# Patient Record
Sex: FEMALE | Race: WHITE | NOT HISPANIC OR LATINO | Employment: OTHER | URBAN - METROPOLITAN AREA
[De-identification: names, ages, dates, MRNs, and addresses within clinical notes are randomized per-mention and may not be internally consistent; named-entity substitution may affect disease eponyms.]

---

## 2017-01-05 ENCOUNTER — ALLIED HEALTH/NURSE VISIT (OUTPATIENT)
Dept: FAMILY MEDICINE | Facility: CLINIC | Age: 67
End: 2017-01-05
Payer: COMMERCIAL

## 2017-01-05 DIAGNOSIS — Z23 ENCOUNTER FOR IMMUNIZATION: Primary | ICD-10-CM

## 2017-01-05 PROCEDURE — 99207 ZZC NO CHARGE NURSE ONLY: CPT

## 2017-01-05 PROCEDURE — 90732 PPSV23 VACC 2 YRS+ SUBQ/IM: CPT

## 2017-01-05 PROCEDURE — G0009 ADMIN PNEUMOCOCCAL VACCINE: HCPCS

## 2017-01-05 NOTE — NURSING NOTE
Prior to injection verified patient identity using patient's name and date of birth.  Screening Questionnaire for Adult Immunization    Are you sick today?   No   Do you have allergies to medications, food, a vaccine component or latex?   No   Have you ever had a serious reaction after receiving a vaccination?   No   Do you have a long-term health problem with heart disease, lung disease, asthma, kidney disease, metabolic disease (e.g. diabetes), anemia, or other blood disorder?   No   Do you have cancer, leukemia, HIV/AIDS, or any other immune system problem?   No   In the past 3 months, have you taken medications that affect  your immune system, such as prednisone, other steroids, or anticancer drugs; drugs for the treatment of rheumatoid arthritis, Crohn s disease, or psoriasis; or have you had radiation treatments?   No   Have you had a seizure, or a brain or other nervous system problem?   No   During the past year, have you received a transfusion of blood or blood     products, or been given immune (gamma) globulin or antiviral drug?   No   For women: Are you pregnant or is there a chance you could become        pregnant during the next month?   No   Have you received any vaccinations in the past 4 weeks?   No     Immunization questionnaire answers were all negative.      MNVFC doesn't apply on this patient   Patient instructed to remain in clinic for 20 minutes afterwards, and to report any adverse reaction to me immediately.       Screening performed by Mary Cade on 1/5/2017 at 2:45 PM.

## 2017-01-12 ENCOUNTER — TELEPHONE (OUTPATIENT)
Dept: FAMILY MEDICINE | Facility: CLINIC | Age: 67
End: 2017-01-12

## 2017-01-12 DIAGNOSIS — B00.1 RECURRENT COLD SORES: Primary | ICD-10-CM

## 2017-01-12 RX ORDER — VALACYCLOVIR HYDROCHLORIDE 1 G/1
2000 TABLET, FILM COATED ORAL 2 TIMES DAILY
Qty: 4 TABLET | Refills: 0 | Status: SHIPPED | OUTPATIENT
Start: 2017-01-12 | End: 2017-04-21

## 2017-01-12 NOTE — TELEPHONE ENCOUNTER
Valtrex 1gm       Last Written Prescription Date:  Not on med list   Last Fill Quantity: ,   # refills:   Last Office Visit with [facility] or [facility] prescribing provider: 10/25/16  Future Office visit:       Routing refill request to provider for review/approval because:  Drug not active on patient's medication list    Thank you!  Sultana Burnham   [facility]  P: [phone] F: [phone]

## 2017-01-30 ENCOUNTER — TELEPHONE (OUTPATIENT)
Dept: FAMILY MEDICINE | Facility: CLINIC | Age: 67
End: 2017-01-30

## 2017-02-01 ENCOUNTER — RADIANT APPOINTMENT (OUTPATIENT)
Dept: GENERAL RADIOLOGY | Facility: CLINIC | Age: 67
End: 2017-02-01
Attending: PHYSICIAN ASSISTANT
Payer: COMMERCIAL

## 2017-02-01 ENCOUNTER — OFFICE VISIT (OUTPATIENT)
Dept: FAMILY MEDICINE | Facility: CLINIC | Age: 67
End: 2017-02-01
Payer: COMMERCIAL

## 2017-02-01 VITALS
TEMPERATURE: 97.4 F | HEART RATE: 80 BPM | HEIGHT: 67 IN | BODY MASS INDEX: 24.77 KG/M2 | SYSTOLIC BLOOD PRESSURE: 100 MMHG | WEIGHT: 157.8 LBS | RESPIRATION RATE: 18 BRPM | DIASTOLIC BLOOD PRESSURE: 62 MMHG

## 2017-02-01 DIAGNOSIS — M79.632 PAIN OF LEFT FOREARM: ICD-10-CM

## 2017-02-01 DIAGNOSIS — R11.0 NAUSEA: ICD-10-CM

## 2017-02-01 DIAGNOSIS — I63.50 CEREBRAL ARTERY OCCLUSION WITH CEREBRAL INFARCTION (H): ICD-10-CM

## 2017-02-01 DIAGNOSIS — M79.632 PAIN OF LEFT FOREARM: Primary | ICD-10-CM

## 2017-02-01 DIAGNOSIS — Z12.11 COLON CANCER SCREENING: ICD-10-CM

## 2017-02-01 DIAGNOSIS — J34.89 NASAL DISCHARGE: ICD-10-CM

## 2017-02-01 PROCEDURE — 73090 X-RAY EXAM OF FOREARM: CPT | Mod: LT

## 2017-02-01 PROCEDURE — 99214 OFFICE O/P EST MOD 30 MIN: CPT | Performed by: PHYSICIAN ASSISTANT

## 2017-02-01 ASSESSMENT — PAIN SCALES - GENERAL: PAINLEVEL: NO PAIN (0)

## 2017-02-01 NOTE — PATIENT INSTRUCTIONS
(M79.712) Pain of left forearm  (primary encounter diagnosis)  Comment: Unclear cause, suspect injury that you don't recall.  If xray normal, consider giving this 2 wks or trying splint for 2 wks.  If that doesn't work, then Physical Therapy.    Plan: XR Forearm Left 2 Views    (I63.50) Cerebral artery occlusion with cerebral infarction (H)  Plan: continue pravastatin, discussed krill oil no proven benefit    (R11.0) Nausea  Plan: try omeprazole 20 mg daily x 2 wks, then can try off med but may need for up to 8 weeks.  If never helps at all, need to scope.  Can also be from nose drainage, but not the bad burps.    (J34.89) Nasal discharge  Plan: Unclear sinus infection and already doing nasal spray and rinses, so have ENT take a look.    (Z12.11) Colon cancer screening  Comment: set up colonoscopy!  Plan: GASTROENTEROLOGY ADULT REF PROCEDURE ONLY

## 2017-02-01 NOTE — NURSING NOTE
"Chief Complaint   Patient presents with     Musculoskeletal Problem     Arm tender     /62 mmHg  Pulse 80  Temp(Src) 97.4  F (36.3  C) (Tympanic)  Resp 18  Ht 5' 6.85\" (1.698 m)  Wt 157 lb 12.8 oz (71.578 kg)  BMI 24.83 kg/m2 Estimated body mass index is 24.83 kg/(m^2) as calculated from the following:    Height as of this encounter: 5' 6.85\" (1.698 m).    Weight as of this encounter: 157 lb 12.8 oz (71.578 kg).  bp completed using cuff size: regular      Health Maintenance that is potentially due pending provider review:  NONE    Ayanna Dominguez MA  8:00 AM 2/1/2017      "

## 2017-02-01 NOTE — MR AVS SNAPSHOT
After Visit Summary   2/1/2017    Chyna Moncada    MRN: [id number]           Patient Information     Date Of Birth          1950        Visit Information        Provider Department      2/1/2017 7:40 AM Edilia Davison PA-C [facility]        Today's Diagnoses     Pain of left forearm    -  1     Cerebral artery occlusion with cerebral infarction (H)         Nausea         Nasal discharge         Colon cancer screening           Care Instructions    (M79.632) Pain of left forearm  (primary encounter diagnosis)  Comment: Unclear cause, suspect injury that you don't recall.  If xray normal, consider giving this 2 wks or trying splint for 2 wks.  If that doesn't work, then Physical Therapy.    Plan: XR Forearm Left 2 Views    (I63.50) Cerebral artery occlusion with cerebral infarction (H)  Plan: continue pravastatin, discussed krill oil no proven benefit    (R11.0) Nausea  Plan: try omeprazole 20 mg daily x 2 wks, then can try off med but may need for up to 8 weeks.  If never helps at all, need to scope.  Can also be from nose drainage, but not the bad burps.    (J34.89) Nasal discharge  Plan: Unclear sinus infection and already doing nasal spray and rinses, so have ENT take a look.    (Z12.11) Colon cancer screening  Comment: set up colonoscopy!  Plan: GASTROENTEROLOGY ADULT REF PROCEDURE ONLY            Follow-ups after your visit        Additional Services     GASTROENTEROLOGY ADULT REF PROCEDURE ONLY       Last Lab Result: CREATININE (mg/dL)       Date                     Value                 10/01/2015               0.82             ----------  Body mass index is 24.83 kg/(m^2).      Patient will be contacted to schedule procedure.     Please be aware that coverage of these services is subject to the terms and limitations of your health insurance plan.  Call member services at your health plan with any benefit or coverage questions.  Any procedures must be performed  at a [facility] OR coordinated by your clinic's referral office.    Please bring the following with you to your appointment:    (1) Any X-Rays, CTs or MRIs which have been performed.  Contact the facility where they were done to arrange for  prior to your scheduled appointment.    (2) List of current medications   (3) This referral request   (4) Any documents/labs given to you for this referral                  Your next 10 appointments already scheduled     Feb 02, 2017 10:00 AM   Office Visit with Mando Bradford [facility]    [address] [place] [id number]   [phone]           Bring a current list of meds and any records pertaining to this visit.  For Physicals, please bring immunization records and any forms needing to be filled out.  Please arrive 10 minutes early to complete paperwork.              Future tests that were ordered for you today     Open Future Orders        Priority Expected Expires Ordered    XR Forearm Left 2 Views Routine 2/1/2017 2/1/2018 2/1/2017            Who to contact     If you have questions or need follow up information about today's clinic visit or your schedule please contact [facility] directly at [phone].  Normal or non-critical lab and imaging results will be communicated to you by MyChart, letter or phone within 4 business days after the clinic has received the results. If you do not hear from us within 7 days, please contact the clinic through [employer] or phone. If you have a critical or abnormal lab result, we will notify you by phone as soon as possible.  Submit refill requests through 365[employer] or call your pharmacy and they will forward the refill request to us. Please allow 3 business days for your refill to be completed.          Additional Information About Your Visit        365webcall Information     365webcall lets you send messages to your doctor, view your  "test results, renew your prescriptions, schedule appointments and more. To sign up, go to [email or web address] . Click on \"Log in\" on the left side of the screen, which will take you to the Welcome page. Then click on \"Sign up Now\" on the right side of the page.     You will be asked to enter the access code listed below, as well as some personal information. Please follow the directions to create your username and password.     Your access code is: [email or web address]  Expires: 2017  8:28 AM     Your access code will  in 90 days. If you need help or a new code, please call your [place] clinic or [phone].        Care EveryWhere ID     This is your [facility] EveryWhere ID. This could be used by other organizations to access your [place] medical records  [id number]        Your Vitals Were     Pulse Temperature Respirations Height BMI (Body Mass Index)       80 97.4  F (36.3  C) (Tympanic) 18 5' 6.85\" (1.698 m) 24.83 kg/m2        Blood Pressure from Last 3 Encounters:   17 100/62   10/25/16 132/82   10/13/16 100/74    Weight from Last 3 Encounters:   17 157 lb 12.8 oz (71.578 kg)   10/25/16 154 lb (69.854 kg)   10/13/16 153 lb (69.4 kg)              We Performed the Following     GASTROENTEROLOGY ADULT REF PROCEDURE ONLY          Today's Medication Changes          These changes are accurate as of: 17  8:28 AM.  If you have any questions, ask your nurse or doctor.               Stop taking these medicines if you haven't already. Please contact your care team if you have questions.     vitamin E 1000 UNIT capsule   Commonly known as:  TOCOPHEROL   Stopped by:  Edilia Davison PA-C                    Primary Care Provider Office Phone # Fax #    Edilia Davison PA-C [phone]       [address] [address]        Thank you!     Thank you for choosing [facility]  for your care. Our goal is always to provide you " with excellent care. Hearing back from our patients is one way we can continue to improve our services. Please take a few minutes to complete the written survey that you may receive in the mail after your visit with us. Thank you!             Your Updated Medication List - Protect others around you: Learn how to safely use, store and throw away your medicines at www.disposemymeds.org.          This list is accurate as of: 2/1/17  8:28 AM.  Always use your most recent med list.                   Brand Name Dispense Instructions for use    aspirin EC 81 MG EC tablet      Take 81 mg by mouth       calcium-vitamin D 600-400 MG-UNIT per tablet    CALTRATE     Take 1 tablet by mouth       CINNAMON PO      2 capsules per day       CULTURELLE PO      Take by mouth daily       fluticasone 50 MCG/ACT spray    FLONASE    1 Package    Spray 2 sprays into both nostrils daily       GARLIQUE PO      Take 1 tablet by mouth daily       IRON SUPPLEMENT PO      Take 325 mg by mouth daily       LORazepam 0.5 MG tablet    ATIVAN    10 tablet    Take 1 tablet (0.5 mg) by mouth every 6 hours as needed       magnesium 100 MG Caps          multivitamin Tabs tablet      Take 1 tablet by mouth daily       pravastatin 40 MG tablet    PRAVACHOL    30 tablet    Take 1 tablet (40 mg) by mouth At Bedtime       Propylene Glycol-Glycerin 1-0.3 % Soln      Place 1 drop into both eyes as needed       valACYclovir 1000 mg tablet    VALTREX    4 tablet    Take 2 tablets (2,000 mg) by mouth 2 times daily       VITAMIN D-3 PO      Take 1,000 Units by mouth daily

## 2017-02-01 NOTE — PROGRESS NOTES
"  SUBJECTIVE:                                                    Chyna Moncada is a 66 year old female who presents to clinic today for the following health issues:    1.) Possible Sinus Infection  R side runs all the time - unilateral  Can't blow much out    Not history of freq sinus infections  Has used nasacort, nasal rinses    2.) Nausea x 2 weeks  Variable, can be very mild to very bothersome.    Never like will throw up.  Normal appetite and no change in what she eats.  No NSAIDS.  Stools - more often but not black.  Bad tasting burps    3) discuss cholesterol test  On pravastatin, would prefer to be on just krill   History of stroke.    Tender Arm     Onset: Friday 1/27/17    Description:   Location: Left Arm/Wrist  Character: Dull ache    Intensity: moderate    Progression of Symptoms: same    Accompanying Signs & Symptoms:  Other symptoms: Bruising and very tender   History:   Previous similar pain: no       Precipitating factors:   Trauma or overuse: no     Alleviating factors:  Improved by: nothing       Therapies Tried and outcome: None    No injury  Had bad bruise  Was very tender to just touch the other day   Sometimes awakens her at night.  Aches in day.    Sometimes has to change position of arm.    Due for colonoscopy.    Problem list and histories reviewed & adjusted, as indicated.  Additional history: as documented    Problem list, Medication list, Allergies, and Medical/Social/Surgical histories reviewed in EPIC and updated as appropriate.    ROS:  Constitutional, HEENT, cardiovascular, GI, musculoskeletal, neuro systems are negative, except as otherwise noted.    OBJECTIVE:                                                    /62 mmHg  Pulse 80  Temp(Src) 97.4  F (36.3  C) (Tympanic)  Resp 18  Ht 5' 6.85\" (1.698 m)  Wt 157 lb 12.8 oz (71.578 kg)  BMI 24.83 kg/m2  Body mass index is 24.83 kg/(m^2).  GENERAL: healthy, alert and no distress  HEENT: ear canals and TM's normal, nose and " mouth without ulcers or lesions  MS: 3-4 in fading bruise over ventral distal L forearm, mild discomfort with wrist flexion otherwise normal ROM and no abnormality on palpation    Xray read by Edilia Davison PA-C as normal       ASSESSMENT/PLAN:                                                        ICD-10-CM    1. Pain of left forearm M79.632 XR Forearm Left 2 Views   2. Cerebral artery occlusion with cerebral infarction (H) I63.50    3. Nausea R11.0    4. Nasal discharge J34.89    5. Colon cancer screening Z12.11 GASTROENTEROLOGY ADULT REF PROCEDURE ONLY       Patient Instructions   (M79.632) Pain of left forearm  (primary encounter diagnosis)  Comment: Unclear cause, suspect injury that you don't recall.  If xray normal, consider giving this 2 wks or trying splint for 2 wks.  If that doesn't work, then Physical Therapy.    Plan: XR Forearm Left 2 Views    (I63.50) Cerebral artery occlusion with cerebral infarction (H)  Plan: continue pravastatin, discussed krill oil no proven benefit    (R11.0) Nausea  Plan: try omeprazole 20 mg daily x 2 wks, then can try off med but may need for up to 8 weeks.  If never helps at all, need to scope.  Can also be from nose drainage, but not the bad burps.    (J34.89) Nasal discharge  Plan: Unclear sinus infection and already doing nasal spray and rinses, so have ENT take a look.    (Z12.11) Colon cancer screening  Comment: set up colonoscopy!  Plan: GASTROENTEROLOGY ADULT REF PROCEDURE ONLY            Edilia Davison PA-C  [facility]

## 2017-04-21 ENCOUNTER — OFFICE VISIT (OUTPATIENT)
Dept: FAMILY MEDICINE | Facility: CLINIC | Age: 67
End: 2017-04-21
Payer: COMMERCIAL

## 2017-04-21 VITALS
DIASTOLIC BLOOD PRESSURE: 70 MMHG | BODY MASS INDEX: 25.01 KG/M2 | HEIGHT: 66 IN | SYSTOLIC BLOOD PRESSURE: 102 MMHG | WEIGHT: 155.6 LBS | HEART RATE: 76 BPM

## 2017-04-21 DIAGNOSIS — Z12.11 COLON CANCER SCREENING: ICD-10-CM

## 2017-04-21 DIAGNOSIS — F41.9 ANXIETY: ICD-10-CM

## 2017-04-21 DIAGNOSIS — L82.1 SEBORRHEIC KERATOSES: ICD-10-CM

## 2017-04-21 DIAGNOSIS — I63.50 CEREBRAL ARTERY OCCLUSION WITH CEREBRAL INFARCTION (H): ICD-10-CM

## 2017-04-21 DIAGNOSIS — B00.1 RECURRENT COLD SORES: ICD-10-CM

## 2017-04-21 DIAGNOSIS — G47.00 INSOMNIA, UNSPECIFIED TYPE: Primary | ICD-10-CM

## 2017-04-21 PROCEDURE — 99214 OFFICE O/P EST MOD 30 MIN: CPT | Mod: 25 | Performed by: PHYSICIAN ASSISTANT

## 2017-04-21 PROCEDURE — 17110 DESTRUCTION B9 LES UP TO 14: CPT | Performed by: PHYSICIAN ASSISTANT

## 2017-04-21 RX ORDER — VALACYCLOVIR HYDROCHLORIDE 1 G/1
2000 TABLET, FILM COATED ORAL 2 TIMES DAILY
Qty: 4 TABLET | Refills: 11 | Status: SHIPPED | OUTPATIENT
Start: 2017-04-21 | End: 2018-08-13

## 2017-04-21 RX ORDER — AMITRIPTYLINE HYDROCHLORIDE 10 MG/1
10-20 TABLET ORAL AT BEDTIME
Qty: 60 TABLET | Refills: 11 | Status: SHIPPED | OUTPATIENT
Start: 2017-04-21 | End: 2017-06-21

## 2017-04-21 RX ORDER — LORAZEPAM 0.5 MG/1
0.5 TABLET ORAL EVERY 6 HOURS PRN
Qty: 10 TABLET | Refills: 0 | Status: SHIPPED | OUTPATIENT
Start: 2017-04-21 | End: 2019-06-04

## 2017-04-21 RX ORDER — PRAVASTATIN SODIUM 40 MG
40 TABLET ORAL AT BEDTIME
Qty: 90 TABLET | Refills: 1 | Status: SHIPPED | OUTPATIENT
Start: 2017-04-21 | End: 2017-06-21

## 2017-04-21 ASSESSMENT — ANXIETY QUESTIONNAIRES
6. BECOMING EASILY ANNOYED OR IRRITABLE: SEVERAL DAYS
GAD7 TOTAL SCORE: 1
3. WORRYING TOO MUCH ABOUT DIFFERENT THINGS: NOT AT ALL
2. NOT BEING ABLE TO STOP OR CONTROL WORRYING: NOT AT ALL
7. FEELING AFRAID AS IF SOMETHING AWFUL MIGHT HAPPEN: NOT AT ALL
5. BEING SO RESTLESS THAT IT IS HARD TO SIT STILL: NOT AT ALL
1. FEELING NERVOUS, ANXIOUS, OR ON EDGE: NOT AT ALL

## 2017-04-21 ASSESSMENT — PATIENT HEALTH QUESTIONNAIRE - PHQ9: 5. POOR APPETITE OR OVEREATING: NOT AT ALL

## 2017-04-21 NOTE — NURSING NOTE
"Chief Complaint   Patient presents with     Fatigue     pt. has a hardtime sleeping or stay asleep X 6 months plus. no energy     Derm Problem     check out spots on stomach and one on back      Lipids     Health Maintenance     pt would like talk about fit test instead of colonoscopy       Initial /70 (BP Location: Right arm, Patient Position: Chair, Cuff Size: Adult Regular)  Pulse 76  Ht 5' 6\" (1.676 m)  Wt 155 lb 9.6 oz (70.6 kg)  BMI 25.11 kg/m2 Estimated body mass index is 25.11 kg/(m^2) as calculated from the following:    Height as of this encounter: 5' 6\" (1.676 m).    Weight as of this encounter: 155 lb 9.6 oz (70.6 kg).  Medication Reconciliation: complete     Vanessa Leigh, CMA      "

## 2017-04-21 NOTE — MR AVS SNAPSHOT
After Visit Summary   4/21/2017    Chyna Moncada    MRN: [id number]           Patient Information     Date Of Birth          1950        Visit Information        Provider Department      4/21/2017 1:00 PM Edilia Davison PA-C [facility]        Today's Diagnoses     Insomnia, unspecified type    -  1    Cerebral artery occlusion with cerebral infarction (H)        Anxiety        Recurrent cold sores        Colon cancer screening        Seborrheic keratoses          Care Instructions    Insomnia -  Melatonin, valerian root, chamomile, lavender oil  If none of these work, then try prescription   Many different prescription options  Discussed many different prescription options.  All can potentially make tired in days.  Or risk falls if too groggy.  Chose med to help aches as well.    If fatigue doesn't improve with better sleep, lab work up      Refilled meds    Treated skin spots.  Likely to recur since they keep doing so despite different treatments.            Follow-ups after your visit        Future tests that were ordered for you today     Open Future Orders        Priority Expected Expires Ordered    Fecal colorectal cancer screen (FIT) Routine 5/12/2017 7/14/2017 4/21/2017            Who to contact     If you have questions or need follow up information about today's clinic visit or your schedule please contact [facility] directly at [phone].  Normal or non-critical lab and imaging results will be communicated to you by MyChart, letter or phone within 4 business days after the clinic has received the results. If you do not hear from us within 7 days, please contact the clinic through MyChart or phone. If you have a critical or abnormal lab result, we will notify you by phone as soon as possible.  Submit refill requests through [employer] or call your pharmacy and they will forward the refill request to us. Please allow 3 business days for  "your refill to be completed.          Additional Information About Your Visit        [employer] Information     [employer] lets you send messages to your doctor, view your test results, renew your prescriptions, schedule appointments and more. To sign up, go to [email or web address] . Click on \"Log in\" on the left side of the screen, which will take you to the Welcome page. Then click on \"Sign up Now\" on the right side of the page.     You will be asked to enter the access code listed below, as well as some personal information. Please follow the directions to create your username and password.     Your access code is: [email or web address]  Expires: 2017  9:28 AM     Your access code will  in 90 days. If you need help or a new code, please call your [place] clinic or [phone].        Care EveryWhere ID     This is your Care EveryWhere ID. This could be used by other organizations to access your [place] medical records  [id number]        Your Vitals Were     Pulse Height BMI (Body Mass Index)             76 5' 6\" (1.676 m) 25.11 kg/m2          Blood Pressure from Last 3 Encounters:   17 102/70   17 100/62   10/25/16 132/82    Weight from Last 3 Encounters:   17 155 lb 9.6 oz (70.6 kg)   17 157 lb 12.8 oz (71.6 kg)   10/25/16 154 lb (69.9 kg)              We Performed the Following     C DESTROY < 15 BENIGN SKIN LESIONS          Today's Medication Changes          These changes are accurate as of: 17  1:43 PM.  If you have any questions, ask your nurse or doctor.               Start taking these medicines.        Dose/Directions    amitriptyline 10 MG tablet   Commonly known as:  ELAVIL   Used for:  Insomnia, unspecified type   Started by:  Edilia Davison PA-C        Dose:  10-20 mg   Take 1-2 tablets (10-20 mg) by mouth At Bedtime   Quantity:  60 tablet   Refills:  11            Where to get your medicines      These medications were sent to [facility] - " [facility] [address] [place] [address]  [address] [place] [address]     Phone:  [phone]     amitriptyline 10 MG tablet    pravastatin 40 MG tablet    valACYclovir 1000 mg tablet         Some of these will need a paper prescription and others can be bought over the counter.  Ask your nurse if you have questions.     Bring a paper prescription for each of these medications     LORazepam 0.5 MG tablet                Primary Care Provider Office Phone # Fax #    Edilia Davison PA-C [phone]       [facility] [address] [place] [address]        Thank you!     Thank you for choosing [facility]  for your care. Our goal is always to provide you with excellent care. Hearing back from our patients is one way we can continue to improve our services. Please take a few minutes to complete the written survey that you may receive in the mail after your visit with us. Thank you!             Your Updated Medication List - Protect others around you: Learn how to safely use, store and throw away your medicines at www.disposemymeds.org.          This list is accurate as of: 4/21/17  1:43 PM.  Always use your most recent med list.                   Brand Name Dispense Instructions for use    amitriptyline 10 MG tablet    ELAVIL    60 tablet    Take 1-2 tablets (10-20 mg) by mouth At Bedtime       aspirin EC 81 MG EC tablet      Take 81 mg by mouth       calcium-vitamin D 600-400 MG-UNIT per tablet    CALTRATE     Take 1 tablet by mouth       CINNAMON PO      2 capsules per day       CULTURELLE PO      Take by mouth daily       fluticasone 50 MCG/ACT spray    FLONASE    1 Package    Spray 2 sprays into both nostrils daily       GARLIQUE PO      Take 1 tablet by mouth daily       IRON SUPPLEMENT PO      Take 325 mg by mouth daily       LORazepam 0.5 MG tablet    ATIVAN    10 tablet    Take 1 tablet (0.5 mg) by mouth every 6 hours as needed       magnesium 100 MG  Caps          multivitamin Tabs tablet      Take 1 tablet by mouth daily       order for DME     1 Device    Equipment being ordered: Wrist Brace       pravastatin 40 MG tablet    PRAVACHOL    90 tablet    Take 1 tablet (40 mg) by mouth At Bedtime       Propylene Glycol-Glycerin 1-0.3 % Soln      Place 1 drop into both eyes as needed       valACYclovir 1000 mg tablet    VALTREX    4 tablet    Take 2 tablets (2,000 mg) by mouth 2 times daily       VITAMIN D-3 PO      Take 1,000 Units by mouth daily

## 2017-04-21 NOTE — PATIENT INSTRUCTIONS
Insomnia -  Melatonin, valerian root, chamomile, lavender oil  If none of these work, then try prescription   Many different prescription options  Discussed many different prescription options.  All can potentially make tired in days.  Or risk falls if too groggy.  Chose med to help aches as well.    If fatigue doesn't improve with better sleep, lab work up      Refilled meds    Treated skin spots.  Likely to recur since they keep doing so despite different treatments.

## 2017-04-21 NOTE — PROGRESS NOTES
"Chief Complaint   Patient presents with     Fatigue     pt. has a hardtime sleeping or stay asleep X 6 months plus. no energy     Derm Problem     check out spots on stomach and one on back      Lipids     Health Maintenance     pt would like talk about fit test instead of colonoscopy       SUBJECTIVE:                                                    Chyna Moncada is a 66 year old female who presents to clinic today for the following health issues:      Hyperlipidemia Follow-Up      Rate your low fat/cholesterol diet?: poor    Taking statin?  Yes, no muscle aches from statin    Other lipid medications/supplements?:  none       Amount of exercise or physical activity: 2-3 days/week for an average of greater than 60 minutes    Problems taking medications regularly: No    Medication side effects: none    Diet: regular (no restrictions)    Tired.  Sleep troubles - staying asleep.  \"Brain never stops\", but nothing on mind when she wakes up.  Often getting only 4 hrs.  Sometimes napping.  No history of sleep troubles previously.  Exercise in mornings.  1 glass wine at night - but doesn't seem to sleep differently with or without.  No major stressors.  No new supplements, herbs.      Lipids.  History CVA.  On pravastatin.  Myalgia with lovastatin in past.  Many achy areas of body currently.  Wants to stay on statin, unclear that symptoms are related, and history of stroke.    Cold sores - valtrex works.  Few months or more between symptoms.    Anxiety - not really a problem, still has most of ativan from 2015, but states wants on hand, is \"security blanket\".    Skin lesions- get stuck when showering.    Problem list and histories reviewed & adjusted, as indicated.  Additional history: as documented    BP Readings from Last 3 Encounters:   04/21/17 102/70   02/01/17 100/62   10/25/16 132/82    Wt Readings from Last 3 Encounters:   04/21/17 155 lb 9.6 oz (70.6 kg)   02/01/17 157 lb 12.8 oz (71.6 kg)   10/25/16 154 lb " "(69.9 kg)             Labs reviewed in EPIC    Reviewed and updated as needed this visit by clinical staff  Tobacco  Allergies  Med Hx  Surg Hx  Fam Hx  Soc Hx      Reviewed and updated as needed this visit by Provider         ROS:  Constitutional, HEENT, cardiovascular, GI, musculoskeletal, neuro, skin, and psych systems are negative, except as otherwise noted.    OBJECTIVE:                                                    /70 (BP Location: Right arm, Patient Position: Chair, Cuff Size: Adult Regular)  Pulse 76  Ht 5' 6\" (1.676 m)  Wt 155 lb 9.6 oz (70.6 kg)  BMI 25.11 kg/m2  Body mass index is 25.11 kg/(m^2).  GENERAL: healthy, alert and no distress  SKIN: no suspicious lesions or rashes, numerous SK      Diagnostic Test Results:  none      ASSESSMENT/PLAN:                                                        ICD-10-CM    1. Insomnia, unspecified type G47.00 amitriptyline (ELAVIL) 10 MG tablet   2. Cerebral artery occlusion with cerebral infarction (H) I63.50 pravastatin (PRAVACHOL) 40 MG tablet   3. Anxiety F41.9 LORazepam (ATIVAN) 0.5 MG tablet   4. Recurrent cold sores B00.1 valACYclovir (VALTREX) 1000 mg tablet   5. Colon cancer screening Z12.11 Fecal colorectal cancer screen (FIT)   6. Seborrheic keratoses L82.1 C DESTROY < 15 BENIGN SKIN LESIONS   SK treated with LN    Patient Instructions   Insomnia -  Melatonin, valerian root, chamomile, lavender oil  If none of these work, then try prescription   Many different prescription options  Discussed many different prescription options.  All can potentially make tired in days.  Or risk falls if too groggy.  Chose med to help aches as well.    If fatigue doesn't improve with better sleep, lab work up      Refilled meds    Treated skin spots.  Likely to recur since they keep doing so despite different treatments.          Edilia Davison PA-C  [facility]    "

## 2017-04-22 ASSESSMENT — PATIENT HEALTH QUESTIONNAIRE - PHQ9: SUM OF ALL RESPONSES TO PHQ QUESTIONS 1-9: 11

## 2017-04-22 ASSESSMENT — ANXIETY QUESTIONNAIRES: GAD7 TOTAL SCORE: 1

## 2017-05-08 PROCEDURE — G0328 FECAL BLOOD SCRN IMMUNOASSAY: HCPCS | Performed by: PHYSICIAN ASSISTANT

## 2017-05-09 DIAGNOSIS — Z12.11 COLON CANCER SCREENING: ICD-10-CM

## 2017-05-09 LAB — HEMOCCULT STL QL IA: NEGATIVE

## 2017-05-20 DIAGNOSIS — I63.50 CEREBRAL ARTERY OCCLUSION WITH CEREBRAL INFARCTION (H): ICD-10-CM

## 2017-05-22 ENCOUNTER — TELEPHONE (OUTPATIENT)
Dept: FAMILY MEDICINE | Facility: CLINIC | Age: 67
End: 2017-05-22

## 2017-05-22 DIAGNOSIS — S13.9XXD SPRAIN OF NECK, SUBSEQUENT ENCOUNTER: Primary | ICD-10-CM

## 2017-05-22 RX ORDER — PRAVASTATIN SODIUM 40 MG
TABLET ORAL
Qty: 90 TABLET | Refills: 1 | Status: SHIPPED | OUTPATIENT
Start: 2017-05-22 | End: 2017-10-16

## 2017-05-22 NOTE — TELEPHONE ENCOUNTER
Reason for Call: Request for an order or referral:    Order or referral being requested: Chyna is asking if Edilia would refer her back to Physical Therapy. She is having pain again in her Right shoulder into the neck. She would like to see Deana again.  (Brooklynn Rojas had referred her 10/25/16)    Date needed: as soon as possible    Has the patient been seen by the PCP for this problem? YES    Additional comments: She was last seen in PT in December 2016    Phone number Patient can be reached at:  Home number on file [phone] (home)    Best Time:  anytime    Can we leave a detailed message on this number?  YES    Call taken on 5/22/2017 at 12:05 PM by Cassy Slaughter

## 2017-05-22 NOTE — TELEPHONE ENCOUNTER
myrtle     Last Written Prescription Date: 4/21/17  Last Fill Quantity: 90, # refills: 1  Last Office Visit with G, P or [facility] prescribing provider: 4/21/17       Lab Results   Component Value Date    CHOL 229 10/13/2016     Lab Results   Component Value Date    HDL 81 10/13/2016     Lab Results   Component Value Date     10/13/2016     Lab Results   Component Value Date    TRIG 135 10/13/2016     Lab Results   Component Value Date    CHOLHDLRATIO 2.9 10/06/2015

## 2017-05-30 ENCOUNTER — HOSPITAL ENCOUNTER (EMERGENCY)
Facility: CLINIC | Age: 67
Discharge: HOME | End: 2017-05-30
Attending: PHYSICIAN ASSISTANT | Admitting: PHYSICIAN ASSISTANT
Payer: MEDICARE

## 2017-05-30 ENCOUNTER — APPOINTMENT (OUTPATIENT)
Dept: GENERAL RADIOLOGY | Facility: CLINIC | Age: 67
End: 2017-05-30
Attending: PHYSICIAN ASSISTANT
Payer: MEDICARE

## 2017-05-30 VITALS
SYSTOLIC BLOOD PRESSURE: 138 MMHG | OXYGEN SATURATION: 98 % | BODY MASS INDEX: 25.02 KG/M2 | DIASTOLIC BLOOD PRESSURE: 78 MMHG | TEMPERATURE: 98.7 F | WEIGHT: 155 LBS

## 2017-05-30 DIAGNOSIS — M25.512 ACUTE PAIN OF LEFT SHOULDER: ICD-10-CM

## 2017-05-30 PROCEDURE — 73030 X-RAY EXAM OF SHOULDER: CPT | Mod: LT

## 2017-05-30 PROCEDURE — 71101 X-RAY EXAM UNILAT RIBS/CHEST: CPT | Mod: LT

## 2017-05-30 PROCEDURE — A9270 NON-COVERED ITEM OR SERVICE: HCPCS | Mod: GY | Performed by: PHYSICIAN ASSISTANT

## 2017-05-30 PROCEDURE — 99213 OFFICE O/P EST LOW 20 MIN: CPT | Performed by: PHYSICIAN ASSISTANT

## 2017-05-30 PROCEDURE — 25000132 ZZH RX MED GY IP 250 OP 250 PS 637: Mod: GY | Performed by: PHYSICIAN ASSISTANT

## 2017-05-30 PROCEDURE — 99213 OFFICE O/P EST LOW 20 MIN: CPT

## 2017-05-30 RX ORDER — IBUPROFEN 200 MG
600 TABLET ORAL ONCE
Status: COMPLETED | OUTPATIENT
Start: 2017-05-30 | End: 2017-05-30

## 2017-05-30 RX ADMIN — IBUPROFEN 600 MG: 200 TABLET, FILM COATED ORAL at 20:16

## 2017-05-30 NOTE — ED AVS SNAPSHOT
[facility] Emergency Department    [address] [address]    Phone:  [phone]    Fax:  [phone]                                       Chyna Moncada   MRN: [id number]    Department:  [facility] Emergency Department   Date of Visit:  5/30/2017           Patient Information     Date Of Birth          1950        Your diagnoses for this visit were:     Acute pain of left shoulder        You were seen by Elsi Lynn PA-C.      Follow-up Information     Follow up with Edilia Davison PA-C In 1 week.    Specialty:  Physician Assistant    Why:  if no improvement or sooner if new or worsening symptoms     Contact information:    [facility]  [address] [facility] [address]  [phone]          Follow up with [facility].    Specialty:  Orthopedics and Sports Medicine    Why:  As needed, If symptoms worsen    Contact information:    [address]  [address]  [place] [place] [address] [phone]      Future Appointments        Provider Department Dept Phone Center    5/31/2017  9:00 AM Mary Jane Rocha, PT [facility] Physical Therapy [phone] [facility]    6/12/2017 8:00 AM [facility] MAMMO ROOM 1 [facility] [phone] [facility]      24 Hour Appointment Hotline       To make an appointment at any [facility], call [phone] ([phone]). If you don't have a family doctor or clinic, we will help you find one. [facility] are conveniently located to serve the needs of you and your family.          ED Discharge Orders     LUCINA MARSHALL                    Review of your medicines      Our records show that you are taking the medicines listed below. If these are incorrect, please call your family doctor or clinic.        Dose / Directions Last dose taken    amitriptyline 10 MG tablet   Commonly known as:  ELAVIL   Dose:  10-20 mg   Quantity:  60 tablet         Take 1-2 tablets (10-20 mg) by mouth At Bedtime   Refills:  11        aspirin EC 81 MG EC tablet   Dose:  81 mg        Take 81 mg by mouth   Refills:  0        calcium-vitamin D 600-400 MG-UNIT per tablet   Commonly known as:  CALTRATE   Dose:  1 tablet        Take 1 tablet by mouth   Refills:  0        CINNAMON PO        2 capsules per day   Refills:  0        CULTURELLE PO   Indication:  Pt unsure of dosage        Take by mouth daily   Refills:  0        fluticasone 50 MCG/ACT spray   Commonly known as:  FLONASE   Dose:  2 spray   Quantity:  1 Package        Spray 2 sprays into both nostrils daily   Refills:  11        GARLIQUE PO   Dose:  1 tablet        Take 1 tablet by mouth daily   Refills:  0        IRON SUPPLEMENT PO   Dose:  325 mg        Take 325 mg by mouth daily   Refills:  0        LORazepam 0.5 MG tablet   Commonly known as:  ATIVAN   Dose:  0.5 mg   Quantity:  10 tablet        Take 1 tablet (0.5 mg) by mouth every 6 hours as needed   Refills:  0        magnesium 100 MG Caps        Refills:  0        multivitamin Tabs tablet   Dose:  1 tablet        Take 1 tablet by mouth daily   Refills:  0        order for DME   Quantity:  1 Device        Equipment being ordered: Wrist Brace   Refills:  0        * pravastatin 40 MG tablet   Commonly known as:  PRAVACHOL   Dose:  40 mg   Quantity:  90 tablet        Take 1 tablet (40 mg) by mouth At Bedtime   Refills:  1        * pravastatin 40 MG tablet   Commonly known as:  PRAVACHOL   Quantity:  90 tablet        TAKE ONE TABLET BY MOUTH EVERY NIGHT AT BEDTIME   Refills:  1        Propylene Glycol-Glycerin 1-0.3 % Soln   Dose:  1 drop        Place 1 drop into both eyes as needed   Refills:  0        valACYclovir 1000 mg tablet   Commonly known as:  VALTREX   Dose:  2000 mg   Quantity:  4 tablet        Take 2 tablets (2,000 mg) by mouth 2 times daily   Refills:  11        VITAMIN D-3 PO   Dose:  1000 Units        Take 1,000 Units by mouth daily   Refills:  0        *  Notice:  This list has 2 medication(s) that are the same as other medications prescribed for you. Read the directions carefully, and ask your doctor or other care provider to review them with you.            Procedures and tests performed during your visit     Ribs XR, unilat 3 views + PA chest,  left    Shoulder XR, 2 view left      Orders Needing Specimen Collection     None      Pending Results     Date and Time Order Name Status Description    5/30/2017 2014 Ribs XR, unilat 3 views + PA chest,  left Preliminary     5/30/2017 2014 Shoulder XR, 2 view left Preliminary             Pending Culture Results     No orders found from 5/28/2017 to 5/31/2017.            Pending Results Instructions     If you had any lab results that were not finalized at the time of your Discharge, you can call the ED Lab Result RN at [phone]. You will be contacted by this team for any positive Lab results or changes in treatment. The nurses are available 7 days a week from 10A to 6:30P.  You can leave a message 24 hours per day and they will return your call.        Test Results From Your Hospital Stay        5/30/2017  8:52 PM      Narrative     LEFT SHOULDER THREE VIEWS  5/30/2017 8:39 PM    HISTORY:  Pain after falling.    COMPARISON:  None.    FINDINGS:  No fracture or osseous lesion is seen. The joint spaces are  well preserved. There are two ossifications projected over the medial  aspect of the left humeral neck on the frontal view, the larger  measuring 1.6 cm and the smaller measuring 0.3 cm. These could  represent loose bodies. No other soft tissue pathology is seen.        Impression     IMPRESSION: No definite acute abnormality is seen. There may be loose  bodies adjacent to the medial humeral neck.          5/30/2017  8:54 PM      Narrative     CHEST AND LEFT RIBS THREE VIEWS   5/30/2017 8:39 PM    HISTORY:  Pain after falling.    COMPARISON:  Chest radiographs on 4/25/2016.    FINDINGS:  The heart size is normal. No  "mediastinal pathology is seen.  The lungs are clear. The pulmonary vasculature is normal. No  pneumothorax or pleural effusion is seen. No rib fracture or other  chest wall pathology is seen.        Impression     IMPRESSION:  Unremarkable chest and left ribs.                Thank you for choosing [facility]       Thank you for choosing [facility] for your care. Our goal is always to provide you with excellent care. Hearing back from our patients is one way we can continue to improve our services. Please take a few minutes to complete the written survey that you may receive in the mail after you visit with us. Thank you!        [employer] Information     [employer] lets you send messages to your doctor, view your test results, renew your prescriptions, schedule appointments and more. To sign up, go to [email or web address] . Click on \"Log in\" on the left side of the screen, which will take you to the Welcome page. Then click on \"Sign up Now\" on the right side of the page.     You will be asked to enter the access code listed below, as well as some personal information. Please follow the directions to create your username and password.     Your access code is: [email or web address]  Expires: 2017  9:03 PM     Your access code will  in 90 days. If you need help or a new code, please call your [facility] clinic or [phone].        Care EveryWhere ID     This is your Care EveryWhere ID. This could be used by other organizations to access your [facility] medical records  [id number]        After Visit Summary       This is your record. Keep this with you and show to your community pharmacist(s) and doctor(s) at your next visit.                  "

## 2017-05-30 NOTE — ED AVS SNAPSHOT
[facility] Emergency Department    [address] [address]    Phone:  [phone]    Fax:  [phone]                                       Chyna Moncada   MRN: [id number]    Department:  [facility] Emergency Department   Date of Visit:  5/30/2017           After Visit Summary Signature Page     I have received my discharge instructions, and my questions have been answered. I have discussed any challenges I see with this plan with the nurse or doctor.    ..........................................................................................................................................  Patient/Patient Representative Signature      ..........................................................................................................................................  Patient Representative Print Name and Relationship to Patient    ..................................................               ................................................  Date                                            Time    ..........................................................................................................................................  Reviewed by Signature/Title    ...................................................              ..............................................  Date                                                            Time

## 2017-05-31 NOTE — ED PROVIDER NOTES
History     Chief Complaint   Patient presents with     Shoulder Pain     after fall     HPI  Chyna Moncada is a 66 year old female who presents to the urgent care with concerns over left shoulder/back pain after she sustained a fall on the shower just prior to arrival.  Patient states she slipped on a wet shower floor and fell landing on her left side.  She states she did hit her head during the fall but denies any LOC.  She denies any current headache, dizziness, lightheadedness, distal numbness or paresthesias.  She does complain of left sided upper back/posterior shoulder pain back which is exacerbated by deep breathing.  She has not had any actual shortness of breath.  She has not attempted any OTC treatments.     Past Medical History:   Diagnosis Date     Cerebral embolism with cerebral infarction (H)      Current Outpatient Prescriptions   Medication Sig Dispense Refill     pravastatin (PRAVACHOL) 40 MG tablet TAKE ONE TABLET BY MOUTH EVERY NIGHT AT BEDTIME 90 tablet 1     valACYclovir (VALTREX) 1000 mg tablet Take 2 tablets (2,000 mg) by mouth 2 times daily 4 tablet 11     pravastatin (PRAVACHOL) 40 MG tablet Take 1 tablet (40 mg) by mouth At Bedtime 90 tablet 1     LORazepam (ATIVAN) 0.5 MG tablet Take 1 tablet (0.5 mg) by mouth every 6 hours as needed 10 tablet 0     amitriptyline (ELAVIL) 10 MG tablet Take 1-2 tablets (10-20 mg) by mouth At Bedtime 60 tablet 11     Lactobacillus Rhamnosus, GG, (CULTURELLE PO) Take by mouth daily       Cholecalciferol (VITAMIN D-3 PO) Take 1,000 Units by mouth daily       order for DME Equipment being ordered: Wrist Brace 1 Device 0     aspirin EC 81 MG tablet Take 81 mg by mouth       calcium-vitamin D (CALTRATE) 600-400 MG-UNIT per tablet Take 1 tablet by mouth       CINNAMON PO 2 capsules per day       Garlic (GARLIQUE PO) Take 1 tablet by mouth daily       Ferrous Sulfate (IRON SUPPLEMENT PO) Take 325 mg by mouth daily       magnesium 100 MG CAPS         multivitamin (OCUVITE) TABS Take 1 tablet by mouth daily       fluticasone (FLONASE) 50 MCG/ACT nasal spray Spray 2 sprays into both nostrils daily 1 Package 11     Propylene Glycol-Glycerin 1-0.3 % SOLN Place 1 drop into both eyes as needed       Social History   Substance Use Topics     Smoking status: Former Smoker     Types: Cigarettes     Smokeless tobacco: Never Used     Alcohol use Yes      Comment: Occational     I have reviewed the Medications, Allergies, Past Medical and Surgical History, and Social History in the Epic system.    Review of Systems   CONSTITUTIONAL:NEGATIVE for fever, chills, change in weight  INTEGUMENTARY/SKIN: NEGATIVE for worrisome rashes, moles or lesions  EYES: NEGATIVE for vision changes  RESP:POSITIVE for pain with deep breathing and NEGATIVE for cough, shortness of breath, wheezing   GI: NEGATIVE for nausea, vomiting or abdominal pain   MUSCULOSKELETAL: POSITIVE  for left posterior shoulder/back pain   NEURO: NEGATIVE for headache, dizziness, lightheadedness, or distal numbness or paresthesias   Physical Exam   /78  Temp 98.7  F (37.1  C)  Wt 70.3 kg (155 lb)  SpO2 98%  BMI 25.02 kg/m2  Physical Exam   Constitutional: She is oriented to person, place, and time. She appears well-developed and well-nourished. She appears distressed (mild patient appears in pain).   Cardiovascular: Normal rate and regular rhythm.  Exam reveals no gallop and no friction rub.    No murmur heard.  Pulmonary/Chest: Effort normal and breath sounds normal. No respiratory distress. She has no wheezes. She has no rales.   Musculoskeletal:        Left shoulder: She exhibits decreased range of motion (actively wtih flexion, abduction duet to pain, full passive ROM) and tenderness. She exhibits no swelling, no effusion, no crepitus, no deformity and no laceration.        Cervical back: She exhibits normal range of motion, no tenderness, no bony tenderness, no swelling, no deformity and no laceration.         Thoracic back: She exhibits tenderness.        Back:    Neurological: She is alert and oriented to person, place, and time. No sensory deficit.   Skin: Skin is warm and dry. No abrasion, no ecchymosis, no laceration and no rash noted. No erythema.     ED Course     ED Course     Procedures        Critical Care time:  none            Results for orders placed or performed during the hospital encounter of 05/30/17   Shoulder XR, 2 view left    Narrative    LEFT SHOULDER THREE VIEWS  5/30/2017 8:39 PM    HISTORY:  Pain after falling.    COMPARISON:  None.    FINDINGS:  No fracture or osseous lesion is seen. The joint spaces are  well preserved. There are two ossifications projected over the medial  aspect of the left humeral neck on the frontal view, the larger  measuring 1.6 cm and the smaller measuring 0.3 cm. These could  represent loose bodies. No other soft tissue pathology is seen.      Impression    IMPRESSION: No definite acute abnormality is seen. There may be loose  bodies adjacent to the medial humeral neck.     ELVIS LINK MD   Ribs XR, unilat 3 views + PA chest,  left    Narrative    CHEST AND LEFT RIBS THREE VIEWS   5/30/2017 8:39 PM    HISTORY:  Pain after falling.    COMPARISON:  Chest radiographs on 4/25/2016.    FINDINGS:  The heart size is normal. No mediastinal pathology is seen.  The lungs are clear. The pulmonary vasculature is normal. No  pneumothorax or pleural effusion is seen. No rib fracture or other  chest wall pathology is seen.      Impression    IMPRESSION:  Unremarkable chest and left ribs.    ELVIS LINK MD     Labs Ordered and Resulted from Time of ED Arrival Up to the Time of Departure from the ED - No data to display    Assessments & Plan (with Medical Decision Making)     I have reviewed the nursing notes.    I have reviewed the findings, diagnosis, plan and need for follow up with the patient.  Discharge Medication List as of 5/30/2017  9:03 PM        Final  diagnoses:   Acute pain of left shoulder     66-year-old female presents to the urgent care concerns of her left-sided shoulder/upper back pain after she sustained a fall showers prior to arrival.  She had stable vital signs upon arrival.  Physical exam findings as described above.  Respiratory evaluation she did have x-rays of her left ribs and left shoulder.  Her shoulder x-ray did demonstrate any evidence of acute fracture, dislocation however there was incidental finding of  two ossifications projected over the medial aspect of the left humeral neck on the frontal view, the larger measuring 1.6 cm and the smaller measuring 0.3 cm. These could represent loose bodies per radiology report,  however this does not correlate with patients area of pain.  X-ray of her chest and ribs are negative for acute fracture, pneumothorax, pleural effusion or change cardiopulmonary vasculature.  This was reassured and normal findings.  She was discharged home stable with instructions for symptomatic treatment has been ice, Tylenol/ibuprofen as needed for discomfort.  I did discuss risk/benefits of prescription pain medication however patient is unable to tolerate long history of side effects, intolerances.  She was instructed to follow up with PCP if no improvement within the next 5-7 days.  Worrisome reasons to return to ER/UC sooner discussed.    5/30/2017   [facility] EMERGENCY DEPARTMENT     Elsi Lynn PA-C  06/01/17 1010

## 2017-06-01 ENCOUNTER — RADIANT APPOINTMENT (OUTPATIENT)
Dept: GENERAL RADIOLOGY | Facility: CLINIC | Age: 67
End: 2017-06-01
Attending: NURSE PRACTITIONER
Payer: COMMERCIAL

## 2017-06-01 ENCOUNTER — OFFICE VISIT (OUTPATIENT)
Dept: FAMILY MEDICINE | Facility: CLINIC | Age: 67
End: 2017-06-01
Payer: COMMERCIAL

## 2017-06-01 VITALS
HEART RATE: 69 BPM | BODY MASS INDEX: 25.99 KG/M2 | SYSTOLIC BLOOD PRESSURE: 126 MMHG | TEMPERATURE: 97.3 F | DIASTOLIC BLOOD PRESSURE: 78 MMHG | WEIGHT: 161 LBS

## 2017-06-01 DIAGNOSIS — M79.602 PAIN OF LEFT UPPER EXTREMITY: Primary | ICD-10-CM

## 2017-06-01 DIAGNOSIS — M79.602 PAIN OF LEFT UPPER EXTREMITY: ICD-10-CM

## 2017-06-01 PROCEDURE — 73060 X-RAY EXAM OF HUMERUS: CPT | Mod: LT

## 2017-06-01 PROCEDURE — 99213 OFFICE O/P EST LOW 20 MIN: CPT | Performed by: NURSE PRACTITIONER

## 2017-06-01 NOTE — PATIENT INSTRUCTIONS
Continue using your sling and Ibuprofen and tylenol as directed.    Arnica gel/cream can be applied for comfort as well.    Follow up with your PCP early next week for recheck.    Follow-up with your primary care provider next week and as needed.    Indications for emergent return to emergency department discussed with patient, who verbalized good understanding and agreement.  Patient understands the limitations of today's evaluation.         Upper Extremity Contusion  You have a contusion (bruise) of an upper extremity (arm, wrist, hand, or fingers). Symptoms include pain, swelling, and skin discoloration. No bones are broken. This injury may take from a few days to a few weeks to heal.  During that time, the bruise may change from reddish in color, to purple-blue, to green-yellow, to yellow-brown.  Home care    Unless another medication was prescribed, you can take acetaminophen, ibuprofen, or naproxen to control pain. (If you have chronic liver or kidney disease or ever had a stomach ulcer or GI bleeding, talk with your doctor before using these medicines.)    Elevate the injured area to reduce pain and swelling. As much as possible, sit or lie down with the injured area raised about the level of your heart. This is especially important during the first 48 hours.    Ice the injured area to help reduce pain and swelling. Wrap a cold source (ice pack or ice cubes in a plastic bag) in a thin towel. Apply to the bruised area for 20 minutes every 1 to 2 hours the first day. Continue this 3 to 4 times a day until the pain and swelling goes away.    If a sling was provided, you may remove it to shower or bathe. To prevent joint stiffness, do not wear it for more than one week.  Follow up  Follow up with your health care provider or our staff as advised. Call if you are not improving within the next 1 to 2 weeks.  When to seek medical advice   Call your health care provider right away if any of these occur:    Increased  pain or swelling    Hand or fingers become cold, blue, numb or tingly    Signs of infection: Warmth, drainage, or increased redness or pain around the injury    Inability to move the injured body part     Frequent bruising for unknown reasons    5496-3248 [employer]. [address] [place] [address]. All rights reserved. This information is not intended as a substitute for professional medical care. Always follow your healthcare professional's instructions.

## 2017-06-01 NOTE — NURSING NOTE
"Chief Complaint   Patient presents with     Musculoskeletal Problem     left arm pain       Initial /78 (BP Location: Right arm, Cuff Size: Adult Regular)  Pulse 69  Temp 97.3  F (36.3  C) (Tympanic)  Wt 161 lb (73 kg)  BMI 25.99 kg/m2 Estimated body mass index is 25.99 kg/(m^2) as calculated from the following:    Height as of 4/21/17: 5' 6\" (1.676 m).    Weight as of this encounter: 161 lb (73 kg).  Medication Reconciliation: complete    Health Maintenance that is potentially due pending provider review:  NONE    N/a  Nancy Lara M.A.          "

## 2017-06-01 NOTE — PROGRESS NOTES
SUBJECTIVE:                                                    Chyna Moncada is a 66 year old female who presents to clinic today for the following health issues:    Joint Pain     Onset:Tuesday night     Description:   Location: left top of arm  Character: Sharp and Stabbing    Intensity: moderate, 9/10 when moving arm outwards     Progression of Symptoms: worse    Accompanying Signs & Symptoms:  Other symptoms: swelling of upper arm and in finger and discoloration of fingers slightly.    History:   Previous similar pain: no       Precipitating factors:   Trauma or overuse: YES- fell in shower     Alleviating factors:  Improved by: rest/inactivity and immobilization       Therapies Tried and outcome: sling and Ibuprofen 3 times daily.  Yesterday just sat around, and today is moving a little bit.     She points to mid to upper portion, posterior, of her left humerus.      Problem list and histories reviewed & adjusted, as indicated.  Additional history: as documented    Patient Active Problem List   Diagnosis     Altered mental status     Allergic rhinitis     Anxiety state     Cerebral artery occlusion with cerebral infarction (H)     Degeneration of cervical intervertebral disc     Degeneration of lumbar or lumbosacral intervertebral disc     Insomnia     Postmenopausal atrophic vaginitis     Raynaud's syndrome     Symptomatic menopausal or female climacteric states     Osteopenia     Pulmonary nodules     TGA (transient global amnesia)     Recurrent cold sores     Peripheral vascular disease (H)     Atopic rhinitis     Osteoarthritis of hip     Recurrent herpes labialis     Bilateral sensorineural hearing loss     Tendinitis of shoulder     Past Surgical History:   Procedure Laterality Date     HYSTERECTOMY, PAP NO LONGER INDICATED         Social History   Substance Use Topics     Smoking status: Former Smoker     Types: Cigarettes     Smokeless tobacco: Never Used     Alcohol use Yes      Comment:  Occational     Family History   Problem Relation Age of Onset     Arthritis Mother      CANCER Mother      GYN     HEART DISEASE Father      Alcohol/Drug Maternal Grandfather      HEART DISEASE Paternal Grandfather      Heart attack     DIABETES Paternal Grandfather      CANCER Brother      throat         Current Outpatient Prescriptions   Medication Sig Dispense Refill     pravastatin (PRAVACHOL) 40 MG tablet TAKE ONE TABLET BY MOUTH EVERY NIGHT AT BEDTIME 90 tablet 1     valACYclovir (VALTREX) 1000 mg tablet Take 2 tablets (2,000 mg) by mouth 2 times daily 4 tablet 11     pravastatin (PRAVACHOL) 40 MG tablet Take 1 tablet (40 mg) by mouth At Bedtime 90 tablet 1     LORazepam (ATIVAN) 0.5 MG tablet Take 1 tablet (0.5 mg) by mouth every 6 hours as needed 10 tablet 0     Lactobacillus Rhamnosus, GG, (CULTURELLE PO) Take by mouth daily       Cholecalciferol (VITAMIN D-3 PO) Take 1,000 Units by mouth daily       aspirin EC 81 MG tablet Take 81 mg by mouth       calcium-vitamin D (CALTRATE) 600-400 MG-UNIT per tablet Take 1 tablet by mouth       CINNAMON PO 2 capsules per day       Garlic (GARLIQUE PO) Take 1 tablet by mouth daily       Ferrous Sulfate (IRON SUPPLEMENT PO) Take 325 mg by mouth daily       magnesium 100 MG CAPS        multivitamin (OCUVITE) TABS Take 1 tablet by mouth daily       fluticasone (FLONASE) 50 MCG/ACT nasal spray Spray 2 sprays into both nostrils daily 1 Package 11     Propylene Glycol-Glycerin 1-0.3 % SOLN Place 1 drop into both eyes as needed       amitriptyline (ELAVIL) 10 MG tablet Take 1-2 tablets (10-20 mg) by mouth At Bedtime (Patient not taking: Reported on 6/1/2017) 60 tablet 11     order for DME Equipment being ordered: Wrist Brace (Patient not taking: Reported on 6/1/2017) 1 Device 0     Allergies   Allergen Reactions     Tramadol Anaphylaxis     Benadryl [Altaryl] Other (See Comments)     Made her goofy     Caffeine Other (See Comments)     Jittery       Codeine Nausea and Vomiting      Darvocet [Propoxyphene N-Apap]      Other reaction(s): GI Upset     Diphenhydramine Other (See Comments)     Lovastatin Other (See Comments)     Other reaction(s): Myalgia  pain     Percocet [Oxycodone-Acetaminophen] Other (See Comments)     Other reaction(s): Dizziness  Sick or dizzy?     Vicodin [Hydrocodone-Acetaminophen] Other (See Comments) and Nausea     Sick or dizzy?     Labs reviewed in EPIC    Reviewed and updated as needed this visit by clinical staff  Tobacco  Allergies  Meds  Problems  Med Hx  Surg Hx  Fam Hx  Soc Hx        Reviewed and updated as needed this visit by Provider  Allergies  Meds  Problems         ROS:  Constitutional, HEENT, cardiovascular, pulmonary, GI, , musculoskeletal, neuro, skin, endocrine and psych systems are negative, except as otherwise noted.    OBJECTIVE:                                                    /78 (BP Location: Right arm, Cuff Size: Adult Regular)  Pulse 69  Temp 97.3  F (36.3  C) (Tympanic)  Wt 161 lb (73 kg)  BMI 25.99 kg/m2  Body mass index is 25.99 kg/(m^2).  GENERAL: healthy, alert and in mild distress secondary to pain from fall, left arm in sling  EYES: Eyes grossly normal to inspection,  conjunctivae and sclerae normal  HENT: normocephalic  NECK:supple with full ROM  MS: no gross musculoskeletal defects noted, mild swelling of posterior left humerus.     Diagnostic Test Results: Xray shows no fracture.     XR HUMERUS LT G/E 2 VW 6/1/2017 1:02 PM     COMPARISON: 5/30/2017     HISTORY: Pain         IMPRESSION: Calcific density adjacent to the inferior glenoid,  suggests intra-articular body. No fractures are seen in the left  humerus.     ACE DELEON  No results found for this or any previous visit (from the past 24 hour(s)).     ASSESSMENT/PLAN:                                                      Problem List Items Addressed This Visit     None      Visit Diagnoses     Pain of left upper extremity    -  Primary    Relevant Orders     XR Humerus Left G/E 2 Views (Completed)               Patient Instructions   Continue using your sling and Ibuprofen and tylenol as directed.    Arnica gel/cream can be applied for comfort as well.    Follow up with your PCP early next week for recheck.    Follow-up with your primary care provider next week and as needed.    Indications for emergent return to emergency department discussed with patient, who verbalized good understanding and agreement.  Patient understands the limitations of today's evaluation.         Upper Extremity Contusion  You have a contusion (bruise) of an upper extremity (arm, wrist, hand, or fingers). Symptoms include pain, swelling, and skin discoloration. No bones are broken. This injury may take from a few days to a few weeks to heal.  During that time, the bruise may change from reddish in color, to purple-blue, to green-yellow, to yellow-brown.  Home care    Unless another medication was prescribed, you can take acetaminophen, ibuprofen, or naproxen to control pain. (If you have chronic liver or kidney disease or ever had a stomach ulcer or GI bleeding, talk with your doctor before using these medicines.)    Elevate the injured area to reduce pain and swelling. As much as possible, sit or lie down with the injured area raised about the level of your heart. This is especially important during the first 48 hours.    Ice the injured area to help reduce pain and swelling. Wrap a cold source (ice pack or ice cubes in a plastic bag) in a thin towel. Apply to the bruised area for 20 minutes every 1 to 2 hours the first day. Continue this 3 to 4 times a day until the pain and swelling goes away.    If a sling was provided, you may remove it to shower or bathe. To prevent joint stiffness, do not wear it for more than one week.  Follow up  Follow up with your health care provider or our staff as advised. Call if you are not improving within the next 1 to 2 weeks.  When to seek medical  advice   Call your health care provider right away if any of these occur:    Increased pain or swelling    Hand or fingers become cold, blue, numb or tingly    Signs of infection: Warmth, drainage, or increased redness or pain around the injury    Inability to move the injured body part     Frequent bruising for unknown reasons    8609-9785 [employer]. [address] [place] [address]. All rights reserved. This information is not intended as a substitute for professional medical care. Always follow your healthcare professional's instructions.            GEORGE [facility]

## 2017-06-01 NOTE — MR AVS SNAPSHOT
After Visit Summary   6/1/2017    Chyna Moncada    MRN: [id number]           Patient Information     Date Of Birth          1950        Visit Information        Provider Department      6/1/2017 12:00 PM Sandra Garduno APRN [facility]        Today's Diagnoses     Pain of left upper extremity    -  1      Care Instructions    Continue using your sling and Ibuprofen and tylenol as directed.    Arnica gel/cream can be applied for comfort as well.    Follow up with your PCP early next week for recheck.    Follow-up with your primary care provider next week and as needed.    Indications for emergent return to emergency department discussed with patient, who verbalized good understanding and agreement.  Patient understands the limitations of today's evaluation.         Upper Extremity Contusion  You have a contusion (bruise) of an upper extremity (arm, wrist, hand, or fingers). Symptoms include pain, swelling, and skin discoloration. No bones are broken. This injury may take from a few days to a few weeks to heal.  During that time, the bruise may change from reddish in color, to purple-blue, to green-yellow, to yellow-brown.  Home care    Unless another medication was prescribed, you can take acetaminophen, ibuprofen, or naproxen to control pain. (If you have chronic liver or kidney disease or ever had a stomach ulcer or GI bleeding, talk with your doctor before using these medicines.)    Elevate the injured area to reduce pain and swelling. As much as possible, sit or lie down with the injured area raised about the level of your heart. This is especially important during the first 48 hours.    Ice the injured area to help reduce pain and swelling. Wrap a cold source (ice pack or ice cubes in a plastic bag) in a thin towel. Apply to the bruised area for 20 minutes every 1 to 2 hours the first day. Continue this 3 to 4 times a day until the pain and swelling goes  away.    If a sling was provided, you may remove it to shower or bathe. To prevent joint stiffness, do not wear it for more than one week.  Follow up  Follow up with your health care provider or our staff as advised. Call if you are not improving within the next 1 to 2 weeks.  When to seek medical advice   Call your health care provider right away if any of these occur:    Increased pain or swelling    Hand or fingers become cold, blue, numb or tingly    Signs of infection: Warmth, drainage, or increased redness or pain around the injury    Inability to move the injured body part     Frequent bruising for unknown reasons    7902-3677 [employer]. [address] [place] [address]. All rights reserved. This information is not intended as a substitute for professional medical care. Always follow your healthcare professional's instructions.                Follow-ups after your visit        Follow-up notes from your care team     See patient instructions section of the AVS Return in about 5 days (around 6/6/2017) for Follow up with your primary care provider.      Your next 10 appointments already scheduled     Jun 12, 2017  8:00 AM CDT   MA SCREENING DIGITAL BILATERAL with NBMA1   [facility] ([facility])    [address] [place] [id number]   [phone]           Do not use any powder, lotion or deodorant under your arms or on your breast. If you do, we will ask you to remove it before your exam.  Wear comfortable, two-piece clothing.  If you have any allergies, tell your care team.  Bring any previous mammograms from other facilities or have them mailed to the breast center.              Who to contact     If you have questions or need follow up information about today's clinic visit or your schedule please contact [facility] directly at [phone].  Normal or non-critical lab and imaging results will be communicated to you  "by Tap[employer]art, letter or phone within 4 business days after the clinic has received the results. If you do not hear from us within 7 days, please contact the clinic through [employer] or phone. If you have a critical or abnormal lab result, we will notify you by phone as soon as possible.  Submit refill requests through [employer] or call your pharmacy and they will forward the refill request to us. Please allow 3 business days for your refill to be completed.          Additional Information About Your Visit        [facility]"[employer] Information     [employer] lets you send messages to your doctor, view your test results, renew your prescriptions, schedule appointments and more. To sign up, go to [email or web address] . Click on \"Log in\" on the left side of the screen, which will take you to the Welcome page. Then click on \"Sign up Now\" on the right side of the page.     You will be asked to enter the access code listed below, as well as some personal information. Please follow the directions to create your username and password.     Your access code is: [email or web address]  Expires: 2017  9:03 PM     Your access code will  in 90 days. If you need help or a new code, please call your [place] clinic or [phone].        Care EveryWhere ID     This is your Care EveryWhere ID. This could be used by other organizations to access your [place] medical records  [id number]        Your Vitals Were     Pulse Temperature BMI (Body Mass Index)             69 97.3  F (36.3  C) (Tympanic) 25.99 kg/m2          Blood Pressure from Last 3 Encounters:   17 126/78   17 138/78   17 102/70    Weight from Last 3 Encounters:   17 161 lb (73 kg)   17 155 lb (70.3 kg)   17 155 lb 9.6 oz (70.6 kg)               Primary Care Provider Office Phone # Fax #    Edilia Davison PA-C [phone]       [place] [id number] [place] [address]        Thank you!     Thank you for " choosing [facility]  for your care. Our goal is always to provide you with excellent care. Hearing back from our patients is one way we can continue to improve our services. Please take a few minutes to complete the written survey that you may receive in the mail after your visit with us. Thank you!             Your Updated Medication List - Protect others around you: Learn how to safely use, store and throw away your medicines at www.disposemymeds.org.          This list is accurate as of: 6/1/17  1:24 PM.  Always use your most recent med list.                   Brand Name Dispense Instructions for use    amitriptyline 10 MG tablet    ELAVIL    60 tablet    Take 1-2 tablets (10-20 mg) by mouth At Bedtime       aspirin EC 81 MG EC tablet      Take 81 mg by mouth       calcium-vitamin D 600-400 MG-UNIT per tablet    CALTRATE     Take 1 tablet by mouth       CINNAMON PO      2 capsules per day       CULTURELLE PO      Take by mouth daily       fluticasone 50 MCG/ACT spray    FLONASE    1 Package    Spray 2 sprays into both nostrils daily       GARLIQUE PO      Take 1 tablet by mouth daily       IRON SUPPLEMENT PO      Take 325 mg by mouth daily       LORazepam 0.5 MG tablet    ATIVAN    10 tablet    Take 1 tablet (0.5 mg) by mouth every 6 hours as needed       magnesium 100 MG Caps          multivitamin Tabs tablet      Take 1 tablet by mouth daily       order for DME     1 Device    Equipment being ordered: Wrist Brace       * pravastatin 40 MG tablet    PRAVACHOL    90 tablet    Take 1 tablet (40 mg) by mouth At Bedtime       * pravastatin 40 MG tablet    PRAVACHOL    90 tablet    TAKE ONE TABLET BY MOUTH EVERY NIGHT AT BEDTIME       Propylene Glycol-Glycerin 1-0.3 % Soln      Place 1 drop into both eyes as needed       valACYclovir 1000 mg tablet    VALTREX    4 tablet    Take 2 tablets (2,000 mg) by mouth 2 times daily       VITAMIN D-3 PO      Take 1,000 Units by mouth daily       * Notice:   This list has 2 medication(s) that are the same as other medications prescribed for you. Read the directions carefully, and ask your doctor or other care provider to review them with you.

## 2017-06-05 ENCOUNTER — TELEPHONE (OUTPATIENT)
Dept: FAMILY MEDICINE | Facility: CLINIC | Age: 67
End: 2017-06-05

## 2017-06-05 NOTE — TELEPHONE ENCOUNTER
"S-(situation): fell a week ago    B-(background): was seen in the ED and in urgent care on 6-1-17.  Pain is worse today.  States \"taking ibuprofen like candy\".  Woke up with pain this AM.  States can' use the arm.      A-(assessment): left arm pain    R-(recommendations): advised to return to clinic to be re evaluated.     "

## 2017-06-05 NOTE — TELEPHONE ENCOUNTER
Reason for Call:  Other      Detailed comments: Chyna says she fell in her bathtub a week ago. She was seen in ER and they xrayed her ribs and shoulder. She says nothing was broken. She was seen in follow up by Sandra Garduno on Thursday, June 1st. Chyna says it is so painful and she is not able to use the upper part of her arm or move it away from her body. She would like to get order for MRI ASAP. She says she doesn't want to come in to be seen again.  Her brother is visiting from out of state so hopes this can be done ASAP so she can make plans to see him.     Phone Number Patient can be reached at: Cell number on file:    Telephone Information:   Mobile [phone]       Best Time: anytime but would like to hear from us ASAP this morning    Can we leave a detailed message on this number? YES    Call taken on 6/5/2017 at 8:04 AM by Cassy Slaughter

## 2017-06-07 ENCOUNTER — HOSPITAL ENCOUNTER (EMERGENCY)
Facility: CLINIC | Age: 67
Discharge: HOME | End: 2017-06-07
Attending: EMERGENCY MEDICINE | Admitting: EMERGENCY MEDICINE
Payer: MEDICARE

## 2017-06-07 ENCOUNTER — NURSE TRIAGE (OUTPATIENT)
Dept: NURSING | Facility: CLINIC | Age: 67
End: 2017-06-07

## 2017-06-07 VITALS
DIASTOLIC BLOOD PRESSURE: 81 MMHG | RESPIRATION RATE: 16 BRPM | HEART RATE: 78 BPM | BODY MASS INDEX: 25.07 KG/M2 | OXYGEN SATURATION: 100 % | TEMPERATURE: 97.8 F | HEIGHT: 66 IN | WEIGHT: 156 LBS | SYSTOLIC BLOOD PRESSURE: 151 MMHG

## 2017-06-07 DIAGNOSIS — M79.602 PAIN OF LEFT UPPER EXTREMITY: ICD-10-CM

## 2017-06-07 PROCEDURE — 99282 EMERGENCY DEPT VISIT SF MDM: CPT

## 2017-06-07 PROCEDURE — 99283 EMERGENCY DEPT VISIT LOW MDM: CPT | Performed by: EMERGENCY MEDICINE

## 2017-06-07 ASSESSMENT — ENCOUNTER SYMPTOMS
JOINT SWELLING: 0
NECK PAIN: 1
NECK STIFFNESS: 1
WOUND: 0

## 2017-06-07 NOTE — ED NOTES
"Pt fell in shower 1 week ago and then had pain and discomfort. Was seen after thge event and then was re-seen the next Wednesday. Pt has been told that there is no \"broken bones\" but reports that she can not use her arm and has severe pain. Has brrn on ibuprofen 600 QID and is not having her pain controlled. Pt comes in with arm in sling  "

## 2017-06-07 NOTE — ED NOTES
Patient left ED room and walked out of ED before receiving pt's physician discharge and follow up instructions.

## 2017-06-07 NOTE — DISCHARGE INSTRUCTIONS
Muscle Strain in the Extremities  A muscle strain is a stretching and tearing of muscle fibers. This causes pain, especially when you move that muscle. There may also be some swelling and bruising.  Home care    Keep the hurt area raised to reduce pain and swelling. This is especially important during the first 48 hours.    Apply an ice pack over the injured area for 15 to 20 minutes every 3 to 6 hours. You should do this for the first 24 to 48 hours. You can make an ice pack by filling a plastic bag that seals at the top with ice cubes and then wrapping it with a thin towel. Be careful not to injure your skin with the ice treatments. Ice should never be applied directly to skin. Continue the use of ice packs for relief of pain and swelling as needed. After 48 hours, apply heat (warm shower or warm bath) for 15 to 20 minutes several times a day, or alternate ice and heat.    You may use over-the-counter pain medicine to control pain, unless another medicine was prescribed. If you have chronic liver or kidney disease or ever had a stomach ulcer or GI bleeding, talk with your healthcare provider before using these medicines.    For leg strains: If crutches have been recommended, don t put full weight on the hurt leg until you can do so without pain. You can return to sports when you are able to hop and run on the injured leg without pain.  Follow-up care  Follow up with your healthcare provider, or as advised.  When to seek medical advice  Call your healthcare provider right away if any of these occur:    The toes of the injured leg become swollen, cold, blue, numb, or tingly    Pain or swelling increases    6763-2416 [employer]. [address] [place] [address], [place] [address]. All rights reserved. This information is not intended as a substitute for professional medical care. Always follow your healthcare professional's instructions.          Understanding the Pain Response  Your pain is important. It can  slow healing and keep you from being active. You may have acute or chronic pain. Both types of pain respond to treatment. Work with your health care professional. Together you can find relief.  Types of pain  Acute pain is caused by a health problem or injury. The pain usually goes away when its cause is treated. You may have pain:    From an illness or injury that needs emergency care.    After an operation, such as heart surgery.    During and after the birth of your baby.  Chronic pain lasts 3 to 6 months or more. It can be caused by a health problem or injury, like arthritis or a shoulder strain. Chronic pain can also exist without a clear cause.  Your perception of pain  Pain is a complex phenomenon that involves many of the chemicals found naturally in the spinal cord and brain. All pain signals travel to the brain. The brain sends back signals to protect the body. The brain also makes its own painkillers (endorphins). These can help reduce the pain.    1. Pain starts in 1 or more parts of the body. In some cases, the site of the pain is far from its source.  2. Pain signals move through nerves and up the spinal cord.  3. The brain reads the signals as pain. Natural painkillers are released.  4. The feeling of pain can be reduced in this way.    8498-6004 [employer]. [address] [place], [place] [address]. All rights reserved. This information is not intended as a substitute for professional medical care. Always follow your healthcare professional's instructions.          Myalgias  Myalgias are another word for muscle aches and soreness. This is a symptom, not a disease. Myalgias can have many causes. A cold, the flu, or an acute infection can cause them. So can any illness with a high fever. They may happen after exertion (such as heavy exercise) or trauma (such as an accident or fall). Some medicines (such as statins and certain antidepressants) can cause myalgias. They can also be a  symptom of chronic or ongoing medical problems (such as lupus, chronic fatigue, or hypothyroidism). With these illnesses, other serious symptoms often occur in addition to muscle pain and soreness.    Myalgias most often go away on their own. If they don't go away, come back, or are severe, testing may be needed to help find the cause.  Home care    Rest until you feel better.    Follow instructions that you were given for how to care for yourself. This may depend on the cause of your myalgias.     If myalgia is thought to be due to a medicine, be sure to talk to the doctor that prescribed the medicine about the best course of action.    To control pain, take prescription or over-the-counter medicines as directed. Unless told not to, you can try acetaminophen or ibuprophen.  Follow-up care  Follow up with your healthcare provider or as advised by our staff. If your symptoms do not go away in a few days or if they come back, follow up with your healthcare provider for an exam and testing.  When to see medical advice  Call your healthcare provider for any of the following:    Fever of 10.4 F (38 C) or higher, or as directed by your healthcare provider    Pain that gets worse and not better, or that goes away and comes back    New joint pains    New rash    Severe headache, neck pain, drowsiness, or confusion    1116-6727 [employer]. [address] [place] [address], [place] [address]. All rights reserved. This information is not intended as a substitute for professional medical care. Always follow your healthcare professional's instructions.

## 2017-06-07 NOTE — ED AVS SNAPSHOT
[facility] Emergency Department    [address] [address]    Phone:  [phone]    Fax:  [phone]                                       Chyna Moncada   MRN: [id number]    Department:  [facility] Emergency Department   Date of Visit:  6/7/2017           After Visit Summary Signature Page     I have received my discharge instructions, and my questions have been answered. I have discussed any challenges I see with this plan with the nurse or doctor.    ..........................................................................................................................................  Patient/Patient Representative Signature      ..........................................................................................................................................  Patient Representative Print Name and Relationship to Patient    ..................................................               ................................................  Date                                            Time    ..........................................................................................................................................  Reviewed by Signature/Title    ...................................................              ..............................................  Date                                                            Time

## 2017-06-07 NOTE — ED AVS SNAPSHOT
[facility] Emergency Department    [address] [facility] [address]    Phone:  [phone]    Fax:  [phone]                                       Chyna Moncada   MRN: [id number]    Department:  [facility] Emergency Department   Date of Visit:  6/7/2017           Patient Information     Date Of Birth          1950        Your diagnoses for this visit were:     Pain of left upper extremity        You were seen by Kasi Cole MD.        Discharge Instructions         Muscle Strain in the Extremities  A muscle strain is a stretching and tearing of muscle fibers. This causes pain, especially when you move that muscle. There may also be some swelling and bruising.  Home care    Keep the hurt area raised to reduce pain and swelling. This is especially important during the first 48 hours.    Apply an ice pack over the injured area for 15 to 20 minutes every 3 to 6 hours. You should do this for the first 24 to 48 hours. You can make an ice pack by filling a plastic bag that seals at the top with ice cubes and then wrapping it with a thin towel. Be careful not to injure your skin with the ice treatments. Ice should never be applied directly to skin. Continue the use of ice packs for relief of pain and swelling as needed. After 48 hours, apply heat (warm shower or warm bath) for 15 to 20 minutes several times a day, or alternate ice and heat.    You may use over-the-counter pain medicine to control pain, unless another medicine was prescribed. If you have chronic liver or kidney disease or ever had a stomach ulcer or GI bleeding, talk with your healthcare provider before using these medicines.    For leg strains: If crutches have been recommended, don t put full weight on the hurt leg until you can do so without pain. You can return to sports when you are able to hop and run on the injured leg without pain.  Follow-up care  Follow up with your healthcare provider, or as advised.  When  to seek medical advice  Call your healthcare provider right away if any of these occur:    The toes of the injured leg become swollen, cold, blue, numb, or tingly    Pain or swelling increases    0533-7013 [employer]. [address] [place] [address]. All rights reserved. This information is not intended as a substitute for professional medical care. Always follow your healthcare professional's instructions.          Understanding the Pain Response  Your pain is important. It can slow healing and keep you from being active. You may have acute or chronic pain. Both types of pain respond to treatment. Work with your health care professional. Together you can find relief.  Types of pain  Acute pain is caused by a health problem or injury. The pain usually goes away when its cause is treated. You may have pain:    From an illness or injury that needs emergency care.    After an operation, such as heart surgery.    During and after the birth of your baby.  Chronic pain lasts 3 to 6 months or more. It can be caused by a health problem or injury, like arthritis or a shoulder strain. Chronic pain can also exist without a clear cause.  Your perception of pain  Pain is a complex phenomenon that involves many of the chemicals found naturally in the spinal cord and brain. All pain signals travel to the brain. The brain sends back signals to protect the body. The brain also makes its own painkillers (endorphins). These can help reduce the pain.    1. Pain starts in 1 or more parts of the body. In some cases, the site of the pain is far from its source.  2. Pain signals move through nerves and up the spinal cord.  3. The brain reads the signals as pain. Natural painkillers are released.  4. The feeling of pain can be reduced in this way.    7438-3222 [employer]. [address] [place] [address]. All rights reserved. This information is not intended as a substitute for professional  medical care. Always follow your healthcare professional's instructions.          Myalgias  Myalgias are another word for muscle aches and soreness. This is a symptom, not a disease. Myalgias can have many causes. A cold, the flu, or an acute infection can cause them. So can any illness with a high fever. They may happen after exertion (such as heavy exercise) or trauma (such as an accident or fall). Some medicines (such as statins and certain antidepressants) can cause myalgias. They can also be a symptom of chronic or ongoing medical problems (such as lupus, chronic fatigue, or hypothyroidism). With these illnesses, other serious symptoms often occur in addition to muscle pain and soreness.    Myalgias most often go away on their own. If they don't go away, come back, or are severe, testing may be needed to help find the cause.  Home care    Rest until you feel better.    Follow instructions that you were given for how to care for yourself. This may depend on the cause of your myalgias.     If myalgia is thought to be due to a medicine, be sure to talk to the doctor that prescribed the medicine about the best course of action.    To control pain, take prescription or over-the-counter medicines as directed. Unless told not to, you can try acetaminophen or ibuprophen.  Follow-up care  Follow up with your healthcare provider or as advised by our staff. If your symptoms do not go away in a few days or if they come back, follow up with your healthcare provider for an exam and testing.  When to see medical advice  Call your healthcare provider for any of the following:    Fever of 10.4 F (38 C) or higher, or as directed by your healthcare provider    Pain that gets worse and not better, or that goes away and comes back    New joint pains    New rash    Severe headache, neck pain, drowsiness, or confusion    0443-2718 [employer]. [address] [place] [address], [place] [address]. All rights reserved. This  information is not intended as a substitute for professional medical care. Always follow your healthcare professional's instructions.          Future Appointments        Provider Department Dept Phone Center    6/12/2017 8:00 AM [facility] MAMMO ROOM 1 [facility] [phone] [facility]      24 Hour Appointment Hotline       To make an appointment at any [facility], call [phone] ([phone]). If you don't have a family doctor or clinic, we will help you find one. [facility] are conveniently located to serve the needs of you and your family.          ED Discharge Orders     ORTHO  REFERRAL       [facility] is referring you to the Orthopedic  Services at [facility].       The  Representative will assist you in the coordination of your Orthopedic and Musculoskeletal Care as prescribed by your physician.    The  Representative will call you within 1 business day to help schedule your appointment, or you may contact the  Representative at:    All areas ~ [phone]     Type of Referral : Non Surgical       Timeframe requested: 1 - 2 days    Coverage of these services is subject to the terms and limitations of your health insurance plan.  Please call member services at your health plan with any benefit or coverage questions.      If X-rays, CT or MRI's have been performed, please contact the facility where they were done to arrange for , prior to your scheduled appointment.  Please bring this referral request to your appointment and present it to your specialist.                     Review of your medicines      Our records show that you are taking the medicines listed below. If these are incorrect, please call your family doctor or clinic.        Dose / Directions Last dose taken    amitriptyline 10 MG tablet   Commonly known as:  ELAVIL   Dose:  10-20 mg   Quantity:  60 tablet         Take 1-2 tablets (10-20 mg) by mouth At Bedtime   Refills:  11        aspirin EC 81 MG EC tablet   Dose:  81 mg        Take 81 mg by mouth   Refills:  0        calcium-vitamin D 600-400 MG-UNIT per tablet   Commonly known as:  CALTRATE   Dose:  1 tablet        Take 1 tablet by mouth   Refills:  0        CINNAMON PO        2 capsules per day   Refills:  0        CULTURELLE PO   Indication:  Pt unsure of dosage        Take by mouth daily   Refills:  0        fluticasone 50 MCG/ACT spray   Commonly known as:  FLONASE   Dose:  2 spray   Quantity:  1 Package        Spray 2 sprays into both nostrils daily   Refills:  11        GARLIQUE PO   Dose:  1 tablet        Take 1 tablet by mouth daily   Refills:  0        IRON SUPPLEMENT PO   Dose:  325 mg        Take 325 mg by mouth daily   Refills:  0        LORazepam 0.5 MG tablet   Commonly known as:  ATIVAN   Dose:  0.5 mg   Quantity:  10 tablet        Take 1 tablet (0.5 mg) by mouth every 6 hours as needed   Refills:  0        magnesium 100 MG Caps        Refills:  0        multivitamin Tabs tablet   Dose:  1 tablet        Take 1 tablet by mouth daily   Refills:  0        order for DME   Quantity:  1 Device        Equipment being ordered: Wrist Brace   Refills:  0        * pravastatin 40 MG tablet   Commonly known as:  PRAVACHOL   Dose:  40 mg   Quantity:  90 tablet        Take 1 tablet (40 mg) by mouth At Bedtime   Refills:  1        * pravastatin 40 MG tablet   Commonly known as:  PRAVACHOL   Quantity:  90 tablet        TAKE ONE TABLET BY MOUTH EVERY NIGHT AT BEDTIME   Refills:  1        Propylene Glycol-Glycerin 1-0.3 % Soln   Dose:  1 drop        Place 1 drop into both eyes as needed   Refills:  0        valACYclovir 1000 mg tablet   Commonly known as:  VALTREX   Dose:  2000 mg   Quantity:  4 tablet        Take 2 tablets (2,000 mg) by mouth 2 times daily   Refills:  11        VITAMIN D-3 PO   Dose:  1000 Units        Take 1,000 Units by mouth daily   Refills:  0        *  "Notice:  This list has 2 medication(s) that are the same as other medications prescribed for you. Read the directions carefully, and ask your doctor or other care provider to review them with you.            Orders Needing Specimen Collection     None      Pending Results     No orders found from 2017 to 2017.            Pending Culture Results     No orders found from 2017 to 2017.            Pending Results Instructions     If you had any lab results that were not finalized at the time of your Discharge, you can call the ED Lab Result RN at [phone]. You will be contacted by this team for any positive Lab results or changes in treatment. The nurses are available 7 days a week from 10A to 6:30P.  You can leave a message 24 hours per day and they will return your call.        Test Results From Your Hospital Stay               Thank you for choosing [facility]       Thank you for choosing [facility] for your care. Our goal is always to provide you with excellent care. Hearing back from our patients is one way we can continue to improve our services. Please take a few minutes to complete the written survey that you may receive in the mail after you visit with us. Thank you!        [facility] Information     [employer] lets you send messages to your doctor, view your test results, renew your prescriptions, schedule appointments and more. To sign up, go to [email or web address] . Click on \"Log in\" on the left side of the screen, which will take you to the Welcome page. Then click on \"Sign up Now\" on the right side of the page.     You will be asked to enter the access code listed below, as well as some personal information. Please follow the directions to create your username and password.     Your access code is: [email or web address]  Expires: 2017  9:03 PM     Your access code will  in 90 days. If you need help or a new code, please call your [facility]3] or [phone].        Care EveryWhere ID  "    This is your Care EveryWhere ID. This could be used by other organizations to access your [facility] medical records  [id number]        After Visit Summary       This is your record. Keep this with you and show to your community pharmacist(s) and doctor(s) at your next visit.

## 2017-06-07 NOTE — ED PROVIDER NOTES
"  History     Chief Complaint   Patient presents with     Arm Pain     injured 1 week agoo and has been seen X2 poor control of pain      HPI  Chyna Moncada is a 66 year old female who states she fell one week ago and injured her left arm and left shoulder and has had persistent, severe pain, now radiating to the right posterior shoulder and right neck to the occiput area.  She has a sharp, constant, severe pain which is refractory to ibuprofen.  She is using a sling and states she can't move her left arm.  She cannot localize the pain and states that the entire left upper arm hurts.  She was initially seen in the ED 8 days ago, 5/30/17, after her fall and head x-rays of the left shoulder, ribs and chest which were unremarkable.  She was then seen again 2 days later and had left humerus x-rays which were only remarkable for a calcific density adjacent to the inferior glenoid suggesting an intra-articular body, but no fractures.  She presents to the emergency department today requesting an MRI \"to see if I tore something in there\".  No acute injury or trauma.  No CMS abnormality in the distal left upper extremity.      I have reviewed the Medications, Allergies, Past Medical and Surgical History, and Social History in the Epic system.  Patient Active Problem List   Diagnosis     Altered mental status     Allergic rhinitis     Anxiety state     Cerebral artery occlusion with cerebral infarction (H)     Degeneration of cervical intervertebral disc     Degeneration of lumbar or lumbosacral intervertebral disc     Insomnia     Postmenopausal atrophic vaginitis     Raynaud's syndrome     Symptomatic menopausal or female climacteric states     Osteopenia     Pulmonary nodules     TGA (transient global amnesia)     Recurrent cold sores     Peripheral vascular disease (H)     Atopic rhinitis     Osteoarthritis of hip     Recurrent herpes labialis     Bilateral sensorineural hearing loss     Tendinitis of shoulder     Past " Medical History:   Diagnosis Date     Cerebral embolism with cerebral infarction (H)      Past Surgical History:   Procedure Laterality Date     HYSTERECTOMY, PAP NO LONGER INDICATED       No current facility-administered medications for this encounter.      Current Outpatient Prescriptions   Medication     pravastatin (PRAVACHOL) 40 MG tablet     valACYclovir (VALTREX) 1000 mg tablet     pravastatin (PRAVACHOL) 40 MG tablet     LORazepam (ATIVAN) 0.5 MG tablet     amitriptyline (ELAVIL) 10 MG tablet     Lactobacillus Rhamnosus, GG, (CULTURELLE PO)     Cholecalciferol (VITAMIN D-3 PO)     order for DME     aspirin EC 81 MG tablet     calcium-vitamin D (CALTRATE) 600-400 MG-UNIT per tablet     CINNAMON PO     Garlic (GARLIQUE PO)     Ferrous Sulfate (IRON SUPPLEMENT PO)     magnesium 100 MG CAPS     multivitamin (OCUVITE) TABS     fluticasone (FLONASE) 50 MCG/ACT nasal spray     Propylene Glycol-Glycerin 1-0.3 % SOLN     Allergies   Allergen Reactions     Tramadol Anaphylaxis     Benadryl [Altaryl] Other (See Comments)     Made her goofy     Caffeine Other (See Comments)     Jittery       Codeine Nausea and Vomiting     Darvocet [Propoxyphene N-Apap]      Other reaction(s): GI Upset     Diphenhydramine Other (See Comments)     Lovastatin Other (See Comments)     Other reaction(s): Myalgia  pain     Percocet [Oxycodone-Acetaminophen] Other (See Comments)     Other reaction(s): Dizziness  Sick or dizzy?     Vicodin [Hydrocodone-Acetaminophen] Other (See Comments) and Nausea     Sick or dizzy?     Social History   Substance Use Topics     Smoking status: Former Smoker     Types: Cigarettes     Smokeless tobacco: Never Used     Alcohol use Yes      Comment: Occational     Family History   Problem Relation Age of Onset     Arthritis Mother      CANCER Mother      GYN     HEART DISEASE Father      Alcohol/Drug Maternal Grandfather      HEART DISEASE Paternal Grandfather      Heart attack     DIABETES Paternal Grandfather   "    CANCER Brother      throat     Review of Systems   Musculoskeletal: Positive for neck pain and neck stiffness. Negative for joint swelling.   Skin: Negative.  Negative for pallor and wound.       Physical Exam   BP: 151/81  Pulse: 78  Temp: 97.8  F (36.6  C)  Resp: 16  Height: 167.6 cm (5' 6\")  Weight: 70.8 kg (156 lb)  SpO2: 100 %    Physical Exam   Constitutional: She appears well-developed and well-nourished.   HENT:   Head: Normocephalic and atraumatic.   Eyes: Conjunctivae and EOM are normal. No scleral icterus.   Neck: Normal range of motion. Neck supple.   Cardiovascular: Normal rate, regular rhythm and intact distal pulses.    Musculoskeletal: She exhibits tenderness (diffuse left upper arm soft tissue tenderness, left shoulder tenderness, left trapezius and left paraspinous muscular tenderness; no contusion, abrasion, ecchymosis or swelling). She exhibits no edema or deformity.   Neurological: She is alert.   Skin: Skin is warm and dry. No rash noted. No erythema. No pallor.   Psychiatric:   Flat affect.  Depressed mood.   Nursing note and vitals reviewed.      ED Course     ED Course     Procedures             Labs Ordered and Resulted from Time of ED Arrival Up to the Time of Departure from the ED - No data to display    8:58 AM - Left without signing out after discussion of orthopedic  referral and orthopedic/sports medicine clinic follow-up later this week.  She declined Toradol IM or Ibuprofen po in the ED.  She declined an Rx for a muscle relaxant states that she has those at home.    Assessments & Plan (with Medical Decision Making)   66-year-old female with fall 8 days ago here for her 3rd evaluation of nonlocalized left upper arm pain.  She presents today wanting an MRI evaluation.  She has thus far had x-rays of the left shoulder, left ribs and chest, and left humerus which were only remarkable for a calcific density inferior to the glenoid suggesting an intra-articular foreign body.  " No fractures, dislocation or other acute abnormalities were seen on x-rays.  No acute injury or trauma and she appears neurovascularly intact.  Do not feel that repeat plain films are indicated and emergent MRI evaluation is not currently indicated.  I made an orthopedic  referral for her.  She declined Toradol IM or Ibuprofen po in the ED, and declined a prescription for a muscle relaxant.     I have reviewed the nursing notes.    I have reviewed the findings, diagnosis, plan and need for follow up with the patient.    Discharge Medication List as of 6/7/2017  9:08 AM          Final diagnoses:   Pain of left upper extremity       6/7/2017   [facility] EMERGENCY DEPARTMENT     Kasi Cole MD  06/07/17 0913

## 2017-06-07 NOTE — TELEPHONE ENCOUNTER
"Patient calling reporting she was seen in ED on 5/30/17 following a fall with left shoulder pain.  Reporting x-ray results were negative. Patient reporting severe pain \"above a 10\" this morning. 3 Ibuprofen taken this morning with no improvement.   Patient prefers to go into ED now.     Reason for Disposition    [1] SEVERE pain AND [2] not improved 2 hours after pain medicine/ice packs    Additional Information    Negative: Passed out (i.e., lost consciousness, collapsed and was not responding)    Negative: Serious injury with multiple fractures    Negative: [1] Major bleeding (e.g., actively dripping or spurting) AND [2] can't be stopped    Negative: Bullet wound, stabbed by knife, or other serious penetrating wound    Negative: Sounds like a life-threatening emergency to the triager    Negative: Wound looks infected    Negative: Shoulder pain from overuse (work, exercise, gardening) OR from self-induced lifting injury    Negative: Shoulder pain not from an injury    Negative: Looks like a broken bone (crooked or deformed)    Negative: Looks like a dislocated joint    Negative: Can't move injured shoulder at all    Negative: Skin is split open or gaping  (or length > 1/2 inch or 12 mm)    Negative: [1] Bleeding AND [2] won't stop after 10 minutes of direct pressure (using correct technique)    Negative: [1] Dirt in the wound AND [2] not removed with 15 minutes of scrubbing    Negative: Sounds like a serious injury to the triager    Protocols used: SHOULDER INJURY-ADULT-, SHOULDER PAIN-ADULT-    "

## 2017-06-14 ENCOUNTER — OFFICE VISIT (OUTPATIENT)
Dept: ORTHOPEDICS | Facility: CLINIC | Age: 67
End: 2017-06-14
Payer: COMMERCIAL

## 2017-06-14 VITALS — HEIGHT: 66 IN | BODY MASS INDEX: 25.07 KG/M2 | RESPIRATION RATE: 16 BRPM | HEART RATE: 88 BPM | WEIGHT: 156 LBS

## 2017-06-14 DIAGNOSIS — S49.92XA INJURY OF LEFT SHOULDER, INITIAL ENCOUNTER: Primary | ICD-10-CM

## 2017-06-14 PROCEDURE — 99214 OFFICE O/P EST MOD 30 MIN: CPT | Performed by: PEDIATRICS

## 2017-06-14 NOTE — PATIENT INSTRUCTIONS
We discussed these other possible diagnosis: left shoulder injury    Plan:  - Today's Plan of Care:  MRI of the left shoulder     -We also discussed other future treatment options:  Referral to orthopedic surgeon  or Rehab: Physical Therapy    Follow Up: after MRI to review results       Advanced imaging is done by appointment. Some insurance require a prior authorization to be completed which may delay the time until you are able to schedule your appointment.  Please call [place] [place] [facility] [place]: [phone] to schedule your MRI.  Depending on your availability you can usually schedule within the next 1-2 days.    Please make a follow up appointment in the clinic at least 2 days following your MRI by calling [phone].

## 2017-06-14 NOTE — PROGRESS NOTES
Sports Medicine Clinic Visit    PCP: Edilia Davison Antwan is a 66 year old female who is seen as an ER referral presenting with left upper arm injury.  Patient fell in the shower 2 weeks ago.  She is not exactly sure how she landed, but feels she may have landed over the latera aspect of her left shoulder.  Has had x rays of shoulder and humerus.  Continues with use of sling.  States she cannot pinpoint where it is most bothersome since she will not allow herself to move enough to find out where it is painful.  Might be more painful anterior.  Pain in posterior shoulder as well.  Denies any numbness or tingling.  Denies neck pain or radiating pain down her arm.  - Patient is right hand dominant.     Injury: fall on left shoulder    Location of Pain: left shoulder diffuse   Duration of Pain: 2 week(s)  Rating of Pain at worst: off the charts  Rating of Pain Currently: cannot valorie, too painful to answer  Symptoms are better with: nothing  Symptoms are worse with: any movement of her arm   Additional Features:   Positive: weakness   Negative: swelling, bruising, popping, grinding, catching, locking, instability, paresthesias, numbness, pain in other joints and systemic symptoms  Other evaluation and/or treatments so far consists of: Ibuprofen, Other medications: muscle relaxant  and sling   Prior History of related problems: HX right arm shoulder pain in the past.      Social History: retired.     Review of Systems  Skin: no bruising, no swelling  Musculoskeletal: as above  Neurologic: no numbness, paresthesias  Remainder of review of systems is negative including constitutional, CV, pulmonary, GI, except as noted in HPI or medical history.    Patient's current problem list, past medical and surgical history, and family history were reviewed.    Patient Active Problem List   Diagnosis     Altered mental status     Allergic rhinitis     Anxiety state     Cerebral artery occlusion with cerebral  "infarction (H)     Degeneration of cervical intervertebral disc     Degeneration of lumbar or lumbosacral intervertebral disc     Insomnia     Postmenopausal atrophic vaginitis     Raynaud's syndrome     Symptomatic menopausal or female climacteric states     Osteopenia     Pulmonary nodules     TGA (transient global amnesia)     Recurrent cold sores     Peripheral vascular disease (H)     Atopic rhinitis     Osteoarthritis of hip     Recurrent herpes labialis     Bilateral sensorineural hearing loss     Tendinitis of shoulder     Past Medical History:   Diagnosis Date     Cerebral embolism with cerebral infarction (H)      Past Surgical History:   Procedure Laterality Date     HYSTERECTOMY, PAP NO LONGER INDICATED       Family History   Problem Relation Age of Onset     Arthritis Mother      CANCER Mother      GYN     HEART DISEASE Father      Alcohol/Drug Maternal Grandfather      HEART DISEASE Paternal Grandfather      Heart attack     DIABETES Paternal Grandfather      CANCER Brother      throat         Objective  Pulse 88  Resp 16  Ht 5' 6\" (1.676 m)  Wt 156 lb (70.8 kg)  BMI 25.18 kg/m2      GENERAL APPEARANCE: healthy, alert and no distress   GAIT: NORMAL  SKIN: no suspicious lesions or rashes  HEENT: Sclera clear, anicteric  CV: good peripheral pulses  RESP: Breathing not labored  NEURO: Normal strength and tone, mentation intact and speech normal  PSYCH:  mentation appears normal and affect normal/bright    Bilateral Shoulder exam    Inspection and Posture:       rounded shoulders and upper back    Skin:        no visible deformities    Tender:        None currently    Non Tender:       remainder of shoulder bilateral    ROM:        forward flexion 110  left       abduction 20 left       internal rotation minimal left       external rotation 30 left       asymmetric scapular motion    Painful motions:       flexion left       elevation left       internal rotation left    Strength:        abduction 3/5 " left       flexion 3/5 left       internal rotation 3/5 left       external rotation 3/5 left    Sensation:        normal sensation over shoulder and upper extremity       Radiology  I visualized and reviewed these images with the patient  XR HUMERUS LT G/E 2 VW 6/1/2017 1:02 PM  COMPARISON: 5/30/2017  HISTORY: Pain  IMPRESSION: Calcific density adjacent to the inferior glenoid,  suggests intra-articular body. No fractures are seen in the left  humerus.    LEFT SHOULDER THREE VIEWS  5/30/2017 8:39 PM  HISTORY:  Pain after falling.  COMPARISON:  None.  FINDINGS:  No fracture or osseous lesion is seen. The joint spaces are  well preserved. There are two ossifications projected over the medial  aspect of the left humeral neck on the frontal view, the larger  measuring 1.6 cm and the smaller measuring 0.3 cm. These could  represent loose bodies. No other soft tissue pathology is seen.  IMPRESSION: No definite acute abnormality is seen. There may be loose  bodies adjacent to the medial humeral neck.     Assessment:  1. Injury of left shoulder, initial encounter      Concern for rotator cuff tear given his history and exam. I recommend MRI to evaluate. Would consider referral or therapy pending images.    Plan:  - Today's Plan of Care:  MRI of the left shoulder     -We also discussed other future treatment options:  Referral to orthopedic surgeon  or Rehab: Physical Therapy    Follow Up: after MRI to review results    Concerning signs and symptoms were reviewed.  The patient expressed understanding of this management plan and all questions were answered at this time.    Tiffany Ernandez MD CAQ  Primary Care Sports Medicine  [facility]

## 2017-06-14 NOTE — MR AVS SNAPSHOT
After Visit Summary   6/14/2017    Chyna Moncada    MRN: [id number]           Patient Information     Date Of Birth          1950        Visit Information        Provider Department      6/14/2017 1:20 PM Tiffany Ernandez MD [facility]        Today's Diagnoses     Injury of left shoulder, initial encounter    -  1      Care Instructions    We discussed these other possible diagnosis: left shoulder injury    Plan:  - Today's Plan of Care:  MRI of the left shoulder     -We also discussed other future treatment options:  Referral to orthopedic surgeon  or Rehab: Physical Therapy    Follow Up: after MRI to review results       Advanced imaging is done by appointment. Some insurance require a prior authorization to be completed which may delay the time until you are able to schedule your appointment.  Please call [facility]: [phone] to schedule your MRI.  Depending on your availability you can usually schedule within the next 1-2 days.    Please make a follow up appointment in the clinic at least 2 days following your MRI by calling [phone].                    Follow-ups after your visit        Future tests that were ordered for you today     Open Future Orders        Priority Expected Expires Ordered    MR Shoulder Left w/o Contrast Routine  6/14/2018 6/14/2017            Who to contact     If you have questions or need follow up information about today's clinic visit or your schedule please contact [facility] directly at [phone].  Normal or non-critical lab and imaging results will be communicated to you by MyChart, letter or phone within 4 business days after the clinic has received the results. If you do not hear from us within 7 days, please contact the clinic through MyChart or phone. If you have a critical or abnormal lab result, we will notify you by phone as soon as possible.  Submit  "refill requests through zeeWAVES or call your pharmacy and they will forward the refill request to us. Please allow 3 business days for your refill to be completed.          Additional Information About Your Visit        VOYAAhart Information     zeeWAVES lets you send messages to your doctor, view your test results, renew your prescriptions, schedule appointments and more. To sign up, go to [email or web address] . Click on \"Log in\" on the left side of the screen, which will take you to the Welcome page. Then click on \"Sign up Now\" on the right side of the page.     You will be asked to enter the access code listed below, as well as some personal information. Please follow the directions to create your username and password.     Your access code is: [email or web address]  Expires: 2017  9:03 PM     Your access code will  in 90 days. If you need help or a new code, please call your [place] clinic or [phone].        Care EveryWhere ID     This is your Care EveryWhere ID. This could be used by other organizations to access your [place] medical records  [id number]        Your Vitals Were     Pulse Respirations Height BMI (Body Mass Index)          88 16 5' 6\" (1.676 m) 25.18 kg/m2         Blood Pressure from Last 3 Encounters:   17 151/81   17 126/78   17 138/78    Weight from Last 3 Encounters:   17 156 lb (70.8 kg)   17 156 lb (70.8 kg)   17 161 lb (73 kg)               Primary Care Provider Office Phone # Fax #    Edilia Davison PA-C [phone]       [place] [id number] [place] [address]        Thank you!     Thank you for choosing [facility]  for your care. Our goal is always to provide you with excellent care. Hearing back from our patients is one way we can continue to improve our services. Please take a few minutes to complete the written survey that you may receive in the mail after your " visit with us. Thank you!             Your Updated Medication List - Protect others around you: Learn how to safely use, store and throw away your medicines at www.disposemymeds.org.          This list is accurate as of: 6/14/17  1:42 PM.  Always use your most recent med list.                   Brand Name Dispense Instructions for use    amitriptyline 10 MG tablet    ELAVIL    60 tablet    Take 1-2 tablets (10-20 mg) by mouth At Bedtime       aspirin EC 81 MG EC tablet      Take 81 mg by mouth       calcium-vitamin D 600-400 MG-UNIT per tablet    CALTRATE     Take 1 tablet by mouth       CINNAMON PO      2 capsules per day       CULTURELLE PO      Take by mouth daily       fluticasone 50 MCG/ACT spray    FLONASE    1 Package    Spray 2 sprays into both nostrils daily       GARLIQUE PO      Take 1 tablet by mouth daily       IRON SUPPLEMENT PO      Take 325 mg by mouth daily       LORazepam 0.5 MG tablet    ATIVAN    10 tablet    Take 1 tablet (0.5 mg) by mouth every 6 hours as needed       magnesium 100 MG Caps          multivitamin Tabs tablet      Take 1 tablet by mouth daily       order for DME     1 Device    Equipment being ordered: Wrist Brace       * pravastatin 40 MG tablet    PRAVACHOL    90 tablet    Take 1 tablet (40 mg) by mouth At Bedtime       * pravastatin 40 MG tablet    PRAVACHOL    90 tablet    TAKE ONE TABLET BY MOUTH EVERY NIGHT AT BEDTIME       Propylene Glycol-Glycerin 1-0.3 % Soln      Place 1 drop into both eyes as needed       valACYclovir 1000 mg tablet    VALTREX    4 tablet    Take 2 tablets (2,000 mg) by mouth 2 times daily       VITAMIN D-3 PO      Take 1,000 Units by mouth daily       * Notice:  This list has 2 medication(s) that are the same as other medications prescribed for you. Read the directions carefully, and ask your doctor or other care provider to review them with you.

## 2017-06-15 ENCOUNTER — HOSPITAL ENCOUNTER (OUTPATIENT)
Dept: MRI IMAGING | Facility: CLINIC | Age: 67
Discharge: HOME | End: 2017-06-15
Attending: PEDIATRICS | Admitting: PEDIATRICS
Payer: MEDICARE

## 2017-06-15 DIAGNOSIS — S49.92XA INJURY OF LEFT SHOULDER, INITIAL ENCOUNTER: ICD-10-CM

## 2017-06-15 PROCEDURE — 73221 MRI JOINT UPR EXTREM W/O DYE: CPT | Mod: LT

## 2017-06-16 ENCOUNTER — TELEPHONE (OUTPATIENT)
Dept: ORTHOPEDICS | Facility: CLINIC | Age: 67
End: 2017-06-16

## 2017-06-16 NOTE — TELEPHONE ENCOUNTER
Reason for Call:  Request for results:    Name of test or procedure: MRI    Date of test of procedure: 6/15    Location of the test or procedure: [place]    OK to leave the result message on voice mail or with a family member? YES    Phone number Patient can be reached at:  Home number on file [phone] (home)    Additional comments: pt calling would like results from MRI asap    Call taken on 6/16/2017 at 12:26 PM by Danuta Glover

## 2017-06-16 NOTE — TELEPHONE ENCOUNTER
Results for orders placed or performed during the hospital encounter of 06/15/17   MR Shoulder Left w/o Contrast    Narrative    MR LEFT SHOULDER  6/15/2017 9:40 AM    HISTORY: Left-sided shoulder pain with decreased range of motion after  a fall 2 weeks ago.    TECHNIQUE: Oblique coronal T1, T2 and T2 fat suppressed images,  oblique sagittal T2 and axial proton density and T2 weighted images  were obtained.     COMPARISON: Plain film dated 5/30/2017.    FINDINGS:  Osseous acromial outlet: The acromioclavicular joint is unremarkable.  The distal acromion is a type I morphology with slight negative slope  on oblique sagittal images and shows moderate to marked lateral  downsloping on oblique coronal images. No abnormal fluid in the  subacromial/subdeltoid bursa.    Rotator cuff: The supraspinatus, infraspinatus, teres minor, and  subscapularis muscles and tendons are normal.    Labrum: The glenoid labrum is normal as visualized.    Biceps tendon: The biceps tendon is normal proximally at the biceps  anchor and distally in the bicipital groove.      Osseous structures and cartilaginous surfaces: No acute-appearing bony  abnormalities. There is a 1.4 x 0.9 x 0.7 cm focus of very low-signal  intensity projecting adjacent to the posteroinferior glenoid rim. This  corresponds to the dense calcification seen on the recent plain film.  This is not intra-articular and is closely related to the glenoid rim.  This may be the residual of an old posteroinferior glenoid rim  ununited fracture, although the glenoid rim does not appear  significantly deformed. This could also represent a focal ossification  within the joint capsule of uncertain etiology and significance. It  could be related to an old glenoid avulsion of the posterior band of  the inferior glenohumeral ligament which has healed. Glenohumeral  articular cartilages appear normal.    Additional findings: None.      Impression    IMPRESSION:   1. 1.4 cm calcific  density seen on the plain film is closely related  to the posteroinferior glenoid rim and may be the result of an old  bony and/or ligamentous injury as described above. This is not an  intra-articular loose body.  2. Lateral downsloping of distal acromion.    KRISH JENKINS MD

## 2017-06-19 NOTE — TELEPHONE ENCOUNTER
Please call patient with MRI results as noted below.    IMPRESSION:   1. 1.4 cm calcific density seen on the plain film is closely related  to the posteroinferior glenoid rim and may be the result of an old  bony and/or ligamentous injury as described above. This is not an  intra-articular loose body.  2. Lateral downsloping of distal acromion.    In Summary:  - Calcification near inferior glenoid rim, possibly related to old bony or ligamentous injury, not intra-articular  - No rotator cuff tear    I Recommend:  - Follow up in clinic to discuss results.  Likely next step in treatment is physical therapy, possible injection, pending repeat exam and our discussion.    Tiffany Ernandez MD

## 2017-06-20 NOTE — TELEPHONE ENCOUNTER
Spoke with patient regarding their MRI results and plan. She is scheduled for Friday. She is also interested in talking about a CSI in the right shoulder.     It appears that you saw her 5/18 and 8/12 of 2016 and referred her to [facility]. She had a CSI with [facility] on 12/6/2016 with Dr. Mccullough.    Shelbie Sanchez M.Ed., ATR, ATC

## 2017-06-21 ENCOUNTER — OFFICE VISIT (OUTPATIENT)
Dept: FAMILY MEDICINE | Facility: CLINIC | Age: 67
End: 2017-06-21
Payer: COMMERCIAL

## 2017-06-21 VITALS
OXYGEN SATURATION: 98 % | WEIGHT: 157 LBS | TEMPERATURE: 97.1 F | RESPIRATION RATE: 18 BRPM | BODY MASS INDEX: 25.34 KG/M2 | SYSTOLIC BLOOD PRESSURE: 118 MMHG | HEART RATE: 70 BPM | DIASTOLIC BLOOD PRESSURE: 68 MMHG

## 2017-06-21 DIAGNOSIS — M79.18 ACUTE BUTTOCK PAIN: Primary | ICD-10-CM

## 2017-06-21 DIAGNOSIS — G89.29 CHRONIC RIGHT SHOULDER PAIN: ICD-10-CM

## 2017-06-21 DIAGNOSIS — M54.2 CERVICALGIA: ICD-10-CM

## 2017-06-21 DIAGNOSIS — M25.512 ACUTE PAIN OF LEFT SHOULDER: ICD-10-CM

## 2017-06-21 DIAGNOSIS — M25.511 CHRONIC RIGHT SHOULDER PAIN: ICD-10-CM
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
## 2017-06-21 PROCEDURE — 99214 OFFICE O/P EST MOD 30 MIN: CPT | Performed by: PHYSICIAN ASSISTANT

## 2017-06-21 NOTE — NURSING NOTE
"Chief Complaint   Patient presents with     Musculoskeletal Problem     fell in bath tub     Back Pain       Initial /68  Pulse 70  Temp 97.1  F (36.2  C) (Tympanic)  Resp 18  Wt 157 lb (71.2 kg)  SpO2 98%  BMI 25.34 kg/m2 Estimated body mass index is 25.34 kg/(m^2) as calculated from the following:    Height as of 6/14/17: 5' 6\" (1.676 m).    Weight as of this encounter: 157 lb (71.2 kg).  Medication Reconciliation: complete     Encouraged Advanced directive planning  "

## 2017-06-21 NOTE — MR AVS SNAPSHOT
After Visit Summary   6/21/2017    Chyna Moncada    MRN: [id number]           Patient Information     Date Of Birth          1950        Visit Information        Provider Department      6/21/2017 8:00 AM Edilia Davison PA-C [facility]        Today's Diagnoses     Acute buttock pain    -  1    Acute pain of left shoulder        Chronic right shoulder pain        Fall, subsequent encounter          Care Instructions    Try Physical Therapy   Consider massage  Discussed options for pain - can't do tramadol, other stronger pain meds aren't good options for this type of pain and you've been intolerant of them in past, lidocaine OTC patches haven't helped, tylenol hasn't helped, and muscle relaxers risk falls and don't seem to help much.  Ibuprofen has helped a bit, so would continue that as needed - take with food and stay hydrated.    Recheck if not improving          Follow-ups after your visit        Your next 10 appointments already scheduled     Jun 23, 2017  8:20 AM CDT   Return Visit with Tiffany Ernandez MD   [facility] ([facility])    [address] [address] [phone]              Who to contact     If you have questions or need follow up information about today's clinic visit or your schedule please contact [facility] directly at [phone].  Normal or non-critical lab and imaging results will be communicated to you by MyChart, letter or phone within 4 business days after the clinic has received the results. If you do not hear from us within 7 days, please contact the clinic through MyChart or phone. If you have a critical or abnormal lab result, we will notify you by phone as soon as possible.  Submit refill requests through [employer] or call your pharmacy and they will forward the refill request to us. Please allow 3 business days for your refill to be  "completed.          Additional Information About Your Visit        [employer] Information     [employer] lets you send messages to your doctor, view your test results, renew your prescriptions, schedule appointments and more. To sign up, go to [email or web address] . Click on \"Log in\" on the left side of the screen, which will take you to the Welcome page. Then click on \"Sign up Now\" on the right side of the page.     You will be asked to enter the access code listed below, as well as some personal information. Please follow the directions to create your username and password.     Your access code is: [email or web address]  Expires: 2017  9:03 PM     Your access code will  in 90 days. If you need help or a new code, please call your [facility] clinic or [phone].        Care EveryWhere ID     This is your Care EveryWhere ID. This could be used by other organizations to access your [facility] medical records  [id number]        Your Vitals Were     Pulse Temperature Respirations Pulse Oximetry BMI (Body Mass Index)       70 97.1  F (36.2  C) (Tympanic) 18 98% 25.34 kg/m2        Blood Pressure from Last 3 Encounters:   17 118/68   17 151/81   17 126/78    Weight from Last 3 Encounters:   17 157 lb (71.2 kg)   17 156 lb (70.8 kg)   17 156 lb (70.8 kg)              Today, you had the following     No orders found for display       Primary Care Provider Office Phone # Fax #    Edilia Davison PA-C [phone]       [facility] [address] [place] [id number]        Equal Access to Services     PATTI GARCIA : Celestina Agee, homar hernandez, camilla barclay, nigel landry. So [facility] [phone].    ATENCIÓN: Si habla español, tiene a ferraro disposición servicios gratuitos de asistencia lingüística. Llame al [phone].    We comply with applicable federal civil rights laws and [place] laws. We do not " discriminate on the basis of race, color, national origin, age, disability sex, sexual orientation or gender identity.            Thank you!     Thank you for choosing [facility]  for your care. Our goal is always to provide you with excellent care. Hearing back from our patients is one way we can continue to improve our services. Please take a few minutes to complete the written survey that you may receive in the mail after your visit with us. Thank you!             Your Updated Medication List - Protect others around you: Learn how to safely use, store and throw away your medicines at www.disposemymeds.org.          This list is accurate as of: 6/21/17  8:38 AM.  Always use your most recent med list.                   Brand Name Dispense Instructions for use Diagnosis    aspirin EC 81 MG EC tablet      Take 81 mg by mouth        calcium-vitamin D 600-400 MG-UNIT per tablet    CALTRATE     Take 1 tablet by mouth        CINNAMON PO      2 capsules per day        fluticasone 50 MCG/ACT spray    FLONASE    1 Package    Spray 2 sprays into both nostrils daily    Chronic rhinitis       GARLIQUE PO      Take 1 tablet by mouth daily        IRON SUPPLEMENT PO      Take 325 mg by mouth daily        LORazepam 0.5 MG tablet    ATIVAN    10 tablet    Take 1 tablet (0.5 mg) by mouth every 6 hours as needed    Anxiety       magnesium 100 MG Caps           multivitamin Tabs tablet      Take 1 tablet by mouth daily        pravastatin 40 MG tablet    PRAVACHOL    90 tablet    TAKE ONE TABLET BY MOUTH EVERY NIGHT AT BEDTIME    Cerebral artery occlusion with cerebral infarction (H)       Propylene Glycol-Glycerin 1-0.3 % Soln      Place 1 drop into both eyes as needed        valACYclovir 1000 mg tablet    VALTREX    4 tablet    Take 2 tablets (2,000 mg) by mouth 2 times daily    Recurrent cold sores       VITAMIN D-3 PO      Take 1,000 Units by mouth daily

## 2017-06-21 NOTE — PATIENT INSTRUCTIONS
Try Physical Therapy   Consider massage  Discussed options for pain - can't do tramadol, other stronger pain meds aren't good options for this type of pain and you've been intolerant of them in past, lidocaine OTC patches haven't helped, tylenol hasn't helped, and muscle relaxers risk falls and don't seem to help much.  Ibuprofen has helped a bit, so would continue that as needed - take with food and stay hydrated.    Recheck if not improving

## 2017-06-21 NOTE — PROGRESS NOTES
SUBJECTIVE:                                                    Chyna Moncada is a 66 year old female who presents to clinic today for the following health issues:      Musculoskeletal problem/pain      Duration: three weeks ago- would like PT referral    Description  Location:L upper back, also lower left and Left leg pain- radiates down leg- tingling    Intensity:  mild, severe    Accompanying signs and symptoms: none    History  Previous similar problem: no   Previous evaluation:  x-ray, MRI and orthopedic evaluation- has another appt with ortho Friday- expecting injection and PT    Precipitating or alleviating factors:  Trauma or overuse: YES- fall three weeks ago- was seen in ER and ortho  Aggravating factors include: laying down, housework (dishes)    Therapies tried and outcome: ibuprofen   Arm is getting better, can move it some now whereas she couldn't move it at all.      Back pain - in past couple weeks, didn't notice immediately .  Really bad when she lays on her back. Pain is always there, but worse in early mornings.  Can feel it some with breathing.  Is a pain that is above/on scapula goes thru her chest.    Had dramatic bruise on butt after fall.  Now when sits, can't sit for so long, and sometimes pain goes to posterior thigh.      Also had a fall a year ago, hurt R shoulder, which never fully healed, had done Physical Therapy for awhile, then went to ortho for injection, and is worse right now with compensating for L arm.  Also neck is sore, was sore before she fell.      No falls except the 2 in the past 1 year as above.    Problem list and histories reviewed & adjusted, as indicated.  Additional history: as documented    BP Readings from Last 3 Encounters:   06/21/17 118/68   06/07/17 151/81   06/01/17 126/78    Wt Readings from Last 3 Encounters:   06/21/17 157 lb (71.2 kg)   06/14/17 156 lb (70.8 kg)   06/07/17 156 lb (70.8 kg)         Labs reviewed in EPIC    Reviewed and updated as needed  this visit by clinical staff  Tobacco  Allergies  Med Hx  Surg Hx  Fam Hx  Soc Hx      Reviewed and updated as needed this visit by Provider         ROS:  Constitutional, musculoskeletal, neuro, psych systems are negative, except as otherwise noted.    OBJECTIVE:                                                    /68  Pulse 70  Temp 97.1  F (36.2  C) (Tympanic)  Resp 18  Wt 157 lb (71.2 kg)  SpO2 98%  BMI 25.34 kg/m2  Body mass index is 25.34 kg/(m^2).  GENERAL: healthy, alert and no distress  MS: area of pain is just medial of L scapula, diffuse tightness in neck, paraspinal cervical thoracic lumbar, and tender over inferior to lateral L buttock, but appropriate back ROM, deferring exam of shoulders today        ASSESSMENT/PLAN:                                                        ICD-10-CM    1. Acute buttock pain M79.1 PHYSICAL THERAPY REFERRAL   2. Acute pain of left shoulder M25.512 PHYSICAL THERAPY REFERRAL   3. Chronic right shoulder pain M25.511 PHYSICAL THERAPY REFERRAL    G89.29    4. Fall, subsequent encounter W19.XXXD PHYSICAL THERAPY REFERRAL   5. Cervicalgia M54.2 PHYSICAL THERAPY REFERRAL       Patient Instructions   Try Physical Therapy   Consider massage  Discussed options for pain - can't do tramadol, other stronger pain meds aren't good options for this type of pain and you've been intolerant of them in past, lidocaine OTC patches haven't helped, tylenol hasn't helped, and muscle relaxers risk falls and don't seem to help much.  Ibuprofen has helped a bit, so would continue that as needed - take with food and stay hydrated.    Recheck if not improving      Edilia Davison PA-C  [facility]

## 2017-06-23 ENCOUNTER — OFFICE VISIT (OUTPATIENT)
Dept: ORTHOPEDICS | Facility: CLINIC | Age: 67
End: 2017-06-23
Payer: COMMERCIAL

## 2017-06-23 VITALS
DIASTOLIC BLOOD PRESSURE: 84 MMHG | SYSTOLIC BLOOD PRESSURE: 132 MMHG | BODY MASS INDEX: 25.23 KG/M2 | WEIGHT: 157 LBS | HEIGHT: 66 IN

## 2017-06-23 DIAGNOSIS — M25.512 ACUTE PAIN OF LEFT SHOULDER: Primary | ICD-10-CM

## 2017-06-23 PROCEDURE — 99213 OFFICE O/P EST LOW 20 MIN: CPT | Mod: 25 | Performed by: PEDIATRICS

## 2017-06-23 PROCEDURE — 20610 DRAIN/INJ JOINT/BURSA W/O US: CPT | Mod: LT | Performed by: PEDIATRICS

## 2017-06-23 RX ORDER — TRIAMCINOLONE ACETONIDE 40 MG/ML
40 INJECTION, SUSPENSION INTRA-ARTICULAR; INTRAMUSCULAR ONCE
Qty: 1 ML | Refills: 0 | OUTPATIENT
Start: 2017-06-23 | End: 2017-06-23

## 2017-06-23 RX ORDER — LIDOCAINE HYDROCHLORIDE 10 MG/ML
2 INJECTION, SOLUTION INFILTRATION; PERINEURAL ONCE
Qty: 2 ML | Refills: 0 | OUTPATIENT
Start: 2017-06-23 | End: 2017-06-23

## 2017-06-23 NOTE — NURSING NOTE
"Chief Complaint   Patient presents with     Shoulder left       Initial /84  Ht 5' 6\" (1.676 m)  Wt 157 lb (71.2 kg)  BMI 25.34 kg/m2 Estimated body mass index is 25.34 kg/(m^2) as calculated from the following:    Height as of this encounter: 5' 6\" (1.676 m).    Weight as of this encounter: 157 lb (71.2 kg).  Medication Reconciliation: complete     Sissy Calhoun, ATC    "

## 2017-06-23 NOTE — PATIENT INSTRUCTIONS
We discussed diagnosis: Left Shoulder Impingement    Plan:  - Today's Plan of Care:  Continue with Physical Therapy  Steroid injection of the left shoulder: subacromial space was performed today in clinic  Icing for the next 1-2 days may be helpful for pain. Injection may take 10-14 days to see the full effect.    -We also discussed other future treatment options:  Referral to orthopedic surgery    Follow Up: as needed    After the Injection     After the injection, strenuous and repetitive activity should be minimized for approximately 48 hours.   Ice should be applied to the injected area at least for the next 48 hours.   Apply ice to the injected area at least 3 - 4 times a day for 20 minutes each time for the next 48 hours. This can reduce the painful  flare  reaction that can follow an injection the next day. This reaction can cause the area that was injected to hurt more the next day just from the injection. This will resolve within a day if it does occur.     Use over-the-counter pain medications such as Tylenol to help with the pain if necessary.     After 48 hours, icing the area may be continued if you find it beneficial.     The lidocaine or marcaine (commonly called Novocain) is an anesthetic agent that is injected with the steroid will typically relieve your pain for a few hours following the injection. If the  Novocain  and steroid are injected near a nerve, you may experience local numbness or weakness from the nerve block until it wears off. After this wears off your pain may return until the steroid takes effect.   The steroid may be effective immediately after the injection. Do not be concerned if the injection is not effective in relieving your symptoms immediately. In some cases, it may take up to two weeks for the steroid to work.   If you are diabetic, the corticosteroid may cause your blood sugar to become elevated for several days following the injection. This response usually lasts about 2-4  days before it returns to your normal level.   You should report any adverse reaction to you doctor. Call if there are any questions.

## 2017-06-23 NOTE — PROGRESS NOTES
Sports Medicine Clinic Visit - Interim History June 23, 2017    Initial Visit Date 6/16/2017    PCP: Edilia Davison Antwan is a 66 year old female who is seen in f/u up for Acute pain of left shoulder. Since last visit on 6/16/2017 patient has slight improvement, better ROM since the last visit. She is here to review the results of her recent MRI left shoulder.  - Pain mostly lateral shoulder.  Denies radiating pain into arm, numbness or tingling.    Initial History 6/14/2017:  Patient fell in the shower 2 weeks ago.  She is not exactly sure how she landed, but feels she may have landed over the latera aspect of her left shoulder.  Has had x rays of shoulder and humerus.  Continues with use of sling.  States she cannot pinpoint where it is most bothersome since she will not allow herself to move enough to find out where it is painful.  Might be more painful anterior.  Pain in posterior shoulder as well.  Denies any numbness or tingling.  Denies neck pain or radiating pain down her arm.  - Patient is right hand dominant.     Symptoms are better with: increased use of the left arm  Symptoms are worse with: trying to weight bear with arms, or push self up with arms  Additional Features:   Positive: pain   Negative: swelling, bruising, popping, grinding, catching, locking, instability and paresthesias    Social History: retired    Review of Systems  Skin: no bruising, no swelling  Musculoskeletal: as above  Neurologic: no numbness, paresthesias  Remainder of review of systems is negative including constitutional, CV, pulmonary, GI, except as noted in HPI or medical history.    Patient's current problem list, past medical and surgical history, and family history were reviewed.    Patient Active Problem List   Diagnosis     Altered mental status     Allergic rhinitis     Anxiety state     Cerebral artery occlusion with cerebral infarction (H)     Degeneration of cervical intervertebral disc      "Degeneration of lumbar or lumbosacral intervertebral disc     Insomnia     Postmenopausal atrophic vaginitis     Raynaud's syndrome     Symptomatic menopausal or female climacteric states     Osteopenia     Pulmonary nodules     TGA (transient global amnesia)     Recurrent cold sores     Peripheral vascular disease (H)     Atopic rhinitis     Osteoarthritis of hip     Recurrent herpes labialis     Bilateral sensorineural hearing loss     Tendinitis of shoulder     Past Medical History:   Diagnosis Date     Cerebral embolism with cerebral infarction (H)      Past Surgical History:   Procedure Laterality Date     HYSTERECTOMY, PAP NO LONGER INDICATED       Family History   Problem Relation Age of Onset     Arthritis Mother      CANCER Mother      GYN     HEART DISEASE Father      Alcohol/Drug Maternal Grandfather      HEART DISEASE Paternal Grandfather      Heart attack     DIABETES Paternal Grandfather      CANCER Brother      throat       Objective  /84  Ht 5' 6\" (1.676 m)  Wt 157 lb (71.2 kg)  BMI 25.34 kg/m2    GENERAL APPEARANCE: healthy, alert and no distress   GAIT: NORMAL  SKIN: no suspicious lesions or rashes  HEENT: Sclera clear, anicteric  CV: good peripheral pulses  RESP: Breathing not labored  NEURO: Normal strength and tone, mentation intact and speech normal  PSYCH:  mentation appears normal and affect normal/bright    Bilateral Shoulder exam  Inspection and Posture:       rounded shoulders and upper back     Skin:        no visible deformities     Tender:        None currently     Non Tender:       remainder of shoulder bilateral     ROM:        forward flexion 130  left       abduction 130 left       internal rotation minimal left       external rotation 45 left       asymmetric scapular motion     Painful motions:       flexion left       elevation left       internal rotation left     Strength:        abduction 4/5 left       flexion 4/5 left       internal rotation 4/5 left       external " rotation 4/5 left     Sensation:        normal sensation over shoulder and upper extremity     Radiology  I ordered, visualized and reviewed these images with the patient  R Shoulder Left w/o Contrast    Narrative    MR LEFT SHOULDER  6/15/2017 9:40 AM    HISTORY: Left-sided shoulder pain with decreased range of motion after  a fall 2 weeks ago.    TECHNIQUE: Oblique coronal T1, T2 and T2 fat suppressed images,  oblique sagittal T2 and axial proton density and T2 weighted images  were obtained.     COMPARISON: Plain film dated 5/30/2017.    FINDINGS:  Osseous acromial outlet: The acromioclavicular joint is unremarkable.  The distal acromion is a type I morphology with slight negative slope  on oblique sagittal images and shows moderate to marked lateral  downsloping on oblique coronal images. No abnormal fluid in the  subacromial/subdeltoid bursa.    Rotator cuff: The supraspinatus, infraspinatus, teres minor, and  subscapularis muscles and tendons are normal.    Labrum: The glenoid labrum is normal as visualized.    Biceps tendon: The biceps tendon is normal proximally at the biceps  anchor and distally in the bicipital groove.      Osseous structures and cartilaginous surfaces: No acute-appearing bony  abnormalities. There is a 1.4 x 0.9 x 0.7 cm focus of very low-signal  intensity projecting adjacent to the posteroinferior glenoid rim. This  corresponds to the dense calcification seen on the recent plain film.  This is not intra-articular and is closely related to the glenoid rim.  This may be the residual of an old posteroinferior glenoid rim  ununited fracture, although the glenoid rim does not appear  significantly deformed. This could also represent a focal ossification  within the joint capsule of uncertain etiology and significance. It  could be related to an old glenoid avulsion of the posterior band of  the inferior glenohumeral ligament which has healed. Glenohumeral  articular cartilages appear  normal.    Additional findings: None.      Impression    IMPRESSION:   1. 1.4 cm calcific density seen on the plain film is closely related  to the posteroinferior glenoid rim and may be the result of an old  bony and/or ligamentous injury as described above. This is not an  intra-articular loose body.  2. Lateral downsloping of distal acromion.    KRISH JENKINS MD       Assessment:  1. Acute pain of left shoulder      Left shoulder pain from an injury without clear injury on MRI.  Calcification likely old.  Exam with shoulder dysfunction and impingement.  Less likely cervical source of pain.  We discussed next steps in treatment including physical therapy and trial of injection which may calm down some initial pain - patient elects to proceed.    Plan:  - Today's Plan of Care:  Continue with Physical Therapy  Steroid injection of the left shoulder: subacromial space was performed today in clinic  Icing for the next 1-2 days may be helpful for pain. Injection may take 10-14 days to see the full effect.    -We also discussed other future treatment options:  Referral to orthopedic surgery    Follow Up: as needed    Procedure Note:  The risks, benefits and complications of steroid injection were discussed with the patient (including but not limited to: bleeding, infection, pain, scar, damage to adjacent structures, atrophy or necrosis of soft tissue, skin blanching, failure to relieve symptoms, worsening of symptoms, allergic reaction).  After this discussion all questions were addressed and answered and the patient elected to proceed.  Written informed consent was obtained.  The correct procedural site was identified and confirmed.  Using sterile technique, the area was first prepped with betadyne and an alcohol swab.  A 25 gauge  needle was used to inject 40 mg Kenalog and 2 cc of 1% lidocaine into subacromial space using a posterior approach on the left.  Sterile band aid applied.  Chyna  tolerated the procedure  well without complications.  Also discussed that if diabetic, recommend close monitoring of blood sugars over the next week as cortisone injections can temporarily elevate blood sugars.    Concerning signs and symptoms were reviewed.  The patient expressed understanding of this management plan and all questions were answered at this time.    Tiffany Ernandez MD CAQ  Primary Care Sports Medicine  [facility]

## 2017-06-23 NOTE — MR AVS SNAPSHOT
After Visit Summary   6/23/2017    Chyna Moncada    MRN: [id number]           Patient Information     Date Of Birth          1950        Visit Information        Provider Department      6/23/2017 8:20 AM Tiffany Ernandez MD [facility]        Care Instructions    We discussed diagnosis: Left Shoulder Impingement    Plan:  - Today's Plan of Care:  Continue with Physical Therapy  Steroid injection of the left shoulder: subacromial space was performed today in clinic  Icing for the next 1-2 days may be helpful for pain. Injection may take 10-14 days to see the full effect.    -We also discussed other future treatment options:  Referral to orthopedic surgery    Follow Up: as needed    After the Injection     After the injection, strenuous and repetitive activity should be minimized for approximately 48 hours.   Ice should be applied to the injected area at least for the next 48 hours.   Apply ice to the injected area at least 3 - 4 times a day for 20 minutes each time for the next 48 hours. This can reduce the painful  flare  reaction that can follow an injection the next day. This reaction can cause the area that was injected to hurt more the next day just from the injection. This will resolve within a day if it does occur.     Use over-the-counter pain medications such as Tylenol to help with the pain if necessary.     After 48 hours, icing the area may be continued if you find it beneficial.     The lidocaine or marcaine (commonly called Novocain) is an anesthetic agent that is injected with the steroid will typically relieve your pain for a few hours following the injection. If the  Novocain  and steroid are injected near a nerve, you may experience local numbness or weakness from the nerve block until it wears off. After this wears off your pain may return until the steroid takes effect.   The steroid may be effective immediately after the injection. Do not be  "concerned if the injection is not effective in relieving your symptoms immediately. In some cases, it may take up to two weeks for the steroid to work.   If you are diabetic, the corticosteroid may cause your blood sugar to become elevated for several days following the injection. This response usually lasts about 2-4 days before it returns to your normal level.   You should report any adverse reaction to you doctor. Call if there are any questions.               Follow-ups after your visit        Your next 10 appointments already scheduled     Jun 26, 2017  7:00 AM CDT   Ortho Eval with Mary Jane Rocha, PT   [facility] ([facility])    [address] [place] [address] [phone]              Who to contact     If you have questions or need follow up information about today's clinic visit or your schedule please contact [facility] directly at [phone].  Normal or non-critical lab and imaging results will be communicated to you by MyChart, letter or phone within 4 business days after the clinic has received the results. If you do not hear from us within 7 days, please contact the clinic through MyChart or phone. If you have a critical or abnormal lab result, we will notify you by phone as soon as possible.  Submit refill requests through [employer] or call your pharmacy and they will forward the refill request to us. Please allow 3 business days for your refill to be completed.          Additional Information About Your Visit        [email or web address] Information     [employer] lets you send messages to your doctor, view your test results, renew your prescriptions, schedule appointments and more. To sign up, go to [email or web address] . Click on \"Log in\" on the left side of the screen, which will take you to the Welcome page. Then click on \"Sign up Now\" on the right side of the page.     You will be asked to enter the access code listed " "below, as well as some personal information. Please follow the directions to create your username and password.     Your access code is: [email or web address]  Expires: 2017  9:03 PM     Your access code will  in 90 days. If you need help or a new code, please call your [facility] clinic or [phone].        Care EveryWhere ID     This is your Care EveryWhere ID. This could be used by other organizations to access your [facility] medical records  [id number]        Your Vitals Were     Height BMI (Body Mass Index)                5' 6\" (1.676 m) 25.34 kg/m2           Blood Pressure from Last 3 Encounters:   17 132/84   17 118/68   17 151/81    Weight from Last 3 Encounters:   17 157 lb (71.2 kg)   17 157 lb (71.2 kg)   17 156 lb (70.8 kg)              Today, you had the following     No orders found for display       Primary Care Provider Office Phone # Fax #    Edilia Davison PA-C [phone]       [facility] [address] [address]        Equal Access to Services     PATTI GARCIA : Hadii polly moyer hadasho Somaryali, waaxda luqadaha, qaybta kaalmada adeegyada, nigel landry. So [facility] [phone].    ATENCIÓN: Si habla español, tiene a ferraro disposición servicios gratuitos de asistencia lingüística. Llame al [phone].    We comply with applicable federal civil rights laws and [place] laws. We do not discriminate on the basis of race, color, national origin, age, disability sex, sexual orientation or gender identity.            Thank you!     Thank you for choosing [facility]  for your care. Our goal is always to provide you with excellent care. Hearing back from our patients is one way we can continue to improve our services. Please take a few minutes to complete the written survey that you may receive in the mail after your visit with us. Thank you!             Your Updated " Medication List - Protect others around you: Learn how to safely use, store and throw away your medicines at www.disposemymeds.org.          This list is accurate as of: 6/23/17  8:57 AM.  Always use your most recent med list.                   Brand Name Dispense Instructions for use Diagnosis    aspirin EC 81 MG EC tablet      Take 81 mg by mouth        calcium-vitamin D 600-400 MG-UNIT per tablet    CALTRATE     Take 1 tablet by mouth        CINNAMON PO      2 capsules per day        fluticasone 50 MCG/ACT spray    FLONASE    1 Package    Spray 2 sprays into both nostrils daily    Chronic rhinitis       GARLIQUE PO      Take 1 tablet by mouth daily        IRON SUPPLEMENT PO      Take 325 mg by mouth daily        LORazepam 0.5 MG tablet    ATIVAN    10 tablet    Take 1 tablet (0.5 mg) by mouth every 6 hours as needed    Anxiety       magnesium 100 MG Caps           multivitamin Tabs tablet      Take 1 tablet by mouth daily        pravastatin 40 MG tablet    PRAVACHOL    90 tablet    TAKE ONE TABLET BY MOUTH EVERY NIGHT AT BEDTIME    Cerebral artery occlusion with cerebral infarction (H)       Propylene Glycol-Glycerin 1-0.3 % Soln      Place 1 drop into both eyes as needed        valACYclovir 1000 mg tablet    VALTREX    4 tablet    Take 2 tablets (2,000 mg) by mouth 2 times daily    Recurrent cold sores       VITAMIN D-3 PO      Take 1,000 Units by mouth daily

## 2017-06-26 ENCOUNTER — HOSPITAL ENCOUNTER (OUTPATIENT)
Dept: PHYSICAL THERAPY | Facility: CLINIC | Age: 67
Setting detail: THERAPIES SERIES
End: 2017-06-26
Attending: PHYSICIAN ASSISTANT
Payer: MEDICARE

## 2017-06-26 PROCEDURE — 97110 THERAPEUTIC EXERCISES: CPT | Mod: GP | Performed by: PHYSICAL THERAPIST

## 2017-06-26 PROCEDURE — 97140 MANUAL THERAPY 1/> REGIONS: CPT | Mod: GP | Performed by: PHYSICAL THERAPIST

## 2017-06-26 PROCEDURE — 40000718 ZZHC STATISTIC PT DEPARTMENT ORTHO VISIT: Performed by: PHYSICAL THERAPIST

## 2017-06-26 PROCEDURE — G8978 MOBILITY CURRENT STATUS: HCPCS | Mod: GP,CL | Performed by: PHYSICAL THERAPIST

## 2017-06-26 PROCEDURE — 97162 PT EVAL MOD COMPLEX 30 MIN: CPT | Mod: GP | Performed by: PHYSICAL THERAPIST

## 2017-06-26 PROCEDURE — 97116 GAIT TRAINING THERAPY: CPT | Mod: GP | Performed by: PHYSICAL THERAPIST

## 2017-06-26 PROCEDURE — G8979 MOBILITY GOAL STATUS: HCPCS | Mod: GP,CI | Performed by: PHYSICAL THERAPIST

## 2017-06-26 NOTE — PROGRESS NOTES
[facility]          OUTPATIENT PHYSICAL THERAPY ORTHOPEDIC EVALUATION  PLAN OF TREATMENT FOR OUTPATIENT REHABILITATION  (COMPLETE FOR INITIAL CLAIMS ONLY)  Patient's Last Name, First Name, M.I.  YOB: 1950  NatanaelskyChyna burnham  LISA    Provider s Name:  [facility]   Medical Record No.  [id number]   Start of Care Date:  06/26/17   Onset Date:  05/30/17   Type:     _X__PT   ___OT   ___SLP Medical Diagnosis:  Acute buttock pain; acute pain of left shoulder; chronic right shoulder pain; fall, subsequent encounter, cervicalgia     PT Diagnosis:  Left GH primary impingement; Left sciatic N and superficial peroneal N tension; limited hip and knee strength; impaired gait and proprioception; high falls risk   Visits from SOC:  1      _________________________________________________________________________________  Plan of Treatment/Functional Goals:  ADL retraining, joint mobilization, manual therapy, motor coordination training, neuromuscular re-education, ROM, strengthening, stretching, balance training, gait training     Cryotherapy     Goals  Goal Identifier: GH ER Strength  Goal Description: Following PT interventions, patient will have >=4+/5 lisa GH ER strength to reduce impingement with overhead ADL's.  Target Date: 08/21/17    Goal Identifier: GH AROM  Goal Description: Following PT interventions, patient will have >=150 degrees of bilateral GH flexion AROM to allow for normalized ADL's.  Target Date: 08/07/17    Goal Identifier: Stairs  Goal Description: Following PT interventions, patient will be able to climb >=10 stairs without difficulty to allow for normalized community mobility.  Target Date: 08/21/17    Goal Identifier: Weight lifting  Goal Description: Following PT interventions, patient will be able to perform her desired weightlifting routine without difficulty.   Target Date: 09/18/17      Therapy Frequency:  2 times/Week (2x/week for 5 weeks; 1x/week  for 7 weeks)  Predicted Duration of Therapy Intervention:  12 weeks    Mary Jane Rocha, PT                 I CERTIFY THE NEED FOR THESE SERVICES FURNISHED UNDER        THIS PLAN OF TREATMENT AND WHILE UNDER MY CARE     (Physician co-signature of this document indicates review and certification of the therapy plan).                       Certification Date From:  06/26/17   Certification Date To:  09/18/17    Referring Provider:  Edilia Davison PA-C    Initial Assessment        See Epic Evaluation Start of Care Date: 06/26/17                                                                            I CERTIFY THE NEED FOR THESE SERVICES FURNISHED UNDER        THIS PLAN OF TREATMENT AND WHILE UNDER MY CARE       Edilia Davison  6/26/2017

## 2017-06-26 NOTE — PROGRESS NOTES
06/26/17 0600   General Information   Type of Visit Initial OP Ortho PT Evaluation   Start of Care Date 06/26/17   Referring Physician Edilia Davison PA-C   Orders Evaluate and Treat   Date of Order 06/21/17   Insurance Type Medicare   Insurance Comments/Visits Authorized Medicare; [employer] Pl[employer] ($1980 remaining in PT/SLP cap; no KX modifier needed; no iontophoresis)   Medical Diagnosis Acute buttock pain; acute pain of left shoulder; chronic right shoulder pain; fall, subsequent encounter, cervicalgia   Surgical/Medical history reviewed Yes   Precautions/Limitations no known precautions/limitations   General Information Comments PMHX/Co-morbidities: anxiety state; insomnia; raynaud's syndrome; osteopenia; OA of hip; bilateral sensorineural hearing loss       Present No   Body Part(s)   Body Part(s) Shoulder;Hip   Presentation and Etiology   Pertinent history of current problem (include personal factors and/or comorbidities that impact the POC) Per chart review: patient injured her left shoulder after a fall on 5/30/17.    Patient reports: her low back has really tightened up.  Feeling left LE sciatic N pain running down to her toes.  Left shoulder has also flared up after a fall.  Left shoulder has been improving more than the sciatic N pain.  Waking up 5-8x/night due to pain.    Impairments A. Pain;D. Decreased ROM;E. Decreased flexibility;F. Decreased strength and endurance   Functional Limitations perform activities of daily living;perform desired leisure / sports activities   Symptom Location Right lateral shoulder; lisa SI, Left LE posterior thigh to calf, to distal toes   How/Where did it occur With a fall   Onset date of current episode/exacerbation 05/30/17   Chronicity New   Pain rating (0-10 point scale) Best (/10);Worst (/10)   Best (/10) 0   Worst (/10) 10   Pain quality A. Sharp;C. Aching;F. Stabbing   Frequency of pain/symptoms A. Constant   Pain/symptoms are: The same  all the time   Pain/symptoms exacerbated by E. Rest;G. Certain positions;H. Overhead reach;J. ADL;K. Home tasks   Pain/symptoms eased by D. Nothing   Progression of symptoms since onset: Worsened   Current / Previous Interventions   Diagnostic Tests: MRI;X-ray   X-ray Results Results   X-ray results 5/30/17 XR left shoulder: FINDINGS:  No fracture or osseous lesion is seen. The joint spaces are well preserved. There are two ossifications projected over the medial aspect of the left humeral neck on the frontal view, the larger measuring 1.6 cm and the smaller measuring 0.3 cm. These could represent loose bodies. No other soft tissue pathology is seen. 6/1/17 XR left humerus: IMPRESSION: Calcific density adjacent to the inferior glenoid, suggests intra-articular body. No fractures are seen in the left humerus.   MRI Results Results   MRI results 5/13/16 MR right shoulder: IMPRESSION: 1. Mild supraspinatus tendinosis. Some findings which can be seen in association with impingement syndrome, in the appropriate clinical setting.  2. Moderate subscapularis tendinosis. 6/15/17 MR right shoulder IMPRESSION:  1. 1.4 cm calcific density seen on the plain film is closely related to the posteroinferior glenoid rim and may be the result of an old bony and/or ligamentous injury as described above. This is not an intra-articular loose body. 2. Lateral downsloping of distal acromion.   Prior Level of Function   Prior Level of Function-Mobility Independent   Prior Level of Function-ADLs Independent   Current Level of Function   Current Community Support Family/friend caregiver   Patient role/employment history F. Retired   Living environment [facility]   Fall Risk Screen   Fall screen completed by PT   Per patient - Fall 2 or more times in past year? Yes   Per patient - Fall with injury in past year? Yes   Timed Up and Go score (seconds) NT- deferred due to antalgic gait (>15 sec)   Is patient a fall risk? Yes;Department fall  "risk interventions implemented   System Outcome Measures   Outcome Measures Low Back Pain (see [place] and Mary)   Functional Scales   Functional Scales Other   Other Scales  SPADI   Hip Objective Findings   Side (if bilateral, select both right and left) Left   Gait/Locomotion With single axillary crutch right UE - reduced stance time left, antalgic, lacking heel-toe (left)   Balance/Proprioception (Single Leg Stance) Left=0 sec   Straight Leg Raise Test Right=40 deg; Left=30 deg   Palpation Tender to palpation along superficial peroneal nerve \"that's my pain, it shoots up to my low back\"   Left Hip Flexion PROM Roh=359 deg   Left Hip ER PROM Alberto=30 deg   Left Hip IR PROM Alberto=50 deg   Left Hip Ext PROM Alberto=10 deg   Left Hip Flexion Strength Alberto=5/5   Left Hip Abduction Strength Alberto=3+/5   Left Hip Extension Strength Alberto=4/5   Left Hip IR Strength Alberto=4/5   Left Hip ER Strength Alberto=5/5   Left Knee Flexion Strength Alberto=4+/5   Left Knee Extension Strength Alberto=5/5   Shoulder Objective Findings   Side (if bilateral, select both right and left) Left   Posture Rounded shoulders; forward head; increased thoracic kyphosis   Right Shoulder Flexion AROM Right=165 deg; Left=140 deg   Right Shoulder Abduction AROM Tosvc=078 deg; Left=100 deg   Right Shoulder ER AROM Alberto=50 deg   Right Shoulder IR AROM Right=PSIS; Left=lateral hip   Right Shoulder Flexion Strength right=4+/5; Left=3+/5   Right Shoulder Abduction Strength Alberto=4/5   Right Shoulder ER Strength Right=4/5; Left=2+/5 with pain   Right Shoulder IR Strength alberto=4/5 pain free   Right Lower Trapezius Strength Alberto=3+/5   Palpation Tender to palpation of the biceps   Accessory Motion/Joint Mobility Limited caudal and posterior GH glide   Planned Therapy Interventions   Planned Therapy Interventions ADL retraining;joint mobilization;manual therapy;motor coordination training;neuromuscular re-education;ROM;strengthening;stretching;balance training;gait training   Planned " Modality Interventions   Planned Modality Interventions Cryotherapy   Clinical Impression   Criteria for Skilled Therapeutic Interventions Met yes, treatment indicated   PT Diagnosis Left GH primary impingement; Left sciatic N and superficial peroneal N tension; limited hip and knee strength; impaired gait and proprioception; high falls risk   Influenced by the following impairments Pain; weaknesss; impaired ROM; impaired proprioception   Functional limitations due to impairments Unable to perform desired weightlifting routine; pain and difficulty with ADL's   Clinical Presentation Evolving/Changing   Clinical Presentation Rationale (+) motivation; previous success with PT  (-) time since injury; recurring falls   Clinical Decision Making (Complexity) Moderate complexity   Therapy Frequency 2 times/Week  (2x/week for 5 weeks; 1x/week for 7 weeks)   Predicted Duration of Therapy Intervention (days/wks) 12 weeks   Risk & Benefits of therapy have been explained Yes   Patient, Family & other staff in agreement with plan of care Yes   Clinical Impression Comments Chyna is a pleasant 67 yo female who presents today with two separate issues: left GH primary impingement and    Education Assessment   Preferred Learning Style Listening;Demonstration;Pictures/video   Barriers to Learning No barriers   ORTHO GOALS   PT Ortho Eval Goals 1;2;3;4;5   Ortho Goal 1   Goal Identifier GH ER Strength   Goal Description Following PT interventions, patient will have >=4+/5 lisa GH ER strength to reduce impingement with overhead ADL's.   Target Date 08/21/17   Ortho Goal 2   Goal Identifier GH AROM   Goal Description Following PT interventions, patient will have >=150 degrees of bilateral GH flexion AROM to allow for normalized ADL's.   Target Date 08/07/17   Ortho Goal 3   Goal Identifier Stairs   Goal Description Following PT interventions, patient will be able to climb >=10 stairs without difficulty to allow for normalized community  mobility.   Target Date 08/21/17   Ortho Goal 4   Goal Identifier Weight lifting   Goal Description Following PT interventions, patient will be able to perform her desired weightlifting routine without difficulty.    Target Date 09/18/17   Total Evaluation Time   Total Evaluation Time 25 min   Therapy Certification   Certification date from 06/26/17   Certification date to 09/18/17   Medical Diagnosis Acute buttock pain; acute pain of left shoulder; chronic right shoulder pain; fall, subsequent encounter, cervicalgia         Mary Jane Rocha, PT, DPT  Doctor of Physical Therapy #[id number]  [facility]  [phone]  [email or web address]

## 2017-06-29 ENCOUNTER — TELEPHONE (OUTPATIENT)
Dept: FAMILY MEDICINE | Facility: CLINIC | Age: 67
End: 2017-06-29

## 2017-06-29 NOTE — TELEPHONE ENCOUNTER
Reason for call:  Symptom   Symptom or request: still has pain from fall    Duration (how long have symptoms been present): 05/30/17  Have you been treated for this before? Yes    Additional comments: was seen in ed and has mri and other tests/ hard to take a deep breath and worse when exhaling    Phone number to reach patient:  Home number on file [phone] (home)    Best Time:  Any     Can we leave a detailed message on this number?  YES

## 2017-06-29 NOTE — TELEPHONE ENCOUNTER
S-(situation): patient C/O pain     B-(background): pain in chest straight across the breast bone.  Hurts to inhale and exhale.  Pain is worse.  No change in sleep habits. Has done PT.     A-(assessment): breast bone pain    R-(recommendations): advised to consider massage, ibuprofen.  If worst of worst pain go to the ER.  Iesha Henriquez RN

## 2017-07-03 ENCOUNTER — HOSPITAL ENCOUNTER (OUTPATIENT)
Dept: PHYSICAL THERAPY | Facility: CLINIC | Age: 67
Setting detail: THERAPIES SERIES
End: 2017-07-03
Attending: PHYSICIAN ASSISTANT
Payer: MEDICARE

## 2017-07-03 PROCEDURE — 40000718 ZZHC STATISTIC PT DEPARTMENT ORTHO VISIT: Performed by: PHYSICAL THERAPIST

## 2017-07-03 PROCEDURE — 97110 THERAPEUTIC EXERCISES: CPT | Mod: GP | Performed by: PHYSICAL THERAPIST

## 2017-07-03 PROCEDURE — 97140 MANUAL THERAPY 1/> REGIONS: CPT | Mod: GP | Performed by: PHYSICAL THERAPIST

## 2017-07-06 ENCOUNTER — OFFICE VISIT (OUTPATIENT)
Dept: FAMILY MEDICINE | Facility: CLINIC | Age: 67
End: 2017-07-06
Payer: COMMERCIAL

## 2017-07-06 ENCOUNTER — RADIANT APPOINTMENT (OUTPATIENT)
Dept: GENERAL RADIOLOGY | Facility: CLINIC | Age: 67
End: 2017-07-06
Attending: PHYSICIAN ASSISTANT
Payer: COMMERCIAL

## 2017-07-06 VITALS
WEIGHT: 158 LBS | BODY MASS INDEX: 25.39 KG/M2 | SYSTOLIC BLOOD PRESSURE: 118 MMHG | TEMPERATURE: 97.7 F | DIASTOLIC BLOOD PRESSURE: 76 MMHG | HEART RATE: 71 BPM | HEIGHT: 66 IN

## 2017-07-06 DIAGNOSIS — M54.6 PAIN OF THORACIC FACET JOINT: ICD-10-CM
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
## 2017-07-06 DIAGNOSIS — M54.6 PAIN OF THORACIC FACET JOINT: Primary | ICD-10-CM

## 2017-07-06 PROCEDURE — 99213 OFFICE O/P EST LOW 20 MIN: CPT | Performed by: PHYSICIAN ASSISTANT

## 2017-07-06 PROCEDURE — 72070 X-RAY EXAM THORAC SPINE 2VWS: CPT

## 2017-07-06 NOTE — MR AVS SNAPSHOT
"              After Visit Summary   7/6/2017    Chyna Moncada    MRN: [id number]           Patient Information     Date Of Birth          1950        Visit Information        Provider Department      7/6/2017 8:00 AM Edilia Davison PA-C [facility]        Today's Diagnoses     Pain of thoracic facet joint    -  1    Fall, subsequent encounter          Care Instructions    Xray   Physical Therapy for this, or can try chiropractic  Update me if not resolving          Follow-ups after your visit        Additional Services     PHYSICAL THERAPY REFERRAL       *This therapy referral will be filtered to a centralized scheduling office at [facility] and the patient will receive a call to schedule an appointment at a [facility] location most convenient for them. *     [facility] provides Physical Therapy evaluation and treatment and many specialty services across the [facility] system.  If requesting a specialty program, please choose from the list below.    If you have not heard from the scheduling office within 2 business days, please call [phone] for all locations, with the exception of [place], please call [phone].  Treatment: Evaluation & Treatment  Special Instructions/Modalities: eval and treat  Special Programs: None    Please be aware that coverage of these services is subject to the terms and limitations of your health insurance plan.  Call member services at your health plan with any benefit or coverage questions.      **Note to Provider:  If you are referring outside of [facility] for the therapy appointment, please list the name of the location in the \"special instructions\" above, print the referral and give to the patient to schedule the appointment.                  Your next 10 appointments already scheduled     Jul 07, 2017  8:30 AM CDT   Ortho Treatment with Mary Jane Rocha PT   [facility] ([facility] " "[facility])    [address] [place] [address]   [phone]            Jul 10, 2017  7:00 AM CDT   Ortho Treatment with Mary Jane L Hron, PT   [facility] ([facility])    [address] [place] [address]   [phone]            Jul 14, 2017  7:00 AM CDT   Ortho Treatment with Mary Jane L Hron, PT   [facility] ([facility])    [address] [place] [address]   [phone]            Jul 17, 2017 10:00 AM CDT   Ortho Treatment with Mary Jane L Hron, PT   [facility] ([facility])    [address] [place] [address]   [phone]            Jul 21, 2017  8:00 AM CDT   Ortho Treatment with Mary Jane L Hron, PT   [facility] ([facility])    [address] [place] [address]   [phone]              Who to contact     If you have questions or need follow up information about today's clinic visit or your schedule please contact [facility] directly at [phone].  Normal or non-critical lab and imaging results will be communicated to you by [employer], letter or phone within 4 business days after the clinic has received the results. If you do not hear from us within 7 days, please contact the clinic through [employer] or phone. If you have a critical or abnormal lab result, we will notify you by phone as soon as possible.  Submit refill requests through [employer] or call your pharmacy and they will forward the refill request to us. Please allow 3 business days for your refill to be completed.          Additional Information About Your Visit        [employer] Information     [employer] lets you send messages to your doctor, view your test results, renew your prescriptions, schedule appointments and more. To sign up, go to [email or web address] . Click on \"Log in\" on the left side of the screen, " "which will take you to the Welcome page. Then click on \"Sign up Now\" on the right side of the page.     You will be asked to enter the access code listed below, as well as some personal information. Please follow the directions to create your username and password.     Your access code is: [email or web address]  Expires: 2017  9:03 PM     Your access code will  in 90 days. If you need help or a new code, please call your [facility] or [phone].        Care EveryWhere ID     This is your Care EveryWhere ID. This could be used by other organizations to access your [place] medical records  [id number]        Your Vitals Were     Pulse Temperature Height BMI (Body Mass Index)          71 97.7  F (36.5  C) (Tympanic) 5' 6\" (1.676 m) 25.5 kg/m2         Blood Pressure from Last 3 Encounters:   17 118/76   17 132/84   17 118/68    Weight from Last 3 Encounters:   17 158 lb (71.7 kg)   17 157 lb (71.2 kg)   17 157 lb (71.2 kg)              We Performed the Following     PHYSICAL THERAPY REFERRAL        Primary Care Provider Office Phone # Fax #    Edilia Davison PA-C [phone]       [facility] [address] [place] [id number]        Equal Access to Services     PATTI GARCIA AH: Hadii polly duckwortho Cyndie, waaxda luqadaha, qaybta kaalmada ning, nigel landry. So [facility] [phone].    ATENCIÓN: Si habla carloz, tiene a ferraro disposición servicios gratuitos de asistencia lingüística. Llame al [phone].    We comply with applicable federal civil rights laws and [place] laws. We do not discriminate on the basis of race, color, national origin, age, disability sex, sexual orientation or gender identity.            Thank you!     Thank you for choosing [facility]  for your care. Our goal is always to provide you with excellent care. Hearing back from our patients is one way we can continue " to improve our services. Please take a few minutes to complete the written survey that you may receive in the mail after your visit with us. Thank you!             Your Updated Medication List - Protect others around you: Learn how to safely use, store and throw away your medicines at www.disposemymeds.org.          This list is accurate as of: 7/6/17  2:05 PM.  Always use your most recent med list.                   Brand Name Dispense Instructions for use Diagnosis    aspirin EC 81 MG EC tablet      Take 81 mg by mouth        calcium-vitamin D 600-400 MG-UNIT per tablet    CALTRATE     Take 1 tablet by mouth        CINNAMON PO      2 capsules per day        fluticasone 50 MCG/ACT spray    FLONASE    1 Package    Spray 2 sprays into both nostrils daily    Chronic rhinitis       GARLIQUE PO      Take 1 tablet by mouth daily        IRON SUPPLEMENT PO      Take 325 mg by mouth daily        LORazepam 0.5 MG tablet    ATIVAN    10 tablet    Take 1 tablet (0.5 mg) by mouth every 6 hours as needed    Anxiety       magnesium 100 MG Caps           multivitamin Tabs tablet      Take 1 tablet by mouth daily        pravastatin 40 MG tablet    PRAVACHOL    90 tablet    TAKE ONE TABLET BY MOUTH EVERY NIGHT AT BEDTIME    Cerebral artery occlusion with cerebral infarction (H)       Propylene Glycol-Glycerin 1-0.3 % Soln      Place 1 drop into both eyes as needed        valACYclovir 1000 mg tablet    VALTREX    4 tablet    Take 2 tablets (2,000 mg) by mouth 2 times daily    Recurrent cold sores       VITAMIN D-3 PO      Take 1,000 Units by mouth daily

## 2017-07-06 NOTE — PROGRESS NOTES
"  SUBJECTIVE:                                                    Chyna Moncada is a 66 year old female who presents to clinic today for the following health issues:    Chest Pain      Onset: 5/30/2017 since her fall    Description (location/character/radiation/duration): Chest, straight across the breast bone    Intensity:  moderate    Accompanying signs and symptoms:        Shortness of breath: YES- hurts to inhale and exhale       Sweating: no        Nausea/vomitting: no        Palpitations: no        Other (fevers/chills/cough/heartburn/lightheadedness): no     History (similar episodes/previous evaluation): None    Precipitating or alleviating factors:       Worse with exertion: YES, pain is always there       Worse with breathing: YES       Related to eating: no        Better with burping: no     Therapies tried and outcome: None    Feels pain is actually in her back but then radiates thru to mid chest.  Is sharp.  Hurts with breathing, somewhat with twisting.  Not improving.  Physical Therapy has mainly been working on her R shoulder.  Shoulder, neck, buttock all still hurt.  Has seen sports med for shoulder, had injection.  No past imaging of back.    Problem list and histories reviewed & adjusted, as indicated.  Additional history: as documented      Reviewed and updated as needed this visit by clinical staff  Tobacco  Allergies  Med Hx  Surg Hx  Fam Hx  Soc Hx      Reviewed and updated as needed this visit by Provider         ROS:  Constitutional, HEENT, cardiovascular, pulmonary, GI, , musculoskeletal, neuro, skin, endocrine and psych systems are negative, except as otherwise noted.    OBJECTIVE:     /76 (BP Location: Right arm, Patient Position: Chair, Cuff Size: Adult Regular)  Pulse 71  Temp 97.7  F (36.5  C) (Tympanic)  Ht 5' 6\" (1.676 m)  Wt 158 lb (71.7 kg)  BMI 25.5 kg/m2  Body mass index is 25.5 kg/(m^2).  GENERAL: healthy, alert and no distress  MS: no tenderness over spine, " tenderness is just left of spine around T8, and without symptoms on passive scapular extension    Xray neg    ASSESSMENT/PLAN:       ICD-10-CM    1. Pain of thoracic facet joint M54.6 XR Thoracic Spine 2 Views     PHYSICAL THERAPY REFERRAL   2. Fall, subsequent encounter W19.XXXD      Patient Instructions   Xray   Physical Therapy for this, or can try chiropractic  Update me if not resolving        Edilia Davison PA-C  [facility]

## 2017-07-06 NOTE — NURSING NOTE
"Chief Complaint   Patient presents with     Musculoskeletal Problem       Initial /76 (BP Location: Right arm, Patient Position: Chair, Cuff Size: Adult Regular)  Pulse 71  Temp 97.7  F (36.5  C) (Tympanic)  Ht 5' 6\" (1.676 m)  Wt 158 lb (71.7 kg)  BMI 25.5 kg/m2 Estimated body mass index is 25.5 kg/(m^2) as calculated from the following:    Height as of this encounter: 5' 6\" (1.676 m).    Weight as of this encounter: 158 lb (71.7 kg).  Medication Reconciliation: complete    Health Maintenance that is potentially due pending provider review:  NONE    Felisha WOODRUFF MA        "

## 2017-07-07 ENCOUNTER — HOSPITAL ENCOUNTER (OUTPATIENT)
Dept: PHYSICAL THERAPY | Facility: CLINIC | Age: 67
Setting detail: THERAPIES SERIES
End: 2017-07-07
Attending: PHYSICIAN ASSISTANT
Payer: MEDICARE

## 2017-07-07 PROCEDURE — 40000718 ZZHC STATISTIC PT DEPARTMENT ORTHO VISIT: Performed by: PHYSICAL THERAPIST

## 2017-07-07 PROCEDURE — 97110 THERAPEUTIC EXERCISES: CPT | Mod: GP | Performed by: PHYSICAL THERAPIST

## 2017-07-07 PROCEDURE — 97112 NEUROMUSCULAR REEDUCATION: CPT | Mod: GP | Performed by: PHYSICAL THERAPIST

## 2017-07-07 PROCEDURE — 97140 MANUAL THERAPY 1/> REGIONS: CPT | Mod: GP | Performed by: PHYSICAL THERAPIST

## 2017-07-10 ENCOUNTER — HOSPITAL ENCOUNTER (OUTPATIENT)
Dept: PHYSICAL THERAPY | Facility: CLINIC | Age: 67
Setting detail: THERAPIES SERIES
End: 2017-07-10
Attending: PHYSICIAN ASSISTANT
Payer: MEDICARE

## 2017-07-10 PROCEDURE — 40000718 ZZHC STATISTIC PT DEPARTMENT ORTHO VISIT: Performed by: PHYSICAL THERAPIST

## 2017-07-10 PROCEDURE — 97140 MANUAL THERAPY 1/> REGIONS: CPT | Mod: GP | Performed by: PHYSICAL THERAPIST

## 2017-07-10 PROCEDURE — 97110 THERAPEUTIC EXERCISES: CPT | Mod: GP | Performed by: PHYSICAL THERAPIST

## 2017-07-10 PROCEDURE — 97112 NEUROMUSCULAR REEDUCATION: CPT | Mod: GP | Performed by: PHYSICAL THERAPIST

## 2017-07-14 ENCOUNTER — HOSPITAL ENCOUNTER (OUTPATIENT)
Dept: PHYSICAL THERAPY | Facility: CLINIC | Age: 67
Setting detail: THERAPIES SERIES
End: 2017-07-14
Attending: PHYSICIAN ASSISTANT
Payer: MEDICARE

## 2017-07-14 PROCEDURE — 40000718 ZZHC STATISTIC PT DEPARTMENT ORTHO VISIT: Performed by: PHYSICAL THERAPIST

## 2017-07-14 PROCEDURE — 97140 MANUAL THERAPY 1/> REGIONS: CPT | Mod: GP | Performed by: PHYSICAL THERAPIST

## 2017-07-14 PROCEDURE — 97110 THERAPEUTIC EXERCISES: CPT | Mod: GP | Performed by: PHYSICAL THERAPIST

## 2017-07-14 PROCEDURE — 97112 NEUROMUSCULAR REEDUCATION: CPT | Mod: GP | Performed by: PHYSICAL THERAPIST

## 2017-07-19 ENCOUNTER — HOSPITAL ENCOUNTER (OUTPATIENT)
Dept: PHYSICAL THERAPY | Facility: CLINIC | Age: 67
Setting detail: THERAPIES SERIES
End: 2017-07-19
Attending: PHYSICIAN ASSISTANT
Payer: MEDICARE

## 2017-07-19 PROCEDURE — 40000718 ZZHC STATISTIC PT DEPARTMENT ORTHO VISIT: Performed by: PHYSICAL THERAPIST

## 2017-07-19 PROCEDURE — 97140 MANUAL THERAPY 1/> REGIONS: CPT | Mod: GP | Performed by: PHYSICAL THERAPIST

## 2017-07-26 ENCOUNTER — HOSPITAL ENCOUNTER (OUTPATIENT)
Dept: PHYSICAL THERAPY | Facility: CLINIC | Age: 67
Setting detail: THERAPIES SERIES
End: 2017-07-26
Attending: PHYSICIAN ASSISTANT
Payer: MEDICARE

## 2017-07-26 PROCEDURE — G8978 MOBILITY CURRENT STATUS: HCPCS | Mod: GP,CI | Performed by: PHYSICAL THERAPIST

## 2017-07-26 PROCEDURE — 97110 THERAPEUTIC EXERCISES: CPT | Mod: GP | Performed by: PHYSICAL THERAPIST

## 2017-07-26 PROCEDURE — G8979 MOBILITY GOAL STATUS: HCPCS | Mod: GP,CI | Performed by: PHYSICAL THERAPIST

## 2017-07-26 PROCEDURE — 40000718 ZZHC STATISTIC PT DEPARTMENT ORTHO VISIT: Performed by: PHYSICAL THERAPIST

## 2017-07-26 PROCEDURE — 97140 MANUAL THERAPY 1/> REGIONS: CPT | Mod: GP | Performed by: PHYSICAL THERAPIST

## 2017-07-26 PROCEDURE — G8980 MOBILITY D/C STATUS: HCPCS | Mod: GP,CI | Performed by: PHYSICAL THERAPIST

## 2017-07-28 NOTE — PROGRESS NOTES
Outpatient Physical Therapy Discharge Note     Patient: Chyna Moncada  : 1950    Beginning/End Dates of Reporting Period:  17 to 2017    Referring Provider: Edilia Davison PA-C    Therapy Diagnosis: Left GH primary impingement; Left sciatic N and superficial peroneal N tension; limited hip and knee strength; impaired gait and proprioception; high falls risk     Client Self Report: The shoulder is really feeling great.  Mild right upper trap tightness remaining, managing this well with the HEP.  Feels ready to be done with PT and continue HEP independently.  Brought in TENS unit today - wondering how to purchase longer lead wires via the internet.    Objective Measurements:  Objective Measure: GH MMT  Details: ER=4+/5 lisa; IR=5/5 lisa  Objective Measure: Gait  Details: No gross deviations  Objective Measure: GH AROM   Details: Tkrcqkz=292 deg lisa; ER=65 deg lisa  Objective Measure: SPADI  Details: 7.5% disability (92.41% improvement from initial evaluation)  Objective Measure: Cervical rotation AROM  Details: Bilateral=70 deg         Goals:  Goal Identifier GH ER Strength   Goal Description Following PT interventions, patient will have >=4+/5 lisa GH ER strength to reduce impingement with overhead ADL's.   Target Date 17   Date Met  17   Progress:     Goal Identifier GH AROM   Goal Description Following PT interventions, patient will have >=150 degrees of bilateral GH flexion AROM to allow for normalized ADL's.   Target Date 17   Date Met  17   Progress:     Goal Identifier Stairs   Goal Description Following PT interventions, patient will be able to climb >=10 stairs without difficulty to allow for normalized community mobility.   Target Date 17   Date Met  07/10/17   Progress:     Goal Identifier Weight lifting   Goal Description Following PT interventions, patient will be able to perform her desired weightlifting routine without difficulty.    Target Date  09/18/17   Date Met  07/26/17   Progress:     Progress Toward Goals:   Progress this reporting period: Chyna attended 7 physical therapy sessions to address her left LE, left shoulder, and neck pain.  She made excellent progress with pain reduction, rotator cuff and glute strength, and range of motion.   Patient is independent with her long term HEP and all therapy goals have been met.        Plan:  Changes to therapy plan of care: No formal visits at this time; chart to remain open 6 weeks in case of exacerbation.    Discharge:  Pt has not returned in 6 weeks., discharge with long term HEP.    Mary Jane Rocha, PT, DPT  Doctor of Physical Therapy #[id number]  [facility]  [phone]  [email or web address]

## 2017-08-07 ENCOUNTER — RADIANT APPOINTMENT (OUTPATIENT)
Dept: MAMMOGRAPHY | Facility: CLINIC | Age: 67
End: 2017-08-07
Attending: PHYSICIAN ASSISTANT
Payer: COMMERCIAL

## 2017-08-07 DIAGNOSIS — Z12.31 VISIT FOR SCREENING MAMMOGRAM: ICD-10-CM

## 2017-08-07 PROCEDURE — G0202 SCR MAMMO BI INCL CAD: HCPCS | Mod: TC

## 2017-10-16 ENCOUNTER — OFFICE VISIT (OUTPATIENT)
Dept: FAMILY MEDICINE | Facility: CLINIC | Age: 67
End: 2017-10-16
Payer: COMMERCIAL

## 2017-10-16 VITALS
TEMPERATURE: 98.3 F | HEART RATE: 82 BPM | HEIGHT: 66 IN | WEIGHT: 165 LBS | SYSTOLIC BLOOD PRESSURE: 138 MMHG | DIASTOLIC BLOOD PRESSURE: 87 MMHG | BODY MASS INDEX: 26.52 KG/M2 | RESPIRATION RATE: 16 BRPM

## 2017-10-16 DIAGNOSIS — M25.50 POLYARTHRALGIA: ICD-10-CM

## 2017-10-16 DIAGNOSIS — Z23 NEED FOR PROPHYLACTIC VACCINATION AND INOCULATION AGAINST INFLUENZA: ICD-10-CM

## 2017-10-16 DIAGNOSIS — Z00.00 MEDICARE ANNUAL WELLNESS VISIT, SUBSEQUENT: Primary | ICD-10-CM

## 2017-10-16 DIAGNOSIS — M85.80 OSTEOPENIA, UNSPECIFIED LOCATION: ICD-10-CM

## 2017-10-16 DIAGNOSIS — R63.5 WEIGHT GAIN: ICD-10-CM

## 2017-10-16 DIAGNOSIS — I73.9 PERIPHERAL VASCULAR DISEASE (H): ICD-10-CM

## 2017-10-16 DIAGNOSIS — R26.89 BALANCE PROBLEM: ICD-10-CM

## 2017-10-16 DIAGNOSIS — I63.50 CEREBRAL ARTERY OCCLUSION WITH CEREBRAL INFARCTION (H): ICD-10-CM

## 2017-10-16 LAB
ALBUMIN SERPL-MCNC: 3.7 G/DL (ref 3.4–5)
ALP SERPL-CCNC: 107 U/L (ref 40–150)
ALT SERPL W P-5'-P-CCNC: 32 U/L (ref 0–50)
ANION GAP SERPL CALCULATED.3IONS-SCNC: 4 MMOL/L (ref 3–14)
AST SERPL W P-5'-P-CCNC: 22 U/L (ref 0–45)
BASOPHILS # BLD AUTO: 0 10E9/L (ref 0–0.2)
BASOPHILS NFR BLD AUTO: 0.7 %
BILIRUB SERPL-MCNC: 0.5 MG/DL (ref 0.2–1.3)
BUN SERPL-MCNC: 15 MG/DL (ref 7–30)
CALCIUM SERPL-MCNC: 8.7 MG/DL (ref 8.5–10.1)
CHLORIDE SERPL-SCNC: 106 MMOL/L (ref 94–109)
CHOLEST SERPL-MCNC: 205 MG/DL
CK SERPL-CCNC: 76 U/L (ref 30–225)
CO2 SERPL-SCNC: 28 MMOL/L (ref 20–32)
CREAT SERPL-MCNC: 0.76 MG/DL (ref 0.52–1.04)
CRP SERPL-MCNC: <2.9 MG/L (ref 0–8)
DIFFERENTIAL METHOD BLD: NORMAL
EOSINOPHIL # BLD AUTO: 0.2 10E9/L (ref 0–0.7)
EOSINOPHIL NFR BLD AUTO: 3.3 %
ERYTHROCYTE [DISTWIDTH] IN BLOOD BY AUTOMATED COUNT: 13.2 % (ref 10–15)
ERYTHROCYTE [SEDIMENTATION RATE] IN BLOOD BY WESTERGREN METHOD: 6 MM/H (ref 0–30)
GFR SERPL CREATININE-BSD FRML MDRD: 76 ML/MIN/1.7M2
GLUCOSE SERPL-MCNC: 96 MG/DL (ref 70–99)
HCT VFR BLD AUTO: 44.6 % (ref 35–47)
HDLC SERPL-MCNC: 91 MG/DL
HGB BLD-MCNC: 14.6 G/DL (ref 11.7–15.7)
LDLC SERPL CALC-MCNC: 80 MG/DL
LYMPHOCYTES # BLD AUTO: 1.3 10E9/L (ref 0.8–5.3)
LYMPHOCYTES NFR BLD AUTO: 23.3 %
MCH RBC QN AUTO: 29.1 PG (ref 26.5–33)
MCHC RBC AUTO-ENTMCNC: 32.7 G/DL (ref 31.5–36.5)
MCV RBC AUTO: 89 FL (ref 78–100)
MONOCYTES # BLD AUTO: 0.6 10E9/L (ref 0–1.3)
MONOCYTES NFR BLD AUTO: 10.9 %
NEUTROPHILS # BLD AUTO: 3.3 10E9/L (ref 1.6–8.3)
NEUTROPHILS NFR BLD AUTO: 61.8 %
NONHDLC SERPL-MCNC: 114 MG/DL
PLATELET # BLD AUTO: 199 10E9/L (ref 150–450)
POTASSIUM SERPL-SCNC: 4.3 MMOL/L (ref 3.4–5.3)
PROT SERPL-MCNC: 7.3 G/DL (ref 6.8–8.8)
PTH-INTACT SERPL-MCNC: 67 PG/ML (ref 12–72)
RBC # BLD AUTO: 5.01 10E12/L (ref 3.8–5.2)
SODIUM SERPL-SCNC: 138 MMOL/L (ref 133–144)
TRIGL SERPL-MCNC: 169 MG/DL
TSH SERPL DL<=0.005 MIU/L-ACNC: 1.77 MU/L (ref 0.4–4)
URATE SERPL-MCNC: 3.7 MG/DL (ref 2.6–6)
VIT B12 SERPL-MCNC: 465 PG/ML (ref 193–986)
WBC # BLD AUTO: 5.4 10E9/L (ref 4–11)

## 2017-10-16 PROCEDURE — 36415 COLL VENOUS BLD VENIPUNCTURE: CPT | Performed by: PHYSICIAN ASSISTANT

## 2017-10-16 PROCEDURE — 83970 ASSAY OF PARATHORMONE: CPT | Performed by: PHYSICIAN ASSISTANT

## 2017-10-16 PROCEDURE — 82607 VITAMIN B-12: CPT | Performed by: PHYSICIAN ASSISTANT

## 2017-10-16 PROCEDURE — 86431 RHEUMATOID FACTOR QUANT: CPT | Performed by: PHYSICIAN ASSISTANT

## 2017-10-16 PROCEDURE — 84550 ASSAY OF BLOOD/URIC ACID: CPT | Performed by: PHYSICIAN ASSISTANT

## 2017-10-16 PROCEDURE — 85652 RBC SED RATE AUTOMATED: CPT | Performed by: PHYSICIAN ASSISTANT

## 2017-10-16 PROCEDURE — 86140 C-REACTIVE PROTEIN: CPT | Performed by: PHYSICIAN ASSISTANT

## 2017-10-16 PROCEDURE — 90662 IIV NO PRSV INCREASED AG IM: CPT | Performed by: PHYSICIAN ASSISTANT

## 2017-10-16 PROCEDURE — 82306 VITAMIN D 25 HYDROXY: CPT | Performed by: PHYSICIAN ASSISTANT

## 2017-10-16 PROCEDURE — 99397 PER PM REEVAL EST PAT 65+ YR: CPT | Mod: 25 | Performed by: PHYSICIAN ASSISTANT

## 2017-10-16 PROCEDURE — G0008 ADMIN INFLUENZA VIRUS VAC: HCPCS | Performed by: PHYSICIAN ASSISTANT

## 2017-10-16 PROCEDURE — 86038 ANTINUCLEAR ANTIBODIES: CPT | Performed by: PHYSICIAN ASSISTANT

## 2017-10-16 PROCEDURE — 86618 LYME DISEASE ANTIBODY: CPT | Performed by: PHYSICIAN ASSISTANT

## 2017-10-16 PROCEDURE — 80061 LIPID PANEL: CPT | Performed by: PHYSICIAN ASSISTANT

## 2017-10-16 PROCEDURE — 99213 OFFICE O/P EST LOW 20 MIN: CPT | Mod: 25 | Performed by: PHYSICIAN ASSISTANT

## 2017-10-16 PROCEDURE — 86780 TREPONEMA PALLIDUM: CPT | Performed by: PHYSICIAN ASSISTANT

## 2017-10-16 PROCEDURE — 82550 ASSAY OF CK (CPK): CPT | Performed by: PHYSICIAN ASSISTANT

## 2017-10-16 PROCEDURE — 80050 GENERAL HEALTH PANEL: CPT | Performed by: PHYSICIAN ASSISTANT

## 2017-10-16 RX ORDER — PRAVASTATIN SODIUM 40 MG
TABLET ORAL
Qty: 90 TABLET | Refills: 3 | Status: SHIPPED | OUTPATIENT
Start: 2017-10-16 | End: 2018-10-25

## 2017-10-16 NOTE — MR AVS SNAPSHOT
After Visit Summary   10/16/2017    Chyna Moncada    MRN: [id number]           Patient Information     Date Of Birth          1950        Visit Information        Provider Department      10/16/2017 8:00 AM Edilia Davison PA-C [facility]        Today's Diagnoses     Medicare annual wellness visit, subsequent    -  1    Cerebral artery occlusion with cerebral infarction (H)        Peripheral vascular disease (H)        Osteopenia, unspecified location        Polyarthralgia        Balance problem        Weight gain          Care Instructions    Joint aches and pain in neck/shoulder -  Many labs today   See if going off pravastatin helps.  If doesn't help, resume.  If does help to go off, retry going back on to make sure isn't a fluke.      See me when have decided if statin is causing any symptoms - we'll work further on symptoms or changing meds  Will talk more about fatigue at that recheck    Quit fungal topical - usually not effective  Can try Vicks daily x 6 mo  If starts to bother more, will do nail culture.      Referral to neurology for balance and history of stroke.  We can discuss further next visit as well.    Bone density check again next Dec or so      Preventive Health Recommendations  Female Ages 65 +    Yearly exam:     See your health care provider every year in order to  o Review health changes.   o Discuss preventive care.    o Review your medicines if your doctor has prescribed any.      You no longer need a yearly Pap test unless you've had an abnormal Pap test in the past 10 years. If you have vaginal symptoms, such as bleeding or discharge, be sure to talk with your provider about a Pap test.      Every 1 to 2 years, have a mammogram.  If you are over 69, talk with your health care provider about whether or not you want to continue having screening mammograms.      Every 10 years, have a colonoscopy. Or, have a yearly FIT test (stool test). These  exams will check for colon cancer.       Have a cholesterol test every 5 years, or more often if your doctor advises it.       Have a diabetes test (fasting glucose) every three years. If you are at risk for diabetes, you should have this test more often.       At age 65, have a bone density scan (DEXA) to check for osteoporosis (brittle bone disease).    Shots:    Get a flu shot each year.    Get a tetanus shot every 10 years.    Talk to your doctor about your pneumonia vaccines. There are now two you should receive - Pneumovax (PPSV 23) and Prevnar (PCV 13).    Talk to your doctor about the shingles vaccine.    Talk to your doctor about the hepatitis B vaccine.    Nutrition:     Eat at least 5 servings of fruits and vegetables each day.      Eat whole-grain bread, whole-wheat pasta and brown rice instead of white grains and rice.      Talk to your provider about Calcium and Vitamin D.     Lifestyle    Exercise at least 150 minutes a week (30 minutes a day, 5 days a week). This will help you control your weight and prevent disease.      Limit alcohol to one drink per day.      No smoking.       Wear sunscreen to prevent skin cancer.       See your dentist twice a year for an exam and cleaning.      See your eye doctor every 1 to 2 years to screen for conditions such as glaucoma, macular degeneration, cataracts, etc           Follow-ups after your visit        Additional Services     NEUROLOGY ADULT REFERRAL       Your provider has referred you for the following:   Consult at [facility] [place] [place]: [facility] [phone]   [email or web address]  [facility] [place] [place]: [place] [facility] [phone]   [email or web address]  [facility] [place] [place]: [facility] [phone]   [email or web address]    Please be aware that coverage of these services is subject to the terms and limitations of your health insurance plan.  Call member services at your [facility]  "plan with any benefit or coverage questions.      Please bring the following with you to your appointment:    (1) Any X-Rays, CTs or MRIs which have been performed.  Contact the facility where they were done to arrange for  prior to your scheduled appointment.    (2) List of current medications  (3) This referral request   (4) Any documents/labs given to you for this referral                  Who to contact     If you have questions or need follow up information about today's clinic visit or your schedule please contact [facility] directly at [phone].  Normal or non-critical lab and imaging results will be communicated to you by "[employer]hart, letter or phone within 4 business days after the clinic has received the results. If you do not hear from us within 7 days, please contact the clinic through "[employer]"hart or phone. If you have a critical or abnormal lab result, we will notify you by phone as soon as possible.  Submit refill requests through [employer] or call your pharmacy and they will forward the refill request to us. Please allow 3 business days for your refill to be completed.          Additional Information About Your Visit        "[employer] Information     [employer] lets you send messages to your doctor, view your test results, renew your prescriptions, schedule appointments and more. To sign up, go to [email or web address] . Click on \"Log in\" on the left side of the screen, which will take you to the Welcome page. Then click on \"Sign up Now\" on the right side of the page.     You will be asked to enter the access code listed below, as well as some personal information. Please follow the directions to create your username and password.     Your access code is: [email or web address]  Expires: 2018  8:54 AM     Your access code will  in 90 days. If you need help or a new code, please call your [facility] or [phone].        Care EveryWhere ID     This is your Care EveryWhere ID. " "This could be used by other organizations to access your [facility] medical records  [id number]        Your Vitals Were     Pulse Temperature Respirations Height BMI (Body Mass Index)       82 98.3  F (36.8  C) (Tympanic) 16 5' 6\" (1.676 m) 26.63 kg/m2        Blood Pressure from Last 3 Encounters:   10/16/17 138/87   07/06/17 118/76   06/23/17 132/84    Weight from Last 3 Encounters:   10/16/17 165 lb (74.8 kg)   07/06/17 158 lb (71.7 kg)   06/23/17 157 lb (71.2 kg)              We Performed the Following     Anti Nuclear Regina IgG by IFA with Reflex     Anti Treponema     CBC with platelets and differential     CK total     Comprehensive metabolic panel     CRP inflammation     Erythrocyte sedimentation rate auto     Lipid panel reflex to direct LDL     Lyme Disease Regina with reflex to WB Serum     NEUROLOGY ADULT REFERRAL     Parathyroid Hormone Intact     Rheumatoid factor     TSH with free T4 reflex     Uric acid     Vitamin B12     Vitamin D Deficiency          Today's Medication Changes          These changes are accurate as of: 10/16/17  8:54 AM.  If you have any questions, ask your nurse or doctor.               These medicines have changed or have updated prescriptions.        Dose/Directions    pravastatin 40 MG tablet   Commonly known as:  PRAVACHOL   This may have changed:  See the new instructions.   Used for:  Cerebral artery occlusion with cerebral infarction (H)   Changed by:  Edilia Davison PA-C        TAKE ONE TABLET BY MOUTH EVERY NIGHT AT BEDTIME   Quantity:  90 tablet   Refills:  3         Stop taking these medicines if you haven't already. Please contact your care team if you have questions.     JEANNETTE HIRAM   Stopped by:  Edilia Davison PA-C                Where to get your medicines      These medications were sent to [facility] [place] [id number] [place] [address]     Phone:  [phone]     pravastatin 40 MG tablet "                Primary Care Provider Office Phone # Fax #    Edilia Davison PA-C [phone] [address] [place] [address]        Equal Access to Services     PATTI GARCIA : Celestina moyer guccio Somaryali, wakiada luqadaha, qaybta kaalmada ning, nigel mayes laForrestnathanael landry. So [facility] [phone].    ATENCIÓN: Si habla español, tiene a ferraro disposición servicios gratuitos de asistencia lingüística. Llame al [phone].    We comply with applicable federal civil rights laws and [place] laws. We do not discriminate on the basis of race, color, national origin, age, disability, sex, sexual orientation, or gender identity.            Thank you!     Thank you for choosing [facility]  for your care. Our goal is always to provide you with excellent care. Hearing back from our patients is one way we can continue to improve our services. Please take a few minutes to complete the written survey that you may receive in the mail after your visit with us. Thank you!             Your Updated Medication List - Protect others around you: Learn how to safely use, store and throw away your medicines at www.disposemymeds.org.          This list is accurate as of: 10/16/17  8:54 AM.  Always use your most recent med list.                   Brand Name Dispense Instructions for use Diagnosis    aspirin EC 81 MG EC tablet      Take 81 mg by mouth        calcium-vitamin D 600-400 MG-UNIT per tablet    CALTRATE     Take 1 tablet by mouth        CINNAMON PO      2 capsules per day        [facility] PO           fluticasone 50 MCG/ACT spray    FLONASE    1 Package    Spray 2 sprays into both nostrils daily    Chronic rhinitis       IRON SUPPLEMENT PO      Take 325 mg by mouth daily        LORazepam 0.5 MG tablet    ATIVAN    10 tablet    Take 1 tablet (0.5 mg) by mouth every 6 hours as needed    Anxiety       magnesium 100 MG Caps           multivitamin Tabs  tablet      Take 1 tablet by mouth daily        pravastatin 40 MG tablet    PRAVACHOL    90 tablet    TAKE ONE TABLET BY MOUTH EVERY NIGHT AT BEDTIME    Cerebral artery occlusion with cerebral infarction (H)       Propylene Glycol-Glycerin 1-0.3 % Soln      Place 1 drop into both eyes as needed        valACYclovir 1000 mg tablet    VALTREX    4 tablet    Take 2 tablets (2,000 mg) by mouth 2 times daily    Recurrent cold sores       VITAMIN D-3 PO      Take 1,000 Units by mouth daily

## 2017-10-16 NOTE — PATIENT INSTRUCTIONS
Joint aches and pain in neck/shoulder -  Many labs today   See if going off pravastatin helps.  If doesn't help, resume.  If does help to go off, retry going back on to make sure isn't a fluke.      See me when have decided if statin is causing any symptoms - we'll work further on symptoms or changing meds  Will talk more about fatigue at that recheck    Quit fungal topical - usually not effective  Can try Vicks daily x 6 mo  If starts to bother more, will do nail culture.      Referral to neurology for balance and history of stroke.  We can discuss further next visit as well.    Bone density check again next Dec or so      Preventive Health Recommendations  Female Ages 65 +    Yearly exam:     See your health care provider every year in order to  o Review health changes.   o Discuss preventive care.    o Review your medicines if your doctor has prescribed any.      You no longer need a yearly Pap test unless you've had an abnormal Pap test in the past 10 years. If you have vaginal symptoms, such as bleeding or discharge, be sure to talk with your provider about a Pap test.      Every 1 to 2 years, have a mammogram.  If you are over 69, talk with your health care provider about whether or not you want to continue having screening mammograms.      Every 10 years, have a colonoscopy. Or, have a yearly FIT test (stool test). These exams will check for colon cancer.       Have a cholesterol test every 5 years, or more often if your doctor advises it.       Have a diabetes test (fasting glucose) every three years. If you are at risk for diabetes, you should have this test more often.       At age 65, have a bone density scan (DEXA) to check for osteoporosis (brittle bone disease).    Shots:    Get a flu shot each year.    Get a tetanus shot every 10 years.    Talk to your doctor about your pneumonia vaccines. There are now two you should receive - Pneumovax (PPSV 23) and Prevnar (PCV 13).    Talk to your doctor about the  shingles vaccine.    Talk to your doctor about the hepatitis B vaccine.    Nutrition:     Eat at least 5 servings of fruits and vegetables each day.      Eat whole-grain bread, whole-wheat pasta and brown rice instead of white grains and rice.      Talk to your provider about Calcium and Vitamin D.     Lifestyle    Exercise at least 150 minutes a week (30 minutes a day, 5 days a week). This will help you control your weight and prevent disease.      Limit alcohol to one drink per day.      No smoking.       Wear sunscreen to prevent skin cancer.       See your dentist twice a year for an exam and cleaning.      See your eye doctor every 1 to 2 years to screen for conditions such as glaucoma, macular degeneration, cataracts, etc

## 2017-10-16 NOTE — NURSING NOTE
"Chief Complaint   Patient presents with     Physical       Initial /87 (BP Location: Right arm, Patient Position: Chair, Cuff Size: Adult Regular)  Pulse 82  Temp 98.3  F (36.8  C) (Tympanic)  Resp 16  Ht 5' 6\" (1.676 m)  Wt 165 lb (74.8 kg)  BMI 26.63 kg/m2 Estimated body mass index is 26.63 kg/(m^2) as calculated from the following:    Height as of this encounter: 5' 6\" (1.676 m).    Weight as of this encounter: 165 lb (74.8 kg).  Medication Reconciliation: complete    Health Maintenance that is potentially due pending provider review:  NONE        Is there anyone who you would like to be able to receive your results? No  If yes have patient fill out KRISTY      "

## 2017-10-16 NOTE — LETTER
[facility]  [address] [place] [id number]  Phone: [phone]  Fax: [phone]    October 18, 2017    Chynaantionette Moncada  [address] [place] [id number]        Dear Chyna,    I am writing to inform you of the results of the laboratory tests you had done recently.  Lab Results   Component Value Date    CHOL 205 10/16/2017          Lab Results   Component Value Date    HDL 91 10/16/2017            Lab Results   Component Value Date    LDL 80 10/16/2017        Lab Results   Component Value Date    TRIG 169 10/16/2017          Results for orders placed or performed in visit on 10/16/17   Lipid panel reflex to direct LDL   Result Value Ref Range    Cholesterol 205 (H) <200 mg/dL    Triglycerides 169 (H) <150 mg/dL    HDL Cholesterol 91 >49 mg/dL    LDL Cholesterol Calculated 80 <100 mg/dL    Non HDL Cholesterol 114 <130 mg/dL   Uric acid   Result Value Ref Range    Uric Acid 3.7 2.6 - 6.0 mg/dL   Vitamin D Deficiency   Result Value Ref Range    Vitamin D Deficiency screening 69 20 - 75 ug/L   Comprehensive metabolic panel   Result Value Ref Range    Sodium 138 133 - 144 mmol/L    Potassium 4.3 3.4 - 5.3 mmol/L    Chloride 106 94 - 109 mmol/L    Carbon Dioxide 28 20 - 32 mmol/L    Anion Gap 4 3 - 14 mmol/L    Glucose 96 70 - 99 mg/dL    Urea Nitrogen 15 7 - 30 mg/dL    Creatinine 0.76 0.52 - 1.04 mg/dL    GFR Estimate 76 >60 mL/min/1.7m2    GFR Estimate If Black >90 >60 mL/min/1.7m2    Calcium 8.7 8.5 - 10.1 mg/dL    Bilirubin Total 0.5 0.2 - 1.3 mg/dL    Albumin 3.7 3.4 - 5.0 g/dL    Protein Total 7.3 6.8 - 8.8 g/dL    Alkaline Phosphatase 107 40 - 150 U/L    ALT 32 0 - 50 U/L    AST 22 0 - 45 U/L   CK total   Result Value Ref Range    CK Total 76 30 - 225 U/L   TSH with free T4 reflex   Result Value Ref Range    TSH 1.77 0.40 - 4.00 mU/L   CRP inflammation   Result Value Ref Range    CRP Inflammation <2.9 0.0 - 8.0 mg/L   Erythrocyte sedimentation rate auto   Result  Value Ref Range    Sed Rate 6 0 - 30 mm/h   Rheumatoid factor   Result Value Ref Range    Rheumatoid Factor <20 <20 IU/mL   Anti Nuclear Regina IgG by IFA with Reflex   Result Value Ref Range    DIANNE interpretation Negative NEG^Negative   Lyme Disease Regina with reflex to WB Serum   Result Value Ref Range    Lyme Disease Antibodies Serum 0.03 0.00 - 0.89   Vitamin B12   Result Value Ref Range    Vitamin B12 465 193 - 986 pg/mL   CBC with platelets and differential   Result Value Ref Range    WBC 5.4 4.0 - 11.0 10e9/L    RBC Count 5.01 3.8 - 5.2 10e12/L    Hemoglobin 14.6 11.7 - 15.7 g/dL    Hematocrit 44.6 35.0 - 47.0 %    MCV 89 78 - 100 fl    MCH 29.1 26.5 - 33.0 pg    MCHC 32.7 31.5 - 36.5 g/dL    RDW 13.2 10.0 - 15.0 %    Platelet Count 199 150 - 450 10e9/L    Diff Method Automated Method     % Neutrophils 61.8 %    % Lymphocytes 23.3 %    % Monocytes 10.9 %    % Eosinophils 3.3 %    % Basophils 0.7 %    Absolute Neutrophil 3.3 1.6 - 8.3 10e9/L    Absolute Lymphocytes 1.3 0.8 - 5.3 10e9/L    Absolute Monocytes 0.6 0.0 - 1.3 10e9/L    Absolute Eosinophils 0.2 0.0 - 0.7 10e9/L    Absolute Basophils 0.0 0.0 - 0.2 10e9/L   Anti Treponema   Result Value Ref Range    Treponema pallidum Antibody Negative NEG^Negative   Parathyroid Hormone Intact   Result Value Ref Range    Parathyroid Hormone Intact 67 12 - 72 pg/mL

## 2017-10-16 NOTE — PROGRESS NOTES
"  SUBJECTIVE:   Chyna Moncada is a 66 year old female who presents for Preventive Visit    Are you in the first 12 months of your Medicare Part B coverage?  No    Healthy Habits:    Do you get at least three servings of calcium containing foods daily (dairy, green leafy vegetables, etc.)? no, taking calcium and/or vitamin D supplement: yes - calcium-vitamin D    Amount of exercise or daily activities, outside of work: 0 day(s) per week, since falling in the tub, her whole body aches.  Tries to do 5 days a week    Problems taking medications regularly No    Medication side effects: No    Have you had an eye exam in the past two years? yes    Do you see a dentist twice per year? yes    Do you have sleep apnea, excessive snoring or daytime drowsiness?yes-daytime drowsiness, is always tired    COGNITIVE SCREEN  1) Repeat 3 items (Banana, Sunrise, Chair)    2) Clock draw: NORMAL  3) 3 item recall: Recalls 3 objects  Results: 3 items recalled: COGNITIVE IMPAIRMENT LESS LIKELY    Mini-CogTM Copyright S Liliana. Licensed by the author for use in [employer]; reprinted with permission ([email or web address]@.[email or web address]). All rights reserved.      * Something wrong with her 2nd toenail.  Has been using fungal shield for over the past year and nothing has changed.    * Would like her cholesterol checked      * Pain in right shoulder into neck, hasn't gone away     From my 6/21 note, \"   Also had a fall a year ago, hurt R shoulder, which never fully healed, had done Physical Therapy for awhile, then went to ortho for injection, and is worse right now with compensating for L arm.  Also neck is sore, was sore before she fell.\"  Had been seeing sports med, had shoulder MRI without obvious injury on MRI and AC injection.  Had Physical Therapy.  Had back and buttock pain as well, thoracic xray  Neck/shoulder pain - tried Physical Therapy, chiro, TENS, bengay, acupuncture.   Neck pain had been back before injury but had gone away for " "awhile.  Physical Therapy strongly feels this is muscle issue.  Physical Therapy helped for awhile.  No pain down arms.    No weakness.      Joint stiffness. \"I just feel like I've been hit by a mac truck.\"  Sore joints, not stiffness.  Not myalgia.  No joint swelling.  Pretty much all joints - knees, hips, shoulders, wrists.      Falls twice in last 2 yrs.  Tripped a couple times.  A few times has felt like she is standing still and just starts to tip over.  Started a few exercises from Physical Therapy for this, but didn't continue then.  Very tired, no ambition to do the exercises.  Feels tired is not new.  \"Its almost since my fall, I can't motivate myself to do stuff.\"  Memory - occasionally can't think of word, sometimes can't recall what she did.  No safety issues.  HA - since neck pain getting some again, used to get more in past.  No change in personality.  Feels may have a bit of depression, \"disgusted\" with situations at home.      Known history osteoporosis, treated with actonel.  Off med sicne Dexa Dec 2015, plan to repeat next yr.    History CVA and PAD.  Taking asa and pravastatin.  Intolerant of other statins.  Forgot to trial off statin.      Reviewed and updated as needed this visit by clinical staff  Tobacco  Allergies  Meds  Problems  Med Hx  Surg Hx  Fam Hx  Soc Hx          Reviewed and updated as needed this visit by Provider  Tobacco  Allergies  Meds  Problems  Med Hx  Surg Hx  Fam Hx  Soc Hx         Social History   Substance Use Topics     Smoking status: Former Smoker     Types: Cigarettes     Smokeless tobacco: Never Used     Alcohol use Yes      Comment: Occational       The patient does not drink >3 drinks per day nor >7 drinks per week.    Today's PHQ-2 Score:   PHQ-2 ( 1999 Pfizer) 10/16/2017 4/21/2017   Q1: Little interest or pleasure in doing things 0 0   Q2: Feeling down, depressed or hopeless 0 0   PHQ-2 Score 0 0       Do you feel safe in your environment - Yes    Do " "you have a Health Care Directive?: Yes: Patient states has Advance Directive and will bring in a copy to clinic.    Current providers sharing in care for this patient include:   Patient Care Team:  Edilia Davison PA-C as PCP - General (Physician Assistant)      Hearing impairment: Yes, Has hearing aids    Ability to successfully perform activities of daily living: Yes, no assistance needed     Fall risk:         Home safety:  none identified      The following health maintenance items are reviewed in Epic and correct as of today:  Health Maintenance   Topic Date Due     ADVANCE DIRECTIVE PLANNING Q5 YRS  12/19/2005     INFLUENZA VACCINE (SYSTEM ASSIGNED)  09/01/2017     FALL RISK ASSESSMENT  10/13/2017     FIT Q1 YR  05/08/2018     MAMMO SCREEN Q2 YR (SYSTEM ASSIGNED)  08/07/2019     LIPID SCREEN Q5 YR FEMALE (SYSTEM ASSIGNED)  10/13/2021     TETANUS IMMUNIZATION (SYSTEM ASSIGNED)  10/22/2023     DEXA SCAN SCREENING (SYSTEM ASSIGNED)  Completed     PNEUMOCOCCAL  Completed     HEPATITIS C SCREENING  Completed     Labs reviewed in EPIC  BP Readings from Last 3 Encounters:   10/16/17 138/87   07/06/17 118/76   06/23/17 132/84    Wt Readings from Last 3 Encounters:   10/16/17 165 lb (74.8 kg)   07/06/17 158 lb (71.7 kg)   06/23/17 157 lb (71.2 kg)         ROS:  Constitutional, HEENT, cardiovascular, pulmonary, GI, , musculoskeletal, neuro, skin, endocrine and psych systems are negative, except as otherwise noted.    OBJECTIVE:   /87 (BP Location: Right arm, Patient Position: Chair, Cuff Size: Adult Regular)  Pulse 82  Temp 98.3  F (36.8  C) (Tympanic)  Resp 16  Ht 5' 6\" (1.676 m)  Wt 165 lb (74.8 kg)  BMI 26.63 kg/m2 Estimated body mass index is 26.63 kg/(m^2) as calculated from the following:    Height as of this encounter: 5' 6\" (1.676 m).    Weight as of this encounter: 165 lb (74.8 kg).  EXAM:   GENERAL APPEARANCE: healthy, alert and no distress  EYES: Eyes grossly normal to inspection, PERRL " and conjunctivae and sclerae normal  HENT: ear canals and TM's normal, nose and mouth without ulcers or lesions, oropharynx clear and oral mucous membranes moist  NECK: no adenopathy, no asymmetry, masses, or scars and thyroid normal to palpation  RESP: lungs clear to auscultation - no rales, rhonchi or wheezes  BREAST: normal without masses, tenderness or nipple discharge and no palpable axillary masses or adenopathy  CV: regular rate and rhythm, normal S1 S2, no S3 or S4, no murmur, click or rub, no peripheral edema and peripheral pulses strong  ABDOMEN: soft, nontender, no hepatosplenomegaly, no masses and bowel sounds normal  MS: no musculoskeletal defects are noted and gait is age appropriate without ataxia  SKIN: no suspicious lesions or rashes.  Thickened 4th toenail.  NEURO: Normal strength and tone, sensory exam grossly normal, mentation intact and speech normal  PSYCH: mentation appears normal and affect normal/bright    ASSESSMENT / PLAN:       ICD-10-CM    1. Medicare annual wellness visit, subsequent Z00.00    2. Cerebral artery occlusion with cerebral infarction (H) I63.50 pravastatin (PRAVACHOL) 40 MG tablet     Lipid panel reflex to direct LDL     NEUROLOGY ADULT REFERRAL   3. Peripheral vascular disease (H) I73.9 Lipid panel reflex to direct LDL     NEUROLOGY ADULT REFERRAL   4. Osteopenia, unspecified location M85.80 Vitamin D Deficiency     TSH with free T4 reflex   5. Polyarthralgia M25.50 Uric acid     Comprehensive metabolic panel     CK total     TSH with free T4 reflex     CRP inflammation     Erythrocyte sedimentation rate auto     Rheumatoid factor     Anti Nuclear Regina IgG by IFA with Reflex     Lyme Disease Regina with reflex to WB Serum     CBC with platelets and differential     Parathyroid Hormone Intact   6. Balance problem R26.89 TSH with free T4 reflex     Lyme Disease Regina with reflex to WB Serum     NEUROLOGY ADULT REFERRAL     Vitamin B12     CBC with platelets and differential      "Anti Treponema   7. Weight gain R63.5 TSH with free T4 reflex   8. Need for prophylactic vaccination and inoculation against influenza Z23 FLU VACCINE, INCREASED ANTIGEN, PRESV FREE, AGE 65+ [18442]     Vaccine Administration, Initial [60628]       End of Life Planning:  Patient currently has an advanced directive: No.  I have verified the patient's ablity to prepare an advanced directive/make health care decisions.  Literature was provided to assist patient in preparing an advanced directive.    COUNSELING:  Reviewed preventive health counseling, as reflected in patient instructions       Regular exercise       Healthy diet/nutrition        Estimated body mass index is 26.63 kg/(m^2) as calculated from the following:    Height as of this encounter: 5' 6\" (1.676 m).    Weight as of this encounter: 165 lb (74.8 kg).     reports that she has quit smoking. Her smoking use included Cigarettes. She has never used smokeless tobacco.        Appropriate preventive services were discussed with this patient, including applicable screening as appropriate for cardiovascular disease, diabetes, osteopenia/osteoporosis, and glaucoma.  As appropriate for age/gender, discussed screening for colorectal cancer, prostate cancer, breast cancer, and cervical cancer. Checklist reviewing preventive services available has been given to the patient.    Reviewed patients plan of care and provided an AVS. The Basic Care Plan (routine screening as documented in Health Maintenance) for Chyna meets the Care Plan requirement. This Care Plan has been established and reviewed with the Patient.    Counseling Resources:  ATP IV Guidelines  Pooled Cohorts Equation Calculator  Breast Cancer Risk Calculator  FRAX Risk Assessment  ICSI Preventive Guidelines  Dietary Guidelines for Americans, 2010  USDA's MyPlate  ASA Prophylaxis  Lung CA Screening    Edilia Davison PA-C  [facility]  Patient Instructions   Joint aches and pain in " neck/shoulder -  Many labs today   See if going off pravastatin helps.  If doesn't help, resume.  If does help to go off, retry going back on to make sure isn't a fluke.      See me when have decided if statin is causing any symptoms - we'll work further on symptoms or changing meds  Will talk more about fatigue at that recheck    Quit fungal topical - usually not effective  Can try Vicks daily x 6 mo  If starts to bother more, will do nail culture.      Referral to neurology for balance and history of stroke.  We can discuss further next visit as well.    Bone density check again next Dec or so      Preventive Health Recommendations  Female Ages 65 +    Yearly exam:     See your health care provider every year in order to  o Review health changes.   o Discuss preventive care.    o Review your medicines if your doctor has prescribed any.      You no longer need a yearly Pap test unless you've had an abnormal Pap test in the past 10 years. If you have vaginal symptoms, such as bleeding or discharge, be sure to talk with your provider about a Pap test.      Every 1 to 2 years, have a mammogram.  If you are over 69, talk with your health care provider about whether or not you want to continue having screening mammograms.      Every 10 years, have a colonoscopy. Or, have a yearly FIT test (stool test). These exams will check for colon cancer.       Have a cholesterol test every 5 years, or more often if your doctor advises it.       Have a diabetes test (fasting glucose) every three years. If you are at risk for diabetes, you should have this test more often.       At age 65, have a bone density scan (DEXA) to check for osteoporosis (brittle bone disease).    Shots:    Get a flu shot each year.    Get a tetanus shot every 10 years.    Talk to your doctor about your pneumonia vaccines. There are now two you should receive - Pneumovax (PPSV 23) and Prevnar (PCV 13).    Talk to your doctor about the shingles  vaccine.    Talk to your doctor about the hepatitis B vaccine.    Nutrition:     Eat at least 5 servings of fruits and vegetables each day.      Eat whole-grain bread, whole-wheat pasta and brown rice instead of white grains and rice.      Talk to your provider about Calcium and Vitamin D.     Lifestyle    Exercise at least 150 minutes a week (30 minutes a day, 5 days a week). This will help you control your weight and prevent disease.      Limit alcohol to one drink per day.      No smoking.       Wear sunscreen to prevent skin cancer.       See your dentist twice a year for an exam and cleaning.      See your eye doctor every 1 to 2 years to screen for conditions such as glaucoma, macular degeneration, cataracts, etc

## 2017-10-17 LAB
ANA SER QL IF: NEGATIVE
B BURGDOR IGG+IGM SER QL: 0.03 (ref 0–0.89)
DEPRECATED CALCIDIOL+CALCIFEROL SERPL-MC: 69 UG/L (ref 20–75)
RHEUMATOID FACT SER NEPH-ACNC: <20 IU/ML (ref 0–20)
T PALLIDUM IGG+IGM SER QL: NEGATIVE

## 2017-12-01 ENCOUNTER — OFFICE VISIT (OUTPATIENT)
Dept: FAMILY MEDICINE | Facility: CLINIC | Age: 67
End: 2017-12-01
Payer: COMMERCIAL

## 2017-12-01 ENCOUNTER — RADIANT APPOINTMENT (OUTPATIENT)
Dept: GENERAL RADIOLOGY | Facility: CLINIC | Age: 67
End: 2017-12-01
Attending: PHYSICIAN ASSISTANT
Payer: COMMERCIAL

## 2017-12-01 VITALS
BODY MASS INDEX: 26.2 KG/M2 | TEMPERATURE: 98.1 F | HEART RATE: 82 BPM | DIASTOLIC BLOOD PRESSURE: 67 MMHG | WEIGHT: 163 LBS | SYSTOLIC BLOOD PRESSURE: 115 MMHG | RESPIRATION RATE: 12 BRPM | HEIGHT: 66 IN

## 2017-12-01 DIAGNOSIS — Z91.81 PERSONAL HISTORY OF FALL: ICD-10-CM

## 2017-12-01 DIAGNOSIS — M79.18 MYOFASCIAL PAIN: Primary | ICD-10-CM

## 2017-12-01 DIAGNOSIS — M54.2 CERVICALGIA: ICD-10-CM

## 2017-12-01 DIAGNOSIS — M79.672 LEFT FOOT PAIN: ICD-10-CM

## 2017-12-01 DIAGNOSIS — M62.838 MUSCLE SPASM: ICD-10-CM

## 2017-12-01 DIAGNOSIS — M77.42 METATARSALGIA OF LEFT FOOT: ICD-10-CM

## 2017-12-01 PROCEDURE — 99214 OFFICE O/P EST MOD 30 MIN: CPT | Performed by: PHYSICIAN ASSISTANT

## 2017-12-01 PROCEDURE — 73630 X-RAY EXAM OF FOOT: CPT | Mod: LT

## 2017-12-01 NOTE — PATIENT INSTRUCTIONS
Neck/shoulder pain -  Seems a lot of spasm  Discussed can try meds like cymbalta but doesn't address muscle spasms as likely cause   Other more muscle relaxant type meds risk more falls - not what we want   Therefore suggest seeing specialist at the .  Possible consideration for botox.    Foot -  Bone abnormality, unsure if cause of symptoms   Do not think callous was cause - too small when I trimmed it  Can try metatarsal pads and see foot doctor if not improving

## 2017-12-01 NOTE — NURSING NOTE
"Chief Complaint   Patient presents with     Musculoskeletal Problem       Initial /67 (BP Location: Right arm, Patient Position: Chair, Cuff Size: Adult Regular)  Pulse 82  Temp 98.1  F (36.7  C) (Tympanic)  Resp 12  Ht 5' 6\" (1.676 m)  Wt 163 lb (73.9 kg)  BMI 26.31 kg/m2 Estimated body mass index is 26.31 kg/(m^2) as calculated from the following:    Height as of this encounter: 5' 6\" (1.676 m).    Weight as of this encounter: 163 lb (73.9 kg).  Medication Reconciliation: complete    Health Maintenance that is potentially due pending provider review:  NONE        Is there anyone who you would like to be able to receive your results? No  If yes have patient fill out KRISTY      "

## 2017-12-01 NOTE — MR AVS SNAPSHOT
After Visit Summary   12/1/2017    Chyna Moncada    MRN: [id number]           Patient Information     Date Of Birth          1950        Visit Information        Provider Department      12/1/2017 10:00 AM Edilia Davison PA-C [facility]        Today's Diagnoses     Myofascial pain    -  1    Muscle spasm        Cervicalgia        Left foot pain          Care Instructions    Neck/shoulder pain -  Seems a lot of spasm  Discussed can try meds like cymbalta but doesn't address muscle spasms as likely cause   Other more muscle relaxant type meds risk more falls - not what we want   Therefore suggest seeing specialist at the .  Possible consideration for botox.    Foot -  Bone abnormality, unsure if cause of symptoms   Do not think callous was cause - too small when I trimmed it  Can try metatarsal pads and see foot doctor if not improving          Follow-ups after your visit        Additional Services     PHYSIATRY REFERRAL       Your provider has referred you to: [facility] [phone]   [email or web address]     Please be aware that coverage of these services is subject to the terms and limitations of your health insurance plan.  Call member services at your health plan with any benefit or coverage questions.      Please bring the following to your appointment:  >>   Any x-rays, CTs or MRIs which have been performed.  Contact the facility where they were done to arrange for  prior to your scheduled appointment.    >>   List of current medications   >>   This referral request   >>   Any documents/labs given to you for this referral            PODIATRY/FOOT & ANKLE SURGERY REFERRAL       Your provider has referred you to: [facility]: [facility] [phone]   [email or web address]  Or any other.    Please be aware that coverage of these services is subject to  "the terms and limitations of your health insurance plan.  Call member services at your health plan with any benefit or coverage questions.      Please bring the following to your appointment:  >>   Any x-rays, CTs or MRIs which have been performed.  Contact the facility where they were done to arrange for  prior to your scheduled appointment.    >>   List of current medications   >>   This referral request   >>   Any documents/labs given to you for this referral                  Who to contact     If you have questions or need follow up information about today's clinic visit or your schedule please contact [facility] directly at [phone].  Normal or non-critical lab and imaging results will be communicated to you by yubackhart, letter or phone within 4 business days after the clinic has received the results. If you do not hear from us within 7 days, please contact the clinic through yubackhart or phone. If you have a critical or abnormal lab result, we will notify you by phone as soon as possible.  Submit refill requests through [employer] or call your pharmacy and they will forward the refill request to us. Please allow 3 business days for your refill to be completed.          Additional Information About Your Visit        yubackharSpeakPhone Information     [employer] lets you send messages to your doctor, view your test results, renew your prescriptions, schedule appointments and more. To sign up, go to [email or web address] . Click on \"Log in\" on the left side of the screen, which will take you to the Welcome page. Then click on \"Sign up Now\" on the right side of the page.     You will be asked to enter the access code listed below, as well as some personal information. Please follow the directions to create your username and password.     Your access code is: [email or web address]  Expires: 2018  7:54 AM     Your access code will  in 90 days. If you need help or a new code, please call your [place] " "clinic or [phone].        Care EveryWhere ID     This is your Care EveryWhere ID. This could be used by other organizations to access your [place] medical records  [id number]        Your Vitals Were     Pulse Temperature Respirations Height BMI (Body Mass Index)       82 98.1  F (36.7  C) (Tympanic) 12 5' 6\" (1.676 m) 26.31 kg/m2        Blood Pressure from Last 3 Encounters:   12/01/17 115/67   10/16/17 138/87   07/06/17 118/76    Weight from Last 3 Encounters:   12/01/17 163 lb (73.9 kg)   10/16/17 165 lb (74.8 kg)   07/06/17 158 lb (71.7 kg)              We Performed the Following     PHYSIATRY REFERRAL     PODIATRY/FOOT & ANKLE SURGERY REFERRAL          Today's Medication Changes          These changes are accurate as of: 12/1/17 11:14 AM.  If you have any questions, ask your nurse or doctor.               Stop taking these medicines if you haven't already. Please contact your care team if you have questions.     CINNAMON PO   Stopped by:  Edilia Davison PA-C                    Primary Care Provider Office Phone # Fax #    Edilia Davison PA-C [phone] [address] [place] [id number]        Equal Access to Services     PATTI GARCIA AH: Hadii polly moyer hadasho Somaryali, waaxda luqadaha, qaybta kaalmada adeegyada, nigel zhu . So [facility] [phone].    ATENCIÓN: Si habla español, tiene a ferraro disposición servicios gratuitos de asistencia lingüística. Llame al [phone].    We comply with applicable federal civil rights laws and [place] laws. We do not discriminate on the basis of race, color, national origin, age, disability, sex, sexual orientation, or gender identity.            Thank you!     Thank you for choosing [facility]  for your care. Our goal is always to provide you with excellent care. Hearing back from our patients is one way we can continue to improve our services. Please take a few minutes to complete the " written survey that you may receive in the mail after your visit with us. Thank you!             Your Updated Medication List - Protect others around you: Learn how to safely use, store and throw away your medicines at www.disposemymeds.org.          This list is accurate as of: 12/1/17 11:14 AM.  Always use your most recent med list.                   Brand Name Dispense Instructions for use Diagnosis    aspirin EC 81 MG EC tablet      Take 81 mg by mouth        calcium-vitamin D 600-400 MG-UNIT per tablet    CALTRATE     Take 1 tablet by mouth        CULTUREE DIGESTIVE HEALTH PO           fluticasone 50 MCG/ACT spray    FLONASE    1 Package    Spray 2 sprays into both nostrils daily    Chronic rhinitis       IRON SUPPLEMENT PO      Take 325 mg by mouth daily        LORazepam 0.5 MG tablet    ATIVAN    10 tablet    Take 1 tablet (0.5 mg) by mouth every 6 hours as needed    Anxiety       magnesium 100 MG Caps           * multivitamin Tabs tablet      Take 1 tablet by mouth daily        * HAIR SKIN NAILS PO           pravastatin 40 MG tablet    PRAVACHOL    90 tablet    TAKE ONE TABLET BY MOUTH EVERY NIGHT AT BEDTIME    Cerebral artery occlusion with cerebral infarction (H)       Propylene Glycol-Glycerin 1-0.3 % Soln      Place 1 drop into both eyes as needed        valACYclovir 1000 mg tablet    VALTREX    4 tablet    Take 2 tablets (2,000 mg) by mouth 2 times daily    Recurrent cold sores       VITAMIN D-3 PO      Take 1,000 Units by mouth daily        * Notice:  This list has 2 medication(s) that are the same as other medications prescribed for you. Read the directions carefully, and ask your doctor or other care provider to review them with you.

## 2017-12-13 ENCOUNTER — OFFICE VISIT (OUTPATIENT)
Dept: PODIATRY | Facility: CLINIC | Age: 67
End: 2017-12-13
Payer: COMMERCIAL

## 2017-12-13 VITALS — HEIGHT: 66 IN | HEART RATE: 80 BPM | WEIGHT: 163 LBS | BODY MASS INDEX: 26.2 KG/M2

## 2017-12-13 DIAGNOSIS — M77.52 BURSITIS OF LEFT FOOT: ICD-10-CM

## 2017-12-13 DIAGNOSIS — M77.42 METATARSALGIA, LEFT FOOT: Primary | ICD-10-CM

## 2017-12-13 PROCEDURE — 99203 OFFICE O/P NEW LOW 30 MIN: CPT | Performed by: PODIATRIST

## 2017-12-13 NOTE — PATIENT INSTRUCTIONS
Initial musculoskeletal treatment recommendation:    1.  Stretch the calf muscles as instructed once an hour.  2.  Ice the injured area in the evening; 20 min on/off.  3.  Take antiinflammatory medication as directed.  4.  Massage the soft tissues around the injured area in the morning to loosen the tissue  5.  Wear supportive foot wear and/or arch supports (rigid not cushion).      If no improvement in symptoms within four to six weeks, return to clinic for reevaluation.    Memory foam insert  Dr. Valdez's Gel toe cap size mini

## 2017-12-13 NOTE — NURSING NOTE
"Chief Complaint   Patient presents with     Consult     Left foot pain, xrays done 12/1/2017       Initial Pulse 80  Ht 5' 6\" (1.676 m)  Wt 163 lb (73.9 kg)  BMI 26.31 kg/m2 Estimated body mass index is 26.31 kg/(m^2) as calculated from the following:    Height as of this encounter: 5' 6\" (1.676 m).    Weight as of this encounter: 163 lb (73.9 kg).  Medication Reconciliation: complete     Hoa James MA        "

## 2017-12-13 NOTE — PROGRESS NOTES
PATIENT HISTORY:  Chyna Moncada is a 66 year old female who presents to clinic for a painful left foot .  The patient describes the pain as sharp stabbing.  The patient relates the pain level is moderate.  The patient relates pain is located under the ball of the left foot.  The patient relates the pain has been present for the past several weeks.  The patient relates pain with ambulation.  The patient has tried different shoes with little relief.       REVIEW OF SYSTEMS:  Constitutional, HEENT, cardiovascular, pulmonary, GI, , musculoskeletal, neuro, skin, endocrine and psych systems are negative, except as otherwise noted.     PAST MEDICAL HISTORY:   Past Medical History:   Diagnosis Date     Cerebral embolism with cerebral infarction (H)         PAST SURGICAL HISTORY:   Past Surgical History:   Procedure Laterality Date     HYSTERECTOMY, PAP NO LONGER INDICATED          MEDICATIONS:   Current Outpatient Prescriptions:      Multiple Vitamins-Minerals (HAIR SKIN NAILS PO), , Disp: , Rfl:      pravastatin (PRAVACHOL) 40 MG tablet, TAKE ONE TABLET BY MOUTH EVERY NIGHT AT BEDTIME, Disp: 90 tablet, Rfl: 3     Lactobacillus-Inulin (CULTUREE DIGESTIVE HEALTH PO), , Disp: , Rfl:      valACYclovir (VALTREX) 1000 mg tablet, Take 2 tablets (2,000 mg) by mouth 2 times daily, Disp: 4 tablet, Rfl: 11     LORazepam (ATIVAN) 0.5 MG tablet, Take 1 tablet (0.5 mg) by mouth every 6 hours as needed, Disp: 10 tablet, Rfl: 0     Cholecalciferol (VITAMIN D-3 PO), Take 1,000 Units by mouth daily, Disp: , Rfl:      aspirin EC 81 MG tablet, Take 81 mg by mouth, Disp: , Rfl:      calcium-vitamin D (CALTRATE) 600-400 MG-UNIT per tablet, Take 1 tablet by mouth, Disp: , Rfl:      Ferrous Sulfate (IRON SUPPLEMENT PO), Take 325 mg by mouth daily, Disp: , Rfl:      magnesium 100 MG CAPS, , Disp: , Rfl:      multivitamin (OCUVITE) TABS, Take 1 tablet by mouth daily, Disp: , Rfl:      fluticasone (FLONASE) 50 MCG/ACT nasal spray, Spray 2  "sprays into both nostrils daily, Disp: 1 Package, Rfl: 11     Propylene Glycol-Glycerin 1-0.3 % SOLN, Place 1 drop into both eyes as needed, Disp: , Rfl:      ALLERGIES:    Allergies   Allergen Reactions     Tramadol Anaphylaxis     Benadryl [Altaryl] Other (See Comments)     Made her goofy     Caffeine Other (See Comments)     Jittery       Codeine Nausea and Vomiting     Darvocet [Propoxyphene N-Apap]      Other reaction(s): GI Upset     Diphenhydramine Other (See Comments)     Lovastatin Other (See Comments)     Other reaction(s): Myalgia  pain     Percocet [Oxycodone-Acetaminophen] Other (See Comments)     Other reaction(s): Dizziness  Sick or dizzy?     Vicodin [Hydrocodone-Acetaminophen] Other (See Comments) and Nausea     Sick or dizzy?        SOCIAL HISTORY:   Social History     Social History     Marital status:      Spouse name: N/A     Number of children: N/A     Years of education: N/A     Occupational History     Not on file.     Social History Main Topics     Smoking status: Former Smoker     Types: Cigarettes     Smokeless tobacco: Never Used     Alcohol use Yes      Comment: Occational     Drug use: No     Sexual activity: No     Other Topics Concern     Parent/Sibling W/ Cabg, Mi Or Angioplasty Before 65f 55m? No     Social History Narrative        FAMILY HISTORY:   Family History   Problem Relation Age of Onset     Arthritis Mother      CANCER Mother      GYN     HEART DISEASE Father      Alcohol/Drug Maternal Grandfather      HEART DISEASE Paternal Grandfather      Heart attack     DIABETES Paternal Grandfather      CANCER Brother      throat        EXAM:Vitals: Pulse 80  Ht 5' 6\" (1.676 m)  Wt 163 lb (73.9 kg)  BMI 26.31 kg/m2  BMI= Body mass index is 26.31 kg/(m^2).          General appearance: Patient is alert and fully cooperative with history & exam.  No sign of distress is noted during the visit.     Psychiatric: Affect is pleasant & appropriate.  Patient appears motivated to " improve health.     Respiratory: Breathing is regular & unlabored while sitting.     HEENT: Hearing is intact to spoken word.  Speech is clear.  No gross evidence of visual impairment that would impact ambulation.     Dermatologic: Skin is intact to both lower extremities without significant lesions, rash or abrasion.  No paronychia or evidence of soft tissue infection is noted.     Vascular: DP & PT pulses are intact & regular bilaterally.  No significant edema or varicosities noted.  CFT and skin temperature is normal to both lower extremities.     Neurologic: Lower extremity sensation is intact to light touch.  No evidence of weakness or contracture in the lower extremities.  No evidence of neuropathy.     Musculoskeletal: Patient is ambulatory without assistive device or brace.  No gross ankle deformity noted.  No foot or ankle joint effusion is noted.  Noted pain on palpation under the fifth metatarsophalangeal joint of the left foot. No surrounding erythema or edema noted. One notes a firm palpable soft tissue mass on the plantar aspect of the fifth metatarsophalangeal joint.         ASSESSMENT / PLAN:     ICD-10-CM    1. Metatarsalgia, left foot M77.42    2. Bursitis of left foot M71.572        I have explained to Chyna  about the conditions.  We discussed the nature of the condition as well as the treatment plan and expected length of recovery.  At this time, the patient will try offloading the left foot with a cushioned insole. The patient was instructed return to the office if problems persist.      Disclaimer: This note consists of symbols derived from keyboarding, dictation and/or voice recognition software. As a result, there may be errors in the script that have gone undetected. Please consider this when interpreting information found in this chart.       GANGA Pace D.P.M., JACQUELYN.F.A.S.

## 2017-12-13 NOTE — LETTER
12/13/2017         RE: Chyna Moncada  [address] [facility] [address]        Dear Colleague,    Thank you for referring your patient, Chyna Moncada, to the [facility]. Please see a copy of my visit note below.    PATIENT HISTORY:  Chyna Moncada is a 66 year old female who presents to clinic for a painful left foot .  The patient describes the pain as sharp stabbing.  The patient relates the pain level is moderate.  The patient relates pain is located under the ball of the left foot.  The patient relates the pain has been present for the past several weeks.  The patient relates pain with ambulation.  The patient has tried different shoes with little relief.       REVIEW OF SYSTEMS:  Constitutional, HEENT, cardiovascular, pulmonary, GI, , musculoskeletal, neuro, skin, endocrine and psych systems are negative, except as otherwise noted.     PAST MEDICAL HISTORY:   Past Medical History:   Diagnosis Date     Cerebral embolism with cerebral infarction (H)         PAST SURGICAL HISTORY:   Past Surgical History:   Procedure Laterality Date     HYSTERECTOMY, PAP NO LONGER INDICATED          MEDICATIONS:   Current Outpatient Prescriptions:      Multiple Vitamins-Minerals (HAIR SKIN NAILS PO), , Disp: , Rfl:      pravastatin (PRAVACHOL) 40 MG tablet, TAKE ONE TABLET BY MOUTH EVERY NIGHT AT BEDTIME, Disp: 90 tablet, Rfl: 3     Lactobacillus-Inulin (CULTURELLE DIGESTIVE HEALTH PO), , Disp: , Rfl:      valACYclovir (VALTREX) 1000 mg tablet, Take 2 tablets (2,000 mg) by mouth 2 times daily, Disp: 4 tablet, Rfl: 11     LORazepam (ATIVAN) 0.5 MG tablet, Take 1 tablet (0.5 mg) by mouth every 6 hours as needed, Disp: 10 tablet, Rfl: 0     Cholecalciferol (VITAMIN D-3 PO), Take 1,000 Units by mouth daily, Disp: , Rfl:      aspirin EC 81 MG tablet, Take 81 mg by mouth, Disp: , Rfl:      calcium-vitamin D (CALTRATE) 600-400 MG-UNIT per tablet, Take 1 tablet by mouth, Disp: , Rfl:       "Ferrous Sulfate (IRON SUPPLEMENT PO), Take 325 mg by mouth daily, Disp: , Rfl:      magnesium 100 MG CAPS, , Disp: , Rfl:      multivitamin (OCUVITE) TABS, Take 1 tablet by mouth daily, Disp: , Rfl:      fluticasone (FLONASE) 50 MCG/ACT nasal spray, Spray 2 sprays into both nostrils daily, Disp: 1 Package, Rfl: 11     Propylene Glycol-Glycerin 1-0.3 % SOLN, Place 1 drop into both eyes as needed, Disp: , Rfl:      ALLERGIES:    Allergies   Allergen Reactions     Tramadol Anaphylaxis     Benadryl [Altaryl] Other (See Comments)     Made her goofy     Caffeine Other (See Comments)     Jittery       Codeine Nausea and Vomiting     Darvocet [Propoxyphene N-Apap]      Other reaction(s): GI Upset     Diphenhydramine Other (See Comments)     Lovastatin Other (See Comments)     Other reaction(s): Myalgia  pain     Percocet [Oxycodone-Acetaminophen] Other (See Comments)     Other reaction(s): Dizziness  Sick or dizzy?     Vicodin [Hydrocodone-Acetaminophen] Other (See Comments) and Nausea     Sick or dizzy?        SOCIAL HISTORY:   Social History     Social History     Marital status:      Spouse name: N/A     Number of children: N/A     Years of education: N/A     Occupational History     Not on file.     Social History Main Topics     Smoking status: Former Smoker     Types: Cigarettes     Smokeless tobacco: Never Used     Alcohol use Yes      Comment: Occational     Drug use: No     Sexual activity: No     Other Topics Concern     Parent/Sibling W/ Cabg, Mi Or Angioplasty Before 65f 55m? No     Social History Narrative        FAMILY HISTORY:   Family History   Problem Relation Age of Onset     Arthritis Mother      CANCER Mother      GYN     HEART DISEASE Father      Alcohol/Drug Maternal Grandfather      HEART DISEASE Paternal Grandfather      Heart attack     DIABETES Paternal Grandfather      CANCER Brother      throat        EXAM:Vitals: Pulse 80  Ht 5' 6\" (1.676 m)  Wt 163 lb (73.9 kg)  BMI 26.31 kg/m2  BMI= " Body mass index is 26.31 kg/(m^2).          General appearance: Patient is alert and fully cooperative with history & exam.  No sign of distress is noted during the visit.     Psychiatric: Affect is pleasant & appropriate.  Patient appears motivated to improve health.     Respiratory: Breathing is regular & unlabored while sitting.     HEENT: Hearing is intact to spoken word.  Speech is clear.  No gross evidence of visual impairment that would impact ambulation.     Dermatologic: Skin is intact to both lower extremities without significant lesions, rash or abrasion.  No paronychia or evidence of soft tissue infection is noted.     Vascular: DP & PT pulses are intact & regular bilaterally.  No significant edema or varicosities noted.  CFT and skin temperature is normal to both lower extremities.     Neurologic: Lower extremity sensation is intact to light touch.  No evidence of weakness or contracture in the lower extremities.  No evidence of neuropathy.     Musculoskeletal: Patient is ambulatory without assistive device or brace.  No gross ankle deformity noted.  No foot or ankle joint effusion is noted.  Noted pain on palpation under the fifth metatarsophalangeal joint of the left foot. No surrounding erythema or edema noted. One notes a firm palpable soft tissue mass on the plantar aspect of the fifth metatarsophalangeal joint.         ASSESSMENT / PLAN:     ICD-10-CM    1. Metatarsalgia, left foot M77.42    2. Bursitis of left foot M71.572        I have explained to Chyna  about the conditions.  We discussed the nature of the condition as well as the treatment plan and expected length of recovery.  At this time, the patient will try offloading the left foot with a cushioned insole. The patient was instructed return to the office if problems persist.      Disclaimer: This note consists of symbols derived from keyboarding, dictation and/or voice recognition software. As a result, there may be errors in the script  that have gone undetected. Please consider this when interpreting information found in this chart.       GANGA Pace D.P.M., F.COLE.C.F.A.S.        Again, thank you for allowing me to participate in the care of your patient.        Sincerely,        Nato Pace DPM

## 2017-12-13 NOTE — MR AVS SNAPSHOT
After Visit Summary   12/13/2017    Chyna Moncada    MRN: [id number]           Patient Information     Date Of Birth          1950        Visit Information        Provider Department      12/13/2017 10:00 AM Nato Pace DPM [facility]        Care Instructions    Initial musculoskeletal treatment recommendation:    1.  Stretch the calf muscles as instructed once an hour.  2.  Ice the injured area in the evening; 20 min on/off.  3.  Take antiinflammatory medication as directed.  4.  Massage the soft tissues around the injured area in the morning to loosen the tissue  5.  Wear supportive foot wear and/or arch supports (rigid not cushion).      If no improvement in symptoms within four to six weeks, return to clinic for reevaluation.    Memory foam insert  Dr. Valdez's Gel toe cap size mini          Follow-ups after your visit        Your next 10 appointments already scheduled     Mar 30, 2018 10:00 AM CDT   (Arrive by 9:45 AM)   New Patient Visit with Sandra Casarez MD   [facility] Physical Medicine and Rehabilitation ([facility])    [address] [place] [id number] [phone]              Who to contact     If you have questions or need follow up information about today's clinic visit or your schedule please contact [facility] directly at [phone].  Normal or non-critical lab and imaging results will be communicated to you by MyChart, letter or phone within 4 business days after the clinic has received the results. If you do not hear from us within 7 days, please contact the clinic through MyChart or phone. If you have a critical or abnormal lab result, we will notify you by phone as soon as possible.  Submit refill requests through [employer] or call your pharmacy and they will forward the refill request to us. Please allow 3 business days for your refill to be completed.          Additional  "Information About Your Visit        MyChart Information     [employer] lets you send messages to your doctor, view your test results, renew your prescriptions, schedule appointments and more. To sign up, go to [email or web address] . Click on \"Log in\" on the left side of the screen, which will take you to the Welcome page. Then click on \"Sign up Now\" on the right side of the page.     You will be asked to enter the access code listed below, as well as some personal information. Please follow the directions to create your username and password.     Your access code is: [email or web address]  Expires: 2018  7:54 AM     Your access code will  in 90 days. If you need help or a new code, please call your [place] clinic or [phone].        Care EveryWhere ID     This is your Care EveryWhere ID. This could be used by other organizations to access your [place] medical records  [id number]        Your Vitals Were     Pulse Height BMI (Body Mass Index)             80 5' 6\" (1.676 m) 26.31 kg/m2          Blood Pressure from Last 3 Encounters:   17 115/67   10/16/17 138/87   17 118/76    Weight from Last 3 Encounters:   17 163 lb (73.9 kg)   17 163 lb (73.9 kg)   10/16/17 165 lb (74.8 kg)              Today, you had the following     No orders found for display       Primary Care Provider Office Phone # Fax #    Edilia Davison PA-C [phone] [address] [place] [address]        Equal Access to Services     [facility] JOSE : Hadangel Agee, homar hernandez, nigel brooks. So [facility] [phone].    ATENCIÓN: Si habla español, tiene a ferraro disposición servicios gratuitos de asistencia lingüística. Shannan al [phone].    We comply with applicable federal civil rights laws and [place] laws. We do not discriminate on the basis of race, color, national origin, age, disability, sex, sexual " orientation, or gender identity.            Thank you!     Thank you for choosing [facility]  for your care. Our goal is always to provide you with excellent care. Hearing back from our patients is one way we can continue to improve our services. Please take a few minutes to complete the written survey that you may receive in the mail after your visit with us. Thank you!             Your Updated Medication List - Protect others around you: Learn how to safely use, store and throw away your medicines at www.disposemymeds.org.          This list is accurate as of: 12/13/17 10:02 AM.  Always use your most recent med list.                   Brand Name Dispense Instructions for use Diagnosis    aspirin EC 81 MG EC tablet      Take 81 mg by mouth        calcium-vitamin D 600-400 MG-UNIT per tablet    CALTRATE     Take 1 tablet by mouth        CULTUREE DIGESTIVE HEALTH PO           fluticasone 50 MCG/ACT spray    FLONASE    1 Package    Spray 2 sprays into both nostrils daily    Chronic rhinitis       IRON SUPPLEMENT PO      Take 325 mg by mouth daily        LORazepam 0.5 MG tablet    ATIVAN    10 tablet    Take 1 tablet (0.5 mg) by mouth every 6 hours as needed    Anxiety       magnesium 100 MG Caps           * multivitamin Tabs tablet      Take 1 tablet by mouth daily        * HAIR SKIN NAILS PO           pravastatin 40 MG tablet    PRAVACHOL    90 tablet    TAKE ONE TABLET BY MOUTH EVERY NIGHT AT BEDTIME    Cerebral artery occlusion with cerebral infarction (H)       Propylene Glycol-Glycerin 1-0.3 % Soln      Place 1 drop into both eyes as needed        valACYclovir 1000 mg tablet    VALTREX    4 tablet    Take 2 tablets (2,000 mg) by mouth 2 times daily    Recurrent cold sores       VITAMIN D-3 PO      Take 1,000 Units by mouth daily        * Notice:  This list has 2 medication(s) that are the same as other medications prescribed for you. Read the directions carefully, and ask your doctor or other  care provider to review them with you.

## 2017-12-26 ENCOUNTER — TELEPHONE (OUTPATIENT)
Dept: FAMILY MEDICINE | Facility: CLINIC | Age: 67
End: 2017-12-26

## 2017-12-26 DIAGNOSIS — M54.2 CERVICALGIA: ICD-10-CM

## 2017-12-26 DIAGNOSIS — M79.18 MYOFASCIAL PAIN: Primary | ICD-10-CM

## 2017-12-26 DIAGNOSIS — M62.838 MUSCLE SPASM: ICD-10-CM

## 2017-12-26 NOTE — TELEPHONE ENCOUNTER
Patient is calling stating Edilia gave her a referral for physical medicine and rehab at the [facility]. She does not want to go down to the [facility]. She found another place called [facility] in [place]. It is in her insurance network. The phone number there is [phone]. She would like a referral sent there please.    Kimmy [facility] [place]

## 2018-01-03 ENCOUNTER — OFFICE VISIT (OUTPATIENT)
Dept: FAMILY MEDICINE | Facility: CLINIC | Age: 68
End: 2018-01-03
Payer: COMMERCIAL

## 2018-01-03 VITALS
SYSTOLIC BLOOD PRESSURE: 118 MMHG | OXYGEN SATURATION: 98 % | DIASTOLIC BLOOD PRESSURE: 64 MMHG | WEIGHT: 164 LBS | HEART RATE: 103 BPM | HEIGHT: 66 IN | TEMPERATURE: 101.3 F | BODY MASS INDEX: 26.36 KG/M2

## 2018-01-03 DIAGNOSIS — J11.1 INFLUENZA-LIKE ILLNESS: Primary | ICD-10-CM

## 2018-01-03 LAB
FLUAV+FLUBV AG SPEC QL: NEGATIVE
FLUAV+FLUBV AG SPEC QL: NEGATIVE
SPECIMEN SOURCE: NORMAL

## 2018-01-03 PROCEDURE — 99213 OFFICE O/P EST LOW 20 MIN: CPT | Performed by: PHYSICIAN ASSISTANT

## 2018-01-03 PROCEDURE — 87804 INFLUENZA ASSAY W/OPTIC: CPT | Performed by: PHYSICIAN ASSISTANT

## 2018-01-03 NOTE — NURSING NOTE
"Chief Complaint   Patient presents with     URI     x 5 days       Initial /64 (BP Location: Right arm)  Pulse 103  Ht 5' 6\" (1.676 m)  Wt 164 lb (74.4 kg)  SpO2 98%  BMI 26.47 kg/m2 Estimated body mass index is 26.47 kg/(m^2) as calculated from the following:    Height as of this encounter: 5' 6\" (1.676 m).    Weight as of this encounter: 164 lb (74.4 kg).  Medication Reconciliation: complete    Health Maintenance that is potentially due pending provider review:  NONE    n/a    Is there anyone who you would like to be able to receive your results? No  If yes have patient fill out KRISTY    "

## 2018-01-03 NOTE — PATIENT INSTRUCTIONS
Viral   Antibiotics will not help at this time  Influenza test negative, but still could have it  Offered tamiflu, but discussed usually most helpful if started in first 2 days.  Decided against it.  Continue fluids, nasal rinses ok, throat gargles.  Ibuprofen and/or acetaminophen (tylenol), can overlap.  For your sinus symptoms -    Afrin (oxymetazoline, generic fine) is medicine that reduces how much mucus your nose makes.  Afrin can be used according to package directions, for up to 3 days.  Longer use will cause worsening symptoms.  Ok to continue nasacort.    Call if questions    Be seen if much worse or if fever lasting more than 1 week

## 2018-01-03 NOTE — MR AVS SNAPSHOT
After Visit Summary   1/3/2018    Chyna Moncada    MRN: [id number]           Patient Information     Date Of Birth          1950        Visit Information        Provider Department      1/3/2018 11:40 AM Edilia Davison PA-C [facility]        Today's Diagnoses     URI (upper respiratory infection)    -  1      Care Instructions    Viral   Antibiotics will not help at this time  Influenza test negative, but still could have it  Offered tamiflu, but discussed usually most helpful if started in first 2 days.  Decided against it.  Continue fluids, nasal rinses ok, throat gargles.  Ibuprofen and/or acetaminophen (tylenol), can overlap.  For your sinus symptoms -    Afrin (oxymetazoline, generic fine) is medicine that reduces how much mucus your nose makes.  Afrin can be used according to package directions, for up to 3 days.  Longer use will cause worsening symptoms.  Ok to continue nasacort.    Call if questions    Be seen if much worse or if fever lasting more than 1 week                Follow-ups after your visit        Your next 10 appointments already scheduled     Pavel 10, 2018 12:00 PM CST   (Arrive by 11:45 AM)   New Patient Visit with Josiah Babin [facility] Physical Medicine and Rehabilitation ([facility])    [address] [place] [address] [phone]              Who to contact     If you have questions or need follow up information about today's clinic visit or your schedule please contact [facility] directly at [phone].  Normal or non-critical lab and imaging results will be communicated to you by MyChart, letter or phone within 4 business days after the clinic has received the results. If you do not hear from us within 7 days, please contact the clinic through MyChart or phone. If you have a critical or abnormal lab result, we will notify you by phone as soon as  "possible.  Submit refill requests through [employer] or call your pharmacy and they will forward the refill request to us. Please allow 3 business days for your refill to be completed.          Additional Information About Your Visit        [employer] Information     [employer] lets you send messages to your doctor, view your test results, renew your prescriptions, schedule appointments and more. To sign up, go to [email or web address] . Click on \"Log in\" on the left side of the screen, which will take you to the Welcome page. Then click on \"Sign up Now\" on the right side of the page.     You will be asked to enter the access code listed below, as well as some personal information. Please follow the directions to create your username and password.     Your access code is: [email or web address]  Expires: 2018  7:54 AM     Your access code will  in 90 days. If you need help or a new code, please call your [place] clinic or [phone].        Care EveryWhere ID     This is your Care EveryWhere ID. This could be used by other organizations to access your [place] medical records  [id number]        Your Vitals Were     Pulse Temperature Height Pulse Oximetry BMI (Body Mass Index)       103 101.3  F (38.5  C) (Tympanic) 5' 6\" (1.676 m) 98% 26.47 kg/m2        Blood Pressure from Last 3 Encounters:   18 118/64   17 115/67   10/16/17 138/87    Weight from Last 3 Encounters:   18 164 lb (74.4 kg)   17 163 lb (73.9 kg)   17 163 lb (73.9 kg)              We Performed the Following     Influenza A/B antigen        Primary Care Provider Office Phone # Fax #    Edilia Davison PA-C [phone] [address] [place] [address]        Equal Access to Services     PATTI GARCIA : Celestina Agee, waiqra hernandez, qaybnigel iniguez . [facility] [phone].    ATENCIÓN: Si torito urbina a ferraro disposición " servicios gratuitos de asistencia lingüística. Shannan tello [phone].    We comply with applicable federal civil rights laws and [place] laws. We do not discriminate on the basis of race, color, national origin, age, disability, sex, sexual orientation, or gender identity.            Thank you!     Thank you for choosing [facility]  for your care. Our goal is always to provide you with excellent care. Hearing back from our patients is one way we can continue to improve our services. Please take a few minutes to complete the written survey that you may receive in the mail after your visit with us. Thank you!             Your Updated Medication List - Protect others around you: Learn how to safely use, store and throw away your medicines at www.disposemymeds.org.          This list is accurate as of: 1/3/18 12:48 PM.  Always use your most recent med list.                   Brand Name Dispense Instructions for use Diagnosis    aspirin EC 81 MG EC tablet      Take 81 mg by mouth        calcium-vitamin D 600-400 MG-UNIT per tablet    CALTRATE     Take 1 tablet by mouth        [facility] DIGESTIVE HEALTH PO           fluticasone 50 MCG/ACT spray    FLONASE    1 Package    Spray 2 sprays into both nostrils daily    Chronic rhinitis       IRON SUPPLEMENT PO      Take 325 mg by mouth daily        LORazepam 0.5 MG tablet    ATIVAN    10 tablet    Take 1 tablet (0.5 mg) by mouth every 6 hours as needed    Anxiety       magnesium 100 MG Caps           * multivitamin Tabs tablet      Take 1 tablet by mouth daily        * HAIR SKIN NAILS PO           pravastatin 40 MG tablet    PRAVACHOL    90 tablet    TAKE ONE TABLET BY MOUTH EVERY NIGHT AT BEDTIME    Cerebral artery occlusion with cerebral infarction (H)       Propylene Glycol-Glycerin 1-0.3 % Soln      Place 1 drop into both eyes as needed        valACYclovir 1000 mg tablet    VALTREX    4 tablet    Take 2 tablets (2,000 mg) by mouth 2 times daily     Recurrent cold sores       VITAMIN D-3 PO      Take 1,000 Units by mouth daily        * Notice:  This list has 2 medication(s) that are the same as other medications prescribed for you. Read the directions carefully, and ask your doctor or other care provider to review them with you.

## 2018-01-03 NOTE — PROGRESS NOTES
"  SUBJECTIVE:   Chyna Moncada is a 67 year old female who presents to clinic today for the following health issues:      RESPIRATORY SYMPTOMS      Duration: 5 days    Description  nasal congestion, rhinorrhea, sore throat, facial pain/pressure, cough, fever, chills, headache, fatigue/malaise, hoarse voice and conjunctival irritation    Severity: severe    Accompanying signs and symptoms: body aches    History (predisposing factors):  none    Precipitating or alleviating factors: None    Therapies tried and outcome:  rest and fluids, nasal rinses and Ibuprofen    Got much worse on Sat - miserable and all above symptoms.  Burning nose and throat a few days previously.  Temp maybe noted Mon night?  Symptoms a bit variable yesterday.   A little cough, more dry now.  Cough has improved.  Not SOB.  Swallowing hurts.  No history freq or severe sinus infections.    Problem list and histories reviewed & adjusted, as indicated.  Additional history: as documented    Labs reviewed in EPIC    Reviewed and updated as needed this visit by clinical staffTobacco  Allergies  Meds  Problems  Med Hx  Surg Hx  Fam Hx  Soc Hx        Reviewed and updated as needed this visit by Provider  Tobacco  Allergies  Meds  Problems  Med Hx  Surg Hx  Fam Hx  Soc Hx          ROS:  Constitutional, HEENT, cardiovascular, pulmonary, GI, , musculoskeletal, neuro, skin, systems are negative, except as otherwise noted.    OBJECTIVE:   /64 (BP Location: Right arm)  Pulse 103  Temp 101.3  F (38.5  C) (Tympanic)  Ht 5' 6\" (1.676 m)  Wt 164 lb (74.4 kg)  SpO2 98%  BMI 26.47 kg/m2  Body mass index is 26.47 kg/(m^2).  GENERAL: tired, mildly ill but non toxic, alert and no distress  EYES: Eyes grossly normal to inspection, conjunctivae and sclerae normal  HENT: ear canals and TM's normal, nose and mouth without ulcers or lesions  NECK: no adenopathy, no asymmetry, masses, or scars   RESP: lungs clear to auscultation - no rales, " rhonchi or wheezes  CV: regular rate and rhythm, normal S1 S2, no S3 or S4, no murmur, click or rub    ASSESSMENT/PLAN:       ICD-10-CM    1. Influenza-like illness R69 Influenza A/B antigen       Patient Instructions   Viral   Antibiotics will not help at this time  Influenza test negative, but still could have it  Offered tamiflu, but discussed usually most helpful if started in first 2 days.  Decided against it.  Continue fluids, nasal rinses ok, throat gargles.  Ibuprofen and/or acetaminophen (tylenol), can overlap.  For your sinus symptoms -    Afrin (oxymetazoline, generic fine) is medicine that reduces how much mucus your nose makes.  Afrin can be used according to package directions, for up to 3 days.  Longer use will cause worsening symptoms.  Ok to continue nasacort.    Call if questions    Be seen if much worse or if fever lasting more than 1 week            Edilia Davison PA-C  [facility]

## 2018-01-05 ENCOUNTER — TELEPHONE (OUTPATIENT)
Dept: FAMILY MEDICINE | Facility: CLINIC | Age: 68
End: 2018-01-05

## 2018-01-05 DIAGNOSIS — J01.90 ACUTE SINUSITIS WITH SYMPTOMS GREATER THAN 10 DAYS: Primary | ICD-10-CM

## 2018-01-05 NOTE — TELEPHONE ENCOUNTER
Reason for Call:  Other call back    Detailed comments: possible sinus infection states blowing out thick green mucus    Phone Number Patient can be reached at: Cell number on file:    Telephone Information:   Mobile [phone]       Best Time: anytime    Can we leave a detailed message on this number? Not Applicable    Call taken on 1/5/2018 at 1:39 PM by Nayely Wagner

## 2018-01-05 NOTE — TELEPHONE ENCOUNTER
S-(situation): asking for antibiotic for what she is a sinus infection    B-(background): not sure if fever.  States feels nose is packed.  Nasal discharge is greenish.  No other symptoms.  Nose stuffy for 7 days    A-(assessment): nasal congestion    R-(recommendations): I told her I would ask Edilia Davison PA-C , but still not so sure an antibiotic would be prescribed.  She is using Neti Pot.

## 2018-01-08 NOTE — TELEPHONE ENCOUNTER
I was off Friday afternoon and thus only now seeing her message.    It looks like it has now been 10 days of symptoms.  At this point is it reasonable to try antibiotic, but I am still not convinced it is truly bacterial sinus infection.  She could give this another few days.  Antibiotics sent.

## 2018-01-10 ENCOUNTER — RADIANT APPOINTMENT (OUTPATIENT)
Dept: GENERAL RADIOLOGY | Facility: CLINIC | Age: 68
End: 2018-01-10
Attending: PHYSICAL MEDICINE & REHABILITATION

## 2018-01-10 ENCOUNTER — OFFICE VISIT (OUTPATIENT)
Dept: PHYSICAL MEDICINE AND REHAB | Facility: CLINIC | Age: 68
End: 2018-01-10

## 2018-01-10 VITALS
WEIGHT: 165.5 LBS | DIASTOLIC BLOOD PRESSURE: 78 MMHG | SYSTOLIC BLOOD PRESSURE: 119 MMHG | TEMPERATURE: 97.8 F | OXYGEN SATURATION: 100 % | RESPIRATION RATE: 20 BRPM | BODY MASS INDEX: 26.6 KG/M2 | HEIGHT: 66 IN | HEART RATE: 94 BPM

## 2018-01-10 DIAGNOSIS — M67.911 TENDINOPATHY OF RIGHT ROTATOR CUFF: Primary | ICD-10-CM

## 2018-01-10 DIAGNOSIS — M25.512 CHRONIC PAIN OF BOTH SHOULDERS: ICD-10-CM

## 2018-01-10 DIAGNOSIS — M54.2 NECK PAIN: ICD-10-CM

## 2018-01-10 DIAGNOSIS — G89.29 CHRONIC PAIN OF BOTH SHOULDERS: ICD-10-CM

## 2018-01-10 DIAGNOSIS — M25.511 CHRONIC PAIN OF BOTH SHOULDERS: ICD-10-CM

## 2018-01-10 DIAGNOSIS — M79.18 MYOFASCIAL PAIN SYNDROME: ICD-10-CM

## 2018-01-10 RX ORDER — DIPHENOXYLATE HYDROCHLORIDE AND ATROPINE SULFATE 2.5; .025 MG/1; MG/1
1 TABLET ORAL DAILY
COMMUNITY
Start: 2007-04-12

## 2018-01-10 RX ORDER — MOMETASONE FUROATE MONOHYDRATE 50 UG/1
2 SPRAY, METERED NASAL DAILY
COMMUNITY
Start: 2013-02-06

## 2018-01-10 ASSESSMENT — PAIN SCALES - GENERAL: PAINLEVEL: MODERATE PAIN (4)

## 2018-01-10 NOTE — LETTER
1/10/2018       RE: Chyna Moncada  [address] [facility] [address]     Dear Colleague,    Thank you for referring your patient, Chyna Moncada, to the [facility] PHYSICAL MEDICINE AND REHABILITATION at [facility]. Please see a copy of my visit note below.    [facility] Physical Medicine & Rehabilitation Clinic Note  Clinic Visit s Pavel 10, 2018    Subjective:  Chyna Moncada is a 67 year old female who is seen in consultation at the request of Edilia Davison PA-C for evaluation of neck/bilateral shoulder pain.    Had a fall in 2015 onto the right shoulder.  Neck pain started in 2016 without acute precipitating event.  Left shoulder pain started in April 2017 with a fall at that time.  Reports right-sided neck and bilateral posterior/lateral shoulder pain without radiation.  Pain is 4/10 currently.  Symptoms are generally worse with rotation activities and with shrugging her shoulders.  Treatment has consisted of TENS unit, heating pad, PT (MARGARITO - without completion of home exercise program currently), chiropractic care, heat, Ibuprofen, corticosteroid injections (bilateral subacromial bursa - [facility]), Salonpas, James Hathaway, and massage therapy (Best relief with use of TENS unit).  Denies any numbness/tingling of the upper extremities.  Notes weakness of the upper extremities because of pain.  Denies any previous shoulder/neck surgeries.    Patient's past medical, surgical, social, and family histories are reviewed today.  Significant medical history as noted above  Past Medical History:   Diagnosis Date     Cerebral embolism with cerebral infarction (H)        Review of Systems:  Constitutional: POSITIVE for fevers and chills (101 degrees Fahrenheit)  Skin: NEGATIVE for worrisome rashes, moles, or lesions  Neuro: POSITIVE for weakness of the upper extremities due to pain  MSK: see  "HPI    Objective:  /78  Pulse 94  Temp 97.8  F (36.6  C) (Oral)  Resp 20  Ht 1.664 m (5' 5.5\")  Wt 75.1 kg (165 lb 8 oz)  SpO2 100%  Breastfeeding? No  BMI 27.12 kg/m2  General: healthy, alert, and in no distress  Skin: no suspicious lesions or rashes  Psych: mentation appears normal and affect normal/bright  HEENT: no scleral icterus  CV: no pedal edema  Resp: normal respiratory effort without conversational dyspnea   Neuro: sensory exam is within normal limits.  Motor strength as noted below  Lymph: no palpable lymphadenopathy    MSK:    CERVICAL SPINE  Inspection:    No redness, swelling, ecchymosis, overlying skin change, or gross deformity/asymmetry with normal cervical lordosis present  Palpation:    Tender about the upper trapezius (bilateral)    No tenderness over the occiput, cervical spinous processes, or rhomboids (bilateral)  Range of Motion:     Flexion within normal limits    Extension limited by pain    Right side bend within normal limits    Left side bend limited by tightness    Right rotation limited by pain    Left rotation within normal limits  Strength:    Deltoid (C5) 5/5, biceps (C6) 5/5, wrist extension (C6) 5/5, triceps (C7) 5/5, and wrist flexion (C7) 5/5  Special Tests:    Positive: None    Negative: Spurling's (bilateral)    BILATERAL SHOULDERS  Inspection:    No swelling, bruising, discoloration, or obvious deformity or asymmetry  Palpation:    Tender about the acromions    No tenderness over the AC joints, anterior capsules, or greater tuberosities  Special Tests:    Positive: Supraspinatus (empty can) - right    Negative: Neer's, Chadwick', crossed arm adduction, Speed's, Yergason's, and belly testing    Diagnostic Testing/Imaging:  No x-rays indicated during today's visit  Previous films were reviewed today, independent visualization of images was performed, and results were discussed with the patient  ESR, CRP, RF, DIANNE, and Lymes Disease - 10/2017 - negative  Thoracic " spine x-rays - 7/16/2017  Impression:  1. Moderate multilevel loss of disc space with anterior marginal osteophyte formation in the midthoracic spine consistent with degenerative change.   2. Devoscoliosis noted of the upper/mid thoracic spine region.    MR of the Left Shoulder - 6/15/2017  IMPRESSION:   1. 1.4 cm calcific density seen on the plain film is closely related to the posteroinferior glenoid rim and may be the result of an old bony and/or ligamentous injury as described above. This is not an intra-articular loose body.  2. Lateral downsloping of distal acromion.    MR of the Right Shoulder - 5/13/2016  IMPRESSION:  1. Mild supraspinatus tendinosis. Some findings which can be seen in association with impingement syndrome, in the appropriate clinical setting.   2. Moderate subscapularis tendinosis.    ASSESSMENT:  1.  Right rotator cuff tendinopathy   2.  Neck pain  3.  Myofascial pain syndrome    PLAN:  1.  Return to formal physical therapy - exercises to include pain-free range of motion exercises along with strengthening/stretching of the rotator cuff musculature with progression to activity specific exercises with home exercise prescription.  2.  Acetaminophen/ice/heat as needed for improved pain control.  3.  Plain radiographs of cervical spine ordered for further evaluation of current medical state.  4.  Call if interested in a right shoulder platelet rich plasma injection with ultrasound guidance for further treatment purposes as discussed.  5.  Call 1-2 business days after completion of cervical spine x-ray to discuss results/further medical care.  Consider MRI of the cervical spine without contrast, initiation of Botulinum toxin injections, cervical medial branch blocks with progression to cervical radiofrequency ablation, referral back to/second surgical opinion for consideration of surgical intervention for the right shoulder, etc. as deemed appropriate moving forward.    Josiah Babin DO,  CHLOE    Department of Rehabilitation Medicine    I spent a total of 30 minutes face-to-face with the patient during today's office visit.  Over 50% of the time was spent counseling the patient and/or coordinating care regarding further formal physical therapy, oral pain medication management, additional diagnostic imaging, injection therapy, surgical intervention, etc.  See office note for details.    Disclaimer: This note consists of symbols derived from keyboarding, dictation and/or voice recognition software. As a result, there may be errors in the script that have gone undetected. Please consider this when interpreting information found in this chart.          Again, thank you for allowing me to participate in the care of your patient.      Sincerely,    Josiah Babin, DO

## 2018-01-10 NOTE — MR AVS SNAPSHOT
After Visit Summary   1/10/2018    Chyna Moncada    MRN: [id number]           Patient Information     Date Of Birth          1950        Visit Information        Provider Department      1/10/2018 12:00 PM Josiah Babin DO [facility] Physical Medicine and Rehabilitation        Today's Diagnoses     Tendinopathy of right rotator cuff    -  1    Neck pain        Chronic pain of both shoulders        Myofascial pain syndrome          Care Instructions    We addressed the following today:    1.  Right rotator cuff tendinitis  2.  Neck pain  3.  Myofascial pain syndrome    Activity modification as discussed  Physical therapy: [facility] - [phone]  Topical Treatments: Ice or heat  Over the counter medication: Acetaminophen (Tylenol) 1000 mg every 6 hours with food (Maximum of 3000 mg/day)  Plain radiographs of the cervical spine to be completed on the first floor in Radiology   Call if interested in a right shoulder platelet rich plasma injection with ultrasound guidance for further treatment purposes as discussed  Call 1-2 business days after completion of cervical spine x-ray to discuss results/further medical care (sooner if needed; call direct clinic number [[phone]] at any time with questions or concerns)              Follow-ups after your visit        Additional Services     [facility] PT, HAND, AND CHIROPRACTIC REFERRAL       **This order will print in the [facility] Scheduling Office**    Physical Therapy, Hand Therapy and Chiropractic Care are available through:    *[facility]  *[facility]  *[facility]    Call one number to schedule at any of the above locations: [phone].    Your provider has referred you to: Physical Therapy at [facility] or [facility]    Indication/Reason for Referral: Shoulder Pain  Therapy Orders: Evaluate and Treat  Special Programs: None  Special Request: None    Mary Hernandez      Additional  Comments for the Therapist or Chiropractor: Formal physical therapy - exercises to include pain-free range of motion exercises along with strengthening/stretching of the rotator cuff musculature with progression to activity specific exercises with home exercise prescription.    Please be aware that coverage of these services is subject to the terms and limitations of your health insurance plan.  Call member services at your health plan with any benefit or coverage questions.      Please bring the following to your appointment:    *Your personal calendar for scheduling future appointments  *Comfortable clothing                  Your next 10 appointments already scheduled     Pavel 10, 2018  1:40 PM CST   (Arrive by 1:25 PM)   XR CERVICAL SPINE 2/3 VIEWS with UCXR1   [facility] Xray ([facility])    [address] [address] [phone]           Please bring a list of your current medicines to your exam. (Include vitamins, minerals and over-thecounter medicines.) Leave your valuables at home.  Tell your doctor if there is a chance you may be pregnant.  You do not need to do anything special for this exam.              Future tests that were ordered for you today     Open Future Orders        Priority Expected Expires Ordered    XR Cervical Spine 2/3 Views Routine 1/10/2018 1/10/2019 1/10/2018            Who to contact     Please call your clinic at [phone] to:    Ask questions about your health    Make or cancel appointments    Discuss your medicines    Learn about your test results    Speak to your doctor   If you have compliments or concerns about an experience at your clinic, or if you wish to file a complaint, please contact [facility] Patient Relations at [phone] or email us at [email or web address]         Additional Information About Your Visit        MyChart Information     [employer] is an  "electronic gateway that provides easy, online access to your medical records. With [employer], you can request a clinic appointment, read your test results, renew a prescription or communicate with your care team.     To sign up for [employer] visit the website at [email or web address]   You will be asked to enter the access code listed below, as well as some personal information. Please follow the directions to create your username and password.     Your access code is: [email or web address]  Expires: 2018  7:54 AM     Your access code will  in 90 days. If you need help or a new code, please contact your [facility] or call [phone] for assistance.        Care EveryWhere ID     This is your Care EveryWhere ID. This could be used by other organizations to access your [facility] medical records  [id number]        Your Vitals Were     Pulse Temperature Respirations Height Pulse Oximetry Breastfeeding?    94 97.8  F (36.6  C) (Oral) 20 1.664 m (5' 5.5\") 100% No    BMI (Body Mass Index)                   27.12 kg/m2            Blood Pressure from Last 3 Encounters:   01/10/18 119/78   18 118/64   17 115/67    Weight from Last 3 Encounters:   01/10/18 75.1 kg (165 lb 8 oz)   18 74.4 kg (164 lb)   17 73.9 kg (163 lb)              We Performed the Following     [facility] PT, HAND, AND CHIROPRACTIC REFERRAL          Today's Medication Changes          These changes are accurate as of: 1/10/18  1:33 PM.  If you have any questions, ask your nurse or doctor.               Stop taking these medicines if you haven't already. Please contact your care team if you have questions.     fluticasone 50 MCG/ACT spray   Commonly known as:  FLONASE   Stopped by:  Josiah Babin,                     Primary Care Provider Office Phone # Fax #    Edilia Davison PA-C [phone] [address] [place] [address]        Equal Access to Services     FIIRO " GAAR : Hadii aad ku jacy Agee, waaxda luqadaha, qaybta kaalmada adesilvia, nigel sebastian ashleytrey escobedojayjessy zhu . So [facility] [phone].    ATENCIÓN: Si habla carloz, tiene a ferraro disposición servicios gratuitos de asistencia lingüística. Llame al [phone].    We comply with applicable federal civil rights laws and [place] laws. We do not discriminate on the basis of race, color, national origin, age, disability, sex, sexual orientation, or gender identity.            Thank you!     Thank you for choosing [facility] PHYSICAL MEDICINE AND REHABILITATION  for your care. Our goal is always to provide you with excellent care. Hearing back from our patients is one way we can continue to improve our services. Please take a few minutes to complete the written survey that you may receive in the mail after your visit with us. Thank you!             Your Updated Medication List - Protect others around you: Learn how to safely use, store and throw away your medicines at www.disposemymeds.org.          This list is accurate as of: 1/10/18  1:33 PM.  Always use your most recent med list.                   Brand Name Dispense Instructions for use Diagnosis    amoxicillin-clavulanate 875-125 MG per tablet    AUGMENTIN    20 tablet    Take 1 tablet by mouth 2 times daily    Acute sinusitis with symptoms greater than 10 days       aspirin EC 81 MG EC tablet      Take 81 mg by mouth daily        calcium-vitamin D 600-400 MG-UNIT per tablet    CALTRATE     Take 1 tablet by mouth daily        [facility] DIGESTIVE HEALTH PO      Take 1 capsule by mouth daily        IRON SUPPLEMENT PO      Take 325 mg by mouth daily        LORazepam 0.5 MG tablet    ATIVAN    10 tablet    Take 1 tablet (0.5 mg) by mouth every 6 hours as needed    Anxiety       magnesium 100 MG Caps      Take 1 tablet by mouth daily        mometasone 50 MCG/ACT spray    NASONEX     Spray 2 sprays in nostril daily        MULTI-VITAMINS Tabs      Take 1  tablet by mouth daily        * multivitamin Tabs tablet      Take 1 tablet by mouth daily        * HAIR SKIN NAILS PO      Take 3 tablets by mouth daily        pravastatin 40 MG tablet    PRAVACHOL    90 tablet    TAKE ONE TABLET BY MOUTH EVERY NIGHT AT BEDTIME    Cerebral artery occlusion with cerebral infarction (H)       Propylene Glycol-Glycerin 1-0.3 % Soln      Place 1 drop into both eyes as needed        valACYclovir 1000 mg tablet    VALTREX    4 tablet    Take 2 tablets (2,000 mg) by mouth 2 times daily    Recurrent cold sores       VITAMIN D-3 PO      Take 1,000 Units by mouth daily        * Notice:  This list has 2 medication(s) that are the same as other medications prescribed for you. Read the directions carefully, and ask your doctor or other care provider to review them with you.

## 2018-01-10 NOTE — Clinical Note
Edilia,  Thank you for the referral.  I wanted to introduce myself as I am a new Sports Medicine/PM&R physician that started with the Department of Rehabilitation Medicine in December 2017.  I wanted to let you know that I am happy to see patients of yours for nonsurgical orthopedic/sports medicine/musculoskeletal medicine conditions including peripheral joint abnormalities/disorders, nonoperative spine care, etc.  I also wanted to let you know that I am able to do lumbar interventional procedures, ultrasound guided corticosteroid/PRP/viscosupplementation injections, etc, thus if you have any patients in need of the care above in the future, please feel free to refer them my direction as I would be happy to help in the care of any of your patients.  Thanks and have a great day!  Sincerely,  Josiah Babin

## 2018-01-10 NOTE — PATIENT INSTRUCTIONS
We addressed the following today:    1.  Right rotator cuff tendinitis  2.  Neck pain  3.  Myofascial pain syndrome    Activity modification as discussed  Physical therapy: [facility] - [phone]  Topical Treatments: Ice or heat  Over the counter medication: Acetaminophen (Tylenol) 1000 mg every 6 hours with food (Maximum of 3000 mg/day)  Plain radiographs of the cervical spine to be completed on the first floor in Radiology   Call if interested in a right shoulder platelet rich plasma injection with ultrasound guidance for further treatment purposes as discussed  Call 1-2 business days after completion of cervical spine x-ray to discuss results/further medical care (sooner if needed; call direct clinic number [[phone]] at any time with questions or concerns)

## 2018-01-10 NOTE — NURSING NOTE
Chief Complaint   Patient presents with     Consult     [facility]- PAIN IN NECK AND SHOULDERS     Ra Christensen, CMA

## 2018-01-10 NOTE — PROGRESS NOTES
"[facility] Physical Medicine & Rehabilitation Clinic Note  Clinic Visit s Pavel 10, 2018    Subjective:  Chyna Moncada is a 67 year old female who is seen in consultation at the request of Edilia Davison PA-C for evaluation of neck/bilateral shoulder pain.    Had a fall in 2015 onto the right shoulder.  Neck pain started in 2016 without acute precipitating event.  Left shoulder pain started in April 2017 with a fall at that time.  Reports right-sided neck and bilateral posterior/lateral shoulder pain without radiation.  Pain is 4/10 currently.  Symptoms are generally worse with rotation activities and with shrugging her shoulders.  Treatment has consisted of TENS unit, heating pad, PT (MARGARITO - without completion of home exercise program currently), chiropractic care, heat, Ibuprofen, corticosteroid injections (bilateral subacromial bursa - [facility]), Salonpas, James Hathaway, and massage therapy (Best relief with use of TENS unit).  Denies any numbness/tingling of the upper extremities.  Notes weakness of the upper extremities because of pain.  Denies any previous shoulder/neck surgeries.    Patient's past medical, surgical, social, and family histories are reviewed today.  Significant medical history as noted above  Past Medical History:   Diagnosis Date     Cerebral embolism with cerebral infarction (H)        Review of Systems:  Constitutional: POSITIVE for fevers and chills (101 degrees Fahrenheit)  Skin: NEGATIVE for worrisome rashes, moles, or lesions  Neuro: POSITIVE for weakness of the upper extremities due to pain  MSK: see HPI    Objective:  /78  Pulse 94  Temp 97.8  F (36.6  C) (Oral)  Resp 20  Ht 1.664 m (5' 5.5\")  Wt 75.1 kg (165 lb 8 oz)  SpO2 100%  Breastfeeding? No  BMI 27.12 kg/m2  General: healthy, alert, and in no distress  Skin: no suspicious lesions or rashes  Psych: mentation appears normal and affect " normal/bright  HEENT: no scleral icterus  CV: no pedal edema  Resp: normal respiratory effort without conversational dyspnea   Neuro: sensory exam is within normal limits.  Motor strength as noted below  Lymph: no palpable lymphadenopathy    MSK:    CERVICAL SPINE  Inspection:    No redness, swelling, ecchymosis, overlying skin change, or gross deformity/asymmetry with normal cervical lordosis present  Palpation:    Tender about the upper trapezius (bilateral)    No tenderness over the occiput, cervical spinous processes, or rhomboids (bilateral)  Range of Motion:     Flexion within normal limits    Extension limited by pain    Right side bend within normal limits    Left side bend limited by tightness    Right rotation limited by pain    Left rotation within normal limits  Strength:    Deltoid (C5) 5/5, biceps (C6) 5/5, wrist extension (C6) 5/5, triceps (C7) 5/5, and wrist flexion (C7) 5/5  Special Tests:    Positive: None    Negative: Spurling's (bilateral)    BILATERAL SHOULDERS  Inspection:    No swelling, bruising, discoloration, or obvious deformity or asymmetry  Palpation:    Tender about the acromions    No tenderness over the AC joints, anterior capsules, or greater tuberosities  Special Tests:    Positive: Supraspinatus (empty can) - right    Negative: Neer's, Chadwick', crossed arm adduction, Speed's, Yergason's, and belly testing    Diagnostic Testing/Imaging:  No x-rays indicated during today's visit  Previous films were reviewed today, independent visualization of images was performed, and results were discussed with the patient  ESR, CRP, RF, DIANNE, and Lymes Disease - 10/2017 - negative  Thoracic spine x-rays - 7/16/2017  Impression:  1. Moderate multilevel loss of disc space with anterior marginal osteophyte formation in the midthoracic spine consistent with degenerative change.   2. Devoscoliosis noted of the upper/mid thoracic spine region.    MR of the Left Shoulder - 6/15/2017  IMPRESSION:   1. 1.4  cm calcific density seen on the plain film is closely related to the posteroinferior glenoid rim and may be the result of an old bony and/or ligamentous injury as described above. This is not an intra-articular loose body.  2. Lateral downsloping of distal acromion.    MR of the Right Shoulder - 5/13/2016  IMPRESSION:  1. Mild supraspinatus tendinosis. Some findings which can be seen in association with impingement syndrome, in the appropriate clinical setting.   2. Moderate subscapularis tendinosis.    ASSESSMENT:  1.  Right rotator cuff tendinopathy   2.  Neck pain  3.  Myofascial pain syndrome    PLAN:  1.  Return to formal physical therapy - exercises to include pain-free range of motion exercises along with strengthening/stretching of the rotator cuff musculature with progression to activity specific exercises with home exercise prescription.  2.  Acetaminophen/ice/heat as needed for improved pain control.  3.  Plain radiographs of cervical spine ordered for further evaluation of current medical state.  4.  Call if interested in a right shoulder platelet rich plasma injection with ultrasound guidance for further treatment purposes as discussed.  5.  Call 1-2 business days after completion of cervical spine x-ray to discuss results/further medical care.  Consider MRI of the cervical spine without contrast, initiation of Botulinum toxin injections, cervical medial branch blocks with progression to cervical radiofrequency ablation, referral back to/second surgical opinion for consideration of surgical intervention for the right shoulder, etc. as deemed appropriate moving forward.    Josiah Babin DO, CANDACEM    Department of Rehabilitation Medicine    I spent a total of 30 minutes face-to-face with the patient during today's office visit.  Over 50% of the time was spent counseling the patient and/or coordinating care regarding further formal physical therapy, oral pain medication management,  additional diagnostic imaging, injection therapy, surgical intervention, etc.  See office note for details.    Disclaimer: This note consists of symbols derived from keyboarding, dictation and/or voice recognition software. As a result, there may be errors in the script that have gone undetected. Please consider this when interpreting information found in this chart.

## 2018-01-12 ENCOUNTER — HOSPITAL ENCOUNTER (OUTPATIENT)
Dept: PHYSICAL THERAPY | Facility: CLINIC | Age: 68
Setting detail: THERAPIES SERIES
End: 2018-01-12
Attending: PHYSICAL MEDICINE & REHABILITATION
Payer: MEDICARE

## 2018-01-12 PROCEDURE — G8984 CARRY CURRENT STATUS: HCPCS | Mod: GP,CK | Performed by: PHYSICAL THERAPIST

## 2018-01-12 PROCEDURE — 97140 MANUAL THERAPY 1/> REGIONS: CPT | Mod: GP | Performed by: PHYSICAL THERAPIST

## 2018-01-12 PROCEDURE — 40000718 ZZHC STATISTIC PT DEPARTMENT ORTHO VISIT: Performed by: PHYSICAL THERAPIST

## 2018-01-12 PROCEDURE — 97110 THERAPEUTIC EXERCISES: CPT | Mod: GP | Performed by: PHYSICAL THERAPIST

## 2018-01-12 PROCEDURE — 97161 PT EVAL LOW COMPLEX 20 MIN: CPT | Mod: GP | Performed by: PHYSICAL THERAPIST

## 2018-01-12 PROCEDURE — G8985 CARRY GOAL STATUS: HCPCS | Mod: GP,CI | Performed by: PHYSICAL THERAPIST

## 2018-01-15 ENCOUNTER — TELEPHONE (OUTPATIENT)
Dept: PHYSICAL MEDICINE AND REHAB | Facility: CLINIC | Age: 68
End: 2018-01-15

## 2018-01-15 NOTE — PROGRESS NOTES
" 01/12/18 1200   General Information   Type of Visit Initial OP Ortho PT Evaluation   Start of Care Date 01/12/18   Referring Physician Josiah Babin, DO    Patient/Family Goals Statement reduce pain   Orders Evaluate and Treat   Orders Comment Additional Comments for the Therapist or Chiropractor: Formal physical therapy - exercises to include pain-free range of motion exercises along with strengthening/stretching of the rotator cuff musculature with progression to activity specific exercises with home exercise prescription.   Date of Order 01/10/18   Insurance Type Medicare   Medical Diagnosis Tendinopathy of right rotator cuff M67.911  - Primary   Surgical/Medical history reviewed Yes   Precautions/Limitations no known precautions/limitations   Body Part(s)   Body Part(s) Shoulder   Presentation and Etiology   Pertinent history of current problem (include personal factors and/or comorbidities that impact the POC) Per MD note, \"Had a fall in 2015 onto the right shoulder.  Neck pain started in 2016 without acute precipitating event.  Left shoulder pain started in April 2017 with a fall at that time.  Reports right-sided neck and bilateral posterior/lateral shoulder pain without radiation.  Pain is 4/10 currently.  Symptoms are generally worse with rotation activities and with shrugging her shoulders.  Treatment has consisted of TENS unit, heating pad, PT (MARGARITO - without completion of home exercise program currently), chiropractic care, heat, Ibuprofen, corticosteroid injections (bilateral subacromial bursa - [facility] and [facility]), Salonpas, James Hathawya, and massage therapy (Best relief with use of TENS unit).  Denies any numbness/tingling of the upper extremities.  Notes weakness of the upper extremities because of pain.  Denies any previous shoulder/neck surgeries.\" Pt now c/o of general stiffness and pain in front of her shoulder radiating up to her neck. Pt denies HA's. " Pt also had a recent injection w/o relief. PMH: broken bones, menopuase, OA, stroke(appeared on MRI - no residual symptoms   Impairments A. Pain;D. Decreased ROM;F. Decreased strength and endurance   Functional Limitations perform activities of daily living;perform required work activities;perform desired leisure / sports activities   Symptom Location R shoulder   How/Where did it occur At home   Onset date of current episode/exacerbation 02/01/17   Chronicity Chronic   Pain rating (0-10 point scale) Best (/10);Worst (/10)   Best (/10) 4   Worst (/10) 10   Pain quality A. Sharp;C. Aching   Frequency of pain/symptoms A. Constant   Pain/symptoms exacerbated by C. Lifting;D. Carrying;E. Rest;G. Certain positions;F. Nothing;H. Overhead reach;J. ADL;K. Home tasks   Pain/symptoms eased by D. Nothing   Progression of symptoms since onset: Worsened   Current Level of Function   Current Community Support Family/friend caregiver   Patient role/employment history F. Retired   Living environment [place]   Fall Risk Screen   Fall screen completed by PT   Have you fallen 2 or more times in the past year? No   Have you fallen and had an injury in the past year? Yes   Is patient a fall risk? No   Functional Scales   Other Scales  SPADI: 83%    Cervical Spine   Cervical Flexion ROM chin to chest,   Cervical Extension %   Cervical Right Side Bending ROM 20   Cervical Left Side Bending ROM 20   Cervical Right Rotation ROM 55 w pain   Cervical Left Rotation ROM 55   Shoulder Objective Findings   Side (if bilateral, select both right and left) Right   Posture fwd head min rounded shoulders    Right Shoulder Flexion AROM 140   Right Shoulder Abduction AROM 160   Right Shoulder ER AROM C7   Right Shoulder IR AROM R hip   Right Shoulder Flexion Strength 4/5   Right Shoulder Abduction Strength 3/5   Right Shoulder ER Strength 3/5   Right Shoulder IR Strength 4/5   Palpation TTP RTC insertion   Chadwick-Suman Test negative    Coracoid Test negative   Crossover Test negative   Planned Therapy Interventions   Planned Therapy Interventions joint mobilization;manual therapy;neuromuscular re-education;ROM;strengthening;stretching   Planned Modality Interventions   Planned Modality Interventions Cryotherapy;Electrical stimulation;Hot packs;TENS;Ultrasound   Clinical Impression   Criteria for Skilled Therapeutic Interventions Met yes, treatment indicated   PT Diagnosis R shoulder pain   Influenced by the following impairments Pain; weaknesss; impaired ROM; impaired proprioception   Functional limitations due to impairments pain with ADL's, IADL's   Clinical Presentation Stable/Uncomplicated   Clinical Presentation Rationale Pt is pleasent 67 yr odl female who presents with R shoulder pain. Pain interferrese with daily ADLS's and is motivated to get better however pt has chornic symptoms and no relief with many other tx   Clinical Decision Making (Complexity) Low complexity   Therapy Frequency 2 times/Week   Predicted Duration of Therapy Intervention (days/wks) 6 weeks   Risk & Benefits of therapy have been explained Yes   Patient, Family & other staff in agreement with plan of care Yes   Education Assessment   Preferred Learning Style Listening;Reading;Demonstration;Pictures/video   Barriers to Learning No barriers   ORTHO GOALS   PT Ortho Eval Goals 1;2;4;3   Ortho Goal 1   Goal Identifier reading    Goal Description Pt will demontrate full cerval ROm w/o pain to be able to read w/o pain   Target Date 02/05/18   Ortho Goal 2   Goal Identifier AROM   Goal Description Pt will demonstrate full  GH AROM with less than 1/10 pain in order to be able to don/doff jacket/shirt to return to PLOF w/o  pain.   Target Date 02/05/18   Ortho Goal 3   Goal Identifier lifting   Goal Description Pt will be able to lift 30 lbs w/o pain in R shoulder to be able to compelte tasks around house   Target Date 02/26/18   Ortho Goal 4   Goal Identifier SPADI   Goal  Description Pt will report <10% disability on SPADI to demonstrate improved ability to complete daily activities and demonstrate significant clinical improvement   Target Date 02/26/18   Total Evaluation Time   Total Evaluation Time 50 (20 eval, 1 TE, 1 MT    Therapy Certification   Certification date from 01/12/18   Certification date to 02/26/18   Medical Diagnosis Tendinopathy of right rotator cuff M67.911  - Primary     Azul Ortega  Physical Therapist  [address] [address]  [email or web address]   [email or web address]   Office: [phone] Fax: [phone]

## 2018-01-15 NOTE — PROGRESS NOTES
[facility]          OUTPATIENT PHYSICAL THERAPY ORTHOPEDIC EVALUATION  PLAN OF TREATMENT FOR OUTPATIENT REHABILITATION  (COMPLETE FOR INITIAL CLAIMS ONLY)  Patient's Last Name, First Name, M.I.  YOB: 1950  AntwanChyna  C    Provider s Name:  [facility]   Medical Record No.  [id number]   Start of Care Date:  01/12/18   Onset Date:  02/01/17   Type:     _X__PT   ___OT   ___SLP Medical Diagnosis:  Tendinopathy of right rotator cuff M67.911  - Primary     PT Diagnosis:  R shoulder pain   Visits from SOC:  1      _________________________________________________________________________________  Plan of Treatment/Functional Goals:  joint mobilization, manual therapy, neuromuscular re-education, ROM, strengthening, stretching     Cryotherapy, Electrical stimulation, Hot packs, TENS, Ultrasound     Goals  Goal Identifier: reading   Goal Description: Pt will demontrate full cerval ROm w/o pain to be able to read w/o pain  Target Date: 02/05/18    Goal Identifier: AROM  Goal Description: Pt will demonstrate full  GH AROM with less than 1/10 pain in order to be able to don/doff jacket/shirt to return to PLOF w/o  pain.  Target Date: 02/05/18    Goal Identifier: lifting  Goal Description: Pt will be able to lift 30 lbs w/o pain in R shoulder to be able to compelte tasks around house  Target Date: 02/26/18    Goal Identifier: SPADI  Goal Description: Pt will report <10% disability on SPADI to demonstrate improved ability to complete daily activities and demonstrate significant clinical improvement  Target Date: 02/26/18                        Therapy Frequency:  2 times/Week  Predicted Duration of Therapy Intervention:  6 weeks    Azul Ortega, PT                 I CERTIFY THE NEED FOR THESE SERVICES FURNISHED UNDER        THIS PLAN OF TREATMENT AND WHILE UNDER MY CARE     (Physician co-signature of this document indicates review and certification of the therapy  plan).                         Certification Date From:  01/12/18   Certification Date To:  02/26/18    Referring Provider:  Josiah Babin, DO     Initial Assessment        See Epic Evaluation Start of Care Date: 01/12/18

## 2018-01-15 NOTE — TELEPHONE ENCOUNTER
Patient was called with results of cervical spine xrays which show degenerative disc disease and facet arthropathy.  Patient stated that she started physical therapy in [place] last week. She will give PMR clinic a call with update on her progress. If no improvement, Dr Babin may order a cervical MRI and or injection therapy. Patient given PMR phone number to call.

## 2018-01-17 ENCOUNTER — HOSPITAL ENCOUNTER (OUTPATIENT)
Dept: PHYSICAL THERAPY | Facility: CLINIC | Age: 68
Setting detail: THERAPIES SERIES
End: 2018-01-17
Attending: PHYSICAL MEDICINE & REHABILITATION
Payer: MEDICARE

## 2018-01-17 PROCEDURE — 97140 MANUAL THERAPY 1/> REGIONS: CPT | Mod: GP | Performed by: PHYSICAL THERAPIST

## 2018-01-17 PROCEDURE — 40000718 ZZHC STATISTIC PT DEPARTMENT ORTHO VISIT: Performed by: PHYSICAL THERAPIST

## 2018-01-17 PROCEDURE — 97110 THERAPEUTIC EXERCISES: CPT | Mod: GP | Performed by: PHYSICAL THERAPIST

## 2018-01-19 ENCOUNTER — HOSPITAL ENCOUNTER (OUTPATIENT)
Dept: PHYSICAL THERAPY | Facility: CLINIC | Age: 68
Setting detail: THERAPIES SERIES
End: 2018-01-19
Attending: PHYSICAL MEDICINE & REHABILITATION
Payer: MEDICARE

## 2018-01-19 PROCEDURE — 40000718 ZZHC STATISTIC PT DEPARTMENT ORTHO VISIT: Performed by: PHYSICAL THERAPIST

## 2018-01-19 PROCEDURE — 97110 THERAPEUTIC EXERCISES: CPT | Mod: GP | Performed by: PHYSICAL THERAPIST

## 2018-01-24 ENCOUNTER — HOSPITAL ENCOUNTER (OUTPATIENT)
Dept: PHYSICAL THERAPY | Facility: CLINIC | Age: 68
Setting detail: THERAPIES SERIES
End: 2018-01-24
Attending: PHYSICAL MEDICINE & REHABILITATION
Payer: MEDICARE

## 2018-01-24 PROCEDURE — 40000718 ZZHC STATISTIC PT DEPARTMENT ORTHO VISIT: Performed by: PHYSICAL THERAPIST

## 2018-01-24 PROCEDURE — 97140 MANUAL THERAPY 1/> REGIONS: CPT | Mod: GP | Performed by: PHYSICAL THERAPIST

## 2018-01-26 ENCOUNTER — HOSPITAL ENCOUNTER (OUTPATIENT)
Dept: PHYSICAL THERAPY | Facility: CLINIC | Age: 68
Setting detail: THERAPIES SERIES
End: 2018-01-26
Attending: PHYSICAL MEDICINE & REHABILITATION
Payer: MEDICARE

## 2018-01-26 PROCEDURE — 97140 MANUAL THERAPY 1/> REGIONS: CPT | Mod: GP | Performed by: PHYSICAL THERAPIST

## 2018-01-26 PROCEDURE — 40000718 ZZHC STATISTIC PT DEPARTMENT ORTHO VISIT: Performed by: PHYSICAL THERAPIST

## 2018-01-31 ENCOUNTER — HOSPITAL ENCOUNTER (OUTPATIENT)
Dept: PHYSICAL THERAPY | Facility: CLINIC | Age: 68
Setting detail: THERAPIES SERIES
End: 2018-01-31
Attending: PHYSICAL MEDICINE & REHABILITATION
Payer: MEDICARE

## 2018-01-31 PROCEDURE — 97110 THERAPEUTIC EXERCISES: CPT | Mod: GP | Performed by: PHYSICAL THERAPIST

## 2018-01-31 PROCEDURE — 40000718 ZZHC STATISTIC PT DEPARTMENT ORTHO VISIT: Performed by: PHYSICAL THERAPIST

## 2018-01-31 PROCEDURE — 97140 MANUAL THERAPY 1/> REGIONS: CPT | Mod: GP | Performed by: PHYSICAL THERAPIST

## 2018-01-31 PROCEDURE — G8983 BODY POS D/C STATUS: HCPCS | Mod: GP,CK | Performed by: PHYSICAL THERAPIST

## 2018-01-31 PROCEDURE — G8982 BODY POS GOAL STATUS: HCPCS | Mod: GP,CI | Performed by: PHYSICAL THERAPIST

## 2018-02-01 NOTE — PROGRESS NOTES
Outpatient Physical Therapy Progress/Discharge Note     Patient: Chyna Moncada  : 1950    Beginning/End Dates of Reporting Period:  18 to 2018    Referring Provider: Josiah Babin DO     Therapy Diagnosis: R shoulder pain     Client Self Report: R shoulder is the same. She is not sure if the HEP is irritating her or what.     Objective Measurements:  Objective Measure: R shoulder AROM   Details: flex:160  abd: 90 ER: T2 IR: R hip    Objective Measure: R MMT   Details: flex: 3/5 w pain abd 2/5 - w pain IR: 3/5 ER: 3/5     Objective Measure: SPADI   Details: 74%        Goals:  Goal Identifier reading    Goal Description Pt will demontrate full cerval ROM w/o pain to be able to read w/o pain    Target Date 18   Date Met      Progress:not met, neck continues to be sore     Goal Identifier AROM   Goal Description Pt will demonstrate full GH AROM with less than 1/10 pain in order to be able to don/doff jacket/shirt to return to PLOF w/o  pain.   Target Date 18   Date Met      Progress:not met, see above     Goal Identifier lifting   Goal Description Pt will be able to lift 30 lbs w/o pain in R shoulder to be able to compelte tasks around house    Target Date 18   Date Met      Progress:pt relates unable to lift over 10 lbs      Goal Identifier SPADI   Goal Description Pt will report <10% disability on SPADI to demonstrate improved ability to complete daily activities and demonstrate significant clinical improvement   Target Date 18   Date Met      Progress:min improved        Progress Toward Goals:   Progress this reporting period: Pt has been seen for 6 visits over this POC. Pt has not seen any change with ROM or strength since starting therapy and continues to be in pain thus pt is being referred back to MD at this time. Plan to hold chart open for 4 weeks.  Pt relates she may try plasma injection.     Update 3/15/18 :Pt has failed to schedule f/u visits within 30 days  from last visit thus is being d/c from therapy at this time. Objective measures are all taken from last visit. Current status is unknown at this time. No additional information found in chart at this time      Plan:  Changes to therapy plan of care: plan for pt to return to MD due to no improvement, will hold chart open for 4 weeks    Discharge:  Yes, d/c to HEP as pt failed to return to therapy     Azul Ortega  Physical Therapist  [place] [id number]  [email or web address]   [email or web address]   Office: [phone] Fax: [phone]

## 2018-02-05 NOTE — PROGRESS NOTES
Outpatient Physical Therapy Discharge Note     Patient: Chyna Moncada  : 1950    Beginning/End Dates of Reporting Period:  17 to 2018    Referring Provider: Edilia Davison PA-C    Therapy Diagnosis:    Left GH primary impingement; Left sciatic N and superficial peroneal N tension; limited hip and knee strength; impaired gait and proprioception; high falls risk          Client Self Report: The shoulder is really feeling great.  Mild right upper trap tightness remaining, managing this well with the HEP.  Feels ready to be done with PT and continue HEP independently.  Brought in TENS unit today - wondering how to purchase longer lead wires via the internet.    Objective Measurements:  Objective Measure: GH MMT  Details: ER=4+/5 lisa; IR=5/5 lisa  Objective Measure: Gait  Details: No gross deviations  Objective Measure: GH AROM   Details: Qftiimx=036 deg lisa; ER=65 deg lisa  Objective Measure: SPADI  Details: 7.5% disability (92.41% improvement from initial evaluation)  Objective Measure: Cervical rotation AROM  Details: Bilateral=70 deg              Goals:  Goal Identifier GH ER Strength   Goal Description Following PT interventions, patient will have >=4+/5 lisa GH ER strength to reduce impingement with overhead ADL's.   Target Date 17   Date Met  17   Progress:     Goal Identifier GH AROM   Goal Description Following PT interventions, patient will have >=150 degrees of bilateral GH flexion AROM to allow for normalized ADL's.   Target Date 17   Date Met  17   Progress:     Goal Identifier Stairs   Goal Description Following PT interventions, patient will be able to climb >=10 stairs without difficulty to allow for normalized community mobility.   Target Date 17   Date Met  07/10/17   Progress:     Goal Identifier Weight lifting   Goal Description Following PT interventions, patient will be able to perform her desired weightlifting routine without difficulty.     Target Date 09/18/17   Date Met  07/26/17   Progress:     Progress Toward Goals:   Progress this reporting period: Chyna attended 7 PT sessions to address her left shoulder and left leg pain.  She made good progress with pain reduction, return to normalized workout routine, and independence with her HEP.      Plan:  Discharge from therapy.    Discharge:    Reason for Discharge: No further expectation of progress.    Equipment Issued: Theraband    Discharge Plan: Patient to continue home program.      Mary Jane Bustamante, PT, DPT  Doctor of Physical Therapy #[id number]  [facility]  [phone]  [email or web address]

## 2018-03-21 ENCOUNTER — OFFICE VISIT (OUTPATIENT)
Dept: FAMILY MEDICINE | Facility: CLINIC | Age: 68
End: 2018-03-21
Payer: COMMERCIAL

## 2018-03-21 VITALS
WEIGHT: 160 LBS | DIASTOLIC BLOOD PRESSURE: 62 MMHG | BODY MASS INDEX: 26.22 KG/M2 | TEMPERATURE: 97.6 F | HEART RATE: 76 BPM | RESPIRATION RATE: 16 BRPM | SYSTOLIC BLOOD PRESSURE: 102 MMHG

## 2018-03-21 DIAGNOSIS — I95.1 ORTHOSTATIC HYPOTENSION: ICD-10-CM

## 2018-03-21 DIAGNOSIS — L98.9 BENIGN SKIN GROWTH: Primary | ICD-10-CM

## 2018-03-21 DIAGNOSIS — E66.3 OVERWEIGHT: ICD-10-CM

## 2018-03-21 LAB
ANION GAP SERPL CALCULATED.3IONS-SCNC: 6 MMOL/L (ref 3–14)
BUN SERPL-MCNC: 26 MG/DL (ref 7–30)
CALCIUM SERPL-MCNC: 9.1 MG/DL (ref 8.5–10.1)
CHLORIDE SERPL-SCNC: 102 MMOL/L (ref 94–109)
CO2 SERPL-SCNC: 29 MMOL/L (ref 20–32)
CREAT SERPL-MCNC: 0.75 MG/DL (ref 0.52–1.04)
ERYTHROCYTE [DISTWIDTH] IN BLOOD BY AUTOMATED COUNT: 13.1 % (ref 10–15)
GFR SERPL CREATININE-BSD FRML MDRD: 77 ML/MIN/1.7M2
GLUCOSE SERPL-MCNC: 83 MG/DL (ref 70–99)
HCT VFR BLD AUTO: 40.2 % (ref 35–47)
HGB BLD-MCNC: 13.5 G/DL (ref 11.7–15.7)
MCH RBC QN AUTO: 29 PG (ref 26.5–33)
MCHC RBC AUTO-ENTMCNC: 33.6 G/DL (ref 31.5–36.5)
MCV RBC AUTO: 87 FL (ref 78–100)
PLATELET # BLD AUTO: 196 10E9/L (ref 150–450)
POTASSIUM SERPL-SCNC: 5.3 MMOL/L (ref 3.4–5.3)
RBC # BLD AUTO: 4.65 10E12/L (ref 3.8–5.2)
SODIUM SERPL-SCNC: 137 MMOL/L (ref 133–144)
WBC # BLD AUTO: 5.7 10E9/L (ref 4–11)

## 2018-03-21 PROCEDURE — 85027 COMPLETE CBC AUTOMATED: CPT | Performed by: PHYSICIAN ASSISTANT

## 2018-03-21 PROCEDURE — 99213 OFFICE O/P EST LOW 20 MIN: CPT | Mod: 25 | Performed by: PHYSICIAN ASSISTANT

## 2018-03-21 PROCEDURE — 80048 BASIC METABOLIC PNL TOTAL CA: CPT | Performed by: PHYSICIAN ASSISTANT

## 2018-03-21 PROCEDURE — 11200 RMVL SKIN TAGS UP TO&INC 15: CPT | Performed by: PHYSICIAN ASSISTANT

## 2018-03-21 PROCEDURE — 36415 COLL VENOUS BLD VENIPUNCTURE: CPT | Performed by: PHYSICIAN ASSISTANT

## 2018-03-21 ASSESSMENT — PAIN SCALES - GENERAL: PAINLEVEL: NO PAIN (0)

## 2018-03-21 NOTE — NURSING NOTE
"Chief Complaint   Patient presents with     Weight Loss     [facility]     Derm Problem     skin growths on chest and head       Initial /62 (BP Location: Right arm)  Pulse 76  Temp 97.6  F (36.4  C) (Tympanic)  Resp 16  Wt 160 lb (72.6 kg)  BMI 26.22 kg/m2 Estimated body mass index is 26.22 kg/(m^2) as calculated from the following:    Height as of 1/10/18: 5' 5.5\" (1.664 m).    Weight as of this encounter: 160 lb (72.6 kg).      Health Maintenance that is potentially due pending provider review:  NONE    n/a    Is there anyone who you would like to be able to receive your results? No  If yes have patient fill out KRISTY    "

## 2018-03-21 NOTE — PROGRESS NOTES
SUBJECTIVE:   Chyna Moncada is a 67 year old female who presents to clinic today for the following health issues:      Derm        Growths on chest and head - wants taken care of   Bother her when getting caught on clothes or with combing hair    On Medifast - wants to talk about this    Lightheaded only when first standing in mornings, not other times.  Trying to hydrate.  Dislikes caffeine.  No new palpitations.    Problem list and histories reviewed & adjusted, as indicated.  Additional history: as documented    BP Readings from Last 3 Encounters:   03/21/18 102/62   01/10/18 119/78   01/03/18 118/64    Wt Readings from Last 3 Encounters:   03/21/18 160 lb (72.6 kg)   01/10/18 165 lb 8 oz (75.1 kg)   01/03/18 164 lb (74.4 kg)        Labs reviewed in EPIC    Reviewed and updated as needed this visit by clinical staff  Tobacco  Allergies  Meds  Problems  Med Hx  Surg Hx  Fam Hx  Soc Hx        Reviewed and updated as needed this visit by Provider  Tobacco  Allergies  Meds  Problems  Med Hx  Surg Hx  Fam Hx  Soc Hx          ROS:  Constitutional, cardiovascular, pulmonary, skin, endocrine systems are negative, except as otherwise noted.    OBJECTIVE:     /62 (BP Location: Right arm)  Pulse 76  Temp 97.6  F (36.4  C) (Tympanic)  Resp 16  Wt 160 lb (72.6 kg)  BMI 26.22 kg/m2  Body mass index is 26.22 kg/(m^2).  GENERAL: healthy, alert and no distress  RESP: lungs clear to auscultation - no rales, rhonchi or wheezes  CV: regular rate and rhythm, normal S1 S2, no S3 or S4, no murmur, click or rub  SKIN: no suspicious lesions or rashes, small benign growth on chest and scalp    ASSESSMENT/PLAN:       ICD-10-CM    1. Benign skin growth L98.9 REMOVAL OF SKIN TAGS, FIRST 15   2. Orthostatic hypotension I95.1 CBC with platelets     Basic metabolic panel  (Ca, Cl, CO2, Creat, Gluc, K, Na, BUN)   3. Overweight E66.3      Lesions cleansed with alcohol, infiltrated with 0.3 cc lidocaine with epi, and  removed by shave technique.  EBL < 1 cc.   Patient Instructions   Labs today  Staying hydrated  Slowly getting up in mornings  Discussed caffeine   Let me know if getting very bad or throughout the day          Edilia Davison PA-C  [facility]

## 2018-03-21 NOTE — PATIENT INSTRUCTIONS
Labs today  Staying hydrated  Slowly getting up in mornings  Discussed caffeine   Let me know if getting very bad or throughout the day

## 2018-03-21 NOTE — MR AVS SNAPSHOT
After Visit Summary   3/21/2018    Chyna Moncada    MRN: [id number]           Patient Information     Date Of Birth          1950        Visit Information        Provider Department      3/21/2018 11:00 AM Edilia Davison PA-C [facility]        Today's Diagnoses     Benign skin growth    -  1    Orthostatic hypotension          Care Instructions    Labs today  Staying hydrated  Slowly getting up in mornings  Discussed caffeine   Let me know if getting very bad or throughout the day              Follow-ups after your visit        Who to contact     If you have questions or need follow up information about today's clinic visit or your schedule please contact [facility] directly at [phone].  Normal or non-critical lab and imaging results will be communicated to you by MyChart, letter or phone within 4 business days after the clinic has received the results. If you do not hear from us within 7 days, please contact the clinic through MyChart or phone. If you have a critical or abnormal lab result, we will notify you by phone as soon as possible.  Submit refill requests through [email or web address] or call your pharmacy and they will forward the refill request to us. Please allow 3 business days for your refill to be completed.          Additional Information About Your Visit        Care EveryWhere ID     This is your Care EveryWhere ID. This could be used by other organizations to access your [facility] medical records  [id number]        Your Vitals Were     Pulse Temperature Respirations BMI (Body Mass Index)          76 97.6  F (36.4  C) (Tympanic) 16 26.22 kg/m2         Blood Pressure from Last 3 Encounters:   03/21/18 102/62   01/10/18 119/78   01/03/18 118/64    Weight from Last 3 Encounters:   03/21/18 160 lb (72.6 kg)   01/10/18 165 lb 8 oz (75.1 kg)   01/03/18 164 lb (74.4 kg)              We Performed the Following     Basic metabolic panel  (Ca, Cl,  CO2, Creat, Gluc, K, Na, BUN)     CBC with platelets          Today's Medication Changes          These changes are accurate as of 3/21/18 11:29 AM.  If you have any questions, ask your nurse or doctor.               Stop taking these medicines if you haven't already. Please contact your care team if you have questions.     amoxicillin-clavulanate 875-125 MG per tablet   Commonly known as:  AUGMENTIN   Stopped by:  Edilia Davison PA-C                    Primary Care Provider Office Phone # Fax #    Edilia Davison PA-C [phone] [address] [facility] [address]        Equal Access to Services     [facility] : Hadii polly moyer hadashisiah Sojackie, waaxda luqadaha, qaybta kaalmada adekyrayada, nigel zhu . So [facility] [phone].    ATENCIÓN: Si habla español, tiene a ferraro disposición servicios gratuitos de asistencia lingüística. Llame al [phone].    We comply with applicable federal civil rights laws and [place] laws. We do not discriminate on the basis of race, color, national origin, age, disability, sex, sexual orientation, or gender identity.            Thank you!     Thank you for choosing [facility]  for your care. Our goal is always to provide you with excellent care. Hearing back from our patients is one way we can continue to improve our services. Please take a few minutes to complete the written survey that you may receive in the mail after your visit with us. Thank you!             Your Updated Medication List - Protect others around you: Learn how to safely use, store and throw away your medicines at www.disposemymeds.org.          This list is accurate as of 3/21/18 11:29 AM.  Always use your most recent med list.                   Brand Name Dispense Instructions for use Diagnosis    aspirin EC 81 MG EC tablet      Take 81 mg by mouth daily        calcium-vitamin D 600-400 MG-UNIT per tablet    CALTRATE     Take 1  tablet by mouth daily        [facility] DIGESTIVE HEALTH PO      Take 1 capsule by mouth daily        IRON SUPPLEMENT PO      Take 325 mg by mouth daily        LORazepam 0.5 MG tablet    ATIVAN    10 tablet    Take 1 tablet (0.5 mg) by mouth every 6 hours as needed    Anxiety       magnesium 100 MG Caps      Take 1 tablet by mouth daily        mometasone 50 MCG/ACT spray    NASONEX     Spray 2 sprays in nostril daily        MULTI-VITAMINS Tabs      Take 1 tablet by mouth daily        * multivitamin Tabs tablet      Take 1 tablet by mouth daily        * HAIR SKIN NAILS PO      Take 3 tablets by mouth daily        pravastatin 40 MG tablet    PRAVACHOL    90 tablet    TAKE ONE TABLET BY MOUTH EVERY NIGHT AT BEDTIME    Cerebral artery occlusion with cerebral infarction (H)       Propylene Glycol-Glycerin 1-0.3 % Soln      Place 1 drop into both eyes as needed        valACYclovir 1000 mg tablet    VALTREX    4 tablet    Take 2 tablets (2,000 mg) by mouth 2 times daily    Recurrent cold sores       VITAMIN D-3 PO      Take 1,000 Units by mouth daily        * Notice:  This list has 2 medication(s) that are the same as other medications prescribed for you. Read the directions carefully, and ask your doctor or other care provider to review them with you.

## 2018-05-11 ENCOUNTER — TELEPHONE (OUTPATIENT)
Dept: FAMILY MEDICINE | Facility: CLINIC | Age: 68
End: 2018-05-11

## 2018-05-11 ENCOUNTER — OFFICE VISIT (OUTPATIENT)
Dept: URGENT CARE | Facility: URGENT CARE | Age: 68
End: 2018-05-11
Payer: COMMERCIAL

## 2018-05-11 VITALS
WEIGHT: 146 LBS | OXYGEN SATURATION: 99 % | BODY MASS INDEX: 23.93 KG/M2 | HEART RATE: 73 BPM | SYSTOLIC BLOOD PRESSURE: 124 MMHG | DIASTOLIC BLOOD PRESSURE: 76 MMHG | TEMPERATURE: 97.8 F

## 2018-05-11 DIAGNOSIS — R30.0 DYSURIA: Primary | ICD-10-CM

## 2018-05-11 LAB
ALBUMIN UR-MCNC: NEGATIVE MG/DL
APPEARANCE UR: CLEAR
BILIRUB UR QL STRIP: NEGATIVE
COLOR UR AUTO: YELLOW
GLUCOSE UR STRIP-MCNC: NEGATIVE MG/DL
HGB UR QL STRIP: NEGATIVE
KETONES UR STRIP-MCNC: NEGATIVE MG/DL
LEUKOCYTE ESTERASE UR QL STRIP: NEGATIVE
NITRATE UR QL: NEGATIVE
PH UR STRIP: 7 PH (ref 5–7)
SOURCE: NORMAL
SP GR UR STRIP: 1.01 (ref 1–1.03)
UROBILINOGEN UR STRIP-ACNC: 0.2 EU/DL (ref 0.2–1)

## 2018-05-11 PROCEDURE — 99213 OFFICE O/P EST LOW 20 MIN: CPT | Performed by: NURSE PRACTITIONER

## 2018-05-11 PROCEDURE — 81003 URINALYSIS AUTO W/O SCOPE: CPT | Performed by: NURSE PRACTITIONER

## 2018-05-11 NOTE — MR AVS SNAPSHOT
After Visit Summary   5/11/2018    Chyna Moncada    MRN: [id number]           Patient Information     Date Of Birth          1950        Visit Information        Provider Department      5/11/2018 5:30 PM Nereida Jacobs APRN CNP [facility]        Today's Diagnoses     Dysuria    -  1      Care Instructions      Dysuria with Uncertain Cause (Adult)    The urethra is the tube that allows urine to pass out of the body. In a woman, the urethra is the opening above the vagina. In men, the urethra is the opening on the tip of the penis. Dysuria is the feeling of pain or burning in the urethra when passing urine.  Dysuria can be caused by anything that irritates or inflames the urethra. An infection or chemical irritation can cause this reaction. A bladder infection is the most common cause of dysuria in adults. A urine test can diagnose this. A bladder infection needs antibiotic treatment.  Soaps, lotions, colognes and feminine hygiene products can cause dysuria. So can birth control jellies, creams, and foams. It will go away 1 to 3 days after using these irritants.  Sexually transmitted diseases (STDs) such as chlamydia or gonorrhea can cause dysuria. Your healthcare provider may take a culture sample. Your provider may start you on antibiotic medicine before the culture test returns.  In women who have gone through menopause, dysuria can be from dryness in the lining of the urethra. This can be treated with hormones. Dysuria becomes long-term (chronic) when it lasts for weeks or months. You may need to see a specialist (urologist) to diagnose and treat chronic dysuria.  Home care  These home care tips may help:    Don't use any chemicals or products that you think may be causing your symptoms.    If you were given a prescription medicine, take as directed. Be sure to take it until it is all used up.    If a culture was taken, don't have sex until you have been  told that it is negative. This means you don't have an infection. Then follow your healthcare provider's advice to treat your condition.  If a culture was done and it is positive:    Both you and your sexual partner may need to be treated. This is true even if your partner has no symptoms.    Contact your healthcare provider or go to an urgent care clinic or the public health department to be looked at and treated.    Don't have sex until both you and your partner(s) have finished all antibiotics and your healthcare provider says you are no longer contagious.    Learn about and use safe sex practices. The safest sex is with a partner who has tested negative and only has sex with you. Condoms can prevent STDs from spreading, but they aren't a guarantee.  Follow-up care  Follow up with your healthcare provider, or as advised. If a culture was taken, you may call as directed for the results. If you have an STD, follow up with your provider or the public health department for a complete STD screening, including HIV testing. For more information, contact CDC-INFO at 369-172-8878.  When to seek medical advice  Call your healthcare provider right away if any of these occur:    You aren't better after 3 days of treatment    Fever of 100.4 F (38 C) or higher, or as directed by your healthcare provider    Back or belly pain that gets worse    You can't urinate because of pain    New discharge from the urethra, vagina, or penis    Painful sores on the penis    Rash or joint pain    Painful lumps (lymph nodes) in the groin    Testicle pain or swelling of the scrotum  Date Last Reviewed: 11/1/2016 2000-2017 [employer]. [address] [place] [address], [place] [address]. All rights reserved. This information is not intended as a substitute for professional medical care. Always follow your healthcare professional's instructions.                Follow-ups after your visit        Who to contact     If you have questions or  need follow up information about today's clinic visit or your schedule please contact [facility] directly at [phone].  Normal or non-critical lab and imaging results will be communicated to you by MyChart, letter or phone within 4 business days after the clinic has received the results. If you do not hear from us within 7 days, please contact the clinic through MyChart or phone. If you have a critical or abnormal lab result, we will notify you by phone as soon as possible.  Submit refill requests through [employer] or call your pharmacy and they will forward the refill request to us. Please allow 3 business days for your refill to be completed.          Additional Information About Your Visit        Care EveryWhere ID     This is your Care EveryWhere ID. This could be used by other organizations to access your [place] medical records  [id number]        Your Vitals Were     Pulse Temperature Pulse Oximetry BMI (Body Mass Index)          73 97.8  F (36.6  C) (Tympanic) 99% 23.93 kg/m2         Blood Pressure from Last 3 Encounters:   05/11/18 124/76   03/21/18 102/62   01/10/18 119/78    Weight from Last 3 Encounters:   05/11/18 146 lb (66.2 kg)   03/21/18 160 lb (72.6 kg)   01/10/18 165 lb 8 oz (75.1 kg)              We Performed the Following     *UA reflex to Microscopic and Culture ([place] [facility] (except [place] and [place])        Primary Care Provider Office Phone # Fax #    Edilia Davison PA-C [phone] [address] [place] [id number]        Equal Access to Services     [facility] JOSE : Hadii aad ku hadasho Soomaali, waaxda luqadaha, qaybta kaalmada adekyrayaquintin, nigel landry. So [facility] [phone].    ATENCIÓN: Si habla español, tiene a ferraro disposición servicios gratuitos de asistencia lingüística. Llame al [phone].    We comply with applicable federal civil rights laws and [place] laws. We do  not discriminate on the basis of race, color, national origin, age, disability, sex, sexual orientation, or gender identity.            Thank you!     Thank you for choosing [facility]  for your care. Our goal is always to provide you with excellent care. Hearing back from our patients is one way we can continue to improve our services. Please take a few minutes to complete the written survey that you may receive in the mail after your visit with us. Thank you!             Your Updated Medication List - Protect others around you: Learn how to safely use, store and throw away your medicines at www.disposemymeds.org.          This list is accurate as of 5/11/18  6:03 PM.  Always use your most recent med list.                   Brand Name Dispense Instructions for use Diagnosis    aspirin 81 MG EC tablet      Take 81 mg by mouth daily        calcium-vitamin D 600-400 MG-UNIT per tablet    CALTRATE     Take 1 tablet by mouth daily        [facility] DIGESTIVE HEALTH PO      Take 1 capsule by mouth daily        IRON SUPPLEMENT PO      Take 325 mg by mouth daily        LORazepam 0.5 MG tablet    ATIVAN    10 tablet    Take 1 tablet (0.5 mg) by mouth every 6 hours as needed    Anxiety       magnesium 100 MG Caps      Take 1 tablet by mouth daily        mometasone 50 MCG/ACT spray    NASONEX     Spray 2 sprays in nostril daily        MULTI-VITAMINS Tabs      Take 1 tablet by mouth daily        * multivitamin Tabs tablet      Take 1 tablet by mouth daily        * HAIR SKIN NAILS PO      Take 3 tablets by mouth daily        pravastatin 40 MG tablet    PRAVACHOL    90 tablet    TAKE ONE TABLET BY MOUTH EVERY NIGHT AT BEDTIME    Cerebral artery occlusion with cerebral infarction (H)       Propylene Glycol-Glycerin 1-0.3 % Soln      Place 1 drop into both eyes as needed        valACYclovir 1000 mg tablet    VALTREX    4 tablet    Take 2 tablets (2,000 mg) by mouth 2 times daily    Recurrent cold  sores       VITAMIN D-3 PO      Take 1,000 Units by mouth daily        * Notice:  This list has 2 medication(s) that are the same as other medications prescribed for you. Read the directions carefully, and ask your doctor or other care provider to review them with you.

## 2018-05-11 NOTE — PATIENT INSTRUCTIONS
Dysuria with Uncertain Cause (Adult)    The urethra is the tube that allows urine to pass out of the body. In a woman, the urethra is the opening above the vagina. In men, the urethra is the opening on the tip of the penis. Dysuria is the feeling of pain or burning in the urethra when passing urine.  Dysuria can be caused by anything that irritates or inflames the urethra. An infection or chemical irritation can cause this reaction. A bladder infection is the most common cause of dysuria in adults. A urine test can diagnose this. A bladder infection needs antibiotic treatment.  Soaps, lotions, colognes and feminine hygiene products can cause dysuria. So can birth control jellies, creams, and foams. It will go away 1 to 3 days after using these irritants.  Sexually transmitted diseases (STDs) such as chlamydia or gonorrhea can cause dysuria. Your healthcare provider may take a culture sample. Your provider may start you on antibiotic medicine before the culture test returns.  In women who have gone through menopause, dysuria can be from dryness in the lining of the urethra. This can be treated with hormones. Dysuria becomes long-term (chronic) when it lasts for weeks or months. You may need to see a specialist (urologist) to diagnose and treat chronic dysuria.  Home care  These home care tips may help:    Don't use any chemicals or products that you think may be causing your symptoms.    If you were given a prescription medicine, take as directed. Be sure to take it until it is all used up.    If a culture was taken, don't have sex until you have been told that it is negative. This means you don't have an infection. Then follow your healthcare provider's advice to treat your condition.  If a culture was done and it is positive:    Both you and your sexual partner may need to be treated. This is true even if your partner has no symptoms.    Contact your healthcare provider or go to an urgent care clinic or the  [facility] health department to be looked at and treated.    Don't have sex until both you and your partner(s) have finished all antibiotics and your healthcare provider says you are no longer contagious.    Learn about and use safe sex practices. The safest sex is with a partner who has tested negative and only has sex with you. Condoms can prevent STDs from spreading, but they aren't a guarantee.  Follow-up care  Follow up with your healthcare provider, or as advised. If a culture was taken, you may call as directed for the results. If you have an STD, follow up with your provider or the public health department for a complete STD screening, including HIV testing. For more information, contact CDC-INFO at 247-303-3479.  When to seek medical advice  Call your healthcare provider right away if any of these occur:    You aren't better after 3 days of treatment    Fever of 100.4 F (38 C) or higher, or as directed by your healthcare provider    Back or belly pain that gets worse    You can't urinate because of pain    New discharge from the urethra, vagina, or penis    Painful sores on the penis    Rash or joint pain    Painful lumps (lymph nodes) in the groin    Testicle pain or swelling of the scrotum  Date Last Reviewed: 11/1/2016 2000-2017 [employer]. [address] [place] [address], [place] [address]. All rights reserved. This information is not intended as a substitute for professional medical care. Always follow your healthcare professional's instructions.

## 2018-05-11 NOTE — TELEPHONE ENCOUNTER
Reason for Call:  Other call back    Detailed comments: low back pain and low urine output in AM no pain with urination.  states she is on the Medi-Fast diet    Phone Number Patient can be reached at: Cell number on file:    Telephone Information:   Mobile [phone]       Best Time: anytime    Can we leave a detailed message on this number? Not Applicable    Call taken on 5/11/2018 at 3:00 PM by Nayely Wagner

## 2018-05-11 NOTE — TELEPHONE ENCOUNTER
S-(situation): low back pain, frequency, foul smelling urine    B-(background): started a week ago    A-(assessment):   Says she has developed lower back pain in the morning mostly and is fine during the day, frequency (during day and up 2-3 times per night), and foul smelling urine over the past week, denies blood, no fever, no burning, no pain, and no sediment. Says she is on a medi-fast diet, and feels fatiqued. Is drinking a lot of water.      R-(recommendations): Advised to be seen, no appointments here today or in [place], so advised urgent care at 5, patient agrees and will be here at 5 to have symptoms reviewed.    SUSSY Tellez

## 2018-05-11 NOTE — PROGRESS NOTES
SUBJECTIVE:   Chyna Moncada is a 67 year old female who  presents today for a possible UTI. Symptoms of dysuria and frequency have been going on for 1week(s).  Hematuria no.  gradual onsetand moderate.  There is no history of fever, chills, nausea or vomiting.  This patient does have a history of urinary tract infections. Patient denies vaginal symptoms, pregnancy or STD concerns.    Past Medical History:   Diagnosis Date     Cerebral embolism with cerebral infarction (H)      Current Outpatient Prescriptions   Medication Sig Dispense Refill     aspirin EC 81 MG tablet Take 81 mg by mouth daily        Lactobacillus-Inulin (CULTURELLE DIGESTIVE HEALTH PO) Take 1 capsule by mouth daily        mometasone (NASONEX) 50 MCG/ACT spray Spray 2 sprays in nostril daily        Multiple Vitamin (MULTI-VITAMINS) TABS Take 1 tablet by mouth daily       Multiple Vitamins-Minerals (HAIR SKIN NAILS PO) Take 3 tablets by mouth daily        multivitamin (OCUVITE) TABS Take 1 tablet by mouth daily       pravastatin (PRAVACHOL) 40 MG tablet TAKE ONE TABLET BY MOUTH EVERY NIGHT AT BEDTIME 90 tablet 3     Propylene Glycol-Glycerin 1-0.3 % SOLN Place 1 drop into both eyes as needed       calcium-vitamin D (CALTRATE) 600-400 MG-UNIT per tablet Take 1 tablet by mouth daily        Cholecalciferol (VITAMIN D-3 PO) Take 1,000 Units by mouth daily       Ferrous Sulfate (IRON SUPPLEMENT PO) Take 325 mg by mouth daily       LORazepam (ATIVAN) 0.5 MG tablet Take 1 tablet (0.5 mg) by mouth every 6 hours as needed (Patient not taking: Reported on 5/11/2018) 10 tablet 0     magnesium 100 MG CAPS Take 1 tablet by mouth daily        valACYclovir (VALTREX) 1000 mg tablet Take 2 tablets (2,000 mg) by mouth 2 times daily (Patient not taking: Reported on 5/11/2018) 4 tablet 11         ROS:   CONSTITUTIONAL:NEGATIVE for fever, chills, change in weight  INTEGUMENTARY/SKIN: NEGATIVE for worrisome rashes, moles or lesions  EYES: NEGATIVE for vision changes  or irritation  ENT/MOUTH: NEGATIVE for ear, mouth and throat problems  RESP:NEGATIVE for significant cough or SOB  CV: NEGATIVE for chest pain, palpitations or peripheral edema  GI: See above   NEURO: NEGATIVE for weakness, dizziness or paresthesias  ENDOCRINE: NEGATIVE for temperature intolerance, skin/hair changes    OBJECTIVE:  /76 (BP Location: Right arm, Cuff Size: Adult Small)  Pulse 73  Temp 97.8  F (36.6  C) (Tympanic)  Wt 146 lb (66.2 kg)  SpO2 99%  BMI 23.93 kg/m2  GENERAL APPEARANCE: healthy, alert and no distress  RESP: lungs clear to auscultation - no rales, rhonchi or wheezes  CV: regular rates and rhythm, normal S1 S2, no murmur noted  ABDOMEN:  soft, nontender, no HSM or masses and bowel sounds normal  BACK: No CVA tenderness  SKIN: no suspicious lesions or rashes    UA:   Results for orders placed or performed in visit on 05/11/18   *UA reflex to Microscopic and Culture ([facility] (except [place] and [place])   Result Value Ref Range    Color Urine Yellow     Appearance Urine Clear     Glucose Urine Negative NEG^Negative mg/dL    Bilirubin Urine Negative NEG^Negative    Ketones Urine Negative NEG^Negative mg/dL    Specific Gravity Urine 1.015 1.003 - 1.035    Blood Urine Negative NEG^Negative    pH Urine 7.0 5.0 - 7.0 pH    Protein Albumin Urine Negative NEG^Negative mg/dL    Urobilinogen Urine 0.2 0.2 - 1.0 EU/dL    Nitrite Urine Negative NEG^Negative    Leukocyte Esterase Urine Negative NEG^Negative    Source Midstream Urine        ASSESSMENT:     ICD-10-CM    1. Dysuria R30.0 *UA reflex to Microscopic and Culture ([facility] (except [place] and [place])         PLAN:  Patient Instructions     Dysuria with Uncertain Cause (Adult)    The urethra is the tube that allows urine to pass out of the body. In a woman, the urethra is the opening above the vagina. In men, the urethra is the opening on the tip of the penis. Dysuria is the feeling of pain or  burning in the urethra when passing urine.  Dysuria can be caused by anything that irritates or inflames the urethra. An infection or chemical irritation can cause this reaction. A bladder infection is the most common cause of dysuria in adults. A urine test can diagnose this. A bladder infection needs antibiotic treatment.  Soaps, lotions, colognes and feminine hygiene products can cause dysuria. So can birth control jellies, creams, and foams. It will go away 1 to 3 days after using these irritants.  Sexually transmitted diseases (STDs) such as chlamydia or gonorrhea can cause dysuria. Your healthcare provider may take a culture sample. Your provider may start you on antibiotic medicine before the culture test returns.  In women who have gone through menopause, dysuria can be from dryness in the lining of the urethra. This can be treated with hormones. Dysuria becomes long-term (chronic) when it lasts for weeks or months. You may need to see a specialist (urologist) to diagnose and treat chronic dysuria.  Home care  These home care tips may help:    Don't use any chemicals or products that you think may be causing your symptoms.    If you were given a prescription medicine, take as directed. Be sure to take it until it is all used up.    If a culture was taken, don't have sex until you have been told that it is negative. This means you don't have an infection. Then follow your healthcare provider's advice to treat your condition.  If a culture was done and it is positive:    Both you and your sexual partner may need to be treated. This is true even if your partner has no symptoms.    Contact your healthcare provider or go to an urgent care clinic or the public health department to be looked at and treated.    Don't have sex until both you and your partner(s) have finished all antibiotics and your healthcare provider says you are no longer contagious.    Learn about and use safe sex practices. The safest sex is  with a partner who has tested negative and only has sex with you. Condoms can prevent STDs from spreading, but they aren't a guarantee.  Follow-up care  Follow up with your healthcare provider, or as advised. If a culture was taken, you may call as directed for the results. If you have an STD, follow up with your provider or the public health department for a complete STD screening, including HIV testing. For more information, contact CDC-INFO at 522-853-4882.  When to seek medical advice  Call your healthcare provider right away if any of these occur:    You aren't better after 3 days of treatment    Fever of 100.4 F (38 C) or higher, or as directed by your healthcare provider    Back or belly pain that gets worse    You can't urinate because of pain    New discharge from the urethra, vagina, or penis    Painful sores on the penis    Rash or joint pain    Painful lumps (lymph nodes) in the groin    Testicle pain or swelling of the scrotum  Date Last Reviewed: 11/1/2016 2000-2017 [employer]. [address] [place] [address]. All rights reserved. This information is not intended as a substitute for professional medical care. Always follow your healthcare professional's instructions.            GEORGE Al CNP

## 2018-05-21 ENCOUNTER — TELEPHONE (OUTPATIENT)
Dept: FAMILY MEDICINE | Facility: CLINIC | Age: 68
End: 2018-05-21

## 2018-05-21 NOTE — TELEPHONE ENCOUNTER
Reason for Call: Request for an order or referral:    Order or referral being requested: Chyna is asking if Edilia can order a colonoscopy for her because it's been more than 10 years. She says she is on the Medifast diet and has been having some diarrhea for 2 weeks. She says she thought maybe she wasn't getting enough fiber so she added some Metamucil but that's not helping either. She says her stools just aren't formed solid.    Date needed: at your convenience    Phone number Patient can be reached at:  Cell number on file:    Telephone Information:   Mobile [phone]       Best Time:  anytime    Can we leave a detailed message on this number?  YES    Call taken on 5/21/2018 at 11:23 AM by Cassy Slaughter

## 2018-05-21 NOTE — TELEPHONE ENCOUNTER
Yes can have colonoscopy but -  Screening (well covered by insurance) is only if has no concerns.    If having stool changes, would first temporarily stop Medifast to see if causing issues.  If not resolving, should be seen as I would consider doing other testing before colonoscopy.

## 2018-05-21 NOTE — TELEPHONE ENCOUNTER
See note below.  She is asking if can have a colonoscopy.  Stools are watery to soft she says.  She is not sick.    Iesha Henriquez RN

## 2018-07-09 ENCOUNTER — TELEPHONE (OUTPATIENT)
Dept: FAMILY MEDICINE | Facility: CLINIC | Age: 68
End: 2018-07-09

## 2018-07-09 NOTE — TELEPHONE ENCOUNTER
Panel Management Review      Patient has the following on her problem list: None      Composite cancer screening  Chart review shows that this patient is due/due soon for the following Fecal Colorectal (FIT)  Summary:    Patient is due/failing the following:   FIT    Action needed:   Fit mailed or picked up    Type of outreach:    Phone, left message for patient to call back.     Questions for provider review:    None                                                                                                                                    Liss Cade MA

## 2018-07-19 DIAGNOSIS — Z12.12 SCREENING FOR COLORECTAL CANCER: Primary | ICD-10-CM

## 2018-07-19 DIAGNOSIS — Z12.11 SCREENING FOR COLORECTAL CANCER: Primary | ICD-10-CM

## 2018-08-01 DIAGNOSIS — Z12.11 SCREENING FOR COLORECTAL CANCER: ICD-10-CM

## 2018-08-01 DIAGNOSIS — Z12.12 SCREENING FOR COLORECTAL CANCER: ICD-10-CM

## 2018-08-01 PROCEDURE — G0328 FECAL BLOOD SCRN IMMUNOASSAY: HCPCS | Performed by: PHYSICIAN ASSISTANT

## 2018-08-03 LAB — HEMOCCULT STL QL IA: NEGATIVE

## 2018-08-13 DIAGNOSIS — B00.1 RECURRENT COLD SORES: ICD-10-CM

## 2018-08-13 RX ORDER — VALACYCLOVIR HYDROCHLORIDE 1 G/1
TABLET, FILM COATED ORAL
Qty: 4 TABLET | Refills: 11 | Status: SHIPPED | OUTPATIENT
Start: 2018-08-13 | End: 2020-12-10

## 2018-08-13 NOTE — TELEPHONE ENCOUNTER
"Requested Prescriptions   Pending Prescriptions Disp Refills     valACYclovir (VALTREX) 1000 mg tablet [Pharmacy Med Name: VALACYCLOVIR HCL 1GM TABS] 4 tablet 11     Sig: TAKE TWO TABLETS BY MOUTH TWICE A DAY    Antivirals for Herpes Protocol Passed    8/13/2018  8:48 AM       Passed - Patient is age 12 or older       Passed - Recent (12 mo) or future (30 days) visit within the authorizing provider's specialty    Patient had office visit in the last 12 months or has a visit in the next 30 days with authorizing provider or within the authorizing provider's specialty.  See \"Patient Info\" tab in inbasket, or \"Choose Columns\" in Meds & Orders section of the refill encounter.           Passed - Normal serum creatinine on file in past 12 months    Recent Labs   Lab Test  03/21/18   1146   CR  0.75             Last Written Prescription Date:  4/11/17  Last Fill Quantity: 4,  # refills: 11   Last office visit: 3/21/2018 with prescribing provider:     Future Office Visit:      "

## 2018-08-22 ENCOUNTER — RADIANT APPOINTMENT (OUTPATIENT)
Dept: MAMMOGRAPHY | Facility: CLINIC | Age: 68
End: 2018-08-22
Attending: PHYSICIAN ASSISTANT
Payer: COMMERCIAL

## 2018-08-22 DIAGNOSIS — Z12.31 VISIT FOR SCREENING MAMMOGRAM: ICD-10-CM

## 2018-08-22 PROCEDURE — 77067 SCR MAMMO BI INCL CAD: CPT | Mod: TC

## 2018-10-19 ENCOUNTER — RADIANT APPOINTMENT (OUTPATIENT)
Dept: GENERAL RADIOLOGY | Facility: CLINIC | Age: 68
End: 2018-10-19
Attending: NURSE PRACTITIONER
Payer: COMMERCIAL

## 2018-10-19 ENCOUNTER — OFFICE VISIT (OUTPATIENT)
Dept: FAMILY MEDICINE | Facility: CLINIC | Age: 68
End: 2018-10-19
Payer: COMMERCIAL

## 2018-10-19 VITALS
TEMPERATURE: 97.5 F | HEIGHT: 66 IN | WEIGHT: 131 LBS | BODY MASS INDEX: 21.05 KG/M2 | HEART RATE: 72 BPM | SYSTOLIC BLOOD PRESSURE: 110 MMHG | DIASTOLIC BLOOD PRESSURE: 60 MMHG | RESPIRATION RATE: 18 BRPM

## 2018-10-19 DIAGNOSIS — Z00.00 ANNUAL PHYSICAL EXAM: Primary | ICD-10-CM

## 2018-10-19 DIAGNOSIS — M79.672 LEFT FOOT PAIN: ICD-10-CM

## 2018-10-19 DIAGNOSIS — M77.9 TENDONITIS: ICD-10-CM

## 2018-10-19 DIAGNOSIS — Z23 NEED FOR PROPHYLACTIC VACCINATION AND INOCULATION AGAINST INFLUENZA: ICD-10-CM

## 2018-10-19 LAB
ANION GAP SERPL CALCULATED.3IONS-SCNC: 6 MMOL/L (ref 3–14)
BUN SERPL-MCNC: 18 MG/DL (ref 7–30)
CALCIUM SERPL-MCNC: 8.7 MG/DL (ref 8.5–10.1)
CHLORIDE SERPL-SCNC: 105 MMOL/L (ref 94–109)
CHOLEST SERPL-MCNC: 172 MG/DL
CO2 SERPL-SCNC: 30 MMOL/L (ref 20–32)
CREAT SERPL-MCNC: 0.74 MG/DL (ref 0.52–1.04)
GFR SERPL CREATININE-BSD FRML MDRD: 79 ML/MIN/1.7M2
GLUCOSE SERPL-MCNC: 95 MG/DL (ref 70–99)
HDLC SERPL-MCNC: 98 MG/DL
LDLC SERPL CALC-MCNC: 58 MG/DL
NONHDLC SERPL-MCNC: 74 MG/DL
POTASSIUM SERPL-SCNC: 4 MMOL/L (ref 3.4–5.3)
SODIUM SERPL-SCNC: 141 MMOL/L (ref 133–144)
TRIGL SERPL-MCNC: 82 MG/DL

## 2018-10-19 PROCEDURE — 36415 COLL VENOUS BLD VENIPUNCTURE: CPT | Performed by: NURSE PRACTITIONER

## 2018-10-19 PROCEDURE — 73630 X-RAY EXAM OF FOOT: CPT | Mod: LT

## 2018-10-19 PROCEDURE — 80061 LIPID PANEL: CPT | Performed by: NURSE PRACTITIONER

## 2018-10-19 PROCEDURE — 99213 OFFICE O/P EST LOW 20 MIN: CPT | Mod: 25 | Performed by: NURSE PRACTITIONER

## 2018-10-19 PROCEDURE — 99397 PER PM REEVAL EST PAT 65+ YR: CPT | Mod: 25 | Performed by: NURSE PRACTITIONER

## 2018-10-19 PROCEDURE — G0008 ADMIN INFLUENZA VIRUS VAC: HCPCS | Performed by: NURSE PRACTITIONER

## 2018-10-19 PROCEDURE — 80048 BASIC METABOLIC PNL TOTAL CA: CPT | Performed by: NURSE PRACTITIONER

## 2018-10-19 PROCEDURE — 90662 IIV NO PRSV INCREASED AG IM: CPT | Performed by: NURSE PRACTITIONER

## 2018-10-19 ASSESSMENT — ENCOUNTER SYMPTOMS
NERVOUS/ANXIOUS: 0
HEARTBURN: 0
HEADACHES: 0
WEAKNESS: 0
HEMATURIA: 0
MYALGIAS: 1
NAUSEA: 0
FEVER: 0
ABDOMINAL PAIN: 0
HEMATOCHEZIA: 0
DIARRHEA: 0
EYE PAIN: 0
SHORTNESS OF BREATH: 0
JOINT SWELLING: 0
DIZZINESS: 0
FREQUENCY: 1
DYSURIA: 0
SORE THROAT: 0
CONSTIPATION: 0
COUGH: 0
PALPITATIONS: 0
ARTHRALGIAS: 1
PARESTHESIAS: 0
BREAST MASS: 0
CHILLS: 0

## 2018-10-19 ASSESSMENT — ACTIVITIES OF DAILY LIVING (ADL)
I_NEED_ASSISTANCE_FOR_THE_FOLLOWING_DAILY_ACTIVITIES:: NO ASSISTANCE IS NEEDED
CURRENT_FUNCTION: NO ASSISTANCE NEEDED

## 2018-10-19 NOTE — LETTER
October 19, 2018      Chyna Moncada  [id number] [facility] [id number]        Dear ,    We are writing to inform you of your test results.    Your basic metabolic panel was normal. Cholesterol numbers are with in normal limits. No changes in current treatment plan at this time.    Resulted Orders   Basic metabolic panel   Result Value Ref Range    Sodium 141 133 - 144 mmol/L    Potassium 4.0 3.4 - 5.3 mmol/L    Chloride 105 94 - 109 mmol/L    Carbon Dioxide 30 20 - 32 mmol/L    Anion Gap 6 3 - 14 mmol/L    Glucose 95 70 - 99 mg/dL    Urea Nitrogen 18 7 - 30 mg/dL    Creatinine 0.74 0.52 - 1.04 mg/dL    GFR Estimate 79 >60 mL/min/1.7m2      Comment:      Non  GFR Calc    GFR Estimate If Black >90 >60 mL/min/1.7m2      Comment:       GFR Calc    Calcium 8.7 8.5 - 10.1 mg/dL   Lipid panel reflex to direct LDL Fasting   Result Value Ref Range    Cholesterol 172 <200 mg/dL    Triglycerides 82 <150 mg/dL    HDL Cholesterol 98 >49 mg/dL    LDL Cholesterol Calculated 58 <100 mg/dL      Comment:      Desirable:       <100 mg/dl    Non HDL Cholesterol 74 <130 mg/dL         If you have any questions or concerns, please call the clinic at the number listed above.       Sincerely,        GEORGE Al CNP

## 2018-10-19 NOTE — PROGRESS NOTES
SUBJECTIVE:   Chyna Moncada is a 67 year old female who presents for Preventive Visit.  Are you in the first 12 months of your Medicare coverage?  No    Physical   Annual:     Getting at least 3 servings of Calcium per day:  Yes    Bi-annual eye exam:  Yes    Dental care twice a year:  Yes    Sleep apnea or symptoms of sleep apnea:  Daytime drowsiness    Diet:  Regular (no restrictions)    Frequency of exercise:  4-5 days/week    Duration of exercise:  45-60 minutes    Taking medications regularly:  Yes    Medication side effects:  Other    Additional concerns today:  YES    Ability to successfully perform activities of daily living: no assistance needed    Home Safety:  Throw rugs in the hallway and lack of grab bars in the bathroom    Hearing Impairment: difficulty following a conversation in a noisy restaurant or crowded room, feel that people are mumbling or not speaking clearly, difficulty following dialogue in the theater, difficult to understand a speaker at a public meeting or [employer] service, need to ask people to speak up or repeat themselves, find that men's voices are easier to understand than woman's, difficulty understanding soft or whispered speech and difficulty understanding speech on the telephone        Fall risk:  Fallen 2 or more times in the past year?: No  Any fall with injury in the past year?: No    COGNITIVE SCREEN  1) Repeat 3 items (Leader, Season, Table)    2) Clock draw: ABNORMAL   3) 3 item recall: Recalls 3 objects  Results: 3 items recalled: COGNITIVE IMPAIRMENT LESS LIKELY    Mini-CogTM Copyright S Liliana. Licensed by the author for use in [facility]; reprinted with permission (yesika@.[facility]). All rights reserved.      Patient has history of foot pain has been going on for the last couple months has recently started exercising and walking more.    Reviewed and updated as needed this visit by clinical staff         Reviewed and updated as needed this visit by  Provider        Social History   Substance Use Topics     Smoking status: Former Smoker     Types: Cigarettes     Quit date: 1/10/1983     Smokeless tobacco: Never Used     Alcohol use 0.6 oz/week     1 Glasses of wine per week      Comment: DAILY       Alcohol Use 10/19/2018   If you drink alcohol do you typically have greater than 3 drinks per day OR greater than 7 drinks per week? No   No flowsheet data found.        Today's PHQ-2 Score:   PHQ-2 ( 1999 [employer]) 10/19/2018   Q1: Little interest or pleasure in doing things 0   Q2: Feeling down, depressed or hopeless 0   PHQ-2 Score 0   Q1: Little interest or pleasure in doing things Not at all   Q2: Feeling down, depressed or hopeless Not at all   PHQ-2 Score 0       Do you feel safe in your environment - Yes    Do you have a Health Care Directive?: No: Advance care planning reviewed with patient; information given to patient to review.    Current providers sharing in care for this patient include:   Patient Care Team:  Edilia Davison PA-C as PCP - General (Physician Assistant)    The following health maintenance items are reviewed in Epic and correct as of today:  Health Maintenance   Topic Date Due     ADVANCE DIRECTIVE PLANNING Q5 YRS  12/19/2005     FALL RISK ASSESSMENT  10/13/2017     INFLUENZA VACCINE (1) 09/01/2018     PHQ-2 Q1 YR  10/16/2018     FIT Q1 YR  08/01/2019     MAMMO SCREEN Q2 YR (SYSTEM ASSIGNED)  08/22/2020     LIPID SCREEN Q5 YR FEMALE (SYSTEM ASSIGNED)  10/16/2022     TETANUS IMMUNIZATION (SYSTEM ASSIGNED)  10/22/2023     DEXA SCAN SCREENING (SYSTEM ASSIGNED)  Completed     PNEUMOCOCCAL  Completed     HEPATITIS C SCREENING  Completed     BP Readings from Last 3 Encounters:   10/19/18 110/60   05/11/18 124/76   03/21/18 102/62    Wt Readings from Last 3 Encounters:   10/19/18 131 lb (59.4 kg)   05/11/18 146 lb (66.2 kg)   03/21/18 160 lb (72.6 kg)                  Patient Active Problem List   Diagnosis     Altered mental status      Allergic rhinitis     Anxiety state     Cerebral artery occlusion with cerebral infarction (H)     Degeneration of cervical intervertebral disc     Degeneration of lumbar or lumbosacral intervertebral disc     Insomnia     Postmenopausal atrophic vaginitis     Raynaud's syndrome     Symptomatic menopausal or female climacteric states     Osteopenia     Pulmonary nodules     TGA (transient global amnesia)     Recurrent cold sores     Peripheral vascular disease (H)     Atopic rhinitis     Osteoarthritis of hip     Recurrent herpes labialis     Bilateral sensorineural hearing loss     Tendinitis of shoulder     Past Surgical History:   Procedure Laterality Date     HYSTERECTOMY, PAP NO LONGER INDICATED         Social History   Substance Use Topics     Smoking status: Former Smoker     Types: Cigarettes     Quit date: 1/10/1983     Smokeless tobacco: Never Used     Alcohol use 0.6 oz/week     1 Glasses of wine per week      Comment: DAILY     Family History   Problem Relation Age of Onset     Arthritis Mother      Cancer Mother      GYN     HEART DISEASE Father      Alcohol/Drug Maternal Grandfather      HEART DISEASE Paternal Grandfather      Heart attack     Diabetes Paternal Grandfather      Cancer Brother      throat         Current Outpatient Prescriptions   Medication Sig Dispense Refill     aspirin EC 81 MG tablet Take 81 mg by mouth daily        calcium-vitamin D (CALTRATE) 600-400 MG-UNIT per tablet Take 1 tablet by mouth daily        Cholecalciferol (VITAMIN D-3 PO) Take 1,000 Units by mouth daily       LORazepam (ATIVAN) 0.5 MG tablet Take 1 tablet (0.5 mg) by mouth every 6 hours as needed 10 tablet 0     mometasone (NASONEX) 50 MCG/ACT spray Spray 2 sprays in nostril daily        Multiple Vitamin (MULTI-VITAMINS) TABS Take 1 tablet by mouth daily       Multiple Vitamins-Minerals (HAIR SKIN NAILS PO) Take 3 tablets by mouth daily        multivitamin (OCUVITE) TABS Take 1 tablet by mouth daily        pravastatin (PRAVACHOL) 40 MG tablet TAKE ONE TABLET BY MOUTH EVERY NIGHT AT BEDTIME 90 tablet 3     Propylene Glycol-Glycerin 1-0.3 % SOLN Place 1 drop into both eyes as needed       valACYclovir (VALTREX) 1000 mg tablet TAKE TWO TABLETS BY MOUTH TWICE A DAY 4 tablet 11     Allergies   Allergen Reactions     Tramadol Anaphylaxis     Benadryl [Altaryl] Other (See Comments)     Made her goofy     Caffeine Other (See Comments)     Jittery       Codeine Nausea and Vomiting     Darvocet [Propoxyphene N-Apap]      Other reaction(s): GI Upset     Diphenhydramine Other (See Comments)     Lovastatin Other (See Comments)     Other reaction(s): Myalgia  pain     Percocet [Oxycodone-Acetaminophen] Other (See Comments)     Other reaction(s): Dizziness  Sick or dizzy?     Vicodin [Hydrocodone-Acetaminophen] Other (See Comments) and Nausea     Sick or dizzy?     Recent Labs   Lab Test  03/21/18   1146  10/16/17   0901  10/13/16   0845  10/06/15   0945  10/01/15   1008   04/30/14   1058   LDL   --   80  121*  111   --    < >   --    HDL   --   91  81  76   --    < >   --    TRIG   --   169*  135  153*   --    < >   --    ALT   --   32  28   --   25   --   29   CR  0.75  0.76   --    --   0.82   --   0.80   GFRESTIMATED  77  76   --    --   70   --   72   GFRESTBLACK  >90  >90   --    --   85   --   88   POTASSIUM  5.3  4.3   --   4.1  3.6   --   5.4*   TSH   --   1.77   --    --    --    --   2.24    < > = values in this interval not displayed.        Pneumonia Vaccine:Adults age 65+ who received Pneumovax (PPSV23) at 65 years or older: Should be given PCV13 > 1 year after their most recent PPSV23  Mammogram Screening: Patient over age 50, mutual decision to screen reflected in health maintenance.  Last 3 Pap and HPV Results:      Review of Systems   Constitutional: Negative for chills and fever.   HENT: Negative for congestion, ear pain, hearing loss and sore throat.    Eyes: Negative for pain and visual disturbance.  "  Respiratory: Negative for cough and shortness of breath.    Cardiovascular: Negative for chest pain, palpitations and peripheral edema.   Gastrointestinal: Negative for abdominal pain, constipation, diarrhea, heartburn, hematochezia and nausea.   Breasts:  Negative for tenderness, breast mass and discharge.   Genitourinary: Positive for frequency. Negative for dysuria, genital sores, hematuria, pelvic pain, urgency, vaginal bleeding and vaginal discharge.   Musculoskeletal: Positive for arthralgias and myalgias. Negative for joint swelling.   Skin: Negative for rash.   Neurological: Negative for dizziness, weakness, headaches and paresthesias.   Psychiatric/Behavioral: Negative for mood changes. The patient is not nervous/anxious.          OBJECTIVE:   /60 (BP Location: Right arm, Cuff Size: Adult Regular)  Pulse 72  Temp 97.5  F (36.4  C) (Tympanic)  Resp 18  Ht 5' 5.5\" (1.664 m)  Wt 131 lb (59.4 kg)  BMI 21.47 kg/m2 Estimated body mass index is 23.93 kg/(m^2) as calculated from the following:    Height as of 1/10/18: 5' 5.5\" (1.664 m).    Weight as of 5/11/18: 146 lb (66.2 kg).  Physical Exam   Constitutional: She is oriented to person, place, and time. She appears well-developed and well-nourished.   HENT:   Head: Normocephalic.   Right Ear: External ear normal.   Left Ear: External ear normal.   Eyes: Conjunctivae are normal. Pupils are equal, round, and reactive to light.   Neck: Normal range of motion. Neck supple.   Cardiovascular: Normal rate, regular rhythm and normal heart sounds.    Pulmonary/Chest: Effort normal and breath sounds normal. She has no wheezes. She has no rales. She exhibits no tenderness.   Abdominal: Soft. Bowel sounds are normal. She exhibits no distension. There is no tenderness.   Musculoskeletal: Normal range of motion.   Neurological: She is alert and oriented to person, place, and time. She has normal reflexes.   Skin: Skin is warm and dry. No rash noted.   Psychiatric: " She has a normal mood and affect. Her behavior is normal. Judgment and thought content normal.       Inspection:  no swelling ,   Tender::proximal 3rdth metatarsal  Non-tender:calcaneous , cuboid, navicular, cuneiform lateral, cuneiform middle, cuneiform medial , metatarsal heads, posterior tibial tendon at medial malleolus, posterior tibial tendon at navicular, plantar fascia  Range of Motion:flexion of toes:  full, extension of toes  full    Results for orders placed or performed in visit on 10/19/18   Basic metabolic panel   Result Value Ref Range    Sodium 141 133 - 144 mmol/L    Potassium 4.0 3.4 - 5.3 mmol/L    Chloride 105 94 - 109 mmol/L    Carbon Dioxide 30 20 - 32 mmol/L    Anion Gap 6 3 - 14 mmol/L    Glucose 95 70 - 99 mg/dL    Urea Nitrogen 18 7 - 30 mg/dL    Creatinine 0.74 0.52 - 1.04 mg/dL    GFR Estimate 79 >60 mL/min/1.7m2    GFR Estimate If Black >90 >60 mL/min/1.7m2    Calcium 8.7 8.5 - 10.1 mg/dL   Lipid panel reflex to direct LDL Fasting   Result Value Ref Range    Cholesterol 172 <200 mg/dL    Triglycerides 82 <150 mg/dL    HDL Cholesterol 98 >49 mg/dL    LDL Cholesterol Calculated 58 <100 mg/dL    Non HDL Cholesterol 74 <130 mg/dL       LEFT FOOT THREE VIEWS  10/19/2018 8:49 AM      HISTORY: Left foot pain.     COMPARISON: 12/1/2017         IMPRESSION: Osteopenia. Soft tissue anchors in the hindfoot. Previous  osteotomy of distal aspect of the proximal phalanx of the fifth toe.  Old healed fractures of mid shaft of first metatarsal and possibly  midshaft of fifth metatarsal. No change.     ASSESSMENT / PLAN:   (Z00.00) Annual physical exam  (primary encounter diagnosis)  Comment:   Plan: DX Hip/Pelvis/Spine, Basic metabolic panel,         Lipid panel reflex to direct LDL Fasting       (M77.9) Tendonitis  Comment: Pain to the foot most representative of a tendinitis  Plan: Rest the area as much as possible were notify me would consider sending to podiatry supportive shoes and if not improving  "should    (M79.392) Left foot pain  Comment:X-ray negative will have patient continue to monitor if not improving, will have her see podiatry if not improving patient is to rest the area over the next week to see how it responds.  Plan: XR Foot Left G/E 3 Views            (Z23) Need for prophylactic vaccination and inoculation against influenza  Comment: Given today  Plan: FLU VACCINE, INCREASED ANTIGEN, PRESV FREE, AGE        65+ [04731], Vaccine Administration, Initial         [21995]    End of Life Planning:  Patient currently has an advanced directive: No.  I have verified the patient's ablity to prepare an advanced directive/make health care decisions.  Literature was provided to assist patient in preparing an advanced directive.    COUNSELING:  Reviewed preventive health counseling, as reflected in patient instructions       Regular exercise       Healthy diet/nutrition       Vision screening       Hearing screening       Dental care       Folic Acid Counseling       Osteoporosis Prevention/Bone Health       Colon cancer screening       Hepatitis C screening       HIV screening for high risk patient       Advanced Planning     BP Readings from Last 1 Encounters:   05/11/18 124/76     Estimated body mass index is 23.93 kg/(m^2) as calculated from the following:    Height as of 1/10/18: 5' 5.5\" (1.664 m).    Weight as of 5/11/18: 146 lb (66.2 kg).           reports that she quit smoking about 35 years ago. Her smoking use included Cigarettes. She has never used smokeless tobacco.      Appropriate preventive services were discussed with this patient, including applicable screening as appropriate for cardiovascular disease, diabetes, osteopenia/osteoporosis, and glaucoma.  As appropriate for age/gender, discussed screening for colorectal cancer, prostate cancer, breast cancer, and cervical cancer. Checklist reviewing preventive services available has been given to the patient.    Reviewed patients plan of care and " provided an AVS. The Basic Care Plan (routine screening as documented in Health Maintenance) for Chyna meets the Care Plan requirement. This Care Plan has been established and reviewed with the Patient.    Counseling Resources:  ATP IV Guidelines  Pooled Cohorts Equation Calculator  Breast Cancer Risk Calculator  FRAX Risk Assessment  ICSI Preventive Guidelines  Dietary Guidelines for Americans, 2010  USDA's MyPlate  ASA Prophylaxis  Lung CA Screening    GEORGE Al CNP  [facility]    Injectable Influenza Immunization Documentation    1.  Is the person to be vaccinated sick today?   No    2. Does the person to be vaccinated have an allergy to a component   of the vaccine?   No  Egg Allergy Algorithm Link    3. Has the person to be vaccinated ever had a serious reaction   to influenza vaccine in the past?   No    4. Has the person to be vaccinated ever had Guillain-Barré syndrome?   No    Form completed by Genna Gillespie MA

## 2018-10-19 NOTE — PROGRESS NOTES
Please Notify Chyna  of test results basic metabolic panel was normal cholesterol numbers within normal limits no change in current treatment plan.  Nereida Jacobs CNP

## 2018-10-19 NOTE — MR AVS SNAPSHOT
After Visit Summary   10/19/2018    Chyna Moncada    MRN: [id number]           Patient Information     Date Of Birth          1950        Visit Information        Provider Department      10/19/2018 8:00 AM Nereida Jacobs APRN [facility]        Today's Diagnoses     Annual physical exam    -  1    Tendonitis        Left foot pain        Need for prophylactic vaccination and inoculation against influenza          Care Instructions      Preventive Health Recommendations    Female Ages 65 +    Yearly exam:     See your health care provider every year in order to  o Review health changes.   o Discuss preventive care.    o Review your medicines if your doctor has prescribed any.      You no longer need a yearly Pap test unless you've had an abnormal Pap test in the past 10 years. If you have vaginal symptoms, such as bleeding or discharge, be sure to talk with your provider about a Pap test.      Every 1 to 2 years, have a mammogram.  If you are over 69, talk with your health care provider about whether or not you want to continue having screening mammograms.      Every 10 years, have a colonoscopy. Or, have a yearly FIT test (stool test). These exams will check for colon cancer.       Have a cholesterol test every 5 years, or more often if your doctor advises it.       Have a diabetes test (fasting glucose) every three years. If you are at risk for diabetes, you should have this test more often.       At age 65, have a bone density scan (DEXA) to check for osteoporosis (brittle bone disease).    Shots:    Get a flu shot each year.    Get a tetanus shot every 10 years.    Talk to your doctor about your pneumonia vaccines. There are now two you should receive - Pneumovax (PPSV 23) and Prevnar (PCV 13).    Talk to your pharmacist about the shingles vaccine.    Talk to your doctor about the hepatitis B vaccine.    Nutrition:     Eat at least 5 servings of fruits and  vegetables each day.      Eat whole-grain bread, whole-wheat pasta and brown rice instead of white grains and rice.      Get adequate Calcium and Vitamin D.     Lifestyle    Exercise at least 150 minutes a week (30 minutes a day, 5 days a week). This will help you control your weight and prevent disease.      Limit alcohol to one drink per day.      No smoking.       Wear sunscreen to prevent skin cancer.       See your dentist twice a year for an exam and cleaning.      See your eye doctor every 1 to 2 years to screen for conditions such as glaucoma, macular degeneration and cataracts.      Tendonitis  A tendon is the thick fibrous cord that joins muscle to bone and allows joints to move. When a tendon becomes inflamed, it is called tendonitis. This can occur from overuse, injury, or infection. This usually involves the shoulders, forearm, wrist, hands and foot. Symptoms include pain, swelling and tenderness to the touch. Moving the joint increases the pain.  It takes 4 to 6 weeks for tendonitis to heal. It is treated by preventing motion of the tendon with a splint or brace and the use of anti-inflammatory medicine.  Home care    Some people find relief with ice packs. These can be crushed or cubed ice in a plastic bag or a bag of frozen vegetables wrapped in a thin towel. Other people get better relief with heat. This can include a hot shower, hot bath, or a moist towel warmed in a microwave. Try each and use the method that feels best, for 15 to 20 minutes several times a day.    Rest the inflamed joint and protect it from movement.    You may use over-the-counter ibuprofen or naproxen to treat pain and inflammation, unless another medicine was prescribed. If you can't take these medicines, acetaminophen may help with the pain, but does not treat inflammation. If you have chronic liver or kidney disease or ever had a stomach ulcer or gastrointestinal bleeding, talk with your doctor before using these  medicines.    As your symptoms improve, begin gradual motion at the involved joint.  Follow-up care  Follow up with your healthcare provider if you are not improving after 5 days of treatment.  When to seek medical advice  Call your healthcare provider right away if any of these occur:    Redness over the painful area    Increasing pain or swelling at the joint    Fever (1 degree above your normal temperature) lasting 24 to 48 hours Or, whatever your healthcare provider told you to report based on your condition  Date Last Reviewed: 11/21/2015 2000-2017 [employer]. [address] [place] [id number]. All rights reserved. This information is not intended as a substitute for professional medical care. Always follow your healthcare professional's instructions.                Follow-ups after your visit        Follow-up notes from your care team     Return in about 1 year (around 10/19/2019) for Physical Exam.      Future tests that were ordered for you today     Open Future Orders        Priority Expected Expires Ordered    DX Hip/Pelvis/Spine Routine  10/19/2019 10/19/2018            Who to contact     If you have questions or need follow up information about today's clinic visit or your schedule please contact [facility] directly at [phone].  Normal or non-critical lab and imaging results will be communicated to you by MyChart, letter or phone within 4 business days after the clinic has received the results. If you do not hear from us within 7 days, please contact the clinic through MyChart or phone. If you have a critical or abnormal lab result, we will notify you by phone as soon as possible.  Submit refill requests through [employer] or call your pharmacy and they will forward the refill request to us. Please allow 3 business days for your refill to be completed.          Additional Information About Your Visit        Care EveryWhere ID     This is your Care EveryWhere  "ID. This could be used by other organizations to access your [place] medical records  [id number]        Your Vitals Were     Pulse Temperature Respirations Height BMI (Body Mass Index)       72 97.5  F (36.4  C) (Tympanic) 18 5' 5.5\" (1.664 m) 21.47 kg/m2        Blood Pressure from Last 3 Encounters:   10/19/18 110/60   05/11/18 124/76   03/21/18 102/62    Weight from Last 3 Encounters:   10/19/18 131 lb (59.4 kg)   05/11/18 146 lb (66.2 kg)   03/21/18 160 lb (72.6 kg)              We Performed the Following     Basic metabolic panel     FLU VACCINE, INCREASED ANTIGEN, PRESV FREE, AGE 65+ [18460]     Lipid panel reflex to direct LDL Fasting     Vaccine Administration, Initial [49046]          Today's Medication Changes          These changes are accurate as of 10/19/18  8:57 AM.  If you have any questions, ask your nurse or doctor.               Stop taking these medicines if you haven't already. Please contact your care team if you have questions.     magnesium 100 MG Caps   Stopped by:  Nereida Jacobs APRN CNP                    Primary Care Provider Office Phone # Fax #    Edilia Davison PA-C [phone] [address] [place] [id number]        Equal Access to Services     PATTI GARCIA : Hadii polly ku hadasho Soomaali, waaxda luqadaha, qaybta kaalmada ning, nigel landry. So [facility] [phone].    ATENCIÓN: Si habla español, tiene a ferraro disposición servicios gratuitos de asistencia lingüística. Shannan al [phone].    We comply with applicable federal civil rights laws and [place] laws. We do not discriminate on the basis of race, color, national origin, age, disability, sex, sexual orientation, or gender identity.            Thank you!     Thank you for choosing [facility]  for your care. Our goal is always to provide you with excellent care. Hearing back from our patients is one way we can continue to improve our services. " Please take a few minutes to complete the written survey that you may receive in the mail after your visit with us. Thank you!             Your Updated Medication List - Protect others around you: Learn how to safely use, store and throw away your medicines at www.disposemymeds.org.          This list is accurate as of 10/19/18  8:57 AM.  Always use your most recent med list.                   Brand Name Dispense Instructions for use Diagnosis    aspirin 81 MG EC tablet      Take 81 mg by mouth daily        calcium carbonate 600 mg-vitamin D 400 units 600-400 MG-UNIT per tablet    CALTRATE     Take 1 tablet by mouth daily        LORazepam 0.5 MG tablet    ATIVAN    10 tablet    Take 1 tablet (0.5 mg) by mouth every 6 hours as needed    Anxiety       mometasone 50 MCG/ACT spray    NASONEX     Spray 2 sprays in nostril daily        MULTI-VITAMINS Tabs      Take 1 tablet by mouth daily        * multivitamin Tabs tablet      Take 1 tablet by mouth daily        * HAIR SKIN NAILS PO      Take 3 tablets by mouth daily        pravastatin 40 MG tablet    PRAVACHOL    90 tablet    TAKE ONE TABLET BY MOUTH EVERY NIGHT AT BEDTIME    Cerebral artery occlusion with cerebral infarction (H)       Propylene Glycol-Glycerin 1-0.3 % Soln      Place 1 drop into both eyes as needed        valACYclovir 1000 mg tablet    VALTREX    4 tablet    TAKE TWO TABLETS BY MOUTH TWICE A DAY    Recurrent cold sores       VITAMIN D-3 PO      Take 1,000 Units by mouth daily        * Notice:  This list has 2 medication(s) that are the same as other medications prescribed for you. Read the directions carefully, and ask your doctor or other care provider to review them with you.

## 2018-10-19 NOTE — PATIENT INSTRUCTIONS
Preventive Health Recommendations    Female Ages 65 +    Yearly exam:     See your health care provider every year in order to  o Review health changes.   o Discuss preventive care.    o Review your medicines if your doctor has prescribed any.      You no longer need a yearly Pap test unless you've had an abnormal Pap test in the past 10 years. If you have vaginal symptoms, such as bleeding or discharge, be sure to talk with your provider about a Pap test.      Every 1 to 2 years, have a mammogram.  If you are over 69, talk with your health care provider about whether or not you want to continue having screening mammograms.      Every 10 years, have a colonoscopy. Or, have a yearly FIT test (stool test). These exams will check for colon cancer.       Have a cholesterol test every 5 years, or more often if your doctor advises it.       Have a diabetes test (fasting glucose) every three years. If you are at risk for diabetes, you should have this test more often.       At age 65, have a bone density scan (DEXA) to check for osteoporosis (brittle bone disease).    Shots:    Get a flu shot each year.    Get a tetanus shot every 10 years.    Talk to your doctor about your pneumonia vaccines. There are now two you should receive - Pneumovax (PPSV 23) and Prevnar (PCV 13).    Talk to your pharmacist about the shingles vaccine.    Talk to your doctor about the hepatitis B vaccine.    Nutrition:     Eat at least 5 servings of fruits and vegetables each day.      Eat whole-grain bread, whole-wheat pasta and brown rice instead of white grains and rice.      Get adequate Calcium and Vitamin D.     Lifestyle    Exercise at least 150 minutes a week (30 minutes a day, 5 days a week). This will help you control your weight and prevent disease.      Limit alcohol to one drink per day.      No smoking.       Wear sunscreen to prevent skin cancer.       See your dentist twice a year for an exam and cleaning.      See your eye doctor  every 1 to 2 years to screen for conditions such as glaucoma, macular degeneration and cataracts.      Tendonitis  A tendon is the thick fibrous cord that joins muscle to bone and allows joints to move. When a tendon becomes inflamed, it is called tendonitis. This can occur from overuse, injury, or infection. This usually involves the shoulders, forearm, wrist, hands and foot. Symptoms include pain, swelling and tenderness to the touch. Moving the joint increases the pain.  It takes 4 to 6 weeks for tendonitis to heal. It is treated by preventing motion of the tendon with a splint or brace and the use of anti-inflammatory medicine.  Home care    Some people find relief with ice packs. These can be crushed or cubed ice in a plastic bag or a bag of frozen vegetables wrapped in a thin towel. Other people get better relief with heat. This can include a hot shower, hot bath, or a moist towel warmed in a microwave. Try each and use the method that feels best, for 15 to 20 minutes several times a day.    Rest the inflamed joint and protect it from movement.    You may use over-the-counter ibuprofen or naproxen to treat pain and inflammation, unless another medicine was prescribed. If you can't take these medicines, acetaminophen may help with the pain, but does not treat inflammation. If you have chronic liver or kidney disease or ever had a stomach ulcer or gastrointestinal bleeding, talk with your doctor before using these medicines.    As your symptoms improve, begin gradual motion at the involved joint.  Follow-up care  Follow up with your healthcare provider if you are not improving after 5 days of treatment.  When to seek medical advice  Call your healthcare provider right away if any of these occur:    Redness over the painful area    Increasing pain or swelling at the joint    Fever (1 degree above your normal temperature) lasting 24 to 48 hours Or, whatever your healthcare provider told you to report based on your  condition  Date Last Reviewed: 11/21/2015 2000-2017 [employer]. [address] [place] [address], [place] [address]. All rights reserved. This information is not intended as a substitute for professional medical care. Always follow your healthcare professional's instructions.

## 2018-10-24 ENCOUNTER — TELEPHONE (OUTPATIENT)
Dept: FAMILY MEDICINE | Facility: CLINIC | Age: 68
End: 2018-10-24

## 2018-10-24 DIAGNOSIS — I63.50 CEREBRAL ARTERY OCCLUSION WITH CEREBRAL INFARCTION (H): ICD-10-CM

## 2018-10-24 NOTE — TELEPHONE ENCOUNTER
Pt has questions about her Cholesterol Medication. Pt received her Lab Results and the numbers have come down. Pt is wondering if she should stay on the same Cholesterol Dose? Please Advise  Stacia Kelley Sec

## 2018-10-25 RX ORDER — PRAVASTATIN SODIUM 40 MG
TABLET ORAL
Qty: 90 TABLET | Refills: 3 | Status: SHIPPED | OUTPATIENT
Start: 2018-10-25 | End: 2019-10-21

## 2018-10-29 ENCOUNTER — TELEPHONE (OUTPATIENT)
Dept: FAMILY MEDICINE | Facility: CLINIC | Age: 68
End: 2018-10-29

## 2018-10-29 DIAGNOSIS — Z78.0 POST-MENOPAUSAL: Primary | ICD-10-CM

## 2018-10-29 NOTE — TELEPHONE ENCOUNTER
Code is wrong on Dexa Scan. Medicare won't pay unless coded correctly.  Post Menopausal Code is what they will cover.  Stacia [place] Sec

## 2018-10-30 ENCOUNTER — OFFICE VISIT (OUTPATIENT)
Dept: FAMILY MEDICINE | Facility: CLINIC | Age: 68
End: 2018-10-30
Payer: COMMERCIAL

## 2018-10-30 VITALS
HEIGHT: 66 IN | WEIGHT: 134 LBS | HEART RATE: 76 BPM | DIASTOLIC BLOOD PRESSURE: 60 MMHG | RESPIRATION RATE: 18 BRPM | BODY MASS INDEX: 21.53 KG/M2 | TEMPERATURE: 97.8 F | SYSTOLIC BLOOD PRESSURE: 108 MMHG

## 2018-10-30 DIAGNOSIS — M77.9 TENDONITIS: Primary | ICD-10-CM

## 2018-10-30 PROCEDURE — 99213 OFFICE O/P EST LOW 20 MIN: CPT | Performed by: NURSE PRACTITIONER

## 2018-10-30 NOTE — PROGRESS NOTES
SUBJECTIVE:   Chyna Moncada is a 67 year old female who presents to clinic today for the following health issues:    Foot Pain    Onset: one week    Description:   Location: right foot  Character: Burning, aching    Intensity: moderate    Progression of Symptoms: worse    Accompanying Signs & Symptoms:  Other symptoms: swelling    History:   Previous similar pain: no       Precipitating factors:   Trauma or overuse: no     Alleviating factors:  Improved by: nothing    Therapies Tried and outcome: nothing            Problem list and histories reviewed & adjusted, as indicated.  Additional history: as documented    Patient Active Problem List   Diagnosis     Altered mental status     Allergic rhinitis     Anxiety state     Cerebral artery occlusion with cerebral infarction (H)     Degeneration of cervical intervertebral disc     Degeneration of lumbar or lumbosacral intervertebral disc     Insomnia     Postmenopausal atrophic vaginitis     Raynaud's syndrome     Symptomatic menopausal or female climacteric states     Osteopenia     Pulmonary nodules     TGA (transient global amnesia)     Recurrent cold sores     Peripheral vascular disease (H)     Atopic rhinitis     Osteoarthritis of hip     Recurrent herpes labialis     Bilateral sensorineural hearing loss     Tendinitis of shoulder     Past Surgical History:   Procedure Laterality Date     HYSTERECTOMY, PAP NO LONGER INDICATED         Social History   Substance Use Topics     Smoking status: Former Smoker     Types: Cigarettes     Quit date: 1/10/1983     Smokeless tobacco: Never Used     Alcohol use 0.6 oz/week     1 Glasses of wine per week      Comment: DAILY     Family History   Problem Relation Age of Onset     Arthritis Mother      Cancer Mother      GYN     HEART DISEASE Father      Alcohol/Drug Maternal Grandfather      HEART DISEASE Paternal Grandfather      Heart attack     Diabetes Paternal Grandfather      Cancer Brother      throat         Current  Outpatient Prescriptions   Medication Sig Dispense Refill     aspirin EC 81 MG tablet Take 81 mg by mouth daily        calcium-vitamin D (CALTRATE) 600-400 MG-UNIT per tablet Take 1 tablet by mouth daily        Cholecalciferol (VITAMIN D-3 PO) Take 1,000 Units by mouth daily       LORazepam (ATIVAN) 0.5 MG tablet Take 1 tablet (0.5 mg) by mouth every 6 hours as needed 10 tablet 0     mometasone (NASONEX) 50 MCG/ACT spray Spray 2 sprays in nostril daily        Multiple Vitamin (MULTI-VITAMINS) TABS Take 1 tablet by mouth daily       Multiple Vitamins-Minerals (HAIR SKIN NAILS PO) Take 3 tablets by mouth daily        multivitamin (OCUVITE) TABS Take 1 tablet by mouth daily       pravastatin (PRAVACHOL) 40 MG tablet TAKE ONE TABLET BY MOUTH EVERY NIGHT AT BEDTIME 90 tablet 3     Propylene Glycol-Glycerin 1-0.3 % SOLN Place 1 drop into both eyes as needed       valACYclovir (VALTREX) 1000 mg tablet TAKE TWO TABLETS BY MOUTH TWICE A DAY 4 tablet 11     Allergies   Allergen Reactions     Tramadol Anaphylaxis     Benadryl [Altaryl] Other (See Comments)     Made her goofy     Caffeine Other (See Comments)     Jittery       Codeine Nausea and Vomiting     Darvocet [Propoxyphene N-Apap]      Other reaction(s): GI Upset     Diphenhydramine Other (See Comments)     Lovastatin Other (See Comments)     Other reaction(s): Myalgia  pain     Percocet [Oxycodone-Acetaminophen] Other (See Comments)     Other reaction(s): Dizziness  Sick or dizzy?     Vicodin [Hydrocodone-Acetaminophen] Other (See Comments) and Nausea     Sick or dizzy?     Recent Labs   Lab Test  10/19/18   0840  03/21/18   1146  10/16/17   0901  10/13/16   0845   10/01/15   1008   04/30/14   1058   LDL  58   --   80  121*   < >   --    < >   --    HDL  98   --   91  81   < >   --    < >   --    TRIG  82   --   169*  135   < >   --    < >   --    ALT   --    --   32  28   --   25   --   29   CR  0.74  0.75  0.76   --    --   0.82   --   0.80   GFRESTIMATED  79  77  76  "  --    --   70   --   72   GFRESTBLACK  >90  >90  >90   --    --   85   --   88   POTASSIUM  4.0  5.3  4.3   --    < >  3.6   --   5.4*   TSH   --    --   1.77   --    --    --    --   2.24    < > = values in this interval not displayed.      BP Readings from Last 3 Encounters:   10/30/18 108/60   10/19/18 110/60   05/11/18 124/76    Wt Readings from Last 3 Encounters:   10/30/18 134 lb (60.8 kg)   10/19/18 131 lb (59.4 kg)   05/11/18 146 lb (66.2 kg)                    Reviewed and updated as needed this visit by clinical staff       Reviewed and updated as needed this visit by Provider         ROS:  Constitutional, HEENT, cardiovascular, pulmonary, gi and gu systems are negative, except as otherwise noted.    OBJECTIVE:     /60 (BP Location: Right arm, Cuff Size: Adult Regular)  Pulse 76  Temp 97.8  F (36.6  C) (Tympanic)  Resp 18  Ht 5' 5.5\" (1.664 m)  Wt 134 lb (60.8 kg)  BMI 21.96 kg/m2  Body mass index is 21.96 kg/(m^2).  GENERAL: healthy, alert and no distress  EYES: Eyes grossly normal to inspection, PERRL and conjunctivae and sclerae normal  HENT: ear canals and TM's normal, nose and mouth without ulcers or lesions  NECK: no adenopathy, no asymmetry, masses, or scars and thyroid normal to palpation  RESP: lungs clear to auscultation - no rales, rhonchi or wheezes  CV: regular rate and rhythm, normal S1 S2, no S3 or S4, no murmur, click or rub, no peripheral edema and peripheral pulses strong  MS: no gross musculoskeletal defects noted, no edema  SKIN: no suspicious lesions or rashes  NEURO: Normal strength and tone, mentation intact and speech normal  PSYCH: mentation appears normal, affect normal/bright    Inspection:  no swelling ,  Tender::midshaft 5th metatarsal, posterior tibial tendon at medial malleolus  Non-tender:calcaneous , cuboid, navicular, cuneiform lateral, cuneiform middle, cuneiform medial , metatarsal heads, plantar fascia  Range of Motion:flexion of toes:  full, extension of " toes  full      ASSESSMENT/PLAN:   (M77.9) Tendonitis  (primary encounter diagnosis)  Comment: Patient has history of tendinitis to the left foot presents today with tenderness to the right foot does have a strong history for increased running.  Did review with patient due to increased activity most likely has inflamed tendinitis pain is on the top of the foot will recommend orthotics patient would like to see podiatry before improved moving forward with orthotics due to repeated tendinitis.  Did put in a referral for podiatry also did review with patient obtaining shoes from the running room where they would measure to find the best appropriate shoes for her arch of her running  Plan: PODIATRY/FOOT & ANKLE SURGERY REFERRAL         GEORGE [facility]

## 2018-10-30 NOTE — MR AVS SNAPSHOT
After Visit Summary   10/30/2018    Chyna Moncada    MRN: [id number]           Patient Information     Date Of Birth          1950        Visit Information        Provider Department      10/30/2018 1:20 PM Nereida Jacobs APRN CNP [facility]        Today's Diagnoses     Tendonitis    -  1      Care Instructions    Your provider has referred you to: [facility]: [facility] [phone]         Tendonitis  A tendon is the thick fibrous cord that joins muscle to bone and allows joints to move. When a tendon becomes inflamed, it is called tendonitis. This can occur from overuse, injury, or infection. This usually involves the shoulders, forearm, wrist, hands and foot. Symptoms include pain, swelling and tenderness to the touch. Moving the joint increases the pain.  It takes 4 to 6 weeks for tendonitis to heal. It is treated by preventing motion of the tendon with a splint or brace and the use of anti-inflammatory medicine.  Home care    Some people find relief with ice packs. These can be crushed or cubed ice in a plastic bag or a bag of frozen vegetables wrapped in a thin towel. Other people get better relief with heat. This can include a hot shower, hot bath, or a moist towel warmed in a microwave. Try each and use the method that feels best, for 15 to 20 minutes several times a day.    Rest the inflamed joint and protect it from movement.    You may use over-the-counter ibuprofen or naproxen to treat pain and inflammation, unless another medicine was prescribed. If you can't take these medicines, acetaminophen may help with the pain, but does not treat inflammation. If you have chronic liver or kidney disease or ever had a stomach ulcer or gastrointestinal bleeding, talk with your doctor before using these medicines.    As your symptoms improve, begin gradual motion at the involved joint.  Follow-up care  Follow up with your healthcare provider  if you are not improving after 5 days of treatment.  When to seek medical advice  Call your healthcare provider right away if any of these occur:    Redness over the painful area    Increasing pain or swelling at the joint    Fever (1 degree above your normal temperature) lasting 24 to 48 hours Or, whatever your healthcare provider told you to report based on your condition  Date Last Reviewed: 11/21/2015 2000-2017 [employer]. [address] [place] [address]. All rights reserved. This information is not intended as a substitute for professional medical care. Always follow your healthcare professional's instructions.                Follow-ups after your visit        Additional Services     PODIATRY/FOOT & ANKLE SURGERY REFERRAL       Your provider has referred you to: FM[facility]: [facility] [phone]   [email or web address]    Please be aware that coverage of these services is subject to the terms and limitations of your health insurance plan.  Call member services at your health plan with any benefit or coverage questions.      Please bring the following to your appointment:  >>   Any x-rays, CTs or MRIs which have been performed.  Contact the facility where they were done to arrange for  prior to your scheduled appointment.    >>   List of current medications   >>   This referral request   >>   Any documents/labs given to you for this referral                  Future tests that were ordered for you today     Open Future Orders        Priority Expected Expires Ordered    DX Hip/Pelvis/Spine Routine  10/29/2019 10/29/2018            Who to contact     If you have questions or need follow up information about today's clinic visit or your schedule please contact [facility] directly at [phone].  Normal or non-critical lab and imaging results will be communicated to you by MyChart, letter or phone within 4  "business days after the clinic has received the results. If you do not hear from us within 7 days, please contact the clinic through [employer] or phone. If you have a critical or abnormal lab result, we will notify you by phone as soon as possible.  Submit refill requests through [employer] or call your pharmacy and they will forward the refill request to us. Please allow 3 business days for your refill to be completed.          Additional Information About Your Visit        Care EveryWhere ID     This is your Care EveryWhere ID. This could be used by other organizations to access your [place] medical records  [id number]        Your Vitals Were     Pulse Temperature Respirations Height BMI (Body Mass Index)       76 97.8  F (36.6  C) (Tympanic) 18 5' 5.5\" (1.664 m) 21.96 kg/m2        Blood Pressure from Last 3 Encounters:   10/30/18 108/60   10/19/18 110/60   05/11/18 124/76    Weight from Last 3 Encounters:   10/30/18 134 lb (60.8 kg)   10/19/18 131 lb (59.4 kg)   05/11/18 146 lb (66.2 kg)              We Performed the Following     PODIATRY/FOOT & ANKLE SURGERY REFERRAL        Primary Care Provider Office Phone # Fax #    Edilia Davison PA-C [phone] [address] [place] [address]        Equal Access to Services     PATTI GARCIA : Hadii aad ku hadasho Somaryali, waaxda luqadaha, qaybta kaalmada adesilvia, nigel landry. So [facility] [phone].    ATENCIÓN: Si habla español, tiene a ferraro disposición servicios gratuitos de asistencia lingüística. Shannan al [phone].    We comply with applicable federal civil rights laws and [place] laws. We do not discriminate on the basis of race, color, national origin, age, disability, sex, sexual orientation, or gender identity.            Thank you!     Thank you for choosing [facility]  for your care. Our goal is always to provide you with excellent care. Hearing back from our patients is one way " we can continue to improve our services. Please take a few minutes to complete the written survey that you may receive in the mail after your visit with us. Thank you!             Your Updated Medication List - Protect others around you: Learn how to safely use, store and throw away your medicines at www.disposemymeds.org.          This list is accurate as of 10/30/18  1:55 PM.  Always use your most recent med list.                   Brand Name Dispense Instructions for use Diagnosis    aspirin 81 MG EC tablet      Take 81 mg by mouth daily        calcium carbonate 600 mg-vitamin D 400 units 600-400 MG-UNIT per tablet    CALTRATE     Take 1 tablet by mouth daily        LORazepam 0.5 MG tablet    ATIVAN    10 tablet    Take 1 tablet (0.5 mg) by mouth every 6 hours as needed    Anxiety       mometasone 50 MCG/ACT spray    NASONEX     Spray 2 sprays in nostril daily        MULTI-VITAMINS Tabs      Take 1 tablet by mouth daily        * multivitamin Tabs tablet      Take 1 tablet by mouth daily        * HAIR SKIN NAILS PO      Take 3 tablets by mouth daily        pravastatin 40 MG tablet    PRAVACHOL    90 tablet    TAKE ONE TABLET BY MOUTH EVERY NIGHT AT BEDTIME    Cerebral artery occlusion with cerebral infarction (H)       Propylene Glycol-Glycerin 1-0.3 % Soln      Place 1 drop into both eyes as needed        valACYclovir 1000 mg tablet    VALTREX    4 tablet    TAKE TWO TABLETS BY MOUTH TWICE A DAY    Recurrent cold sores       VITAMIN D-3 PO      Take 1,000 Units by mouth daily        * Notice:  This list has 2 medication(s) that are the same as other medications prescribed for you. Read the directions carefully, and ask your doctor or other care provider to review them with you.

## 2018-10-30 NOTE — PATIENT INSTRUCTIONS
Your provider has referred you to: [facility]: [facility] [phone]         Tendonitis  A tendon is the thick fibrous cord that joins muscle to bone and allows joints to move. When a tendon becomes inflamed, it is called tendonitis. This can occur from overuse, injury, or infection. This usually involves the shoulders, forearm, wrist, hands and foot. Symptoms include pain, swelling and tenderness to the touch. Moving the joint increases the pain.  It takes 4 to 6 weeks for tendonitis to heal. It is treated by preventing motion of the tendon with a splint or brace and the use of anti-inflammatory medicine.  Home care    Some people find relief with ice packs. These can be crushed or cubed ice in a plastic bag or a bag of frozen vegetables wrapped in a thin towel. Other people get better relief with heat. This can include a hot shower, hot bath, or a moist towel warmed in a microwave. Try each and use the method that feels best, for 15 to 20 minutes several times a day.    Rest the inflamed joint and protect it from movement.    You may use over-the-counter ibuprofen or naproxen to treat pain and inflammation, unless another medicine was prescribed. If you can't take these medicines, acetaminophen may help with the pain, but does not treat inflammation. If you have chronic liver or kidney disease or ever had a stomach ulcer or gastrointestinal bleeding, talk with your doctor before using these medicines.    As your symptoms improve, begin gradual motion at the involved joint.  Follow-up care  Follow up with your healthcare provider if you are not improving after 5 days of treatment.  When to seek medical advice  Call your healthcare provider right away if any of these occur:    Redness over the painful area    Increasing pain or swelling at the joint    Fever (1 degree above your normal temperature) lasting 24 to 48 hours Or, whatever your healthcare provider told you to report based on  your condition  Date Last Reviewed: 11/21/2015 2000-2017 [employer]. [address] [place] [address], [place] [address]. All rights reserved. This information is not intended as a substitute for professional medical care. Always follow your healthcare professional's instructions.

## 2018-11-07 ENCOUNTER — RADIANT APPOINTMENT (OUTPATIENT)
Dept: GENERAL RADIOLOGY | Facility: CLINIC | Age: 68
End: 2018-11-07
Attending: PODIATRIST
Payer: COMMERCIAL

## 2018-11-07 ENCOUNTER — OFFICE VISIT (OUTPATIENT)
Dept: PODIATRY | Facility: CLINIC | Age: 68
End: 2018-11-07
Payer: COMMERCIAL

## 2018-11-07 VITALS
DIASTOLIC BLOOD PRESSURE: 70 MMHG | SYSTOLIC BLOOD PRESSURE: 125 MMHG | BODY MASS INDEX: 21.53 KG/M2 | HEIGHT: 66 IN | HEART RATE: 66 BPM | WEIGHT: 134 LBS

## 2018-11-07 DIAGNOSIS — M79.671 RIGHT FOOT PAIN: ICD-10-CM

## 2018-11-07 DIAGNOSIS — M77.51 TENDINITIS OF RIGHT FOOT: ICD-10-CM

## 2018-11-07 DIAGNOSIS — M79.671 RIGHT FOOT PAIN: Primary | ICD-10-CM

## 2018-11-07 PROCEDURE — 73630 X-RAY EXAM OF FOOT: CPT | Mod: RT

## 2018-11-07 PROCEDURE — 99213 OFFICE O/P EST LOW 20 MIN: CPT | Performed by: PODIATRIST

## 2018-11-07 NOTE — PROGRESS NOTES
PATIENT HISTORY:  Chyna Moncada is a 67 year old female who presents to clinic for a painful right foot .  The patient describes the pain as sharp aching pain.  The patient relates the pain level is moderate.  The patient relates pain is located on the top of the right foot.  The patient relates the pain has been present for the past several weeks.  The patient relates pain with ambulation.  The patient has tried different shoes with little relief.  The patient is an active runner.    REVIEW OF SYSTEMS:  Constitutional, HEENT, cardiovascular, pulmonary, GI, , musculoskeletal, neuro, skin, endocrine and psych systems are negative, except as otherwise noted.     PAST MEDICAL HISTORY:   Past Medical History:   Diagnosis Date     Cerebral embolism with cerebral infarction (H)         PAST SURGICAL HISTORY:   Past Surgical History:   Procedure Laterality Date     HYSTERECTOMY, PAP NO LONGER INDICATED          MEDICATIONS:   Current Outpatient Prescriptions:      aspirin EC 81 MG tablet, Take 81 mg by mouth daily , Disp: , Rfl:      calcium-vitamin D (CALTRATE) 600-400 MG-UNIT per tablet, Take 1 tablet by mouth daily , Disp: , Rfl:      Cholecalciferol (VITAMIN D-3 PO), Take 1,000 Units by mouth daily, Disp: , Rfl:      LORazepam (ATIVAN) 0.5 MG tablet, Take 1 tablet (0.5 mg) by mouth every 6 hours as needed, Disp: 10 tablet, Rfl: 0     mometasone (NASONEX) 50 MCG/ACT spray, Spray 2 sprays in nostril daily , Disp: , Rfl:      Multiple Vitamin (MULTI-VITAMINS) TABS, Take 1 tablet by mouth daily, Disp: , Rfl:      Multiple Vitamins-Minerals (HAIR SKIN NAILS PO), Take 3 tablets by mouth daily , Disp: , Rfl:      multivitamin (OCUVITE) TABS, Take 1 tablet by mouth daily, Disp: , Rfl:      order for DME, Equipment being ordered: Dynaflex insert, Disp: 1 Units, Rfl: 0     order for DME, Trilok Ankle Brace, Disp: 1 Device, Rfl: 0     pravastatin (PRAVACHOL) 40 MG tablet, TAKE ONE TABLET BY MOUTH EVERY NIGHT AT BEDTIME,  "Disp: 90 tablet, Rfl: 3     Propylene Glycol-Glycerin 1-0.3 % SOLN, Place 1 drop into both eyes as needed, Disp: , Rfl:      valACYclovir (VALTREX) 1000 mg tablet, TAKE TWO TABLETS BY MOUTH TWICE A DAY, Disp: 4 tablet, Rfl: 11     ALLERGIES:    Allergies   Allergen Reactions     Tramadol Anaphylaxis     Benadryl [Altaryl] Other (See Comments)     Made her goofy     Caffeine Other (See Comments)     Jittery       Codeine Nausea and Vomiting     Darvocet [Propoxyphene N-Apap]      Other reaction(s): GI Upset     Diphenhydramine Other (See Comments)     Lovastatin Other (See Comments) and Muscle Pain (Myalgia)     Other reaction(s): Myalgia  pain     Percocet [Oxycodone-Acetaminophen] Other (See Comments)     Other reaction(s): Dizziness  Sick or dizzy?     Vicodin [Hydrocodone-Acetaminophen] Other (See Comments) and Nausea     Sick or dizzy?        SOCIAL HISTORY:   Social History     Social History     Marital status:      Spouse name: N/A     Number of children: N/A     Years of education: N/A     Occupational History     Not on file.     Social History Main Topics     Smoking status: Former Smoker     Types: Cigarettes     Quit date: 1/10/1983     Smokeless tobacco: Never Used     Alcohol use 0.6 oz/week     1 Glasses of wine per week      Comment: DAILY     Drug use: No     Sexual activity: Yes     Partners: Male     Other Topics Concern     Parent/Sibling W/ Cabg, Mi Or Angioplasty Before 65f 55m? No     Social History Narrative        FAMILY HISTORY:   Family History   Problem Relation Age of Onset     Arthritis Mother      Cancer Mother      GYN     HEART DISEASE Father      Alcohol/Drug Maternal Grandfather      HEART DISEASE Paternal Grandfather      Heart attack     Diabetes Paternal Grandfather      Cancer Brother      throat        EXAM:Vitals: /70 (BP Location: Right arm, Patient Position: Sitting, Cuff Size: Adult Regular)  Pulse 66  Ht 5' 5.5\" (1.664 m)  Wt 134 lb (60.8 kg)  BMI 21.96 " kg/m2  BMI= Body mass index is 21.96 kg/(m^2).        General appearance: Patient is alert and fully cooperative with history & exam.  No sign of distress is noted during the visit.     Psychiatric: Affect is pleasant & appropriate.  Patient appears motivated to improve health.     Respiratory: Breathing is regular & unlabored while sitting.     HEENT: Hearing is intact to spoken word.  Speech is clear.  No gross evidence of visual impairment that would impact ambulation.     Dermatologic: Skin is intact to both lower extremities without significant lesions, rash or abrasion.  No paronychia or evidence of soft tissue infection is noted.     Vascular: DP & PT pulses are intact & regular bilaterally.  No significant edema or varicosities noted.  CFT and skin temperature is normal to both lower extremities.     Neurologic: Lower extremity sensation is intact to light touch.  No evidence of weakness or contracture in the lower extremities.  No evidence of neuropathy.     Musculoskeletal: Patient is ambulatory without assistive device or brace.  No gross ankle deformity noted.  No foot or ankle joint effusion is noted.  Noted pain on palpation over the dorsal aspect of the right foot.  One notes edema along the forefoot on the right.  One notes pain with dorsiflexion against resistance on the right foot and ankle.    Radiographs were evaluated including AP, lateral and medial oblique views of the right foot reveals  no cortical erosions or periosteal elevation.  All joint margins appear stable.  There is no apparent fracture or tumor formation noted.  There is no evidence of foreign body.    ASSESSMENT / PLAN:     ICD-10-CM    1. Right foot pain M79.671 XR Foot Right G/E 3 Views   2. Tendinitis of right foot M77.51 order for DME     order for DME       I have explained to Chyna  about the conditions.  We discussed the nature of the condition as well as the treatment plan and expected length of recovery.  At this time,  the patient was instructed on icing, stretching, tissue massage and support.  The patient was fitted with a Tri-Lock ankle brace along with a Dynaflex insert that will aid in offloading the tension forces to the soft tissues and prevent further inflammation.    The patient will return in four weeks for reevaluation if the symptoms do not resolve.        Disclaimer: This note consists of symbols derived from keyboarding, dictation and/or voice recognition software. As a result, there may be errors in the script that have gone undetected. Please consider this when interpreting information found in this chart.       GANGA Pace D.P.M., [email or web address].

## 2018-11-07 NOTE — LETTER
11/7/2018         RE: Chyna Moncada  [address] [facility] [address]        Dear Colleague,    Thank you for referring your patient, Chyna Moncada, to the [facility]. Please see a copy of my visit note below.    PATIENT HISTORY:  Chyna Moncada is a 67 year old female who presents to clinic for a painful right foot .  The patient describes the pain as sharp aching pain.  The patient relates the pain level is moderate.  The patient relates pain is located on the top of the right foot.  The patient relates the pain has been present for the past several weeks.  The patient relates pain with ambulation.  The patient has tried different shoes with little relief.  The patient is an active runner.    REVIEW OF SYSTEMS:  Constitutional, HEENT, cardiovascular, pulmonary, GI, , musculoskeletal, neuro, skin, endocrine and psych systems are negative, except as otherwise noted.     PAST MEDICAL HISTORY:   Past Medical History:   Diagnosis Date     Cerebral embolism with cerebral infarction (H)         PAST SURGICAL HISTORY:   Past Surgical History:   Procedure Laterality Date     HYSTERECTOMY, PAP NO LONGER INDICATED          MEDICATIONS:   Current Outpatient Prescriptions:      aspirin EC 81 MG tablet, Take 81 mg by mouth daily , Disp: , Rfl:      calcium-vitamin D (CALTRATE) 600-400 MG-UNIT per tablet, Take 1 tablet by mouth daily , Disp: , Rfl:      Cholecalciferol (VITAMIN D-3 PO), Take 1,000 Units by mouth daily, Disp: , Rfl:      LORazepam (ATIVAN) 0.5 MG tablet, Take 1 tablet (0.5 mg) by mouth every 6 hours as needed, Disp: 10 tablet, Rfl: 0     mometasone (NASONEX) 50 MCG/ACT spray, Spray 2 sprays in nostril daily , Disp: , Rfl:      Multiple Vitamin (MULTI-VITAMINS) TABS, Take 1 tablet by mouth daily, Disp: , Rfl:      Multiple Vitamins-Minerals (HAIR SKIN NAILS PO), Take 3 tablets by mouth daily , Disp: , Rfl:      multivitamin (OCUVITE) TABS, Take 1 tablet by mouth daily,  Disp: , Rfl:      order for DME, Equipment being ordered: Dynaflex insert, Disp: 1 Units, Rfl: 0     order for DME, Trilok Ankle Brace, Disp: 1 Device, Rfl: 0     pravastatin (PRAVACHOL) 40 MG tablet, TAKE ONE TABLET BY MOUTH EVERY NIGHT AT BEDTIME, Disp: 90 tablet, Rfl: 3     Propylene Glycol-Glycerin 1-0.3 % SOLN, Place 1 drop into both eyes as needed, Disp: , Rfl:      valACYclovir (VALTREX) 1000 mg tablet, TAKE TWO TABLETS BY MOUTH TWICE A DAY, Disp: 4 tablet, Rfl: 11     ALLERGIES:    Allergies   Allergen Reactions     Tramadol Anaphylaxis     Benadryl [Altaryl] Other (See Comments)     Made her goofy     Caffeine Other (See Comments)     Jittery       Codeine Nausea and Vomiting     Darvocet [Propoxyphene N-Apap]      Other reaction(s): GI Upset     Diphenhydramine Other (See Comments)     Lovastatin Other (See Comments) and Muscle Pain (Myalgia)     Other reaction(s): Myalgia  pain     Percocet [Oxycodone-Acetaminophen] Other (See Comments)     Other reaction(s): Dizziness  Sick or dizzy?     Vicodin [Hydrocodone-Acetaminophen] Other (See Comments) and Nausea     Sick or dizzy?        SOCIAL HISTORY:   Social History     Social History     Marital status:      Spouse name: N/A     Number of children: N/A     Years of education: N/A     Occupational History     Not on file.     Social History Main Topics     Smoking status: Former Smoker     Types: Cigarettes     Quit date: 1/10/1983     Smokeless tobacco: Never Used     Alcohol use 0.6 oz/week     1 Glasses of wine per week      Comment: DAILY     Drug use: No     Sexual activity: Yes     Partners: Male     Other Topics Concern     Parent/Sibling W/ Cabg, Mi Or Angioplasty Before 65f 55m? No     Social History Narrative        FAMILY HISTORY:   Family History   Problem Relation Age of Onset     Arthritis Mother      Cancer Mother      GYN     HEART DISEASE Father      Alcohol/Drug Maternal Grandfather      HEART DISEASE Paternal Grandfather      Heart  "attack     Diabetes Paternal Grandfather      Cancer Brother      throat        EXAM:Vitals: /70 (BP Location: Right arm, Patient Position: Sitting, Cuff Size: Adult Regular)  Pulse 66  Ht 5' 5.5\" (1.664 m)  Wt 134 lb (60.8 kg)  BMI 21.96 kg/m2  BMI= Body mass index is 21.96 kg/(m^2).        General appearance: Patient is alert and fully cooperative with history & exam.  No sign of distress is noted during the visit.     Psychiatric: Affect is pleasant & appropriate.  Patient appears motivated to improve health.     Respiratory: Breathing is regular & unlabored while sitting.     HEENT: Hearing is intact to spoken word.  Speech is clear.  No gross evidence of visual impairment that would impact ambulation.     Dermatologic: Skin is intact to both lower extremities without significant lesions, rash or abrasion.  No paronychia or evidence of soft tissue infection is noted.     Vascular: DP & PT pulses are intact & regular bilaterally.  No significant edema or varicosities noted.  CFT and skin temperature is normal to both lower extremities.     Neurologic: Lower extremity sensation is intact to light touch.  No evidence of weakness or contracture in the lower extremities.  No evidence of neuropathy.     Musculoskeletal: Patient is ambulatory without assistive device or brace.  No gross ankle deformity noted.  No foot or ankle joint effusion is noted.  Noted pain on palpation over the dorsal aspect of the right foot.  One notes edema along the forefoot on the right.  One notes pain with dorsiflexion against resistance on the right foot and ankle.    Radiographs were evaluated including AP, lateral and medial oblique views of the right foot reveals  no cortical erosions or periosteal elevation.  All joint margins appear stable.  There is no apparent fracture or tumor formation noted.  There is no evidence of foreign body.    ASSESSMENT / PLAN:     ICD-10-CM    1. Right foot pain M79.671 XR Foot Right G/E 3 " Views   2. Tendinitis of right foot M77.51 order for DME     order for DME       I have explained to Chyna  about the conditions.  We discussed the nature of the condition as well as the treatment plan and expected length of recovery.  At this time, the patient was instructed on icing, stretching, tissue massage and support.  The patient was fitted with a Tri-Lock ankle brace along with a Dynaflex insert that will aid in offloading the tension forces to the soft tissues and prevent further inflammation.    The patient will return in four weeks for reevaluation if the symptoms do not resolve.        Disclaimer: This note consists of symbols derived from keyboarding, dictation and/or voice recognition software. As a result, there may be errors in the script that have gone undetected. Please consider this when interpreting information found in this chart.       MIRTA Walls.P.LUCINA., F.A.C.F.A.S.        Again, thank you for allowing me to participate in the care of your patient.        Sincerely,        Nato Pace DPM

## 2018-11-07 NOTE — PATIENT INSTRUCTIONS
Initial musculoskeletal treatment recommendation:    1.  Wear supportive foot wear (stiff soles) and/or arch supports (rigid not cushion).  2.  Stretch the calf muscles as instructed once an hour.  3.  Massage the soft tissues around the injured area in the morning to loosen the tissue with an over the counter product like Icy Hot with Lidocaine  4.  Ice the injured area in the evening; 20 min on/off.  5. Take antiinflammatory medication as indicated.    If no improvement in symptoms within four to six weeks, return to clinic for reevaluation.

## 2018-11-07 NOTE — MR AVS SNAPSHOT
After Visit Summary   11/7/2018    Chyna Moncada    MRN: [id number]           Patient Information     Date Of Birth          1950        Visit Information        Provider Department      11/7/2018 3:20 PM Nato Pace DPM [facility]        Today's Diagnoses     Right foot pain    -  1    Tendinitis of right foot          Care Instructions    Initial musculoskeletal treatment recommendation:    1.  Wear supportive foot wear (stiff soles) and/or arch supports (rigid not cushion).  2.  Stretch the calf muscles as instructed once an hour.  3.  Massage the soft tissues around the injured area in the morning to loosen the tissue with an over the counter product like Icy Hot with Lidocaine  4.  Ice the injured area in the evening; 20 min on/off.  5. Take antiinflammatory medication as indicated.    If no improvement in symptoms within four to six weeks, return to clinic for reevaluation.            Follow-ups after your visit        Follow-up notes from your care team     Return in about 4 weeks (around 12/5/2018).      Your next 10 appointments already scheduled     Nov 26, 2018 10:30 AM CST   DX HIP/PELVIS/SPINE with WYDX1   [facility] Dexa ([facility])    [address] [address]   [phone]           How do I prepare for my exam? (Food and drink instructions) No Food and Drink Restrictions.  How do I prepare for my exam? (Other instructions) Please do not take any of the following 24 hours prior to the day of your exam: vitamins, calcium tablets, antacids.  What should I wear: If possible, please wear clothes without metal (snaps, zippers). A sweat suit works well.  How long does the exam take: The exam takes about 20 minutes.  What should I bring: Bring a list of your current medicines to your exam (including vitamins, minerals and over-the-counter drugs).  Do I need a :  No  is needed.  What should I do after  "the exam: No restrictions, You may resume normal activities.  How do I prepare for my exam? (Food and drink instructions) A DEXA scan is a bone-density scan. It uses a low level of radiation to check the strength of your bones. As you lie on a padded table, a machine will take X-rays. We most often scan the hips and lower spine.  Who should I call with questions: If you have any questions, please call the Imaging Department where you will have your exam. Directions, parking instructions, and other information is available on our website, [email or web address].              Who to contact     If you have questions or need follow up information about today's clinic visit or your schedule please contact [facility] directly at [phone].  Normal or non-critical lab and imaging results will be communicated to you by MyChart, letter or phone within 4 business days after the clinic has received the results. If you do not hear from us within 7 days, please contact the clinic through MyChart or phone. If you have a critical or abnormal lab result, we will notify you by phone as soon as possible.  Submit refill requests through [facility] or call your pharmacy and they will forward the refill request to us. Please allow 3 business days for your refill to be completed.          Additional Information About Your Visit        Care EveryWhere ID     This is your Care EveryWhere ID. This could be used by other organizations to access your [facility] medical records  [id number]        Your Vitals Were     Pulse Height BMI (Body Mass Index)             66 5' 5.5\" (1.664 m) 21.96 kg/m2          Blood Pressure from Last 3 Encounters:   11/07/18 125/70   10/30/18 108/60   10/19/18 110/60    Weight from Last 3 Encounters:   11/07/18 134 lb (60.8 kg)   10/30/18 134 lb (60.8 kg)   10/19/18 131 lb (59.4 kg)                 Today's Medication Changes          These changes are accurate as of 11/7/18  4:01 PM.  If you have " any questions, ask your nurse or doctor.               Start taking these medicines.        Dose/Directions    order for DME   Used for:  Tendinitis of right foot   Started by:  Nato Pace DPM        Equipment being ordered: Dynaflex insert   Quantity:  1 Units   Refills:  0       order for DME   Used for:  Tendinitis of right foot   Started by:  Nato Pace DPM        Trilok Ankle Brace   Quantity:  1 Device   Refills:  0            Where to get your medicines      Some of these will need a paper prescription and others can be bought over the counter.  Ask your nurse if you have questions.     Bring a paper prescription for each of these medications     order for DME    order for DME                Primary Care Provider Office Phone # Fax #    Edilia Davison PA-C [phone] [address] [place] [address]        Equal Access to Services     PATTI GARCIA : Hadii aad ku hadasho Sojackie, waaxda luqadaha, qaybta kaalmada adekyrayaquintin, nigle zhu . So [facility] [phone].    ATENCIÓN: Si habla español, tiene a ferraro disposición servicios gratuitos de asistencia lingüística. Shannan al [phone].    We comply with applicable federal civil rights laws and [place] laws. We do not discriminate on the basis of race, color, national origin, age, disability, sex, sexual orientation, or gender identity.            Thank you!     Thank you for choosing [facility]  for your care. Our goal is always to provide you with excellent care. Hearing back from our patients is one way we can continue to improve our services. Please take a few minutes to complete the written survey that you may receive in the mail after your visit with us. Thank you!             Your Updated Medication List - Protect others around you: Learn how to safely use, store and throw away your medicines at www.disposemymeds.org.          This list is accurate as of  11/7/18  4:01 PM.  Always use your most recent med list.                   Brand Name Dispense Instructions for use Diagnosis    aspirin 81 MG EC tablet      Take 81 mg by mouth daily        calcium carbonate 600 mg-vitamin D 400 units 600-400 MG-UNIT per tablet    CALTRATE     Take 1 tablet by mouth daily        LORazepam 0.5 MG tablet    ATIVAN    10 tablet    Take 1 tablet (0.5 mg) by mouth every 6 hours as needed    Anxiety       mometasone 50 MCG/ACT spray    NASONEX     Spray 2 sprays in nostril daily        MULTI-VITAMINS Tabs      Take 1 tablet by mouth daily        * multivitamin Tabs tablet      Take 1 tablet by mouth daily        * HAIR SKIN NAILS PO      Take 3 tablets by mouth daily        order for DME     1 Units    Equipment being ordered: Dynaflex insert    Tendinitis of right foot       order for DME     1 Device    Trilok Ankle Brace    Tendinitis of right foot       pravastatin 40 MG tablet    PRAVACHOL    90 tablet    TAKE ONE TABLET BY MOUTH EVERY NIGHT AT BEDTIME    Cerebral artery occlusion with cerebral infarction (H)       Propylene Glycol-Glycerin 1-0.3 % Soln      Place 1 drop into both eyes as needed        valACYclovir 1000 mg tablet    VALTREX    4 tablet    TAKE TWO TABLETS BY MOUTH TWICE A DAY    Recurrent cold sores       VITAMIN D-3 PO      Take 1,000 Units by mouth daily        * Notice:  This list has 2 medication(s) that are the same as other medications prescribed for you. Read the directions carefully, and ask your doctor or other care provider to review them with you.

## 2018-11-12 ENCOUNTER — TELEPHONE (OUTPATIENT)
Dept: PODIATRY | Facility: CLINIC | Age: 68
End: 2018-11-12

## 2018-11-12 DIAGNOSIS — M79.671 RIGHT FOOT PAIN: Primary | ICD-10-CM

## 2018-11-12 DIAGNOSIS — M77.51 TENDINITIS OF RIGHT FOOT: ICD-10-CM

## 2018-11-12 NOTE — TELEPHONE ENCOUNTER
Following message was received from central scheduling.    Tay[facility] Triage                     Pt wants order for PT, please call mobile number       Dr Pace please advise.    Maxim Bernal ATC

## 2018-11-12 NOTE — TELEPHONE ENCOUNTER
Spoke to patient discussed that physical therapy had been completed.  Patient already has scheduling information.     Maxim Bernal, ATC

## 2018-11-15 ENCOUNTER — HOSPITAL ENCOUNTER (OUTPATIENT)
Dept: PHYSICAL THERAPY | Facility: CLINIC | Age: 68
Setting detail: THERAPIES SERIES
End: 2018-11-15
Attending: PODIATRIST
Payer: MEDICARE

## 2018-11-15 DIAGNOSIS — M77.51 TENDINITIS OF RIGHT FOOT: ICD-10-CM

## 2018-11-15 DIAGNOSIS — M79.671 RIGHT FOOT PAIN: ICD-10-CM

## 2018-11-15 PROCEDURE — G8980 MOBILITY D/C STATUS: HCPCS | Mod: GP,CJ | Performed by: PHYSICAL THERAPIST

## 2018-11-15 PROCEDURE — 97110 THERAPEUTIC EXERCISES: CPT | Mod: GP | Performed by: PHYSICAL THERAPIST

## 2018-11-15 PROCEDURE — 97140 MANUAL THERAPY 1/> REGIONS: CPT | Mod: GP | Performed by: PHYSICAL THERAPIST

## 2018-11-15 PROCEDURE — 40000718 ZZHC STATISTIC PT DEPARTMENT ORTHO VISIT: Performed by: PHYSICAL THERAPIST

## 2018-11-15 PROCEDURE — 97161 PT EVAL LOW COMPLEX 20 MIN: CPT | Mod: GP | Performed by: PHYSICAL THERAPIST

## 2018-11-15 PROCEDURE — G8979 MOBILITY GOAL STATUS: HCPCS | Mod: GP,CI | Performed by: PHYSICAL THERAPIST

## 2018-11-15 PROCEDURE — G8978 MOBILITY CURRENT STATUS: HCPCS | Mod: GP,CJ | Performed by: PHYSICAL THERAPIST

## 2018-11-16 NOTE — PROGRESS NOTES
[facility]          OUTPATIENT PHYSICAL THERAPY ORTHOPEDIC EVALUATION  PLAN OF TREATMENT FOR OUTPATIENT REHABILITATION  (COMPLETE FOR INITIAL CLAIMS ONLY)  Patient's Last Name, First Name, M.I.  YOB: 1950  AntwanChyna  C    Provider s Name:  [facility]   Medical Record No.  [id number]   Start of Care Date:  11/15/18   Onset Date:  10/15/18   Type:     _X__PT   ___OT   ___SLP Medical Diagnosis:  Right foot pain M79.671  - Primary Tendinitis of right foot M77.51     PT Diagnosis:  metatarsal pain/plantar fascitis   Visits from SOC:  1      _________________________________________________________________________________  Plan of Treatment/Functional Goals:  balance training, gait training, joint mobilization, manual therapy, neuromuscular re-education, ROM, strengthening, stretching     Cryotherapy, Hot packs, TENS, Electrical stimulation, Ultrasound     Goals  Goal Identifier: walking  Goal Description: Pt will be walk w less than 1/10 pain to be able to go grocery shopping  Target Date: 12/07/18    Goal Identifier: ankle DF   Goal Description: Pt will demonstrate 10 deg AROM DF in R ankle w/o pain in order to be able to walk w improved gait mechanics.  Target Date: 12/07/18    Goal Identifier: Running  Goal Description: Pt will be able to run w less than 1/10 foot pain to be able return to PLOF and get in shape.   Target Date: 12/28/18    Goal Identifier: LEFS  Goal Description: Pt will score 60/80 on LEFS outcome tool to demonstrate clinically significant improvement and be able to complete more tasks at home  Target Date: 12/28/18        Therapy Frequency:  1 time/week  Predicted Duration of Therapy Intervention:  6 weeks    Azul Ortega, PT                 I CERTIFY THE NEED FOR THESE SERVICES FURNISHED UNDER        THIS PLAN OF TREATMENT AND WHILE UNDER MY CARE     (Physician co-signature of this document indicates review and certification of the  therapy plan).                       Certification Date From:  11/15/18   Certification Date To:  12/28/18    Referring Provider:  Nato Pace DPM     Initial Assessment        See Epic Evaluation Start of Care Date: 11/15/18

## 2018-11-16 NOTE — PROGRESS NOTES
11/15/18 1000   General Information   Type of Visit Initial OP Ortho PT Evaluation   Start of Care Date 11/15/18   Referring Physician Nato Pace DPM    Patient/Family Goals Statement get back to running    Orders Evaluate and Treat   Date of Order 11/12/18   Insurance Type Medicare   Medical Diagnosis Right foot pain M79.671  - Primary Tendinitis of right foot M77.51   Surgical/Medical history reviewed Yes   Precautions/Limitations no known precautions/limitations   Body Part(s)   Body Part(s) Ankle/Foot   Presentation and Etiology   Pertinent history of current problem (include personal factors and/or comorbidities that impact the POC) Pt relates she has been trying to exercise and lose weight. Pt relates she was running in the house in place and running on black top w shoes. Pt has initially sore on R plantar fascia but that went away. Pt relates now the top of her R foot is sore. Pt has been soaking in Epsom salt and using litocane. PMH: broken bones, stroke, surgery both - bunionectomy ft. hysterectomy, gallbladder   Impairments A. Pain;B. Decreased WB tolerance;D. Decreased ROM;F. Decreased strength and endurance;G. Impaired balance;H. Impaired gait;J. Burning   Functional Limitations perform activities of daily living;perform desired leisure / sports activities   Symptom Location R foot   How/Where did it occur With repetition/overuse   Onset date of current episode/exacerbation 10/15/18   Chronicity New   Pain rating (0-10 point scale) Best (/10);Worst (/10)   Best (/10) 0   Worst (/10) 7   Pain quality B. Dull;C. Aching;D. Burning   Frequency of pain/symptoms A. Constant   Pain/symptoms exacerbated by B. Walking;H. Overhead reach;I. Bending;J. ADL;K. Home tasks;M. Other   Pain exacerbation comment exercising   Pain/symptoms eased by C. Rest;G. Heat;J. Braces/supports   Progression of symptoms since onset: Unchanged   Current Level of Function   Current Community Support Family/friend caregiver    Patient role/employment history F. Retired   Living environment [place]   Fall Risk Screen   Fall screen completed by PT   Have you fallen 2 or more times in the past year? No   Have you fallen and had an injury in the past year? No   Is patient a fall risk? No   Fall screen comments had 1 fall outside   Functional Scales   Functional Scales Other   Other Scales  LEFS: 36/80   Ankle/Foot Objective Findings   Side (if bilateral, select both right and left) Right   Observation squat - poor form    Gait/Locomotion step to gait pattern - unable to put weigth on the ball of the foot    Balance/ Proprioception (Single Leg Stance) unable due to pain    Foot Position In Standing fair arch    Ankle/Foot Strength Comments toe flex: 4/5 w min pain     Anterior Drawer Test -   Posterior Drawer Test -   Talar Tilt Test -   Ankle/Foot Special Tests Comments pt relates arthritis in great toe and helena prominance on medial side - pt relates other foot was remoced    Palpation TTP 3rd and 4th distal ray and 4th anf 5th ray   Right DF (Knee Ext) AROM 4   Right PF AROM 40   Right Calcanceal Inversion AROM 10   Right Calcaneal Eversion AROM 20   Right Great Toe Flexion AROM WNL    Right DF/Inversion Strength 5/5   Right DF/Eversion Strength 5/5   Right PF/Inversion Strength 5/5   Right PF/Eversion Strength 5/5   Right PF Strength 5/5   Planned Therapy Interventions   Planned Therapy Interventions balance training;gait training;joint mobilization;manual therapy;neuromuscular re-education;ROM;strengthening;stretching   Planned Modality Interventions   Planned Modality Interventions Cryotherapy;Hot packs;TENS;Electrical stimulation;Ultrasound   Clinical Impression   Criteria for Skilled Therapeutic Interventions Met yes, treatment indicated   PT Diagnosis metatarsal pain/plantar fascitis   Influenced by the following impairments limited  ROM, weakness, pain   Functional limitations due to impairments walking, running   Clinical  Presentation Stable/Uncomplicated   Clinical Presentation Rationale Pt is pleasant 67 yr old female who presents w R foot pain due to overuse. Pt is active and motivated to get better    Clinical Decision Making (Complexity) Low complexity   Therapy Frequency 1 time/week   Predicted Duration of Therapy Intervention (days/wks) 6 weeks   Risk & Benefits of therapy have been explained Yes   Patient, Family & other staff in agreement with plan of care Yes   Education Assessment   Preferred Learning Style Listening;Reading;Demonstration;Pictures/video   Barriers to Learning No barriers   ORTHO GOALS   PT Ortho Eval Goals 2;1;3;4   Ortho Goal 1   Goal Identifier walking   Goal Description Pt will be walk w less than 1/10 pain to be able to go grocery shopping   Target Date 12/07/18   Ortho Goal 2   Goal Identifier ankle DF    Goal Description Pt will demonstrate 10 deg AROM DF in R ankle w/o pain in order to be able to walk w improved gait mechanics.   Target Date 12/07/18   Ortho Goal 3   Goal Identifier Running   Goal Description Pt will be able to run w less than 1/10 foot pain to be able return to PLOF and get in shape.    Target Date 12/28/18   Ortho Goal 4   Goal Identifier LEFS   Goal Description Pt will score 60/80 on LEFS outcome tool to demonstrate clinically significant improvement and be able to complete more tasks at home   Target Date 12/28/18   Total Evaluation Time   Total Evaluation Time 50 (25 eval, 1 TE, 1 MT)    Therapy Certification   Certification date from 11/15/18   Certification date to 12/28/18   Medical Diagnosis Right foot pain M79.671  - Primary Tendinitis of right foot M77.51       Azul Ortega  Physical Therapist  [address] [address]  [email or web address]   [email or web address]   Office: [phone] Fax: [phone]

## 2018-11-26 ENCOUNTER — HOSPITAL ENCOUNTER (OUTPATIENT)
Dept: BONE DENSITY | Facility: CLINIC | Age: 68
Discharge: HOME | End: 2018-11-26
Attending: NURSE PRACTITIONER | Admitting: NURSE PRACTITIONER
Payer: MEDICARE

## 2018-11-26 DIAGNOSIS — Z78.0 POST-MENOPAUSAL: ICD-10-CM

## 2018-11-26 PROCEDURE — 77080 DXA BONE DENSITY AXIAL: CPT

## 2018-12-11 ENCOUNTER — TELEPHONE (OUTPATIENT)
Dept: FAMILY MEDICINE | Facility: CLINIC | Age: 68
End: 2018-12-11

## 2018-12-11 NOTE — RESULT ENCOUNTER NOTE
Please Notify Chyna  of test results from your DEXA scan showed mild osteopenia.  With the numbers that we see we run a risk calculator to see the if we need to start treatment for the osteopenia.  In review of your numbers and your risk factor you are low risk and did not recommend prescription medications to manage.  Recommend daily use of calcium supplements and vitamin D supplements.  The supplements can be found over-the-counter and to be taken as directed.     Nereida Jacobs CNP

## 2018-12-11 NOTE — TELEPHONE ENCOUNTER
Dexa scan results osteopenia was advised calcium supplements and vitamin D supplements.  The supplements can be found over-the-counter and to be taken as directed. She is already taking Caltrate 600 mg bid with a Vit D. What would should she do seems she is already doing what was advised?    Kayleen Quiroga,CMA

## 2018-12-12 NOTE — TELEPHONE ENCOUNTER
Advised patient is nothing further that can be done just to continue with her over-the-counter calcium and vitamin D.    Nereida Jacobs CNP

## 2018-12-21 ENCOUNTER — APPOINTMENT (OUTPATIENT)
Dept: CT IMAGING | Facility: CLINIC | Age: 68
End: 2018-12-21
Attending: EMERGENCY MEDICINE
Payer: MEDICARE

## 2018-12-21 ENCOUNTER — HOSPITAL ENCOUNTER (EMERGENCY)
Facility: CLINIC | Age: 68
Discharge: HOME | End: 2018-12-21
Attending: EMERGENCY MEDICINE | Admitting: EMERGENCY MEDICINE
Payer: MEDICARE

## 2018-12-21 VITALS
HEART RATE: 65 BPM | DIASTOLIC BLOOD PRESSURE: 72 MMHG | TEMPERATURE: 97.8 F | SYSTOLIC BLOOD PRESSURE: 128 MMHG | BODY MASS INDEX: 21.31 KG/M2 | HEIGHT: 66 IN | RESPIRATION RATE: 16 BRPM | WEIGHT: 132.6 LBS | OXYGEN SATURATION: 98 %

## 2018-12-21 DIAGNOSIS — M54.50 ACUTE RIGHT-SIDED LOW BACK PAIN WITHOUT SCIATICA: ICD-10-CM

## 2018-12-21 DIAGNOSIS — K76.9 LIVER LESION, RIGHT LOBE: ICD-10-CM

## 2018-12-21 LAB
ALBUMIN SERPL-MCNC: 3.6 G/DL (ref 3.4–5)
ALBUMIN UR-MCNC: NEGATIVE MG/DL
ALP SERPL-CCNC: 82 U/L (ref 40–150)
ALT SERPL W P-5'-P-CCNC: 25 U/L (ref 0–50)
AMORPH CRY #/AREA URNS HPF: ABNORMAL /HPF
ANION GAP SERPL CALCULATED.3IONS-SCNC: 5 MMOL/L (ref 3–14)
APPEARANCE UR: ABNORMAL
AST SERPL W P-5'-P-CCNC: 21 U/L (ref 0–45)
BASOPHILS # BLD AUTO: 0 10E9/L (ref 0–0.2)
BASOPHILS NFR BLD AUTO: 0.5 %
BILIRUB DIRECT SERPL-MCNC: 0.1 MG/DL (ref 0–0.2)
BILIRUB SERPL-MCNC: 0.4 MG/DL (ref 0.2–1.3)
BILIRUB UR QL STRIP: NEGATIVE
BUN SERPL-MCNC: 19 MG/DL (ref 7–30)
CALCIUM SERPL-MCNC: 8.4 MG/DL (ref 8.5–10.1)
CHLORIDE SERPL-SCNC: 108 MMOL/L (ref 94–109)
CO2 SERPL-SCNC: 31 MMOL/L (ref 20–32)
COLOR UR AUTO: YELLOW
CREAT SERPL-MCNC: 0.74 MG/DL (ref 0.52–1.04)
DIFFERENTIAL METHOD BLD: NORMAL
EOSINOPHIL # BLD AUTO: 0.1 10E9/L (ref 0–0.7)
EOSINOPHIL NFR BLD AUTO: 2.1 %
ERYTHROCYTE [DISTWIDTH] IN BLOOD BY AUTOMATED COUNT: 12.7 % (ref 10–15)
GFR SERPL CREATININE-BSD FRML MDRD: 82 ML/MIN/{1.73_M2}
GLUCOSE SERPL-MCNC: 90 MG/DL (ref 70–99)
GLUCOSE UR STRIP-MCNC: NEGATIVE MG/DL
HCT VFR BLD AUTO: 43.7 % (ref 35–47)
HGB BLD-MCNC: 14.2 G/DL (ref 11.7–15.7)
HGB UR QL STRIP: NEGATIVE
IMM GRANULOCYTES # BLD: 0 10E9/L (ref 0–0.4)
IMM GRANULOCYTES NFR BLD: 0.3 %
KETONES UR STRIP-MCNC: NEGATIVE MG/DL
LEUKOCYTE ESTERASE UR QL STRIP: NEGATIVE
LYMPHOCYTES # BLD AUTO: 0.9 10E9/L (ref 0.8–5.3)
LYMPHOCYTES NFR BLD AUTO: 14.1 %
MCH RBC QN AUTO: 29.4 PG (ref 26.5–33)
MCHC RBC AUTO-ENTMCNC: 32.5 G/DL (ref 31.5–36.5)
MCV RBC AUTO: 91 FL (ref 78–100)
MONOCYTES # BLD AUTO: 0.6 10E9/L (ref 0–1.3)
MONOCYTES NFR BLD AUTO: 9.9 %
NEUTROPHILS # BLD AUTO: 4.5 10E9/L (ref 1.6–8.3)
NEUTROPHILS NFR BLD AUTO: 73.1 %
NITRATE UR QL: NEGATIVE
NRBC # BLD AUTO: 0 10*3/UL
NRBC BLD AUTO-RTO: 0 /100
PH UR STRIP: 6 PH (ref 5–7)
PLATELET # BLD AUTO: 175 10E9/L (ref 150–450)
POTASSIUM SERPL-SCNC: 4.4 MMOL/L (ref 3.4–5.3)
PROT SERPL-MCNC: 7 G/DL (ref 6.8–8.8)
RBC # BLD AUTO: 4.83 10E12/L (ref 3.8–5.2)
RBC #/AREA URNS AUTO: <1 /HPF (ref 0–2)
SODIUM SERPL-SCNC: 144 MMOL/L (ref 133–144)
SOURCE: ABNORMAL
SP GR UR STRIP: 1.01 (ref 1–1.03)
UROBILINOGEN UR STRIP-MCNC: 0 MG/DL (ref 0–2)
WBC # BLD AUTO: 6.2 10E9/L (ref 4–11)
WBC #/AREA URNS AUTO: 1 /HPF (ref 0–5)

## 2018-12-21 PROCEDURE — 80048 BASIC METABOLIC PNL TOTAL CA: CPT | Performed by: EMERGENCY MEDICINE

## 2018-12-21 PROCEDURE — 96376 TX/PRO/DX INJ SAME DRUG ADON: CPT

## 2018-12-21 PROCEDURE — 99284 EMERGENCY DEPT VISIT MOD MDM: CPT | Mod: 25

## 2018-12-21 PROCEDURE — 74176 CT ABD & PELVIS W/O CONTRAST: CPT

## 2018-12-21 PROCEDURE — 25000128 H RX IP 250 OP 636: Performed by: EMERGENCY MEDICINE

## 2018-12-21 PROCEDURE — 99284 EMERGENCY DEPT VISIT MOD MDM: CPT | Mod: Z6 | Performed by: EMERGENCY MEDICINE

## 2018-12-21 PROCEDURE — 96361 HYDRATE IV INFUSION ADD-ON: CPT

## 2018-12-21 PROCEDURE — 80076 HEPATIC FUNCTION PANEL: CPT | Performed by: EMERGENCY MEDICINE

## 2018-12-21 PROCEDURE — 96375 TX/PRO/DX INJ NEW DRUG ADDON: CPT

## 2018-12-21 PROCEDURE — 96374 THER/PROPH/DIAG INJ IV PUSH: CPT

## 2018-12-21 PROCEDURE — 81001 URINALYSIS AUTO W/SCOPE: CPT | Performed by: EMERGENCY MEDICINE

## 2018-12-21 PROCEDURE — 85025 COMPLETE CBC W/AUTO DIFF WBC: CPT | Performed by: EMERGENCY MEDICINE

## 2018-12-21 RX ORDER — FENTANYL CITRATE 50 UG/ML
50 INJECTION, SOLUTION INTRAMUSCULAR; INTRAVENOUS ONCE
Status: COMPLETED | OUTPATIENT
Start: 2018-12-21 | End: 2018-12-21

## 2018-12-21 RX ORDER — KETOROLAC TROMETHAMINE 15 MG/ML
15 INJECTION, SOLUTION INTRAMUSCULAR; INTRAVENOUS ONCE
Status: COMPLETED | OUTPATIENT
Start: 2018-12-21 | End: 2018-12-21

## 2018-12-21 RX ORDER — CYCLOBENZAPRINE HCL 10 MG
10 TABLET ORAL 3 TIMES DAILY PRN
Qty: 10 TABLET | Refills: 0 | Status: SHIPPED | OUTPATIENT
Start: 2018-12-21 | End: 2019-06-04

## 2018-12-21 RX ADMIN — FENTANYL CITRATE 50 MCG: 50 INJECTION, SOLUTION INTRAMUSCULAR; INTRAVENOUS at 08:29

## 2018-12-21 RX ADMIN — FENTANYL CITRATE 50 MCG: 50 INJECTION, SOLUTION INTRAMUSCULAR; INTRAVENOUS at 07:33

## 2018-12-21 RX ADMIN — KETOROLAC TROMETHAMINE 15 MG: 15 INJECTION, SOLUTION INTRAMUSCULAR; INTRAVENOUS at 08:35

## 2018-12-21 RX ADMIN — SODIUM CHLORIDE 500 ML: 9 INJECTION, SOLUTION INTRAVENOUS at 07:33

## 2018-12-21 ASSESSMENT — MIFFLIN-ST. JEOR: SCORE: 1148.22

## 2018-12-21 NOTE — ED AVS SNAPSHOT
[facility] Emergency Department  [address] [address]  Phone:  [phone]  Fax:  [phone]                                    Chyna Moncada   MRN: [id number]    Department:  [facility] Emergency Department   Date of Visit:  12/21/2018           After Visit Summary Signature Page    I have received my discharge instructions, and my questions have been answered. I have discussed any challenges I see with this plan with the nurse or doctor.    ..........................................................................................................................................  Patient/Patient Representative Signature      ..........................................................................................................................................  Patient Representative Print Name and Relationship to Patient    ..................................................               ................................................  Date                                   Time    ..........................................................................................................................................  Reviewed by Signature/Title    ...................................................              ..............................................  Date                                               Time          22EPIC Rev 08/18

## 2018-12-21 NOTE — ED TRIAGE NOTES
"Patient started feeling dull ache in right side of back just under rib cage. Denies injury to area. Normal bowel and bladder. Normal appetite. Pain intensified at 0130, did not take anything for comfort, rating \"10/10.\". Called EMS at 0600, was so much pain unable to move.   Patient states \"I'm starving.\"  "

## 2018-12-21 NOTE — ED PROVIDER NOTES
Portable  History     Chief Complaint   Patient presents with     Back Pain     Right flank     HPI  Chyna Moncada is a 68 year old female who presents the emergency department complaining of back pain.  Patient states she was in her normal state of health until last night when she had a dull ache in her back is mostly just under the rib cage on the right side.  It was a did not radiate.  She denied any midline back tenderness she went to bed did not take anything for it she woke up at about 130 this morning with a sharp pain to the same region.  Pain worsened with movement.  She has not had any fevers or chills denies any chest pain or shortness of breath.  She denies any recent trauma.  She is not had any abdominal pain.  She is not noticed any pain with urination or blood in her urine.  She denies any focal numbness weakness any extremity.  She currently rates her pain a 5 out of 10 significantly worsened with movement.  No history of kidney stone.  She has been eating and drinking well.    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  I do not have previous study for comparison, so I think it is up to her what she wants to do regarding her actonel - resume med or repeat dexa in 3 years - 2018.         TGA (transient global amnesia) 03/31/2014     Priority: Medium     Altered mental status 03/30/2014     Priority: Medium     Osteoarthritis of hip 02/06/2013     Priority: Medium     Bilateral sensorineural hearing loss 09/30/2012     Priority: Medium     Overview:   Candidate for hearing aids       Tendinitis of shoulder 02/01/2012     Priority: Medium     Recurrent herpes labialis 12/03/2010     Priority: Medium     Postmenopausal atrophic vaginitis 01/20/2010     Priority: Medium     Insomnia 06/19/2009     " Priority: Medium     Allergic rhinitis 2007     Priority: Medium     Anxiety state 2007     Priority: Medium     Overview:   Infrequent Ativan use.         Cerebral artery occlusion with cerebral infarction (H) 2007     Priority: Medium     On her [facility] summary page, it says she has history of CVA in the left occipital region, noted incidentally on MRI. The problem entry is dated 2007. She recalls being told \"you must have had a stroke at some point\" but never recalls any incident. She does not remember why she was having an MRI of her brain in .       Degeneration of cervical intervertebral disc 2007     Priority: Medium     Degeneration of lumbar or lumbosacral intervertebral disc 2007     Priority: Medium     Raynaud's syndrome 2007     Priority: Medium     Symptomatic menopausal or female climacteric states 2007     Priority: Medium     Peripheral vascular disease (H) 2007     Priority: Medium     Atopic rhinitis 2007     Priority: Medium     Pulmonary nodules 2014     Priority: Low        Past Medical History:    Past Medical History:   Diagnosis Date     Cerebral embolism with cerebral infarction (H)        Past Surgical History:    Past Surgical History:   Procedure Laterality Date     HYSTERECTOMY, PAP NO LONGER INDICATED         Family History:    Family History   Problem Relation Age of Onset     Arthritis Mother      Cancer Mother         GYN     Heart Disease Father      Alcohol/Drug Maternal Grandfather      Heart Disease Paternal Grandfather         Heart attack     Diabetes Paternal Grandfather      Cancer Brother         throat       Social History:  Marital Status:   [2]  Social History     Tobacco Use     Smoking status: Former Smoker     Types: Cigarettes     Last attempt to quit: 1/10/1983     Years since quittin.9     Smokeless tobacco: Never Used   Substance Use Topics     Alcohol use: Yes     Alcohol/week: 0.6 oz " "    Types: 1 Glasses of wine per week     Comment: DAILY     Drug use: No        Medications:      aspirin EC 81 MG tablet   calcium-vitamin D (CALTRATE) 600-400 MG-UNIT per tablet   Cholecalciferol (VITAMIN D-3 PO)   LORazepam (ATIVAN) 0.5 MG tablet   mometasone (NASONEX) 50 MCG/ACT spray   Multiple Vitamin (MULTI-VITAMINS) TABS   Multiple Vitamins-Minerals (HAIR SKIN NAILS PO)   multivitamin (OCUVITE) TABS   order for DME   order for DME   pravastatin (PRAVACHOL) 40 MG tablet   Propylene Glycol-Glycerin 1-0.3 % SOLN   valACYclovir (VALTREX) 1000 mg tablet         Review of Systems   All systems reviewed and other than pertinent positives and negatives in HPI all other systems are negative.  Physical Exam   BP: 130/76  Heart Rate: 75  Temp: 97.8  F (36.6  C)  Resp: 16  Height: 167.6 cm (5' 6\")  Weight: 60.1 kg (132 lb 9.6 oz)  SpO2: 98 %      Physical Exam   Constitutional: She appears well-developed and well-nourished. No distress.   HENT:   Head: Normocephalic.   Mouth/Throat: Oropharynx is clear and moist.   Eyes: Conjunctivae are normal.   Neck: Normal range of motion. Neck supple.   Cardiovascular: Normal rate, regular rhythm, normal heart sounds and intact distal pulses.   No murmur heard.  Pulmonary/Chest: Breath sounds normal. No respiratory distress.   Abdominal: Soft. Bowel sounds are normal. She exhibits no distension. There is no tenderness.   Musculoskeletal:   Tenderness to palpation of the right flank region.  This reproduces patient's pain.  No midline back tenderness no lower back tenderness.  Moving extremities well no pain with leg raise.  No peripheral edema no calf tenderness pulses sensation symmetrical.   Neurological: She is alert. She exhibits normal muscle tone.   Skin: Skin is warm and dry. No rash noted.   Psychiatric: She has a normal mood and affect.   Nursing note and vitals reviewed.      ED Course        Procedures               Critical Care time:  none             Labs Ordered and " Resulted from Time of ED Arrival Up to the Time of Departure from the ED   BASIC METABOLIC PANEL - Abnormal; Notable for the following components:       Result Value    Calcium 8.4 (*)     All other components within normal limits   ROUTINE UA WITH MICROSCOPIC - Abnormal; Notable for the following components:    Amorphous Crystals Few (*)     All other components within normal limits   CBC WITH PLATELETS DIFFERENTIAL     Results for orders placed or performed during the hospital encounter of 12/21/18   Abd/pelvis CT - no contrast - Stone Protocol    Narrative    CT ABDOMEN AND PELVIS WITHOUT CONTRAST   12/21/2018 8:19 AM     HISTORY: Right flank pain.    TECHNIQUE: Volumetric helical sections were acquired from the lung  bases through the ischial tuberosities without IV contrast. Coronal  images were also reconstructed. Radiation dose for this scan was  reduced using automated exposure control, adjustment of the mA and/or  kV according to patient size, or iterative reconstruction technique.    COMPARISON: None.    FINDINGS: Nonobstructing 0.2 cm stone in the lower pole of the right  kidney. No other urinary calculi are identified. No ureteral calculi  or hydronephrosis. Prior cholecystectomy. Indeterminate low-density  lesion in the posterior segment of the right hepatic lobe measures 1.2  cm (series 2 image 16). The liver, spleen, adrenal glands, pancreas,  and kidneys have otherwise unremarkable noncontrast appearances. Mild  prominence of the common duct may be related to the patient's age and  postcholecystectomy state. The common duct measures up to 1.4 cm in  diameter. Mild atherosclerotic aortoiliac calcification. No bowel  obstruction. The uterus is not seen, consistent with history of prior  hysterectomy. No convincing evidence for colitis or diverticulitis. No  free fluid in the pelvis. Unremarkable appendix. Degenerative changes  are noted in the lumbar spine.      Impression    IMPRESSION:   1.  Nonobstructing stone in the lower pole of the right kidney measures  0.2 cm.  2. No ureteral calculi or evidence for urinary obstruction.  3. Prior cholecystectomy.   4. Mild dilatation of the common duct may be related to the patient's  age and postcholecystectomy state.  5. Indeterminate 1.2 cm low-density right hepatic lesion. This finding  could represent a hemangioma, but is not definitively characterized.  Liver MRI may be helpful for further characterization.     FLOR LEÓN MD         Medications   fentaNYL (PF) (SUBLIMAZE) injection 50 mcg (50 mcg Intravenous Given 12/21/18 0733)   0.9% sodium chloride BOLUS (0 mLs Intravenous Stopped 12/21/18 0841)   fentaNYL (PF) (SUBLIMAZE) injection 50 mcg (50 mcg Intravenous Given 12/21/18 0829)   ketorolac (TORADOL) injection 15 mg (15 mg Intravenous Given 12/21/18 0835)       Assessments & Plan (with Medical Decision Making) records were reviewed.  Labs were obtained.  Patient was given fentanyl for pain and IV fluids.  CBC was unremarkable.  Comprehensive metabolic panel without significant abnormality.  Urine analysis unremarkable.  Urine analysis was negative except for a few amorphous crystals.  Patient continued to have pain and was given and Toradol.  She had improvement of pain.  Due to her pain and presentation I discussed the risks and benefits of CT stone protocol with her and this was obtained.   there was no evidence of.  No other significant abnormality was noted   Except a hepatic lesion was noted.   this was indeterminate in nature and was recommended follow-up MRI scan as an outpatient be done.  Patient is informed of findings.  She had significant relief of her pain.  She feels comfortable going home at this time.  She understands about the liver lesion and will follow up with her primary care for further evaluation and care I am going to give the patient a muscle relaxant and have her return if symptoms worsen or new symptoms develop.     I  have reviewed the nursing notes.    I have reviewed the findings, diagnosis, plan and need for follow up with the patient.          Medication List      Started    cyclobenzaprine 10 MG tablet  Commonly known as:  FLEXERIL  10 mg, Oral, 3 TIMES DAILY PRN            Final diagnoses:   Acute right-sided low back pain without sciatica   Liver lesion, right lobe       12/21/2018   [facility] EMERGENCY DEPARTMENT     Vamshi Lucero MD  12/22/18 0191

## 2018-12-21 NOTE — DISCHARGE INSTRUCTIONS
Return if symptoms worsen or new symptoms develop.  Follow-up with primary care physician next available.  Drink plenty of fluids.  If increased back pain numbness weakness any extremity or other symptoms present any bowel or bladder dysfunction or other symptoms please return for further evaluation and care.  Take Flexeril as directed.  Follow-up with primary care for set up of the liver MRI to further assess the liver lesion.

## 2018-12-28 ENCOUNTER — TELEPHONE (OUTPATIENT)
Dept: FAMILY MEDICINE | Facility: CLINIC | Age: 68
End: 2018-12-28

## 2018-12-28 NOTE — TELEPHONE ENCOUNTER
S-(situation): this AM woke up and left thumb joint is sore    B-(background): cleaned area yesterday.  No swelling or pain. No fever    A-(assessment): puncture wound to left thumb joint    R-(recommendations): advised to watch it.  If worse needs to be seen   Iesha Henriquez RN

## 2018-12-28 NOTE — TELEPHONE ENCOUNTER
Was trying to get a plastic piece off of a bottle last night with a knife and it slipped and the point of the knife slipped and the point hit her bone in her left thumb. Just above the last knuckle that goes in to the hand. She said it didn't bleed a whole lot but today it is red and sore. She is wondering if she should have it looked at. Please advise.    Kimmy [place]

## 2019-01-02 ENCOUNTER — OFFICE VISIT (OUTPATIENT)
Dept: FAMILY MEDICINE | Facility: CLINIC | Age: 69
End: 2019-01-02
Payer: COMMERCIAL

## 2019-01-02 VITALS
HEART RATE: 80 BPM | DIASTOLIC BLOOD PRESSURE: 62 MMHG | WEIGHT: 133.6 LBS | TEMPERATURE: 97.2 F | SYSTOLIC BLOOD PRESSURE: 118 MMHG | BODY MASS INDEX: 21.56 KG/M2

## 2019-01-02 DIAGNOSIS — M85.80 OSTEOPENIA, UNSPECIFIED LOCATION: ICD-10-CM

## 2019-01-02 DIAGNOSIS — K76.9 HEPATIC LESION: ICD-10-CM

## 2019-01-02 DIAGNOSIS — M62.830 BACK MUSCLE SPASM: Primary | ICD-10-CM

## 2019-01-02 DIAGNOSIS — E83.51 HYPOCALCEMIA: ICD-10-CM

## 2019-01-02 PROCEDURE — 99214 OFFICE O/P EST MOD 30 MIN: CPT | Performed by: PHYSICIAN ASSISTANT

## 2019-01-02 RX ORDER — LORAZEPAM 0.5 MG/1
0.5 TABLET ORAL EVERY 6 HOURS PRN
Qty: 10 TABLET | Refills: 0 | Status: CANCELLED | OUTPATIENT
Start: 2019-01-02

## 2019-01-02 ASSESSMENT — PAIN SCALES - GENERAL: PAINLEVEL: NO PAIN (0)

## 2019-01-02 NOTE — PATIENT INSTRUCTIONS
See if back pain fully resolves  If bothers, will try Physical Therapy - ok to call if need the order placed    Don't worry about barely low blood calcium level  Cut back your supplement of calcium - 1200 mg calcium from all sources is daily target  If getting some through diet, target is 120% daily value on nutrition labels  Vitamin D 800 units per day or more     Kidney stone may never do anything, or may move this week    Set up liver MRI  Call [phone] to set up your imaging testing.      Call if questions

## 2019-01-02 NOTE — PROGRESS NOTES
SUBJECTIVE:   Chyna Moncada is a 68 year old female who presents to clinic today for the following health issues:      ED/UC Followup:    Facility:  [facility]  Date of visit: 12/21/18  Reason for visit: Rt side low back pain  Current Status: Still having twinges   No injuries or new activities, dull ache after eating out with friend, then woke with excruciating pain at 1:30 am.  Seen in ED, labs fairly unremarkable, CT abdomen/pelvis no explanatory findings.  Notes fentanyl did not help.  Ativan helped slightly, has only taken 3 tabs.  Can still feel it a little, a bit sore, but 99% better.  Back to normal activity, just occasional twinge.      Discuss CT results of Abdomen and Pelvis from 12/21/2018. Indeterminant liver lesion.  Questions about hypocalcemia.      Osteopenia.  Dexa UTD.  Doing wt bearing exercises.      Problem list and histories reviewed & adjusted, as indicated.  Additional history: as documented    BP Readings from Last 3 Encounters:   01/02/19 118/62   12/21/18 128/72   11/07/18 125/70    Wt Readings from Last 3 Encounters:   01/02/19 60.6 kg (133 lb 9.6 oz)   12/21/18 60.1 kg (132 lb 9.6 oz)   11/07/18 60.8 kg (134 lb)        Labs reviewed in EPIC    Reviewed and updated as needed this visit by clinical staff  Tobacco  Allergies  Meds  Problems  Med Hx  Surg Hx  Fam Hx  Soc Hx        Reviewed and updated as needed this visit by Provider  Tobacco  Allergies  Meds  Problems  Med Hx  Surg Hx  Fam Hx  Soc Hx          ROS:  Constitutional, musculoskeletal systems are negative, except as otherwise noted.    OBJECTIVE:     /62 (BP Location: Right arm, Patient Position: Sitting, Cuff Size: Adult Regular)   Pulse 80   Temp 97.2  F (36.2  C) (Tympanic)   Wt 60.6 kg (133 lb 9.6 oz)   BMI 21.56 kg/m    Body mass index is 21.56 kg/m .  GENERAL: healthy, alert and no distress  MS: still R sided thoracic paraspinatus spasm    ASSESSMENT/PLAN:       ICD-10-CM    1. Back  muscle spasm M62.830    2. Hepatic lesion K76.9 MR Liver wo & w Contrast   3. Hypocalcemia E83.51    4. Osteopenia, unspecified location M85.80      Advised to try massage then Physical Therapy if not better  Patient Instructions   See if back pain fully resolves  If bothers, will try Physical Therapy - ok to call if need the order placed    Don't worry about barely low blood calcium level  Cut back your supplement of calcium - 1200 mg calcium from all sources is daily target  If getting some through diet, target is 120% daily value on nutrition labels  Vitamin D 800 units per day or more     Kidney stone may never do anything, or may move this week    Set up liver MRI  Call [phone] to set up your imaging testing.      Call if questions      Edilia Davison PA-C  [facility]

## 2019-01-02 NOTE — NURSING NOTE
"Chief Complaint   Patient presents with     Hospital F/U       Initial /62 (BP Location: Right arm, Patient Position: Sitting, Cuff Size: Adult Regular)   Pulse 80   Temp 97.2  F (36.2  C) (Tympanic)   Wt 60.6 kg (133 lb 9.6 oz)   BMI 21.56 kg/m   Estimated body mass index is 21.56 kg/m  as calculated from the following:    Height as of 12/21/18: 1.676 m (5' 6\").    Weight as of this encounter: 60.6 kg (133 lb 9.6 oz).    Patient presents to the clinic using No DME    Health Maintenance that is potentially due pending provider review:  NONE    n/a    Is there anyone who you would like to be able to receive your results? No  If yes have patient fill out KRISTY    Lindsey Clements CMA    "

## 2019-01-14 ENCOUNTER — HOSPITAL ENCOUNTER (OUTPATIENT)
Dept: MRI IMAGING | Facility: CLINIC | Age: 69
Discharge: HOME | End: 2019-01-14
Attending: PHYSICIAN ASSISTANT | Admitting: PHYSICIAN ASSISTANT
Payer: COMMERCIAL

## 2019-01-14 DIAGNOSIS — K76.9 HEPATIC LESION: ICD-10-CM

## 2019-01-14 PROCEDURE — 74183 MRI ABD W/O CNTR FLWD CNTR: CPT

## 2019-01-14 PROCEDURE — A9585 GADOBUTROL INJECTION: HCPCS | Performed by: PHYSICIAN ASSISTANT

## 2019-01-14 PROCEDURE — 25500064 ZZH RX 255 OP 636: Performed by: PHYSICIAN ASSISTANT

## 2019-01-14 RX ORDER — GADOBUTROL 604.72 MG/ML
6 INJECTION INTRAVENOUS ONCE
Status: COMPLETED | OUTPATIENT
Start: 2019-01-14 | End: 2019-01-14

## 2019-01-14 RX ADMIN — GADOBUTROL 6 ML: 604.72 INJECTION INTRAVENOUS at 08:13

## 2019-02-18 NOTE — ADDENDUM NOTE
Encounter addended by: Azul Ortega, PT on: 2/18/2019 9:38 AM   Actions taken: Sign clinical note, Flowsheet accepted, Charge Capture section accepted, Episode resolved

## 2019-02-18 NOTE — PROGRESS NOTES
Outpatient Physical Therapy Discharge Note     Patient: Chyna Moncada  : 1950    Evaluation date:  11/15/18     Referring Provider: Dr Pace    Therapy Diagnosis: R foot pain    Client Self Report:   Current status is unknown since patient did not return for further PT visits.    Objective Measurements:  Current objective status is unknown since patient failed to complete treatment     Goals:  Goal Identifier walking   Goal Description Pt will be walk w less than 1/10 pain to be able to go grocery shopping   Target Date 18   Date Met      Progress:unable to assess as pt failed to schedule f/u appts     Goal Identifier ankle DF    Goal Description Pt will demonstrate 10 deg AROM DF in R ankle w/o pain in order to be able to walk w improved gait mechanics.   Target Date 18   Date Met      Progress:unable to assess as pt failed to schedule f/u appts     Goal Identifier Running   Goal Description Pt will be able to run w less than 1/10 foot pain to be able return to PLOF and get in shape.    Target Date 18   Date Met      Progress:unable to assess as pt failed to schedule f/u appts     Goal Identifier LEFS   Goal Description Pt will score 60/80 on LEFS outcome tool to demonstrate clinically significant improvement and be able to complete more tasks at home   Target Date 18   Date Met      Progress:unable to assess as pt failed to schedule f/u appts       Progress Toward Goals:   Progress this reporting period: Pt has failed to schedule f/u visits within 30 days from last visit thus is being d/c from therapy at this time. Objective measures are all taken from last visit. Current status is unknown at this time.          Plan:  Discharge from therapy.    Discharge:    Reason for Discharge: Patient has failed to schedule further appointments.    Medicare G-code: Patient did not attend a final scheduled session prior to discharge. Unable to determine discharge functional  status.      Equipment Issued: none    Discharge Plan:  Not completed since patient failed to schedule further appointments.    Azul Ortega  Physical Therapist #[id number]  [place] [id number] [place] [id number]  [email or web address]   [email or web address]   Office: [phone] Fax: [phone]

## 2019-05-21 ENCOUNTER — TELEPHONE (OUTPATIENT)
Dept: FAMILY MEDICINE | Facility: CLINIC | Age: 69
End: 2019-05-21

## 2019-05-21 NOTE — TELEPHONE ENCOUNTER
"Reason for call:  Patient reporting a symptom    Symptom or request: Pt says she is going through a divorce, move and changing banking accounts etc and is in a lot of stress. She is having trouble sleeping so would like to get refill of her Lorazepam. She has not had it for 2 years. I consulted with the RN and she said due to this being a controlled substance, she would need to be seen. Pt did schedule apt for 11:20 AM Thursday 5/22 but says she may not be able to come because if it's raining, she can't leave her \"Neurotic dog\" because it goes crazy if it's raining.   I didn't mention this to the patient, but is this something that could be done as a phone visit?  Phone Number patient can be reached at:  Cell number on file:    Telephone Information:   Mobile [phone]       Best Time:  anytime        Call taken on 5/21/2019 at 2:02 PM by Cassy Slaughter    "

## 2019-05-22 NOTE — TELEPHONE ENCOUNTER
I would want to see patient in person.  She can be in a same day/acute/simple hold.  I will not refill without seeing her.

## 2019-05-22 NOTE — TELEPHONE ENCOUNTER
Patient no showed her appt today because dog was having a hard time with the rain.  She doesn't want to reschedule at this time - but she will call us if she needs us.

## 2019-06-04 ENCOUNTER — OFFICE VISIT (OUTPATIENT)
Dept: FAMILY MEDICINE | Facility: CLINIC | Age: 69
End: 2019-06-04
Payer: COMMERCIAL

## 2019-06-04 ENCOUNTER — ANCILLARY PROCEDURE (OUTPATIENT)
Dept: GENERAL RADIOLOGY | Facility: CLINIC | Age: 69
End: 2019-06-04
Attending: NURSE PRACTITIONER
Payer: COMMERCIAL

## 2019-06-04 VITALS
WEIGHT: 132 LBS | SYSTOLIC BLOOD PRESSURE: 102 MMHG | TEMPERATURE: 97.2 F | HEART RATE: 72 BPM | BODY MASS INDEX: 21.99 KG/M2 | HEIGHT: 65 IN | RESPIRATION RATE: 16 BRPM | DIASTOLIC BLOOD PRESSURE: 62 MMHG

## 2019-06-04 DIAGNOSIS — M25.562 ACUTE PAIN OF LEFT KNEE: ICD-10-CM

## 2019-06-04 DIAGNOSIS — F41.9 ANXIETY: Primary | ICD-10-CM

## 2019-06-04 PROCEDURE — 99214 OFFICE O/P EST MOD 30 MIN: CPT | Performed by: NURSE PRACTITIONER

## 2019-06-04 PROCEDURE — 73560 X-RAY EXAM OF KNEE 1 OR 2: CPT | Mod: LT

## 2019-06-04 RX ORDER — LORAZEPAM 0.5 MG/1
0.5 TABLET ORAL EVERY 6 HOURS PRN
Qty: 10 TABLET | Refills: 0 | Status: SHIPPED | OUTPATIENT
Start: 2019-06-04 | End: 2020-12-10

## 2019-06-04 ASSESSMENT — MIFFLIN-ST. JEOR: SCORE: 1133.59

## 2019-06-04 ASSESSMENT — PATIENT HEALTH QUESTIONNAIRE - PHQ9
SUM OF ALL RESPONSES TO PHQ QUESTIONS 1-9: 2
10. IF YOU CHECKED OFF ANY PROBLEMS, HOW DIFFICULT HAVE THESE PROBLEMS MADE IT FOR YOU TO DO YOUR WORK, TAKE CARE OF THINGS AT HOME, OR GET ALONG WITH OTHER PEOPLE: NOT DIFFICULT AT ALL
SUM OF ALL RESPONSES TO PHQ QUESTIONS 1-9: 2

## 2019-06-04 NOTE — PATIENT INSTRUCTIONS
Patient Education     Bursitis    You have bursitis. This is an inflammation of the bursa. These are small, fluid-filled sacs that surround the larger joints of the body. The bursa help the muscles and tendons move smoothly over the joints.  Bursitis often happens in the shoulder. But it can also affect the elbows, hips, pelvis, knees, toes, and heels. Bursitis can be caused by injury, overuse of the joint, or infection of the bursa. Symptoms include pain and tenderness over a joint. Symptoms get worse with movement.  Bursitis is treated with an anti-inflammatory medicine and by resting the joint. More severe cases require injection of medicine directly into the bursa. In the case of infection, surgery and antibiotics may be needed.  Home care    Rest the painful joint and protect it from movement. This will allow the inflammation to heal faster.    Apply an ice pack over the injured area for no more than 15 to 20 minutes. Do this every 3 to 6 hours for the first 24 to 48 hours. Keep using ice packs 3 to 4 times a day until the pain and swelling improves.     To make an ice pack, put ice cubes in a sealed plastic bag. Wrap the bag in a clean, thin towel or cloth. Never put ice or an ice pack directly on the skin. As the ice melts, be careful to not to get the wrap or splint wet.    You may take over-the-counter pain medicine to treat pain and inflammation, unless another medicine was prescribed. Anti-inflammatory pain medicines may be more effective. Talk with your provider before using these medicines if you have chronic liver or kidney disease, or ever had a stomach ulcer or gastrointestinal bleeding.    As your symptoms improve, slowly begin to move the joint. Don't overuse the joint. This may cause the symptoms to flare up again.  When to seek medical advice  Call your healthcare provider right away if any of these occur:    Redness or warmth over the painful area    Increasing pain or swelling at the  joint    Fever of 100.4 F (38 C) or above lasting for 24 to 48 hours, or as advised    [employer]  Date Last Reviewed: 5/1/2018 2000-2018 [employer]. [address] [place] [address], [place] [address]. All rights reserved. This information is not intended as a substitute for professional medical care. Always follow your healthcare professional's instructions.

## 2019-06-04 NOTE — PROGRESS NOTES
Subjective     Chyna Moncada is a 68 year old female who presents to clinic today for the following health issues:    HPI   Musculoskeletal problem/pain      Duration: 2 weeks     Description  Location: left knee    Intensity:  moderate    Accompanying signs and symptoms: veins are protruding out more on lower leg.     History  Previous similar problem: no   Previous evaluation:  none    Precipitating or alleviating factors:  Trauma or overuse: no   Aggravating factors include: stairs, kneeling     Therapies tried and outcome: nothing    Anxiety Follow-Up    How are you doing with your anxiety since your last visit? Feeling like wanting something to decompress when having anxiety.     Are you having other symptoms that might be associated with anxiety? Not sleeping well at times     Have you had a significant life event? OTHER: fathers health      Are you feeling depressed? No    Do you have any concerns with your use of alcohol or other drugs? No     Rare use of Ativan helps with symptoms. Last prescription lasted over 2 years    Social History     Tobacco Use     Smoking status: Former Smoker     Types: Cigarettes     Last attempt to quit: 1/10/1983     Years since quittin.4     Smokeless tobacco: Never Used   Substance Use Topics     Alcohol use: Yes     Alcohol/week: 0.6 oz     Types: 1 Glasses of wine per week     Comment: DAILY     Drug use: No     LALITA-7 SCORE 2016   Total Score 3 3 1     PHQ 2016   PHQ-9 Total Score 4 11 2   Q9: Thoughts of better off dead/self-harm past 2 weeks Not at all Not at all Not at all     No flowsheet data found.      Patient Active Problem List   Diagnosis     Altered mental status     Allergic rhinitis     Anxiety state     Cerebral artery occlusion with cerebral infarction (H)     Degeneration of cervical intervertebral disc     Degeneration of lumbar or lumbosacral intervertebral disc     Insomnia     Postmenopausal  atrophic vaginitis     Raynaud's syndrome     Symptomatic menopausal or female climacteric states     Osteopenia     Pulmonary nodules     TGA (transient global amnesia)     Recurrent cold sores     Peripheral vascular disease (H)     Atopic rhinitis     Osteoarthritis of hip     Recurrent herpes labialis     Bilateral sensorineural hearing loss     Tendinitis of shoulder     Past Surgical History:   Procedure Laterality Date     HYSTERECTOMY, PAP NO LONGER INDICATED         Social History     Tobacco Use     Smoking status: Former Smoker     Types: Cigarettes     Last attempt to quit: 1/10/1983     Years since quittin.4     Smokeless tobacco: Never Used   Substance Use Topics     Alcohol use: Yes     Alcohol/week: 0.6 oz     Types: 1 Glasses of wine per week     Comment: DAILY     Family History   Problem Relation Age of Onset     Arthritis Mother      Cancer Mother         GYN     Heart Disease Father      Alcohol/Drug Maternal Grandfather      Heart Disease Paternal Grandfather         Heart attack     Diabetes Paternal Grandfather      Cancer Brother         throat         Current Outpatient Medications   Medication Sig Dispense Refill     aspirin EC 81 MG tablet Take 81 mg by mouth daily        calcium-vitamin D (CALTRATE) 600-400 MG-UNIT per tablet Take 1 tablet by mouth daily        Cholecalciferol (VITAMIN D-3 PO) Take 1,000 Units by mouth daily       LORazepam (ATIVAN) 0.5 MG tablet Take 1 tablet (0.5 mg) by mouth every 6 hours as needed 10 tablet 0     mometasone (NASONEX) 50 MCG/ACT spray Spray 2 sprays in nostril daily        Multiple Vitamin (MULTI-VITAMINS) TABS Take 1 tablet by mouth daily       Multiple Vitamins-Minerals (HAIR SKIN NAILS PO) Take 3 tablets by mouth daily        multivitamin (OCUVITE) TABS Take 1 tablet by mouth daily       pravastatin (PRAVACHOL) 40 MG tablet TAKE ONE TABLET BY MOUTH EVERY NIGHT AT BEDTIME 90 tablet 3     Propylene Glycol-Glycerin 1-0.3 % SOLN Place 1 drop into  "both eyes as needed       valACYclovir (VALTREX) 1000 mg tablet TAKE TWO TABLETS BY MOUTH TWICE A DAY 4 tablet 11     Allergies   Allergen Reactions     Tramadol Anaphylaxis     Benadryl [Altaryl] Other (See Comments)     Made her goofy     Caffeine Other (See Comments)     Jittery       Codeine Nausea and Vomiting     Darvocet [Propoxyphene N-Apap]      Other reaction(s): GI Upset     Diphenhydramine Other (See Comments)     Lovastatin Other (See Comments) and Muscle Pain (Myalgia)     Other reaction(s): Myalgia  pain     Percocet [Oxycodone-Acetaminophen] Other (See Comments)     Other reaction(s): Dizziness  Sick or dizzy?     Vicodin [Hydrocodone-Acetaminophen] Other (See Comments) and Nausea     Sick or dizzy?       Reviewed and updated as needed this visit by Provider  Tobacco  Allergies  Meds  Problems  Med Hx  Surg Hx  Fam Hx         Review of Systems   ROS COMP: Constitutional, HEENT, cardiovascular, pulmonary, gi and gu systems are negative, except as otherwise noted.      Objective    /62 (Cuff Size: Adult Regular)   Pulse 72   Temp 97.2  F (36.2  C) (Tympanic)   Resp 16   Ht 1.657 m (5' 5.25\")   Wt 59.9 kg (132 lb)   BMI 21.80 kg/m    Body mass index is 21.8 kg/m .  Physical Exam   GENERAL: healthy, alert and no distress  HENT: ear canals and TM's normal, nose and mouth without ulcers or lesions  NECK: no adenopathy, no asymmetry, masses, or scars and thyroid normal to palpation  RESP: lungs clear to auscultation - no rales, rhonchi or wheezes  CV: regular rates and rhythm, normal S1 S2, no S3 or S4, no murmur, click or rub, peripheral pulses strong, no peripheral edema and varicosities bilateral LE  MS: left knee with no tenderness to palpation, full range of motion and strength  PSYCH: mentation appears normal, affect normal/bright    Diagnostic Test Results:  Xray -   XR KNEE STANDING AP BILAT AND LATERAL LEFT 6/4/2019 11:24 AM    COMPARISON: None.    HISTORY: Acute LEFT knee " pain.    FINDINGS:  RIGHT knee: Evaluation is limited to the anterior projection. Joint  spaces are preserved. Mild osteophytosis. No fractures are seen.    LEFT knee: Minimal osteophytosis with preserved joint space. No  fractures are seen. No knee effusion.      Impression     IMPRESSION: Mild degenerative changes in both knees. No acute bone  abnormality identified.    ACE DELEON MD             Assessment & Plan     1. Anxiety  Stable with rare use of Lorazepam.  Symptomatic care and follow up discussed.  - LORazepam (ATIVAN) 0.5 MG tablet; Take 1 tablet (0.5 mg) by mouth every 6 hours as needed  Dispense: 10 tablet; Refill: 0    2. Acute pain of left knee  X ray with mild degenerative changes present.  Recommend compression stockings for varicosities and intermittent elevation throughout the day.  Some inflammation of the bursa present recommend rest, ice, elevate, and antiinflammatory use.  Consider PT if not improving.  Symptomatic care and follow up discussed.  - XR Knee Standing AP Bilat & Lateral Left; Future    Home care instructions were reviewed with the patient. The risks, benefits and treatment options of prescribed medications or other treatments have been discussed with the patient. The patient verbalized their understanding and should call or follow up if no improvement or if they develop further problems.     Patient Instructions     Patient Education     Bursitis    You have bursitis. This is an inflammation of the bursa. These are small, fluid-filled sacs that surround the larger joints of the body. The bursa help the muscles and tendons move smoothly over the joints.  Bursitis often happens in the shoulder. But it can also affect the elbows, hips, pelvis, knees, toes, and heels. Bursitis can be caused by injury, overuse of the joint, or infection of the bursa. Symptoms include pain and tenderness over a joint. Symptoms get worse with movement.  Bursitis is treated with an anti-inflammatory  medicine and by resting the joint. More severe cases require injection of medicine directly into the bursa. In the case of infection, surgery and antibiotics may be needed.  Home care    Rest the painful joint and protect it from movement. This will allow the inflammation to heal faster.    Apply an ice pack over the injured area for no more than 15 to 20 minutes. Do this every 3 to 6 hours for the first 24 to 48 hours. Keep using ice packs 3 to 4 times a day until the pain and swelling improves.     To make an ice pack, put ice cubes in a sealed plastic bag. Wrap the bag in a clean, thin towel or cloth. Never put ice or an ice pack directly on the skin. As the ice melts, be careful to not to get the wrap or splint wet.    You may take over-the-counter pain medicine to treat pain and inflammation, unless another medicine was prescribed. Anti-inflammatory pain medicines may be more effective. Talk with your provider before using these medicines if you have chronic liver or kidney disease, or ever had a stomach ulcer or gastrointestinal bleeding.    As your symptoms improve, slowly begin to move the joint. Don't overuse the joint. This may cause the symptoms to flare up again.  When to seek medical advice  Call your healthcare provider right away if any of these occur:    Redness or warmth over the painful area    Increasing pain or swelling at the joint    Fever of 100.4 F (38 C) or above lasting for 24 to 48 hours, or as advised    Chills  Date Last Reviewed: 5/1/2018 2000-2018 [employer]. [address] [place] [address], [place] [address]. All rights reserved. This information is not intended as a substitute for professional medical care. Always follow your healthcare professional's instructions.               Return in about 1 month (around 7/2/2019), or if symptoms worsen or fail to improve.    GEORGE Izaguirre [facility]      Answers for HPI/ROS submitted by the patient on  6/4/2019   If you checked off any problems, how difficult have these problems made it for you to do your work, take care of things at home, or get along with other people?: Not difficult at all  PHQ9 TOTAL SCORE: 2

## 2019-06-05 ASSESSMENT — PATIENT HEALTH QUESTIONNAIRE - PHQ9: SUM OF ALL RESPONSES TO PHQ QUESTIONS 1-9: 2

## 2019-07-22 ENCOUNTER — OFFICE VISIT (OUTPATIENT)
Dept: FAMILY MEDICINE | Facility: CLINIC | Age: 69
End: 2019-07-22
Payer: COMMERCIAL

## 2019-07-22 VITALS
SYSTOLIC BLOOD PRESSURE: 108 MMHG | HEART RATE: 84 BPM | RESPIRATION RATE: 16 BRPM | DIASTOLIC BLOOD PRESSURE: 68 MMHG | WEIGHT: 135.8 LBS | HEIGHT: 65 IN | BODY MASS INDEX: 22.63 KG/M2 | TEMPERATURE: 97 F

## 2019-07-22 DIAGNOSIS — M17.12 ARTHRITIS OF LEFT KNEE: Primary | ICD-10-CM

## 2019-07-22 PROCEDURE — 20610 DRAIN/INJ JOINT/BURSA W/O US: CPT | Mod: LT | Performed by: FAMILY MEDICINE

## 2019-07-22 PROCEDURE — 99213 OFFICE O/P EST LOW 20 MIN: CPT | Mod: 25 | Performed by: FAMILY MEDICINE

## 2019-07-22 RX ORDER — TRIAMCINOLONE ACETONIDE 40 MG/ML
40 INJECTION, SUSPENSION INTRA-ARTICULAR; INTRAMUSCULAR ONCE
Status: COMPLETED | OUTPATIENT
Start: 2019-07-22 | End: 2019-07-22

## 2019-07-22 RX ADMIN — TRIAMCINOLONE ACETONIDE 40 MG: 40 INJECTION, SUSPENSION INTRA-ARTICULAR; INTRAMUSCULAR at 16:48

## 2019-07-22 ASSESSMENT — MIFFLIN-ST. JEOR: SCORE: 1150.82

## 2019-07-22 NOTE — PROGRESS NOTES
"Subjective     Chyna Moncada is a 68 year old female who presents to clinic today for the following health issues:    HPI     1.) Left Knee Pain  - started x 2 months ago  - stairs and kneeling make worse  - pain varies from sharp to aching  - prolonged sitting makes worse when she is in different sitting positions  - pt likes to run and exercise has been unable to do so due to the pain     S: Chyna Moncada is a 68 year old female with L knee pain.  Xray with mild arthritis.  Hurts when standing after sitting for long time, aches deep in middle at times.  Stairs will bother.      Other knee not as much    Takes occasional tylenol    No redness/swelling/injury    Problem list, med list, additional histories reviewed and updated, as indicated.      O:/68 (BP Location: Right arm, Patient Position: Chair, Cuff Size: Adult Regular)   Pulse 84   Temp 97  F (36.1  C) (Tympanic)   Resp 16   Ht 1.657 m (5' 5.25\")   Wt 61.6 kg (135 lb 12.8 oz)   BMI 22.43 kg/m    GEN: Alert and oriented, in no acute distress  Normal knee exam.  Some pain with bending down.  No redness/swelling    A: L knee arthritis    P: she wanted to try injection.  4cc marcaine with 40mg kenalog.  Medial joint line.  Tolerated fine.     Tylenol discussed as well.    F/u in a few weeks if not improving with above     "

## 2019-07-22 NOTE — NURSING NOTE
Clinic Administered Medication Documentation    4 cc 0.5 Lanny    NDC: 3880-2834-54  LOT: -[id number]  Ex: 5/1/2020    Drawn and given with 1 ml Kenalog 40 by Dr. Drew.     Ayanna Garcia MA  4:26 PM 7/22/2019

## 2019-07-22 NOTE — NURSING NOTE
"Chief Complaint   Patient presents with     Musculoskeletal Problem     Left knee       Initial /68 (BP Location: Right arm, Patient Position: Chair, Cuff Size: Adult Regular)   Pulse 84   Temp 97  F (36.1  C) (Tympanic)   Resp 16   Ht 1.657 m (5' 5.25\")   Wt 61.6 kg (135 lb 12.8 oz)   BMI 22.43 kg/m   Estimated body mass index is 22.43 kg/m  as calculated from the following:    Height as of this encounter: 1.657 m (5' 5.25\").    Weight as of this encounter: 61.6 kg (135 lb 12.8 oz).    Patient presents to the clinic using No DME    Health Maintenance that is potentially due pending provider review:  Colonoscopy/FIT    Ayanna Garcia MA  3:50 PM 7/22/2019  .        "

## 2019-09-03 ENCOUNTER — TELEPHONE (OUTPATIENT)
Dept: FAMILY MEDICINE | Facility: CLINIC | Age: 69
End: 2019-09-03

## 2019-09-03 DIAGNOSIS — Z12.11 SPECIAL SCREENING FOR MALIGNANT NEOPLASMS, COLON: Primary | ICD-10-CM

## 2019-09-03 NOTE — TELEPHONE ENCOUNTER
Pt is requesting a Fit Test / Order  Pt said she does this once a yr - Routine  Call Pt when to  [phone]  [facility]

## 2019-09-12 PROCEDURE — 82274 ASSAY TEST FOR BLOOD FECAL: CPT | Performed by: PHYSICIAN ASSISTANT

## 2019-09-18 ENCOUNTER — ANCILLARY PROCEDURE (OUTPATIENT)
Dept: MAMMOGRAPHY | Facility: CLINIC | Age: 69
End: 2019-09-18
Attending: PHYSICIAN ASSISTANT
Payer: COMMERCIAL

## 2019-09-18 DIAGNOSIS — Z12.31 VISIT FOR SCREENING MAMMOGRAM: ICD-10-CM

## 2019-09-18 PROCEDURE — 77067 SCR MAMMO BI INCL CAD: CPT | Mod: TC

## 2019-09-18 PROCEDURE — 77063 BREAST TOMOSYNTHESIS BI: CPT | Mod: TC

## 2019-09-20 DIAGNOSIS — Z12.11 SPECIAL SCREENING FOR MALIGNANT NEOPLASMS, COLON: ICD-10-CM

## 2019-09-20 LAB — HEMOCCULT STL QL IA: NEGATIVE

## 2019-09-27 ENCOUNTER — OFFICE VISIT (OUTPATIENT)
Dept: FAMILY MEDICINE | Facility: CLINIC | Age: 69
End: 2019-09-27
Payer: COMMERCIAL

## 2019-09-27 VITALS
WEIGHT: 133.2 LBS | SYSTOLIC BLOOD PRESSURE: 122 MMHG | BODY MASS INDEX: 22 KG/M2 | HEART RATE: 77 BPM | RESPIRATION RATE: 14 BRPM | TEMPERATURE: 98.1 F | DIASTOLIC BLOOD PRESSURE: 52 MMHG | OXYGEN SATURATION: 99 %

## 2019-09-27 DIAGNOSIS — M76.62 ACHILLES TENDINITIS OF LEFT LOWER EXTREMITY: Primary | ICD-10-CM

## 2019-09-27 PROCEDURE — 99213 OFFICE O/P EST LOW 20 MIN: CPT | Performed by: NURSE PRACTITIONER

## 2019-09-27 ASSESSMENT — ENCOUNTER SYMPTOMS
ARTHRALGIAS: 1
JOINT SWELLING: 0

## 2019-09-27 NOTE — PATIENT INSTRUCTIONS
Patient Education     What Is Tendonitis of the Foot?  When you use a set of muscles too much, you re likely to strain the tendons (soft tissues) that connect those muscles to your bones. At first, pain or swelling may come and go quickly. But if you do too much too soon, your muscles may overtire again. The strain may cause a tendon s outer covering to swell or small fibers in a tendon to pull apart. If you keep pushing your muscles, damage to the tendons adds up, and tendonitis develops. Over time, pain and swelling may limit your activities. But with your healthcare provider s help, tendonitis can be controlled. Both your symptoms and your risk of future problems including tendon rupture can be reduced.       The back of your foot  The Achilles tendon connects the calf muscle to the heel bone. If tendonitis occurs here, you may feel pain when your foot touches down or when your heel lifts off the ground.   The front of your foot  The anterior tibial tendon helps control the front of your foot when it meets the ground. If this tendon is strained, you may feel pain when you go down stairs or walk or run on hills.     The inside of your foot  The posterior tibial tendon runs along the inside of the ankle and foot. If this tendon is strained, your foot may hurt when it moves forward to push off the ground. Or you may feel pain when your heel shifts from side to side.   The outside of your foot  The peroneal tendons wrap across the bottom of your foot, from the outside to the inside. Tendonitis here may cause pain when you stand or push off the ground and when walking on uneven surfaces.   Date Last Reviewed: 1/1/2018 2000-2018 [employer]. [address] [address], [place] [address]. All rights reserved. This information is not intended as a substitute for professional medical care. Always follow your healthcare professional's instructions.

## 2019-09-27 NOTE — NURSING NOTE
"Chief Complaint   Patient presents with     Musculoskeletal Problem     Left ankle started to bother about a week ago.  Sharp pains, more in back of ankle.  Unable to flex and do weights.        Initial /52 (BP Location: Right arm, Patient Position: Chair, Cuff Size: Adult Regular)   Pulse 77   Temp 98.1  F (36.7  C) (Tympanic)   Resp 14   Wt 60.4 kg (133 lb 3.2 oz)   SpO2 99%   BMI 22.00 kg/m   Estimated body mass index is 22 kg/m  as calculated from the following:    Height as of 7/22/19: 1.657 m (5' 5.25\").    Weight as of this encounter: 60.4 kg (133 lb 3.2 oz).    Patient presents to the clinic using No DME    Health Maintenance that is potentially due pending provider review:  NONE    n/a    Is there anyone who you would like to be able to receive your results? No  If yes have patient fill out KRISTY    Nancy Lara M.A.    "

## 2019-09-27 NOTE — PROGRESS NOTES
SUBJECTIVE:   Chyna Moncada is a 68 year old female presenting with a chief complaint of   Chief Complaint   Patient presents with     Musculoskeletal Problem     Left ankle started to bother about a week ago.  Sharp pains, more in back of ankle.  Unable to flex and do weights.        She is an established patient of [facility].    MS Injury/Pain    Onset of symptoms was 1 week ago.  Location: LEFT back ankle  Context:       The injury happened while at home running in place for 2 miles and walking      Mechanism: sports related injury      Patient experienced delayed pain, after running  Course of symptoms is same.    Severity 7/10  Current and Associated symptoms: Pain and Tenderness  Denies any numbness or tingling  Aggravating Factors: flexion/extension  Therapies to improve symptoms include: none  This is the first time this type of problem has occurred for this patient. Reports history of surgery in RIGHT foot    Review of Systems   Musculoskeletal: Positive for arthralgias. Negative for joint swelling.   All other systems reviewed and are negative.      Past Medical History:   Diagnosis Date     Cerebral embolism with cerebral infarction (H)      Family History   Problem Relation Age of Onset     Arthritis Mother      Cancer Mother         GYN     Heart Disease Father      Alcohol/Drug Maternal Grandfather      Heart Disease Paternal Grandfather         Heart attack     Diabetes Paternal Grandfather      Cancer Brother         throat     Current Outpatient Medications   Medication Sig Dispense Refill     aspirin EC 81 MG tablet Take 81 mg by mouth daily        calcium-vitamin D (CALTRATE) 600-400 MG-UNIT per tablet Take 1 tablet by mouth daily        Cholecalciferol (VITAMIN D-3 PO) Take 1,000 Units by mouth daily       LORazepam (ATIVAN) 0.5 MG tablet Take 1 tablet (0.5 mg) by mouth every 6 hours as needed 10 tablet 0     mometasone (NASONEX) 50 MCG/ACT spray Spray 2 sprays in nostril daily        Multiple  Vitamin (MULTI-VITAMINS) TABS Take 1 tablet by mouth daily       Multiple Vitamins-Minerals (HAIR SKIN NAILS PO) Take 3 tablets by mouth daily        multivitamin (OCUVITE) TABS Take 1 tablet by mouth daily       pravastatin (PRAVACHOL) 40 MG tablet TAKE ONE TABLET BY MOUTH EVERY NIGHT AT BEDTIME 90 tablet 3     Propylene Glycol-Glycerin 1-0.3 % SOLN Place 1 drop into both eyes as needed       valACYclovir (VALTREX) 1000 mg tablet TAKE TWO TABLETS BY MOUTH TWICE A DAY 4 tablet 11     Social History     Tobacco Use     Smoking status: Former Smoker     Types: Cigarettes     Last attempt to quit: 1/10/1983     Years since quittin.7     Smokeless tobacco: Never Used   Substance Use Topics     Alcohol use: Yes     Alcohol/week: 1.0 standard drinks     Types: 1 Glasses of wine per week     Comment: DAILY       OBJECTIVE  /52 (BP Location: Right arm, Patient Position: Chair, Cuff Size: Adult Regular)   Pulse 77   Temp 98.1  F (36.7  C) (Tympanic)   Resp 14   Wt 60.4 kg (133 lb 3.2 oz)   SpO2 99%   BMI 22.00 kg/m      Physical Exam  Constitutional:       Appearance: Normal appearance. She is not ill-appearing.   Cardiovascular:      Rate and Rhythm: Normal rate and regular rhythm.      Pulses: Normal pulses.      Heart sounds: Normal heart sounds. No murmur. No friction rub. No gallop.    Pulmonary:      Effort: Pulmonary effort is normal. No respiratory distress.      Breath sounds: Normal breath sounds. No stridor. No wheezing, rhonchi or rales.   Musculoskeletal: Normal range of motion.         General: Tenderness present. No swelling or signs of injury.      Comments: RIGHT lower extremity with point tenderness at achilles tendon, no other edema or erythema. Skin intact. FROM and mild tenderness with foot extension   Skin:     General: Skin is warm.      Capillary Refill: Capillary refill takes less than 2 seconds.      Findings: No rash.   Neurological:      General: No focal deficit present.       Mental Status: She is alert and oriented to person, place, and time. Mental status is at baseline.   Psychiatric:         Mood and Affect: Mood normal.         Behavior: Behavior normal.         Labs:  No results found for this or any previous visit (from the past 24 hour(s)).    ASSESSMENT:    ICD-10-CM    1. Achilles tendinitis of left lower extremity M76.62         Medical Decision Making:    Differential Diagnosis:  MS Injury Pain: tendonitis and muscle strain    Serious Comorbid Conditions:  Adult:  None    PLAN: Discussed with patient conservative cares and RICE with achilles tendonitis diagnosis. Discussed reduction of overuse measures. Reviewed use of wrist brace during day, role of OTC NSAIDs, Epsom salt soaks and heat/ice application in 20 minute intervals as needed. Briefly, reviewed follow up with PCP in 2 weeks for further evaluation of PT needs. Education provided. Patient agreed to the plan of care with no further questions or concerns.     GEORGE Caputo, CNP      Patient Instructions       Patient Education     What Is Tendonitis of the Foot?  When you use a set of muscles too much, you re likely to strain the tendons (soft tissues) that connect those muscles to your bones. At first, pain or swelling may come and go quickly. But if you do too much too soon, your muscles may overtire again. The strain may cause a tendon s outer covering to swell or small fibers in a tendon to pull apart. If you keep pushing your muscles, damage to the tendons adds up, and tendonitis develops. Over time, pain and swelling may limit your activities. But with your healthcare provider s help, tendonitis can be controlled. Both your symptoms and your risk of future problems including tendon rupture can be reduced.       The back of your foot  The Achilles tendon connects the calf muscle to the heel bone. If tendonitis occurs here, you may feel pain when your foot touches down or when your heel lifts off the  ground.   The front of your foot  The anterior tibial tendon helps control the front of your foot when it meets the ground. If this tendon is strained, you may feel pain when you go down stairs or walk or run on hills.     The inside of your foot  The posterior tibial tendon runs along the inside of the ankle and foot. If this tendon is strained, your foot may hurt when it moves forward to push off the ground. Or you may feel pain when your heel shifts from side to side.   The outside of your foot  The peroneal tendons wrap across the bottom of your foot, from the outside to the inside. Tendonitis here may cause pain when you stand or push off the ground and when walking on uneven surfaces.   Date Last Reviewed: 1/1/2018 2000-2018 The [employer]. [address] [place] [address], [place] [id number]. All rights reserved. This information is not intended as a substitute for professional medical care. Always follow your healthcare professional's instructions.

## 2019-10-01 ENCOUNTER — TELEPHONE (OUTPATIENT)
Dept: FAMILY MEDICINE | Facility: CLINIC | Age: 69
End: 2019-10-01

## 2019-10-01 DIAGNOSIS — M76.62 ACHILLES TENDINITIS OF LEFT LOWER EXTREMITY: Primary | ICD-10-CM

## 2019-10-01 NOTE — TELEPHONE ENCOUNTER
Reason for call:  Patient reporting a symptom    Symptom or request: Left Ankle- Pt was into see SDP 9/27/19. Pt was having pain in the left ankle which was getting worse. Pt is wondering if she can start Physical Therapy at [facility]  to help the process along.  Pt said it does not seem to be getting better since 9/27/19. Pt said just walking is irritating it. Please Advise.    Phone Number patient can be reached at:  Cell number on file:    Telephone Information:   Mobile [phone]       Best Time:  Any Time      Can we leave a detailed message on this number:  YES    Call taken on 10/1/2019 at 8:40 AM by Stacia Avila

## 2019-10-11 ENCOUNTER — HOSPITAL ENCOUNTER (OUTPATIENT)
Dept: PHYSICAL THERAPY | Facility: CLINIC | Age: 69
Setting detail: THERAPIES SERIES
End: 2019-10-11
Attending: PHYSICIAN ASSISTANT
Payer: COMMERCIAL

## 2019-10-11 DIAGNOSIS — M76.62 ACHILLES TENDINITIS OF LEFT LOWER EXTREMITY: ICD-10-CM

## 2019-10-11 PROCEDURE — 97161 PT EVAL LOW COMPLEX 20 MIN: CPT | Mod: GP | Performed by: PHYSICAL THERAPIST

## 2019-10-11 PROCEDURE — 97110 THERAPEUTIC EXERCISES: CPT | Mod: GP | Performed by: PHYSICAL THERAPIST

## 2019-10-11 PROCEDURE — 97140 MANUAL THERAPY 1/> REGIONS: CPT | Mod: GP | Performed by: PHYSICAL THERAPIST

## 2019-10-11 NOTE — PROGRESS NOTES
10/11/19 0800   General Information   Type of Visit Initial OP Ortho PT Evaluation   Start of Care Date 10/11/19   Referring Physician Fabby Davison   Patient/Family Goals Statement get back to working out   Orders Evaluate and Treat   Date of Order 10/02/19   Certification Required? Yes   Medical Diagnosis Achilles tendinitis of left lower extremity M76.62  - Primary   Surgical/Medical history reviewed Yes   Precautions/Limitations no known precautions/limitations   Body Part(s)   Body Part(s) Ankle/Foot;Lumbar Spine/SI   Presentation and Etiology   Pertinent history of current problem (include personal factors and/or comorbidities that impact the POC) Pt relates she was running in place and and doing squats and L achilles started working, Pt has had preiosuse surgery  on L ankle. Pt relates Sciatic nerve is also falred up on L which started due to pain. Pt relates it usually comes afor few says then gone.    Impairments A. Pain;B. Decreased WB tolerance;D. Decreased ROM;H. Impaired gait   Functional Limitations perform activities of daily living;perform required work activities;perform desired leisure / sports activities   Symptom Location L ankle   How/Where did it occur With repetition/overuse   Onset date of current episode/exacerbation 09/27/19   Chronicity New   Pain rating (0-10 point scale) Worst (/10);Best (/10)   Best (/10) 0   Worst (/10) 10   Pain quality A. Sharp;B. Dull;C. Aching   Frequency of pain/symptoms C. With activity   Pain/symptoms exacerbated by B. Walking;D. Carrying;C. Lifting;H. Overhead reach;I. Bending;K. Home tasks   Pain/symptoms eased by D. Nothing   Progression of symptoms since onset: Unchanged   Current Level of Function   Current Community Support Family/friend caregiver   Patient role/employment history F. Retired   Living environment [place]   Fall Risk Screen   Fall screen completed by PT   Have you fallen 2 or more times in the past year? No   Have you fallen  and had an injury in the past year? No   Is patient a fall risk? No   Abuse Screen (yes response referral indicated)   Feels Unsafe at Home or Work/School no   Feels Threatened by Someone no   Does Anyone Try to Keep You From Having Contact with Others or Doing Things Outside Your Home? no   Physical Signs of Abuse Present no   Functional Scales   Functional Scales Other   Other Scales  41/80    Ankle/Foot Objective Findings   Side (if bilateral, select both right and left) Left   Observation fair arches    Gait/Locomotion step to pattern    Ankle/Foot Flexibility Comments SLR: L: 60 deg   Anterior Drawer Test -   Posterior Drawer Test -   Talar Tilt Test -   Palpation TTP achilles tendon -denies pain on bottom of foot    Left DF (Knee Ext) AROM 5 (PROM 10) w pain   Left PF AROM 45 4 pain   Left Calcanceal Inversion AROM 20   Left Calcaneal Eversion AROM 5   Left DF/Inversion Strength 5/5   Left DF/Eversion Strength 3/5 w pain   Left PF/Inversion Strength 4/5   Left PF/Eversion Strength 2/5   Left PF Strength 5/5   Lumbar Spine/SI Objective Findings   Slump Test - B    Planned Therapy Interventions   Planned Therapy Interventions balance training;gait training;joint mobilization;manual therapy;ROM;strengthening;stretching;neuromuscular re-education;motor coordination training   Planned Modality Interventions   Planned Modality Interventions Cryotherapy;TENS;Traction;Ultrasound;Hot packs   Clinical Impression   Criteria for Skilled Therapeutic Interventions Met yes, treatment indicated   PT Diagnosis L achilles   Influenced by the following impairments limited ROM,weakness, pain   Functional limitations due to impairments walkign, standing,working out   Clinical Presentation Stable/Uncomplicated   Clinical Presentation Rationale Pt is pleasant 68 yr old female who presents w L achilles tendonitisi consistent w overuse. Pt is motivated to get better   Clinical Decision Making (Complexity) Low complexity   Therapy  Frequency 1 time/week   Predicted Duration of Therapy Intervention (days/wks) 6 weeks   Risk & Benefits of therapy have been explained Yes   Patient, Family & other staff in agreement with plan of care Yes   Education Assessment   Preferred Learning Style Listening;Reading;Demonstration;Pictures/video   Barriers to Learning No barriers   ORTHO GOALS   PT Ortho Eval Goals 2;1;3;4   Ortho Goal 1   Goal Identifier DF   Goal Description Pt will demonstrate 10 deg AROM DF in R ankle w/o pain in order to be able to walk w improved gait mechanics   Target Date 11/01/19   Ortho Goal 2   Goal Identifier balance   Goal Description Pt will be able to stand on either LE for 10 + secs with less than 1/10 pain to decrease risk of falls and demonstrate improved strength.   Target Date 11/01/19   Ortho Goal 3   Goal Identifier LEFS   Goal Description Pt will score 60/80 on LEFS outcome tool to demonstrate clinically significant improvement and be able to complete more tasks at home   Target Date 11/22/19   Total Evaluation Time   PT Eval, Low Complexity Minutes (90727) 15   Therapy Certification   Certification date from 10/11/19   Certification date to 11/22/19   Medical Diagnosis Achilles tendinitis of left lower extremity M76.62  - Primary       Azul Ortega  Physical Therapist  [address] [address]  [email or web address]   [email or web address]   Office: [phone] Fax: [phone]

## 2019-10-18 ENCOUNTER — HOSPITAL ENCOUNTER (OUTPATIENT)
Dept: PHYSICAL THERAPY | Facility: CLINIC | Age: 69
Setting detail: THERAPIES SERIES
End: 2019-10-18
Attending: PHYSICIAN ASSISTANT
Payer: COMMERCIAL

## 2019-10-18 PROCEDURE — 97110 THERAPEUTIC EXERCISES: CPT | Mod: GP | Performed by: PHYSICAL THERAPIST

## 2019-10-18 PROCEDURE — 97140 MANUAL THERAPY 1/> REGIONS: CPT | Mod: GP | Performed by: PHYSICAL THERAPIST

## 2019-10-18 NOTE — PROGRESS NOTES
[facility]          OUTPATIENT PHYSICAL THERAPY ORTHOPEDIC EVALUATION  PLAN OF TREATMENT FOR OUTPATIENT REHABILITATION  (COMPLETE FOR INITIAL CLAIMS ONLY)  Patient's Last Name, First Name, M.I.  YOB: 1950  AntwanChyna  C    Provider s Name:  [facility]   Medical Record No.  [id number]   Start of Care Date:  10/11/19   Onset Date:  09/27/19   Type:     _X__PT   ___OT   ___SLP Medical Diagnosis:  Achilles tendinitis of left lower extremity M76.62  - Primary     PT Diagnosis:  L achilles   Visits from SOC:  1      _________________________________________________________________________________  Plan of Treatment/Functional Goals:  balance training, gait training, joint mobilization, manual therapy, ROM, strengthening, stretching, neuromuscular re-education, motor coordination training     Cryotherapy, TENS, Traction, Ultrasound, Hot packs     Goals  Goal Identifier: DF  Goal Description: Pt will demonstrate 10 deg AROM DF in R ankle w/o pain in order to be able to walk w improved gait mechanics  Target Date: 11/01/19    Goal Identifier: balance  Goal Description: Pt will be able to stand on either LE for 10 + secs with less than 1/10 pain to decrease risk of falls and demonstrate improved strength.  Target Date: 11/01/19    Goal Identifier: LEFS  Goal Description: Pt will score 60/80 on LEFS outcome tool to demonstrate clinically significant improvement and be able to complete more tasks at home  Target Date: 11/22/19                    Therapy Frequency:  1 time/week  Predicted Duration of Therapy Intervention:  6 weeks    Azul Ortega, PT                 I CERTIFY THE NEED FOR THESE SERVICES FURNISHED UNDER        THIS PLAN OF TREATMENT AND WHILE UNDER MY CARE     (Physician co-signature of this document indicates review and certification of the therapy plan).                       Certification Date From:  10/11/19   Certification Date To:   11/22/19    Referring Provider:  Fabby Davison    Initial Assessment        See Epic Evaluation Start of Care Date: 10/11/19

## 2019-10-21 ENCOUNTER — OFFICE VISIT (OUTPATIENT)
Dept: FAMILY MEDICINE | Facility: CLINIC | Age: 69
End: 2019-10-21
Payer: COMMERCIAL

## 2019-10-21 VITALS
HEART RATE: 67 BPM | WEIGHT: 132 LBS | HEIGHT: 65 IN | TEMPERATURE: 98.2 F | BODY MASS INDEX: 21.99 KG/M2 | RESPIRATION RATE: 16 BRPM | DIASTOLIC BLOOD PRESSURE: 65 MMHG | SYSTOLIC BLOOD PRESSURE: 107 MMHG

## 2019-10-21 DIAGNOSIS — L84 CALLUS OF FOOT: ICD-10-CM

## 2019-10-21 DIAGNOSIS — Z00.00 ROUTINE HISTORY AND PHYSICAL EXAMINATION OF ADULT: Primary | ICD-10-CM

## 2019-10-21 DIAGNOSIS — F41.1 ANXIETY STATE: ICD-10-CM

## 2019-10-21 DIAGNOSIS — I73.9 PERIPHERAL VASCULAR DISEASE (H): ICD-10-CM

## 2019-10-21 DIAGNOSIS — I63.50 CEREBRAL ARTERY OCCLUSION WITH CEREBRAL INFARCTION (H): ICD-10-CM

## 2019-10-21 DIAGNOSIS — R42 SPELL OF DIZZINESS: ICD-10-CM

## 2019-10-21 DIAGNOSIS — M85.80 OSTEOPENIA, UNSPECIFIED LOCATION: ICD-10-CM

## 2019-10-21 LAB
ALBUMIN SERPL-MCNC: 3.6 G/DL (ref 3.4–5)
ALP SERPL-CCNC: 79 U/L (ref 40–150)
ALT SERPL W P-5'-P-CCNC: 24 U/L (ref 0–50)
ANION GAP SERPL CALCULATED.3IONS-SCNC: 4 MMOL/L (ref 3–14)
AST SERPL W P-5'-P-CCNC: 22 U/L (ref 0–45)
BILIRUB SERPL-MCNC: 0.7 MG/DL (ref 0.2–1.3)
BUN SERPL-MCNC: 16 MG/DL (ref 7–30)
CALCIUM SERPL-MCNC: 8.7 MG/DL (ref 8.5–10.1)
CHLORIDE SERPL-SCNC: 107 MMOL/L (ref 94–109)
CHOLEST SERPL-MCNC: 171 MG/DL
CO2 SERPL-SCNC: 29 MMOL/L (ref 20–32)
CREAT SERPL-MCNC: 0.69 MG/DL (ref 0.52–1.04)
ERYTHROCYTE [DISTWIDTH] IN BLOOD BY AUTOMATED COUNT: 13.1 % (ref 10–15)
GFR SERPL CREATININE-BSD FRML MDRD: 89 ML/MIN/{1.73_M2}
GLUCOSE SERPL-MCNC: 94 MG/DL (ref 70–99)
HCT VFR BLD AUTO: 42.6 % (ref 35–47)
HDLC SERPL-MCNC: 93 MG/DL
HGB BLD-MCNC: 14 G/DL (ref 11.7–15.7)
LDLC SERPL CALC-MCNC: 57 MG/DL
MCH RBC QN AUTO: 29.7 PG (ref 26.5–33)
MCHC RBC AUTO-ENTMCNC: 32.9 G/DL (ref 31.5–36.5)
MCV RBC AUTO: 90 FL (ref 78–100)
NONHDLC SERPL-MCNC: 78 MG/DL
PLATELET # BLD AUTO: 178 10E9/L (ref 150–450)
POTASSIUM SERPL-SCNC: 4 MMOL/L (ref 3.4–5.3)
PROT SERPL-MCNC: 6.7 G/DL (ref 6.8–8.8)
RBC # BLD AUTO: 4.71 10E12/L (ref 3.8–5.2)
SODIUM SERPL-SCNC: 140 MMOL/L (ref 133–144)
TRIGL SERPL-MCNC: 106 MG/DL
TSH SERPL DL<=0.005 MIU/L-ACNC: 1.74 MU/L (ref 0.4–4)
WBC # BLD AUTO: 5.4 10E9/L (ref 4–11)

## 2019-10-21 PROCEDURE — 90662 IIV NO PRSV INCREASED AG IM: CPT | Performed by: PHYSICIAN ASSISTANT

## 2019-10-21 PROCEDURE — 80053 COMPREHEN METABOLIC PANEL: CPT | Performed by: PHYSICIAN ASSISTANT

## 2019-10-21 PROCEDURE — 99397 PER PM REEVAL EST PAT 65+ YR: CPT | Mod: 25 | Performed by: PHYSICIAN ASSISTANT

## 2019-10-21 PROCEDURE — 99213 OFFICE O/P EST LOW 20 MIN: CPT | Mod: 25 | Performed by: PHYSICIAN ASSISTANT

## 2019-10-21 PROCEDURE — 85027 COMPLETE CBC AUTOMATED: CPT | Performed by: PHYSICIAN ASSISTANT

## 2019-10-21 PROCEDURE — 93000 ELECTROCARDIOGRAM COMPLETE: CPT | Performed by: PHYSICIAN ASSISTANT

## 2019-10-21 PROCEDURE — 36415 COLL VENOUS BLD VENIPUNCTURE: CPT | Performed by: PHYSICIAN ASSISTANT

## 2019-10-21 PROCEDURE — 84443 ASSAY THYROID STIM HORMONE: CPT | Performed by: PHYSICIAN ASSISTANT

## 2019-10-21 PROCEDURE — 80061 LIPID PANEL: CPT | Performed by: PHYSICIAN ASSISTANT

## 2019-10-21 PROCEDURE — G0008 ADMIN INFLUENZA VIRUS VAC: HCPCS | Performed by: PHYSICIAN ASSISTANT

## 2019-10-21 RX ORDER — PRAVASTATIN SODIUM 40 MG
TABLET ORAL
Qty: 90 TABLET | Refills: 3 | Status: SHIPPED | OUTPATIENT
Start: 2019-10-21 | End: 2020-11-09

## 2019-10-21 RX ORDER — ZINC OXIDE 13 %
CREAM (GRAM) TOPICAL
COMMUNITY
Start: 2019-10-21 | End: 2020-12-10

## 2019-10-21 ASSESSMENT — ENCOUNTER SYMPTOMS
MYALGIAS: 0
BREAST MASS: 0
SORE THROAT: 0
HEMATURIA: 0
HEMATOCHEZIA: 0
DIARRHEA: 0
DYSURIA: 0
EYE PAIN: 0
ARTHRALGIAS: 0
FREQUENCY: 0
JOINT SWELLING: 0
HEADACHES: 0
HEARTBURN: 0
CONSTIPATION: 0
WEAKNESS: 0
ABDOMINAL PAIN: 0
DIZZINESS: 0
PALPITATIONS: 0
SHORTNESS OF BREATH: 0
FEVER: 0
NERVOUS/ANXIOUS: 0
COUGH: 0
PARESTHESIAS: 0
CHILLS: 0
NAUSEA: 0

## 2019-10-21 ASSESSMENT — MIFFLIN-ST. JEOR: SCORE: 1133.59

## 2019-10-21 ASSESSMENT — ACTIVITIES OF DAILY LIVING (ADL): CURRENT_FUNCTION: NO ASSISTANCE NEEDED

## 2019-10-21 NOTE — PATIENT INSTRUCTIONS
"Dizzy \"off balance\" spells -  Labs and EKG  Seeing eye doctor  Decided didn't want further work up at this time - call me if change mind  Be seen if worsening symptoms     Flu shot    Consider new shingles shot.  Need 2 doses.  Some people don't feel great day of, or for a few days.  How you feel after first dose does not predict whether you'll feel good or bad after next dose.  In case you have side effects, pick a time to get the vaccine that its ok if you feel a bit under the weather.  Take this precaution with both doses of the vaccine, even if you feel great after the first dose.  Pharmacy is often cheaper than clinic and can at least tell you your cost in advance, unlike a clinic.      "

## 2019-10-21 NOTE — NURSING NOTE
"Chief Complaint   Patient presents with     Wellness Visit       Initial /65 (BP Location: Right arm, Patient Position: Chair, Cuff Size: Adult Regular)   Pulse 67   Temp 98.2  F (36.8  C) (Tympanic)   Resp 16   Ht 1.657 m (5' 5.25\")   Wt 59.9 kg (132 lb)   BMI 21.80 kg/m   Estimated body mass index is 21.8 kg/m  as calculated from the following:    Height as of this encounter: 1.657 m (5' 5.25\").    Weight as of this encounter: 59.9 kg (132 lb).    Patient presents to the clinic using No DME    Health Maintenance that is potentially due pending provider review:  NONE        Is there anyone who you would like to be able to receive your results? No  If yes have patient fill out KRISTY      "

## 2019-10-21 NOTE — PROGRESS NOTES
"SUBJECTIVE:   Chyna Moncada is a 68 year old female who presents for Preventive Visit.  Are you in the first 12 months of your Medicare coverage?  No    Healthy Habits:     In general, how would you rate your overall health?  Good    Frequency of exercise:  4-5 days/week    Duration of exercise:  45-60 minutes    Do you usually eat at least 4 servings of fruit and vegetables a day, include whole grains    & fiber and avoid regularly eating high fat or \"junk\" foods?  No    Taking medications regularly:  Yes    Medication side effects:  None    Ability to successfully perform activities of daily living:  No assistance needed    Home Safety:  No safety concerns identified    Hearing Impairment:  Difficulty following a conversation in a noisy restaurant or crowded room, difficulty following dialogue in the theater, difficult to understand a speaker at a public meeting or Shinto service and difficulty understanding soft or whispered speech    In the past 6 months, have you been bothered by leaking of urine?  No    In general, how would you rate your overall mental or emotional health?  Good      PHQ-2 Total Score: 0    Additional concerns today:  No    L foot tendonitis not yet improving.  Physical Therapy x 2 sessions so far.  Can't do her usual exercises due to pain.    No more falls.    History CVA and PAD.  No TIAs in past yr.  Taking asa and pravastatin.  Intolerant of other statins.  Forgot to trial off statin.     \"Dizzy\" spells couple times a day.  Last couple seconds.  Vision changes, not specific to times she is having the dizzy spells.  No tinnitus.  No new palpitations.      Anxiety doing ok, hasn't needed med.    Foot pains from callouses, seems she can't file them effectively.    * Flu Shot- heard from some people about build-up in the eye.       Do you feel safe in your environment? Yes    Do you have a Health Care Directive? No: Advance care planning was reviewed with patient; patient declined at " this time.      Fall risk  Fallen 2 or more times in the past year?: No  Any fall with injury in the past year?: No    Cognitive Screening   1) Repeat 3 items (Leader, Season, Table)    2) Clock draw: NORMAL  3) 3 item recall: Recalls 3 objects  Results: 3 items recalled: COGNITIVE IMPAIRMENT LESS LIKELY    Mini-CogTM Copyright CASEY Ford. Licensed by the author for use in [facility]; reprinted with permission (yesika@[employer]). All rights reserved.      Reviewed and updated as needed this visit by clinical staff  Tobacco  Allergies  Meds  Problems  Med Hx  Surg Hx  Fam Hx  Soc Hx          Reviewed and updated as needed this visit by Provider  Tobacco  Allergies  Meds  Problems  Med Hx  Surg Hx  Fam Hx        Social History     Tobacco Use     Smoking status: Former Smoker     Types: Cigarettes     Last attempt to quit: 1/10/1983     Years since quittin.8     Smokeless tobacco: Never Used   Substance Use Topics     Alcohol use: Yes     Alcohol/week: 1.0 standard drinks     Types: 1 Glasses of wine per week     Comment: DAILY         Alcohol Use 10/21/2019   Prescreen: >3 drinks/day or >7 drinks/week? No   Prescreen: >3 drinks/day or >7 drinks/week? -     Current providers sharing in care for this patient include:   Patient Care Team:  Edilia Davison PA-C as PCP - General (Physician Assistant)  Nereida Jacobs APRN CNP as Assigned PCP    The following health maintenance items are reviewed in Epic and correct as of today:  Health Maintenance   Topic Date Due     ZOSTER IMMUNIZATION (2 of 3) 2011     INFLUENZA VACCINE (1) 2019     FALL RISK ASSESSMENT  10/19/2019     MEDICARE ANNUAL WELLNESS VISIT  10/19/2019     FIT  2020     MAMMO SCREENING  2021     LIPID  10/19/2023     ADVANCE CARE PLANNING  10/19/2023     DTAP/TDAP/TD IMMUNIZATION (3 - Td) 10/22/2023     DEXA  Completed     HEPATITIS C SCREENING  Completed     PHQ-2  Completed     PNEUMOCOCCAL  "IMMUNIZATION 65+ LOW/MEDIUM RISK  Completed     IPV IMMUNIZATION  Aged Out     MENINGITIS IMMUNIZATION  Aged Out     Review of Systems   Constitutional: Negative for chills and fever.   HENT: Negative for congestion, ear pain, hearing loss and sore throat.    Eyes: Negative for pain and visual disturbance.   Respiratory: Negative for cough and shortness of breath.    Cardiovascular: Negative for chest pain, palpitations and peripheral edema.   Gastrointestinal: Negative for abdominal pain, constipation, diarrhea, heartburn, hematochezia and nausea.   Breasts:  Negative for tenderness, breast mass and discharge.   Genitourinary: Negative for dysuria, frequency, genital sores, hematuria, pelvic pain, urgency, vaginal bleeding and vaginal discharge.   Musculoskeletal: Negative for arthralgias, joint swelling and myalgias.   Skin: Negative for rash.   Neurological: Negative for dizziness, weakness, headaches and paresthesias.   Psychiatric/Behavioral: Negative for mood changes. The patient is not nervous/anxious.    ROS is negative except as listed above in ROS or in HPI.    OBJECTIVE:   /65 (BP Location: Right arm, Patient Position: Chair, Cuff Size: Adult Regular)   Pulse 67   Temp 98.2  F (36.8  C) (Tympanic)   Resp 16   Ht 1.657 m (5' 5.25\")   Wt 59.9 kg (132 lb)   BMI 21.80 kg/m   Estimated body mass index is 21.8 kg/m  as calculated from the following:    Height as of this encounter: 1.657 m (5' 5.25\").    Weight as of this encounter: 59.9 kg (132 lb).  Physical Exam  GENERAL APPEARANCE: healthy, alert and no distress  EYES: Eyes grossly normal to inspection, PERRL and conjunctivae and sclerae normal  HENT: ear canals and TM's normal, nose and mouth without ulcers or lesions, oropharynx clear and oral mucous membranes moist  NECK: no adenopathy, no asymmetry, masses, or scars and thyroid normal to palpation  RESP: lungs clear to auscultation - no rales, rhonchi or wheezes  BREAST: normal without " "masses, tenderness or nipple discharge and no palpable axillary masses or adenopathy  CV: regular rate and rhythm, normal S1 S2, no S3 or S4, no murmur, click or rub, no peripheral edema   ABDOMEN: soft, nontender, no hepatosplenomegaly, no masses and bowel sounds normal  MS: no musculoskeletal defects are noted and gait is age appropriate without ataxia  SKIN: no suspicious lesions or rashes, mild callouses no corns or warts on 4th MTP area R foot, 2 over 5th MTP L foot  NEURO: Normal strength and tone, sensory exam grossly normal, mentation intact and speech normal  PSYCH: mentation appears normal and affect normal/bright    EKG sinus johanne    ASSESSMENT / PLAN:       ICD-10-CM    1. Routine history and physical examination of adult Z00.00    2. Spell of dizziness R42 Comprehensive metabolic panel     TSH with free T4 reflex     CBC with platelets     EKG 12-lead complete w/read - Clinics   3. Callus of foot L84 Debulked today   4. Peripheral vascular disease (H) I73.9    5. Cerebral artery occlusion with cerebral infarction (H) I63.50 pravastatin (PRAVACHOL) 40 MG tablet     Lipid panel reflex to direct LDL Non-fasting   6. Anxiety state F41.1 TSH with free T4 reflex   7. Osteopenia, unspecified location M85.80 TSH with free T4 reflex       End of Life Planning:  Patient currently has an advanced directive: No.  I have verified the patient's ablity to prepare an advanced directive/make health care decisions.  Literature was provided to assist patient in preparing an advanced directive.    COUNSELING:  Reviewed preventive health counseling, as reflected in patient instructions       Regular exercise       Healthy diet/nutrition       Osteoporosis Prevention/Bone Health    Estimated body mass index is 21.8 kg/m  as calculated from the following:    Height as of this encounter: 1.657 m (5' 5.25\").    Weight as of this encounter: 59.9 kg (132 lb).         reports that she quit smoking about 36 years ago. Her smoking " "use included cigarettes. She has never used smokeless tobacco.      Appropriate preventive services were discussed with this patient, including applicable screening as appropriate for cardiovascular disease, diabetes, osteopenia/osteoporosis, and glaucoma.  As appropriate for age/gender, discussed screening for colorectal cancer, prostate cancer, breast cancer, and cervical cancer. Checklist reviewing preventive services available has been given to the patient.    Reviewed patients plan of care and provided an AVS. The Basic Care Plan (routine screening as documented in Health Maintenance) for Chyna meets the Care Plan requirement. This Care Plan has been established and reviewed with the Patient.    Counseling Resources:  ATP IV Guidelines  Pooled Cohorts Equation Calculator  Breast Cancer Risk Calculator  FRAX Risk Assessment  ICSI Preventive Guidelines  Dietary Guidelines for Americans, 2010  [employer]'s MyPlate  ASA Prophylaxis  Lung CA Screening    Edilia Davison PA-C  [facility]    Identified Health Risks:    Patient Instructions   Dizzy \"off balance\" spells -  Labs and EKG  Seeing eye doctor  Decided didn't want further work up at this time - call me if change mind  Be seen if worsening symptoms     Flu shot    Consider new shingles shot.  Need 2 doses.  Some people don't feel great day of, or for a few days.  How you feel after first dose does not predict whether you'll feel good or bad after next dose.  In case you have side effects, pick a time to get the vaccine that its ok if you feel a bit under the weather.  Take this precaution with both doses of the vaccine, even if you feel great after the first dose.  Pharmacy is often cheaper than clinic and can at least tell you your cost in advance, unlike a clinic.          "

## 2019-10-23 ENCOUNTER — HOSPITAL ENCOUNTER (OUTPATIENT)
Dept: PHYSICAL THERAPY | Facility: CLINIC | Age: 69
Setting detail: THERAPIES SERIES
End: 2019-10-23
Attending: PHYSICIAN ASSISTANT
Payer: COMMERCIAL

## 2019-10-23 PROCEDURE — 97110 THERAPEUTIC EXERCISES: CPT | Mod: GP | Performed by: PHYSICAL THERAPIST

## 2019-10-30 ENCOUNTER — HOSPITAL ENCOUNTER (OUTPATIENT)
Dept: PHYSICAL THERAPY | Facility: CLINIC | Age: 69
Setting detail: THERAPIES SERIES
End: 2019-10-30
Attending: PHYSICIAN ASSISTANT
Payer: COMMERCIAL

## 2019-10-30 PROCEDURE — 97110 THERAPEUTIC EXERCISES: CPT | Mod: GP | Performed by: PHYSICAL THERAPIST

## 2019-11-06 ENCOUNTER — OFFICE VISIT (OUTPATIENT)
Dept: FAMILY MEDICINE | Facility: CLINIC | Age: 69
End: 2019-11-06
Payer: COMMERCIAL

## 2019-11-06 ENCOUNTER — HOSPITAL ENCOUNTER (OUTPATIENT)
Dept: PHYSICAL THERAPY | Facility: CLINIC | Age: 69
Setting detail: THERAPIES SERIES
End: 2019-11-06
Attending: PHYSICIAN ASSISTANT
Payer: COMMERCIAL

## 2019-11-06 ENCOUNTER — OFFICE VISIT (OUTPATIENT)
Dept: PODIATRY | Facility: CLINIC | Age: 69
End: 2019-11-06
Payer: COMMERCIAL

## 2019-11-06 VITALS
TEMPERATURE: 97.6 F | SYSTOLIC BLOOD PRESSURE: 102 MMHG | BODY MASS INDEX: 22.29 KG/M2 | HEART RATE: 71 BPM | DIASTOLIC BLOOD PRESSURE: 64 MMHG | OXYGEN SATURATION: 98 % | WEIGHT: 135 LBS | RESPIRATION RATE: 16 BRPM

## 2019-11-06 VITALS
HEIGHT: 62 IN | HEART RATE: 74 BPM | WEIGHT: 135 LBS | DIASTOLIC BLOOD PRESSURE: 63 MMHG | BODY MASS INDEX: 24.84 KG/M2 | SYSTOLIC BLOOD PRESSURE: 113 MMHG

## 2019-11-06 DIAGNOSIS — M54.32 SCIATICA OF LEFT SIDE: ICD-10-CM

## 2019-11-06 DIAGNOSIS — M76.62 TENDONITIS, ACHILLES, LEFT: Primary | ICD-10-CM

## 2019-11-06 DIAGNOSIS — M79.672 LEFT FOOT PAIN: Primary | ICD-10-CM

## 2019-11-06 PROCEDURE — 97110 THERAPEUTIC EXERCISES: CPT | Mod: GP | Performed by: PHYSICAL THERAPIST

## 2019-11-06 PROCEDURE — 99214 OFFICE O/P EST MOD 30 MIN: CPT | Performed by: PHYSICIAN ASSISTANT

## 2019-11-06 PROCEDURE — 97140 MANUAL THERAPY 1/> REGIONS: CPT | Mod: GP | Performed by: PHYSICAL THERAPIST

## 2019-11-06 PROCEDURE — 99213 OFFICE O/P EST LOW 20 MIN: CPT | Performed by: PODIATRIST

## 2019-11-06 ASSESSMENT — MIFFLIN-ST. JEOR: SCORE: 1099.58

## 2019-11-06 NOTE — NURSING NOTE
"Chief Complaint   Patient presents with     Consult     left foot \"unsure of whats wrong\"       Initial /63   Pulse 74   Ht 1.581 m (5' 2.25\")   Wt 61.2 kg (135 lb)   BMI 24.49 kg/m   Estimated body mass index is 24.49 kg/m  as calculated from the following:    Height as of this encounter: 1.581 m (5' 2.25\").    Weight as of this encounter: 61.2 kg (135 lb).  Medications and allergies reviewed.      Jodi KIRBY MA    "

## 2019-11-06 NOTE — PROGRESS NOTES
Subjective     Chyna Moncada is a 68 year old female who presents to clinic today for the following health issues:    HPI   Left foot pain - follow up from PT  Not improving  Can't have any pressure on foot, has to cross legs at mid calf and not have heel resting on anything  Pain around posterior foot mostly with dorsi or plantar flexion or activity  Some times also having lateral leg pain from lateral popliteal down to this area.  Has had this for 10 yrs but worsening, more freq and severe.  This pain does not necessarily coincide with times of her foot pain.  No back pain.  No saddle paresthesia, weakness, b/b changes.    BP Readings from Last 3 Encounters:   11/06/19 102/64   10/21/19 107/65   09/27/19 122/52    Wt Readings from Last 3 Encounters:   11/06/19 61.2 kg (135 lb)   10/21/19 59.9 kg (132 lb)   09/27/19 60.4 kg (133 lb 3.2 oz)         Reviewed and updated as needed this visit by Provider  Tobacco  Allergies  Meds  Problems  Med Hx  Surg Hx  Fam Hx         Review of Systems   ROS COMP: Constitutional, GI, , musculoskeletal, neuro systems are negative, except as otherwise noted.      Objective    /64 (BP Location: Right arm)   Pulse 71   Temp 97.6  F (36.4  C) (Tympanic)   Resp 16   Wt 61.2 kg (135 lb)   SpO2 98%   BMI 22.29 kg/m    Body mass index is 22.29 kg/m .  Physical Exam   GENERAL: healthy, alert and no distress  MS/Neuro: Spine not tender to palpation.  Normal flexion, extension, side bending and twisting.  Tender around area of sciatic trigger but non focal.Gait normal. Patellar reflexes normal and symmetric. No clonus.  Hip adduction and abduction, and hip, knee, and ankle flexion and extension equal bilaterally and with good strength.  No pain on straight leg raise or hip int or ext rotation, mikey.  Foot: No tenderness over achilles or elsewhere, pain posterior and over peroneal.  Pain with foot inversion and dorsi and plantar flexion.  Pain on anterior drawer, no  "laxity with anterior or posterior draw.            Assessment & Plan       ICD-10-CM    1. Left foot pain M79.672 PODIATRY/FOOT & ANKLE SURGERY REFERRAL   2. Sciatica of left side M54.32 MR Lumbar Spine w/o Contrast          Patient Instructions   Not convinced this is for sure just 1 issues    Would have you see foot doctor given pain with \"anterior draw\" test    Set up MRI of back due to sciatica worsening.  Call [phone] to set up your imaging testing.        Return if symptoms worsen or fail to improve.    Edilia Davison PA-C  [facility]      "

## 2019-11-06 NOTE — PROGRESS NOTES
PATIENT HISTORY:  Chyna Moncada is a 68 year old female who presents to clinic for a painful left foot .  The patient describes the pain as sharp stabbing.  The patient relates the pain level is moderate.  The patient relates pain is located in the back of the left heel.  The patient relates the pain has been present for the past several weeks.  The patient relates pain with ambulation.  The patient has tried shoes with little relief.  The patient was sent by Edilia Davison PA-C for consultation on the left foot.       REVIEW OF SYSTEMS:  Constitutional, HEENT, cardiovascular, pulmonary, GI, , musculoskeletal, neuro, skin, endocrine and psych systems are negative, except as otherwise noted.     PAST MEDICAL HISTORY:   Past Medical History:   Diagnosis Date     Cerebral embolism with cerebral infarction (H)         PAST SURGICAL HISTORY:   Past Surgical History:   Procedure Laterality Date     HYSTERECTOMY, PAP NO LONGER INDICATED          MEDICATIONS:   Current Outpatient Medications:      aspirin EC 81 MG tablet, Take 81 mg by mouth daily , Disp: , Rfl:      calcium-vitamin D (CALTRATE) 600-400 MG-UNIT per tablet, Take 1 tablet by mouth daily , Disp: , Rfl:      Cholecalciferol (VITAMIN D-3 PO), Take 1,000 Units by mouth daily, Disp: , Rfl:      LORazepam (ATIVAN) 0.5 MG tablet, Take 1 tablet (0.5 mg) by mouth every 6 hours as needed, Disp: 10 tablet, Rfl: 0     mometasone (NASONEX) 50 MCG/ACT spray, Spray 2 sprays in nostril daily , Disp: , Rfl:      Multiple Vitamin (MULTI-VITAMINS) TABS, Take 1 tablet by mouth daily, Disp: , Rfl:      pravastatin (PRAVACHOL) 40 MG tablet, TAKE ONE TABLET BY MOUTH EVERY NIGHT AT BEDTIME, Disp: 90 tablet, Rfl: 3     Probiotic Product (PROBIOTIC DAILY) CAPS, , Disp: , Rfl:      Propylene Glycol-Glycerin 1-0.3 % SOLN, Place 1 drop into both eyes as needed, Disp: , Rfl:      valACYclovir (VALTREX) 1000 mg tablet, TAKE TWO TABLETS BY MOUTH TWICE A DAY, Disp: 4 tablet, Rfl:  11     ALLERGIES:    Allergies   Allergen Reactions     Tramadol Anaphylaxis     Benadryl [Altaryl] Other (See Comments)     Made her goofy     Caffeine Other (See Comments)     Jittery       Codeine Nausea and Vomiting     Darvocet [Propoxyphene N-Apap]      Other reaction(s): GI Upset     Diphenhydramine Other (See Comments)     Lovastatin Other (See Comments) and Muscle Pain (Myalgia)     Other reaction(s): Myalgia  pain     Percocet [Oxycodone-Acetaminophen] Other (See Comments)     Other reaction(s): Dizziness  Sick or dizzy?     Vicodin [Hydrocodone-Acetaminophen] Other (See Comments) and Nausea     Sick or dizzy?        SOCIAL HISTORY:   Social History     Socioeconomic History     Marital status:      Spouse name: Not on file     Number of children: Not on file     Years of education: Not on file     Highest education level: Not on file   Occupational History     Not on file   Social Needs     Financial resource strain: Not on file     Food insecurity:     Worry: Not on file     Inability: Not on file     Transportation needs:     Medical: Not on file     Non-medical: Not on file   Tobacco Use     Smoking status: Former Smoker     Types: Cigarettes     Last attempt to quit: 1/10/1983     Years since quittin.8     Smokeless tobacco: Never Used   Substance and Sexual Activity     Alcohol use: Yes     Alcohol/week: 1.0 standard drinks     Types: 1 Glasses of wine per week     Comment: DAILY     Drug use: No     Sexual activity: Yes     Partners: Male   Lifestyle     Physical activity:     Days per week: Not on file     Minutes per session: Not on file     Stress: Not on file   Relationships     Social connections:     Talks on phone: Not on file     Gets together: Not on file     Attends Restoration service: Not on file     Active member of club or organization: Not on file     Attends meetings of clubs or organizations: Not on file     Relationship status: Not on file     Intimate partner violence:  "    Fear of current or ex partner: Not on file     Emotionally abused: Not on file     Physically abused: Not on file     Forced sexual activity: Not on file   Other Topics Concern     Parent/sibling w/ CABG, MI or angioplasty before 65F 55M? No   Social History Narrative     Not on file        FAMILY HISTORY:   Family History   Problem Relation Age of Onset     Arthritis Mother      Cancer Mother         GYN     Heart Disease Father      Alcohol/Drug Maternal Grandfather      Heart Disease Paternal Grandfather         Heart attack     Diabetes Paternal Grandfather      Cancer Brother         throat        EXAM:Vitals: /63   Pulse 74   Ht 1.581 m (5' 2.25\")   Wt 61.2 kg (135 lb)   BMI 24.49 kg/m    BMI= Body mass index is 24.49 kg/m .        General appearance: Patient is alert and fully cooperative with history & exam.  No sign of distress is noted during the visit.     Psychiatric: Affect is pleasant & appropriate.  Patient appears motivated to improve health.     Respiratory: Breathing is regular & unlabored while sitting.     HEENT: Hearing is intact to spoken word.  Speech is clear.  No gross evidence of visual impairment that would impact ambulation.     Dermatologic: Skin is intact to left lower extremities without significant lesions, rash or abrasion.  No paronychia or evidence of soft tissue infection is noted.     Vascular: DP & PT pulses are intact & regular on the left.  No significant edema or varicosities noted.  CFT and skin temperature is normal to the left lower extremities.     Neurologic: Lower extremity sensation is intact to light touch.  No evidence of weakness or contracture in the left lower extremities.  No evidence of neuropathy.     Musculoskeletal: Patient is ambulatory without assistive device or brace.  No gross ankle deformity noted.  No foot or ankle joint effusion is noted.  Noted pain on palpation on posterior aspect of the left heel at the insertion point of Achilles " tendon.  No surrounding erythema noted.  On notes pain with dorsiflexion of the left foot and plantar flexion against resistance.       ASSESSMENT / PLAN:     ICD-10-CM    1. Tendonitis, Achilles, left M76.62        I have explained to Chyna  about the conditions.  We discussed the nature of the condition as well as the treatment plan and expected length of recovery.  At this time, the patient was instructed on icing, stretching, tissue massage and support.  The patient was fitted with 1/4 inch heel lift that will aid in offloading the tension forces to the soft tissues and prevent further inflammation.   The patient will return in four weeks for reevaluation if the symptoms do not resolve.      Chyna verbalized agreement with and understanding of the rational for the diagnosis and treatment plan.  All questions were answered to best of my ability and the patient's satisfaction. The patient was advised to contact the clinic with any questions that may arise after the clinic visit.      Disclaimer: This note consists of symbols derived from keyboarding, dictation and/or voice recognition software. As a result, there may be errors in the script that have gone undetected. Please consider this when interpreting information found in this chart.       GANGA Pace D.P.M., FMOE.F.A.S.

## 2019-11-06 NOTE — PATIENT INSTRUCTIONS
"Not convinced this is for sure just 1 issues    Would have you see foot doctor given pain with \"anterior draw\" test    Set up MRI of back due to sciatica worsening.  Call [phone] to set up your imaging testing.    "

## 2019-11-06 NOTE — LETTER
11/6/2019         RE: Chyna Moncada  [address] [facility] [address]        Dear Colleague,    Thank you for referring your patient, Chyna Moncada, to the [facility]. Please see a copy of my visit note below.    PATIENT HISTORY:  Chyna Moncada is a 68 year old female who presents to clinic for a painful left foot .  The patient describes the pain as sharp stabbing.  The patient relates the pain level is moderate.  The patient relates pain is located in the back of the left heel.  The patient relates the pain has been present for the past several weeks.  The patient relates pain with ambulation.  The patient has tried shoes with little relief.  The patient was sent by Edilia Davison PA-C for consultation on the left foot.       REVIEW OF SYSTEMS:  Constitutional, HEENT, cardiovascular, pulmonary, GI, , musculoskeletal, neuro, skin, endocrine and psych systems are negative, except as otherwise noted.     PAST MEDICAL HISTORY:   Past Medical History:   Diagnosis Date     Cerebral embolism with cerebral infarction (H)         PAST SURGICAL HISTORY:   Past Surgical History:   Procedure Laterality Date     HYSTERECTOMY, PAP NO LONGER INDICATED          MEDICATIONS:   Current Outpatient Medications:      aspirin EC 81 MG tablet, Take 81 mg by mouth daily , Disp: , Rfl:      calcium-vitamin D (CALTRATE) 600-400 MG-UNIT per tablet, Take 1 tablet by mouth daily , Disp: , Rfl:      Cholecalciferol (VITAMIN D-3 PO), Take 1,000 Units by mouth daily, Disp: , Rfl:      LORazepam (ATIVAN) 0.5 MG tablet, Take 1 tablet (0.5 mg) by mouth every 6 hours as needed, Disp: 10 tablet, Rfl: 0     mometasone (NASONEX) 50 MCG/ACT spray, Spray 2 sprays in nostril daily , Disp: , Rfl:      Multiple Vitamin (MULTI-VITAMINS) TABS, Take 1 tablet by mouth daily, Disp: , Rfl:      pravastatin (PRAVACHOL) 40 MG tablet, TAKE ONE TABLET BY MOUTH EVERY NIGHT AT BEDTIME, Disp: 90 tablet, Rfl: 3      Probiotic Product (PROBIOTIC DAILY) CAPS, , Disp: , Rfl:      Propylene Glycol-Glycerin 1-0.3 % SOLN, Place 1 drop into both eyes as needed, Disp: , Rfl:      valACYclovir (VALTREX) 1000 mg tablet, TAKE TWO TABLETS BY MOUTH TWICE A DAY, Disp: 4 tablet, Rfl: 11     ALLERGIES:    Allergies   Allergen Reactions     Tramadol Anaphylaxis     Benadryl [Altaryl] Other (See Comments)     Made her goofy     Caffeine Other (See Comments)     Jittery       Codeine Nausea and Vomiting     Darvocet [Propoxyphene N-Apap]      Other reaction(s): GI Upset     Diphenhydramine Other (See Comments)     Lovastatin Other (See Comments) and Muscle Pain (Myalgia)     Other reaction(s): Myalgia  pain     Percocet [Oxycodone-Acetaminophen] Other (See Comments)     Other reaction(s): Dizziness  Sick or dizzy?     Vicodin [Hydrocodone-Acetaminophen] Other (See Comments) and Nausea     Sick or dizzy?        SOCIAL HISTORY:   Social History     Socioeconomic History     Marital status:      Spouse name: Not on file     Number of children: Not on file     Years of education: Not on file     Highest education level: Not on file   Occupational History     Not on file   Social Needs     Financial resource strain: Not on file     Food insecurity:     Worry: Not on file     Inability: Not on file     Transportation needs:     Medical: Not on file     Non-medical: Not on file   Tobacco Use     Smoking status: Former Smoker     Types: Cigarettes     Last attempt to quit: 1/10/1983     Years since quittin.8     Smokeless tobacco: Never Used   Substance and Sexual Activity     Alcohol use: Yes     Alcohol/week: 1.0 standard drinks     Types: 1 Glasses of wine per week     Comment: DAILY     Drug use: No     Sexual activity: Yes     Partners: Male   Lifestyle     Physical activity:     Days per week: Not on file     Minutes per session: Not on file     Stress: Not on file   Relationships     Social connections:     Talks on phone: Not on file  "    Gets together: Not on file     Attends Tenriism service: Not on file     Active member of club or organization: Not on file     Attends meetings of clubs or organizations: Not on file     Relationship status: Not on file     Intimate partner violence:     Fear of current or ex partner: Not on file     Emotionally abused: Not on file     Physically abused: Not on file     Forced sexual activity: Not on file   Other Topics Concern     Parent/sibling w/ CABG, MI or angioplasty before 65F 55M? No   Social History Narrative     Not on file        FAMILY HISTORY:   Family History   Problem Relation Age of Onset     Arthritis Mother      Cancer Mother         GYN     Heart Disease Father      Alcohol/Drug Maternal Grandfather      Heart Disease Paternal Grandfather         Heart attack     Diabetes Paternal Grandfather      Cancer Brother         throat        EXAM:Vitals: /63   Pulse 74   Ht 1.581 m (5' 2.25\")   Wt 61.2 kg (135 lb)   BMI 24.49 kg/m     BMI= Body mass index is 24.49 kg/m .        General appearance: Patient is alert and fully cooperative with history & exam.  No sign of distress is noted during the visit.     Psychiatric: Affect is pleasant & appropriate.  Patient appears motivated to improve health.     Respiratory: Breathing is regular & unlabored while sitting.     HEENT: Hearing is intact to spoken word.  Speech is clear.  No gross evidence of visual impairment that would impact ambulation.     Dermatologic: Skin is intact to left lower extremities without significant lesions, rash or abrasion.  No paronychia or evidence of soft tissue infection is noted.     Vascular: DP & PT pulses are intact & regular on the left.  No significant edema or varicosities noted.  CFT and skin temperature is normal to the left lower extremities.     Neurologic: Lower extremity sensation is intact to light touch.  No evidence of weakness or contracture in the left lower extremities.  No evidence of " neuropathy.     Musculoskeletal: Patient is ambulatory without assistive device or brace.  No gross ankle deformity noted.  No foot or ankle joint effusion is noted.  Noted pain on palpation on posterior aspect of the left heel at the insertion point of Achilles tendon.  No surrounding erythema noted.  On notes pain with dorsiflexion of the left foot and plantar flexion against resistance.       ASSESSMENT / PLAN:     ICD-10-CM    1. Tendonitis, Achilles, left M76.62        I have explained to Chyna  about the conditions.  We discussed the nature of the condition as well as the treatment plan and expected length of recovery.  At this time, the patient was instructed on icing, stretching, tissue massage and support.  The patient was fitted with 1/4 inch heel lift that will aid in offloading the tension forces to the soft tissues and prevent further inflammation.   The patient will return in four weeks for reevaluation if the symptoms do not resolve.      Chyna verbalized agreement with and understanding of the rational for the diagnosis and treatment plan.  All questions were answered to best of my ability and the patient's satisfaction. The patient was advised to contact the clinic with any questions that may arise after the clinic visit.      Disclaimer: This note consists of symbols derived from keyboarding, dictation and/or voice recognition software. As a result, there may be errors in the script that have gone undetected. Please consider this when interpreting information found in this chart.       MIRTA Walls.P.LUCINA., F.A.C.F.A.S.        Again, thank you for allowing me to participate in the care of your patient.        Sincerely,        Nato Pace DPM

## 2019-11-06 NOTE — NURSING NOTE
"Chief Complaint   Patient presents with     Musculoskeletal Problem     left foot follow up       Initial /64 (BP Location: Right arm)   Pulse 71   Temp 97.6  F (36.4  C) (Tympanic)   Resp 16   Wt 61.2 kg (135 lb)   SpO2 98%   BMI 22.29 kg/m   Estimated body mass index is 22.29 kg/m  as calculated from the following:    Height as of 10/21/19: 1.657 m (5' 5.25\").    Weight as of this encounter: 61.2 kg (135 lb).    Patient presents to the clinic using No DME    Health Maintenance that is potentially due pending provider review:  NONE    n/a    Is there anyone who you would like to be able to receive your results? No  If yes have patient fill out KRISTY    "

## 2019-11-08 ENCOUNTER — HOSPITAL ENCOUNTER (OUTPATIENT)
Dept: MRI IMAGING | Facility: CLINIC | Age: 69
Discharge: HOME | End: 2019-11-08
Attending: PHYSICIAN ASSISTANT | Admitting: PHYSICIAN ASSISTANT
Payer: COMMERCIAL

## 2019-11-08 DIAGNOSIS — M54.32 SCIATICA OF LEFT SIDE: ICD-10-CM

## 2019-11-08 PROCEDURE — 72148 MRI LUMBAR SPINE W/O DYE: CPT

## 2019-11-12 DIAGNOSIS — M54.32 SCIATICA OF LEFT SIDE: Primary | ICD-10-CM

## 2019-11-18 ENCOUNTER — HOSPITAL ENCOUNTER (OUTPATIENT)
Dept: PHYSICAL THERAPY | Facility: CLINIC | Age: 69
Setting detail: THERAPIES SERIES
End: 2019-11-18
Attending: PHYSICIAN ASSISTANT
Payer: COMMERCIAL

## 2019-11-18 DIAGNOSIS — M54.32 SCIATICA OF LEFT SIDE: ICD-10-CM

## 2019-11-18 PROCEDURE — 97140 MANUAL THERAPY 1/> REGIONS: CPT | Mod: GP | Performed by: PHYSICAL THERAPIST

## 2019-11-18 PROCEDURE — 97161 PT EVAL LOW COMPLEX 20 MIN: CPT | Mod: GP | Performed by: PHYSICAL THERAPIST

## 2019-11-18 PROCEDURE — 97110 THERAPEUTIC EXERCISES: CPT | Mod: GP | Performed by: PHYSICAL THERAPIST

## 2019-11-19 NOTE — PROGRESS NOTES
11/18/19 1300   General Information   Type of Visit Initial OP Ortho PT Evaluation   Start of Care Date 11/18/19   Referring Physician Edilia Davison PA-C    Patient/Family Goals Statement get back to working out   Orders Evaluate and Treat   Date of Order 11/18/19   Certification Required? Yes   Medical Diagnosis Sciatica of left side M54.32  - Primary   Surgical/Medical history reviewed Yes   Precautions/Limitations no known precautions/limitations   Body Part(s)   Body Part(s) Lumbar Spine/SI   Presentation and Etiology   Pertinent history of current problem (include personal factors and/or comorbidities that impact the POC) Pt relates she saw podiatrist and foot is doing littler better. Pt relates LBP has been chronic but got worse this fall. Pt biggest c/o is pain going down her L leg. Has gone down 1x. Pt relates she did have an MRI and has slight disc bulge. PMH: see medical chart   Impairments B. Decreased WB tolerance;A. Pain;D. Decreased ROM;E. Decreased flexibility;F. Decreased strength and endurance;G. Impaired balance;H. Impaired gait   Functional Limitations perform activities of daily living;perform desired leisure / sports activities   Symptom Location L low back   How/Where did it occur From insidious onset   Chronicity New   Pain rating (0-10 point scale) Best (/10);Worst (/10)   Best (/10) 0   Worst (/10) 0   Pain quality A. Sharp;C. Aching;F. Stabbing   Frequency of pain/symptoms B. Intermittent   Pain/symptoms exacerbated by A. Sitting;B. Walking;C. Lifting;D. Carrying;E. Rest;G. Certain positions;H. Overhead reach;J. ADL;I. Bending;K. Home tasks;M. Other   Pain exacerbation comment cant walk or move   Pain/symptoms eased by D. Nothing   Progression of symptoms since onset: Worsened   Current Level of Function   Current Community Support Family/friend caregiver   Patient role/employment history F. Retired   Living environment [facility]   Fall Risk Screen   Fall screen completed  by PT   Have you fallen 2 or more times in the past year? No   Have you fallen and had an injury in the past year? No   Is patient a fall risk? No   Abuse Screen (yes response referral indicated)   Feels Unsafe at Home or Work/School no   Feels Threatened by Someone no   Does Anyone Try to Keep You From Having Contact with Others or Doing Things Outside Your Home? no   Physical Signs of Abuse Present no   System Outcome Measures   Outcome Measures Low Back Pain (see [facility] and Mary)   Lumbar Spine/SI Objective Findings   Slump Test - B    Hip Flexion (L2) Strength 5/5   Knee Flexion Strength R: 4/5, L 5/5   Knee Extension (L3) Strength 5/5 B   Ankle Dorsiflexion (L4) Strength 5/5 w pain   Great Toe Extension (L5) Strength 5/5  w pain    Flexion ROM to floor   Extension %    Right Side Bending ROM to knee   Left Side Bending ROM to knee   Hip Abduction Strength 3/5 B    Observation pt has no pain currently    Pelvic Screen L upslip    Planned Therapy Interventions   Planned Therapy Interventions balance training;gait training;joint mobilization;manual therapy;ROM;strengthening;stretching;neuromuscular re-education;motor coordination training   Planned Modality Interventions   Planned Modality Interventions Cryotherapy;TENS;Traction;Ultrasound;Hot packs   Clinical Impression   Criteria for Skilled Therapeutic Interventions Met yes, treatment indicated   PT Diagnosis Lumbar radiculaoapthy   Influenced by the following impairments weakness, pain   Functional limitations due to impairments walking, standing,    Clinical Presentation Stable/Uncomplicated   Clinical Presentation Rationale Pt is pleasant 68 old female who presents w LBP w radicualr symptoms. Pt is doing well today thus focused on core/hip strengthening     Clinical Decision Making (Complexity) Low complexity   Therapy Frequency 1 time/week   Predicted Duration of Therapy Intervention (days/wks) 6 weeks   Risk & Benefits of therapy have been  explained Yes   Patient, Family & other staff in agreement with plan of care Yes   Education Assessment   Preferred Learning Style Listening;Reading;Demonstration;Pictures/video   Barriers to Learning No barriers   ORTHO GOALS   PT Ortho Eval Goals 1;2;4;3   Ortho Goal 1   Goal Identifier sleeping   Goal Description Pt will be able to sleep through night w less than 2/10 LBP   Target Date 12/09/19   Ortho Goal 2   Goal Identifier ADLs   Goal Description Pt will relate less than 2/10 pain w all ADL's   Target Date 12/09/19   Ortho Goal 3   Goal Identifier HEP   Goal Description Pt will be compliant and competent in HEP to allow them to achieve maximal strength and reduce pain mobility    Target Date 12/30/19   Ortho Goal 4   Goal Identifier YANET   Goal Description Pt will report <10% disability on YANET to demonstrate improved ability to complete daily activities and demonstrate significant clinical improvement.    Target Date 12/30/19   Total Evaluation Time   PT Eval, Low Complexity Minutes (75419) 20   Therapy Certification   Certification date from 11/18/19   Certification date to 12/30/19   Medical Diagnosis Sciatica of left side M54.32  - Primary       Azul Ortega  Physical Therapist  [address] [address]  [email or web address]   [email or web address]   Office: [phone] Fax: [phone]

## 2019-11-19 NOTE — PROGRESS NOTES
[facility]          OUTPATIENT PHYSICAL THERAPY ORTHOPEDIC EVALUATION  PLAN OF TREATMENT FOR OUTPATIENT REHABILITATION  (COMPLETE FOR INITIAL CLAIMS ONLY)  Patient's Last Name, First Name, M.I.  YOB: 1950  Chyna Moncada    Provider s Name:  [facility]   Medical Record No.  [id number]   Start of Care Date:  11/18/19   Onset Date:      Type:     _X__PT   ___OT   ___SLP Medical Diagnosis:  Sciatica of left side M54.32  - Primary     PT Diagnosis:  Lumbar radiculaoapthy   Visits from SOC:  1      _________________________________________________________________________________  Plan of Treatment/Functional Goals:  balance training, gait training, joint mobilization, manual therapy, ROM, strengthening, stretching, neuromuscular re-education, motor coordination training     Cryotherapy, TENS, Traction, Ultrasound, Hot packs     Goals  Goal Identifier: sleeping  Goal Description: Pt will be able to sleep through night w less than 2/10 LBP  Target Date: 12/09/19    Goal Identifier: ADLs  Goal Description: Pt will relate less than 2/10 pain w all ADL's  Target Date: 12/09/19    Goal Identifier: HEP  Goal Description: Pt will be compliant and competent in HEP to allow them to achieve maximal strength and reduce pain mobility   Target Date: 12/30/19    Goal Identifier: YANET  Goal Description: Pt will report <10% disability on YANET to demonstrate improved ability to complete daily activities and demonstrate significant clinical improvement.   Target Date: 12/30/19                   Therapy Frequency:  1 time/week  Predicted Duration of Therapy Intervention:  6 weeks    Azul Ortega, PT                 I CERTIFY THE NEED FOR THESE SERVICES FURNISHED UNDER        THIS PLAN OF TREATMENT AND WHILE UNDER MY CARE     (Physician co-signature of this document indicates review and certification of the therapy plan).                       Certification Date From:  11/18/19    Certification Date To:  12/30/19    Referring Provider:  Edilia Davison PA-C     Initial Assessment        See Epic Evaluation Start of Care Date: 11/18/19

## 2019-11-26 ENCOUNTER — HOSPITAL ENCOUNTER (OUTPATIENT)
Dept: PHYSICAL THERAPY | Facility: CLINIC | Age: 69
Setting detail: THERAPIES SERIES
End: 2019-11-26
Attending: PHYSICIAN ASSISTANT
Payer: COMMERCIAL

## 2019-11-26 PROCEDURE — 97110 THERAPEUTIC EXERCISES: CPT | Mod: GP | Performed by: PHYSICAL THERAPIST

## 2019-12-04 ENCOUNTER — OFFICE VISIT (OUTPATIENT)
Dept: PODIATRY | Facility: CLINIC | Age: 69
End: 2019-12-04
Payer: COMMERCIAL

## 2019-12-04 VITALS
DIASTOLIC BLOOD PRESSURE: 62 MMHG | HEIGHT: 62 IN | BODY MASS INDEX: 24.84 KG/M2 | SYSTOLIC BLOOD PRESSURE: 111 MMHG | HEART RATE: 67 BPM | WEIGHT: 135 LBS

## 2019-12-04 DIAGNOSIS — M76.62 TENDONITIS, ACHILLES, LEFT: Primary | ICD-10-CM

## 2019-12-04 PROCEDURE — 99213 OFFICE O/P EST LOW 20 MIN: CPT | Performed by: PODIATRIST

## 2019-12-04 ASSESSMENT — MIFFLIN-ST. JEOR: SCORE: 1099.58

## 2019-12-04 NOTE — NURSING NOTE
"Chief Complaint   Patient presents with     RECHECK     Tendonitis, Left achilles        Initial /62   Pulse 67   Ht 1.581 m (5' 2.25\")   Wt 61.2 kg (135 lb)   BMI 24.49 kg/m   Estimated body mass index is 24.49 kg/m  as calculated from the following:    Height as of this encounter: 1.581 m (5' 2.25\").    Weight as of this encounter: 61.2 kg (135 lb).     Elizabeth Fuller, rossy          "

## 2019-12-04 NOTE — PROGRESS NOTES
Chyna returns to the office for reevaluation of the left Achilles tendon.  The patient relates following the instructions given at the last visit with noted continued pain.  The patient relates overall minimal improvement in pain and function of the left Achilles tendon.  The patient relates no other problems.    PAST MEDICAL HISTORY:   Past Medical History:   Diagnosis Date     Cerebral embolism with cerebral infarction (H)        BMI= Body mass index is 24.49 kg/m .        Physical Exam:    General: The patient appears to have a pleasant mental affect.    Lower extremity physical exam:  Neurovascular status is intact with palpable pedal pulses and intact epicritic sensations.  Muscular exam is within normal limits to major muscle groups.  Integument is intact.      One notes decreased edema.  One notes pain on palpation over the distal Achilles tendon on the left.  One notes a tight gastroc complex in the left lower extremity.  No surrounding erythema or edema noted.       Assessment:      ICD-10-CM    1. Tendonitis, Achilles, left M76.62        Plan:  I have explained to Chyna about the conditions.  At this time, the patient was instructed on icing, stretching, tissue massage and support.  The patient will return in four weeks for reevaluation if the symptoms do not resolve.      Disclaimer: This note consists of symbols derived from keyboarding, dictation and/or voice recognition software. As a result, there may be errors in the script that have gone undetected. Please consider this when interpreting information found in this chart.       GANGA Pace D.P.M., [email or web address].

## 2019-12-04 NOTE — LETTER
12/4/2019         RE: Chyna Moncada  [address] [facility] [address]        Dear Colleague,    Thank you for referring your patient, Chyna Moncada, to the [facility]. Please see a copy of my visit note below.    Chyna returns to the office for reevaluation of the left Achilles tendon.  The patient relates following the instructions given at the last visit with noted continued pain.  The patient relates overall minimal improvement in pain and function of the left Achilles tendon.  The patient relates no other problems.    PAST MEDICAL HISTORY:   Past Medical History:   Diagnosis Date     Cerebral embolism with cerebral infarction (H)        BMI= Body mass index is 24.49 kg/m .        Physical Exam:    General: The patient appears to have a pleasant mental affect.    Lower extremity physical exam:  Neurovascular status is intact with palpable pedal pulses and intact epicritic sensations.  Muscular exam is within normal limits to major muscle groups.  Integument is intact.      One notes decreased edema.  One notes pain on palpation over the distal Achilles tendon on the left.  One notes a tight gastroc complex in the left lower extremity.  No surrounding erythema or edema noted.       Assessment:      ICD-10-CM    1. Tendonitis, Achilles, left M76.62        Plan:  I have explained to Chyna about the conditions.  At this time, the patient was instructed on icing, stretching, tissue massage and support.  The patient will return in four weeks for reevaluation if the symptoms do not resolve.      Disclaimer: This note consists of symbols derived from keyboarding, dictation and/or voice recognition software. As a result, there may be errors in the script that have gone undetected. Please consider this when interpreting information found in this chart.       GANGA Pace D.P.M., [email or web address].      Again, thank you for allowing me to participate in the care of your patient.         Sincerely,        Nato Pace DPM

## 2020-01-14 NOTE — PROGRESS NOTES
Outpatient Physical Therapy Discharge Note     Patient: Chyna Moncada  : 1950    Beginning/End Dates of Reporting Period:  19 to 2020    Referring Provider: Saman Wheeler Diagnosis: LBP     Client Self Report: Pt relates she doesnt like HEP as some are too hard. Pt relates has not had pain in leg since last visits     Objective Measurements:  Objective Measure: Pelvic postion     Objective Measure: Radicualr symptosm          Goals:  Goal Identifier sleeping   Goal Description Pt will be able to sleep through night w less than 2/10 LBP   Target Date 19   Date Met      Progress:not assessed at last visit      Goal Identifier ADLs   Goal Description Pt will relate less than 2/10 pain w all ADL's   Target Date 19   Date Met      Progress:not met     Goal Identifier HEP   Goal Description Pt will be compliant and competent in HEP to allow them to achieve maximal strength and reduce pain mobility    Target Date 19   Date Met      Progress:goal met     Goal Identifier YANET   Goal Description Pt will report <10% disability on YANET to demonstrate improved ability to complete daily activities and demonstrate significant clinical improvement.    Target Date 19   Date Met      Progress:not assessed at last visit        Progress Toward Goals:   Progress this reporting period: Pt was seen for 2 visits over this POC. Pt was having less radicular symptoms and LBP and 2nd visit and was able to progress in strengthening. Tentatively planned 1 more visit however pt did not return in over 30 days thus pt is being discharged to [facility].           Plan:  Discharge from therapy.    Discharge:    Reason for Discharge: Patient has failed to schedule further appointments.    Equipment Issued: NA    Discharge Plan: Patient to continue home program.    Azul Ortega  Physical Therapist  [facility]  [address] [place] [id number]  [email or web address]   [email or web address]    Office: [phone] Fax: [phone]

## 2020-01-14 NOTE — PROGRESS NOTES
Outpatient Physical Therapy Discharge Note     Patient: Chyna Moncada  : 1950    Beginning/End Dates of Reporting Period:  10/11/19 to 2020    Referring Provider: Saman Wheeler Diagnosis: ankle pain     Client Self Report: Pt relates walking is doing better. Stiares,/sqausts etc are still realy hard. Pt wants more planks to help w core as she felt better after     Objective Measurements:  Objective Measure: L ankle DF  Details: 11 deg w pain in tendon     Objective Measure: SL balance     Objective Measure: ambulation  Details: min limp noted and R lean     Objective Measure: Slump  Details: - B          Goals:  Goal Identifier DF   Goal Description Pt will demonstrate 10 deg AROM DF in R ankle w/o pain in order to be able to walk w improved gait mechanics   Target Date 19   Date Met      Progress:goal met     Goal Identifier balance   Goal Description Pt will be able to stand on either LE for 10 + secs with less than 1/10 pain to decrease risk of falls and demonstrate improved strength.   Target Date 19   Date Met      Progress:not met     Goal Identifier LEFS   Goal Description Pt will score 60/80 on LEFS outcome tool to demonstrate clinically significant improvement and be able to complete more tasks at home   Target Date 19   Date Met      Progress:not assessed at last visit       Progress Toward Goals:   Progress this reporting period: Due to continued pain, pt was referred back to MD and was to continue PT. Pt has failed to schedule f/u visits within 30 days from last visit thus is being d/c from therapy at this time. Objective measures are all taken from last visit. Current status is unknown at this time.          Plan:  Discharge from therapy.    Discharge:    Reason for Discharge: Patient has failed to schedule further appointments.    Equipment Issued: NA    Discharge Plan: Patient to continue home program.

## 2020-01-29 ENCOUNTER — TELEPHONE (OUTPATIENT)
Dept: FAMILY MEDICINE | Facility: CLINIC | Age: 70
End: 2020-01-29

## 2020-01-29 NOTE — TELEPHONE ENCOUNTER
Reason for call:  Patient reporting a symptom    Symptom or request: Chyna says for the past week her urine has a very strong odor. She says she doesn't have any burning or pain and feels fine. No new medications or foods in her diet. She wonders if she could just do a urine test. States she doesn't feel a need to be seen since she feels fine.     Phone Number patient can be reached at:  Home number on file [phone] (home)    Best Time:  anytime    Can we leave a detailed message on this number:  YES    Call taken on 1/29/2020 at 12:37 PM by Cassy Slaughter

## 2020-01-29 NOTE — TELEPHONE ENCOUNTER
I talked with Chyna and told her needs to be seen if thinks has UTI.  I explained why needs to be seen  Iesha Henriquez RN

## 2020-03-16 ENCOUNTER — TELEPHONE (OUTPATIENT)
Dept: FAMILY MEDICINE | Facility: CLINIC | Age: 70
End: 2020-03-16

## 2020-03-16 ENCOUNTER — ALLIED HEALTH/NURSE VISIT (OUTPATIENT)
Dept: FAMILY MEDICINE | Facility: CLINIC | Age: 70
End: 2020-03-16
Payer: COMMERCIAL

## 2020-03-16 DIAGNOSIS — R09.89 SYMPTOMS OF UPPER RESPIRATORY INFECTION (URI): Primary | ICD-10-CM

## 2020-03-16 DIAGNOSIS — R50.9 FEVER, UNSPECIFIED FEVER CAUSE: ICD-10-CM

## 2020-03-16 PROCEDURE — 99442 ZZC PHYSICIAN TELEPHONE EVALUATION 11-20 MIN: CPT | Performed by: PHYSICIAN ASSISTANT

## 2020-03-16 NOTE — TELEPHONE ENCOUNTER
Patient is calling stating she has what she thinks is either  Bronchitis or pneumonia. She has not traveled and has not been around anyone who has traveled that she knows of. She does have a temp of 100 . She is not coughing anything up. Does have the chills and body aches, her head hurts and her teeth hurt. She would like to be seen. She states she is only 5 minutes away. Please advise.    Kimmy [place]

## 2020-03-16 NOTE — PROGRESS NOTES
"Chyna Moncada is a 69 year old female who is being evaluated via a billable telephone visit.      The patient has been notified of following:     \"This telephone visit will be conducted via a call between you and your physician/provider. We have found that certain health care needs can be provided without the need for a physical exam.  This service lets us provide the care you need with a short phone conversation.  If a prescription is necessary we can send it directly to your pharmacy.  If lab work is needed we can place an order for that and you can then stop by our lab to have the test done at a later time.    If during the course of the call the physician/provider feels a telephone visit is not appropriate, you will not be charged for this service.\"     Chyna Moncada complains of    Chief Complaint   Patient presents with     URI       I have reviewed and updated the patient's Past Medical History, Social History, Family History and Medication List.    ALLERGIES  Tramadol; Benadryl [altaryl]; Caffeine; Codeine; Darvocet [propoxyphene n-apap]; Diphenhydramine; Lovastatin; Percocet [oxycodone-acetaminophen]; and Vicodin [hydrocodone-acetaminophen]    ENT Symptoms             Symptoms: cc Present Absent Comment   Fever/Chills  x  Temp was 100.   Fatigue  x     Muscle Aches  x     Eye Irritation   x    Sneezing  x     Nasal Walter/Drg  x     Sinus Pressure/Pain  x  Just a little   Loss of smell  x     Dental pain  x     Sore Throat   x    Swollen Glands   x    Ear Pain/Fullness  x     Cough  x  Is more worried about bronchitis or pneumonia.  Dry cough, non-productive   Wheeze   x    Chest Pain  x  Just a little   Shortness of breath  x  Just a little   Rash   x    Other   x      Symptom duration:  5-6 days   Symptom severity:  moderate   Treatments tried:  tylenol, cough drops, cough suppressant   Contacts:  none     Started on Thurs or Fri, felt like she was going to get a cold.  Temp 99 first day.  Has been " running around 100 oral.  Achy.  Head feels in a vice.  Teeth hurt.  Runny nose, clear.  Sinuses slightly tender.  Started as slight cough, worsening Sat night/Sun morning.  Deeper painful, coughing spells, sometimes jag.  Feeling slight SOB, like a typical cough cold.  Chills.      Felisha WOODRUFF MA      Assessment/Plan:  (R09.89) Symptoms of upper respiratory infection (URI)  (primary encounter diagnosis)  (R50.9) Fever, unspecified fever cause    I have reviewed the note as documented above.  This accurately captures the substance of my conversation with the patient.    Telephone length: 18 min    Edilia Davison PA-C    Patient Instructions   Due to covid outbreak and symptoms, unable to evaluate patient with exam and testing    To emergency room if symptoms becoming severe

## 2020-03-16 NOTE — NURSING NOTE
"Chief Complaint   Patient presents with     URI       Initial There were no vitals taken for this visit. Estimated body mass index is 24.49 kg/m  as calculated from the following:    Height as of 12/4/19: 1.581 m (5' 2.25\").    Weight as of 12/4/19: 61.2 kg (135 lb).    Patient presents to the clinic using No DME    Health Maintenance that is potentially due pending provider review:  NONE        Is there anyone who you would like to be able to receive your results? No  If yes have patient fill out KRISTY      "

## 2020-03-19 NOTE — PATIENT INSTRUCTIONS
Due to covid outbreak and symptoms, unable to evaluate patient with exam and testing    To emergency room if symptoms becoming severe

## 2020-03-20 ENCOUNTER — TELEPHONE (OUTPATIENT)
Dept: FAMILY MEDICINE | Facility: CLINIC | Age: 70
End: 2020-03-20

## 2020-03-20 NOTE — TELEPHONE ENCOUNTER
Reason for Call:  Other     Detailed comments: Patient talked to Edilia regarding her cough.  She is feeling better but she is still coughing she wants a RX - Please call patient    Phone Number Patient can be reached at: Home number on file [phone] (home)    Best Time:     Can we leave a detailed message on this number? YES    Call taken on 3/20/2020 at 10:05 AM by Magali Tidwell

## 2020-03-20 NOTE — TELEPHONE ENCOUNTER
"Thermometer quit.  Feels better - \"everything is feeling better, not aching anymore, don't think have temp anymore, its just really the coughing.\"  Still congested feeling in chest.  Can't catch breath sometimes when she's coughing, otherwise not really SOB.  Loose but non productive.  Cough is worse, used to be dry, now more rattly but can't cough it up.  Using her robitussin dm.  Asking for antibiotics.   Offered tessalon, she states she'll hold off.  Advised to contact next week if worsening/not improving.  "

## 2020-07-24 ENCOUNTER — ANCILLARY PROCEDURE (OUTPATIENT)
Dept: GENERAL RADIOLOGY | Facility: CLINIC | Age: 70
End: 2020-07-24
Attending: PHYSICIAN ASSISTANT
Payer: COMMERCIAL

## 2020-07-24 ENCOUNTER — OFFICE VISIT (OUTPATIENT)
Dept: FAMILY MEDICINE | Facility: CLINIC | Age: 70
End: 2020-07-24
Payer: COMMERCIAL

## 2020-07-24 VITALS
WEIGHT: 135.6 LBS | HEIGHT: 62 IN | DIASTOLIC BLOOD PRESSURE: 60 MMHG | RESPIRATION RATE: 12 BRPM | TEMPERATURE: 98.1 F | OXYGEN SATURATION: 98 % | HEART RATE: 79 BPM | SYSTOLIC BLOOD PRESSURE: 106 MMHG | BODY MASS INDEX: 24.95 KG/M2

## 2020-07-24 DIAGNOSIS — M79.662 PAIN OF LEFT LOWER LEG: Primary | ICD-10-CM

## 2020-07-24 DIAGNOSIS — R26.81 UNSTEADINESS ON FEET: ICD-10-CM

## 2020-07-24 DIAGNOSIS — M79.662 PAIN OF LEFT LOWER LEG: ICD-10-CM

## 2020-07-24 DIAGNOSIS — N95.1 MENOPAUSAL SYNDROME (HOT FLASHES): ICD-10-CM

## 2020-07-24 DIAGNOSIS — F41.1 ANXIETY STATE: ICD-10-CM

## 2020-07-24 DIAGNOSIS — N64.4 BREAST PAIN, RIGHT: ICD-10-CM

## 2020-07-24 LAB
ALBUMIN SERPL-MCNC: 3.6 G/DL (ref 3.4–5)
ALP SERPL-CCNC: 93 U/L (ref 40–150)
ALT SERPL W P-5'-P-CCNC: 25 U/L (ref 0–50)
ANION GAP SERPL CALCULATED.3IONS-SCNC: 3 MMOL/L (ref 3–14)
AST SERPL W P-5'-P-CCNC: 16 U/L (ref 0–45)
BILIRUB SERPL-MCNC: 0.3 MG/DL (ref 0.2–1.3)
BUN SERPL-MCNC: 21 MG/DL (ref 7–30)
CALCIUM SERPL-MCNC: 9.5 MG/DL (ref 8.5–10.1)
CHLORIDE SERPL-SCNC: 108 MMOL/L (ref 94–109)
CO2 SERPL-SCNC: 32 MMOL/L (ref 20–32)
CREAT SERPL-MCNC: 0.73 MG/DL (ref 0.52–1.04)
ERYTHROCYTE [DISTWIDTH] IN BLOOD BY AUTOMATED COUNT: 13.1 % (ref 10–15)
GFR SERPL CREATININE-BSD FRML MDRD: 84 ML/MIN/{1.73_M2}
GLUCOSE SERPL-MCNC: 106 MG/DL (ref 70–99)
HCT VFR BLD AUTO: 40.5 % (ref 35–47)
HGB BLD-MCNC: 13.5 G/DL (ref 11.7–15.7)
MCH RBC QN AUTO: 29.3 PG (ref 26.5–33)
MCHC RBC AUTO-ENTMCNC: 33.3 G/DL (ref 31.5–36.5)
MCV RBC AUTO: 88 FL (ref 78–100)
PLATELET # BLD AUTO: 188 10E9/L (ref 150–450)
POTASSIUM SERPL-SCNC: 3.7 MMOL/L (ref 3.4–5.3)
PROT SERPL-MCNC: 6.8 G/DL (ref 6.8–8.8)
RBC # BLD AUTO: 4.61 10E12/L (ref 3.8–5.2)
SODIUM SERPL-SCNC: 143 MMOL/L (ref 133–144)
TSH SERPL DL<=0.005 MIU/L-ACNC: 2.32 MU/L (ref 0.4–4)
WBC # BLD AUTO: 5.2 10E9/L (ref 4–11)

## 2020-07-24 PROCEDURE — 85027 COMPLETE CBC AUTOMATED: CPT | Performed by: PHYSICIAN ASSISTANT

## 2020-07-24 PROCEDURE — 80053 COMPREHEN METABOLIC PANEL: CPT | Performed by: PHYSICIAN ASSISTANT

## 2020-07-24 PROCEDURE — 84443 ASSAY THYROID STIM HORMONE: CPT | Performed by: PHYSICIAN ASSISTANT

## 2020-07-24 PROCEDURE — 73590 X-RAY EXAM OF LOWER LEG: CPT | Mod: LT

## 2020-07-24 PROCEDURE — 36415 COLL VENOUS BLD VENIPUNCTURE: CPT | Performed by: PHYSICIAN ASSISTANT

## 2020-07-24 PROCEDURE — 99214 OFFICE O/P EST MOD 30 MIN: CPT | Performed by: PHYSICIAN ASSISTANT

## 2020-07-24 ASSESSMENT — MIFFLIN-ST. JEOR: SCORE: 1097.3

## 2020-07-24 NOTE — LETTER
July 27, 2020      Chyna Moncada  [address] [facility] [address]        Dear ,    We are writing to inform you of your test results.    Labs and xray looked fine.  Nothing concerning.  See sports medicine if shin pain worsens    Resulted Orders   CBC with platelets   Result Value Ref Range    WBC 5.2 4.0 - 11.0 10e9/L    RBC Count 4.61 3.8 - 5.2 10e12/L    Hemoglobin 13.5 11.7 - 15.7 g/dL    Hematocrit 40.5 35.0 - 47.0 %    MCV 88 78 - 100 fl    MCH 29.3 26.5 - 33.0 pg    MCHC 33.3 31.5 - 36.5 g/dL    RDW 13.1 10.0 - 15.0 %    Platelet Count 188 150 - 450 10e9/L   Comprehensive metabolic panel (BMP + Alb, Alk Phos, ALT, AST, Total. Bili, TP)   Result Value Ref Range    Sodium 143 133 - 144 mmol/L    Potassium 3.7 3.4 - 5.3 mmol/L    Chloride 108 94 - 109 mmol/L    Carbon Dioxide 32 20 - 32 mmol/L    Anion Gap 3 3 - 14 mmol/L    Glucose 106 (H) 70 - 99 mg/dL    Urea Nitrogen 21 7 - 30 mg/dL    Creatinine 0.73 0.52 - 1.04 mg/dL    GFR Estimate 84 >60 mL/min/[1.73_m2]      Comment:      Non  GFR Calc  Starting 12/18/2018, serum creatinine based estimated GFR (eGFR) will be   calculated using the Chronic Kidney Disease Epidemiology Collaboration   (CKD-EPI) equation.      GFR Estimate If Black >90 >60 mL/min/[1.73_m2]      Comment:       GFR Calc  Starting 12/18/2018, serum creatinine based estimated GFR (eGFR) will be   calculated using the Chronic Kidney Disease Epidemiology Collaboration   (CKD-EPI) equation.      Calcium 9.5 8.5 - 10.1 mg/dL    Bilirubin Total 0.3 0.2 - 1.3 mg/dL    Albumin 3.6 3.4 - 5.0 g/dL    Protein Total 6.8 6.8 - 8.8 g/dL    Alkaline Phosphatase 93 40 - 150 U/L    ALT 25 0 - 50 U/L    AST 16 0 - 45 U/L   TSH with free T4 reflex   Result Value Ref Range    TSH 2.32 0.40 - 4.00 mU/L       If you have any questions or concerns, please call the clinic at the number listed above.       Sincerely,        Edilia aDvison,  TWAN

## 2020-07-24 NOTE — PATIENT INSTRUCTIONS
Xray leg   Let me know if want to see sports med    Call [phone] to set up breast imaging     Labs for hot flashes.  Discussed options.

## 2020-07-24 NOTE — NURSING NOTE
"Chief Complaint   Patient presents with     Musculoskeletal Problem     Breast Pain       Initial /60 (BP Location: Right arm, Patient Position: Chair, Cuff Size: Adult Regular)   Pulse 79   Temp 98.1  F (36.7  C) (Tympanic)   Resp 12   Ht 1.581 m (5' 2.25\")   Wt 61.5 kg (135 lb 9.6 oz)   SpO2 98%   BMI 24.60 kg/m   Estimated body mass index is 24.6 kg/m  as calculated from the following:    Height as of this encounter: 1.581 m (5' 2.25\").    Weight as of this encounter: 61.5 kg (135 lb 9.6 oz).    Patient presents to the clinic using No DME    Health Maintenance that is potentially due pending provider review:  NONE        Is there anyone who you would like to be able to receive your results? No  If yes have patient fill out KRISTY      "

## 2020-07-24 NOTE — PROGRESS NOTES
"Subjective     Chyna Moncada is a 69 year old female who presents to clinic today for the following health issues:    HPI     Musculoskeletal problem/pain      Duration: Months    Description  Location: Front of left leg    Intensity:  mild    Accompanying signs and symptoms: burning sensation    History  Previous similar problem: no   Previous evaluation:  none    Precipitating or alleviating factors:  Trauma or overuse: no   Aggravating factors include: none    Therapies tried and outcome: nothing    3-6 months.  Pain anterior shin, when it happens lasts all day.  Several days a week.  Not related to activity.  Never swollen.  No weakness.    * Right side near breast and axillary region, some pain, burning sensation.  When she jogs she gets a slight pain into her arm.  Still having itchy armpits    * Starting to get hot flashes again, not sure why.  Not sweaty like she used to be but uncomfortable, a bit more intense than they used to be.  Balance occasionally slightly off.     BP Readings from Last 3 Encounters:   07/24/20 106/60   12/04/19 111/62   11/06/19 113/63    Wt Readings from Last 3 Encounters:   07/24/20 61.5 kg (135 lb 9.6 oz)   12/04/19 61.2 kg (135 lb)   11/06/19 61.2 kg (135 lb)          Reviewed and updated as needed this visit by Provider  Tobacco  Allergies  Meds  Problems  Med Hx  Surg Hx  Fam Hx         Review of Systems   Complete ROS - constitutional, HEENT, cardiovascular, pulmonary, GI, , musculoskeletal, neuro, skin, endocrine and psych systems are negative, except as otherwise noted.      Objective    /60 (BP Location: Right arm, Patient Position: Chair, Cuff Size: Adult Regular)   Pulse 79   Temp 98.1  F (36.7  C) (Tympanic)   Resp 12   Ht 1.581 m (5' 2.25\")   Wt 61.5 kg (135 lb 9.6 oz)   SpO2 98%   BMI 24.60 kg/m    Body mass index is 24.6 kg/m .  Physical Exam   GENERAL: healthy, alert and no distress  EYES: Eyes grossly normal to inspection, PERRL and " conjunctivae and sclerae normal  HENT: ear canals and TM's normal, nose and mouth deferred due to pandemic  NECK: no adenopathy, no asymmetry, masses, or scars and thyroid normal to palpation  RESP: lungs clear to auscultation - no rales, rhonchi or wheezes  BREAST: normal without masses, tenderness or nipple discharge and no palpable axillary masses or adenopathy  CV: regular rate and rhythm, normal S1 S2, no S3 or S4, no murmur, click or rub, no peripheral edema   ABDOMEN: soft, nontender, no hepatosplenomegaly, no masses and bowel sounds normal  MS: no gross musculoskeletal defects noted, no edema, extensive varicosities on lower left leg  SKIN: no suspicious lesions or rashes  NEURO: Normal strength and tone, mentation intact and speech normal  PSYCH: mentation appears normal, affect normal/bright    Diagnostic Test Results:  Labs reviewed in Epic        Assessment & Plan       ICD-10-CM    1. Pain of left lower leg  M79.662 XR Tibia & Fibula Left 2 Views   2. Menopausal syndrome (hot flashes)  N95.1 CBC with platelets     Comprehensive metabolic panel (BMP + Alb, Alk Phos, ALT, AST, Total. Bili, TP)     TSH with free T4 reflex   3. Breast pain, right  N64.4 MA Diagnostic Digital Bilateral     US Breast Right Limited 1-3 Quadrants   4. Anxiety state  F41.1 TSH with free T4 reflex   5. Unsteadiness on feet   R26.81 CBC with platelets        Patient Instructions   Xray leg   Let me know if want to see sports med    Call [phone] to set up breast imaging     Labs for hot flashes.  Discussed options.      Return if symptoms worsen or fail to improve.    Edilia Davison PA-C  [facility]

## 2020-07-27 NOTE — RESULT ENCOUNTER NOTE
Please notify patient -- Labs and xray looked fine.  Nothing concerning.  See sports medicine if shin pain worsens.

## 2020-09-24 ENCOUNTER — IMMUNIZATION (OUTPATIENT)
Dept: FAMILY MEDICINE | Facility: CLINIC | Age: 70
End: 2020-09-24
Payer: COMMERCIAL

## 2020-09-24 PROCEDURE — 90662 IIV NO PRSV INCREASED AG IM: CPT

## 2020-09-24 PROCEDURE — G0008 ADMIN INFLUENZA VIRUS VAC: HCPCS

## 2020-10-26 ENCOUNTER — ANCILLARY PROCEDURE (OUTPATIENT)
Dept: MAMMOGRAPHY | Facility: CLINIC | Age: 70
End: 2020-10-26
Attending: PHYSICIAN ASSISTANT
Payer: COMMERCIAL

## 2020-10-26 DIAGNOSIS — Z12.31 SCREENING MAMMOGRAM FOR HIGH-RISK PATIENT: ICD-10-CM

## 2020-10-26 PROCEDURE — 77063 BREAST TOMOSYNTHESIS BI: CPT | Performed by: RADIOLOGY

## 2020-10-26 PROCEDURE — 77067 SCR MAMMO BI INCL CAD: CPT | Performed by: RADIOLOGY

## 2020-11-06 DIAGNOSIS — I63.50 CEREBRAL ARTERY OCCLUSION WITH CEREBRAL INFARCTION (H): ICD-10-CM

## 2020-11-09 RX ORDER — PRAVASTATIN SODIUM 40 MG
TABLET ORAL
Qty: 30 TABLET | Refills: 0 | Status: SHIPPED | OUTPATIENT
Start: 2020-11-09 | End: 2020-12-10

## 2020-12-10 ENCOUNTER — ANCILLARY PROCEDURE (OUTPATIENT)
Dept: GENERAL RADIOLOGY | Facility: CLINIC | Age: 70
End: 2020-12-10
Attending: PHYSICIAN ASSISTANT
Payer: COMMERCIAL

## 2020-12-10 ENCOUNTER — OFFICE VISIT (OUTPATIENT)
Dept: FAMILY MEDICINE | Facility: CLINIC | Age: 70
End: 2020-12-10
Payer: COMMERCIAL

## 2020-12-10 VITALS
HEART RATE: 73 BPM | SYSTOLIC BLOOD PRESSURE: 110 MMHG | WEIGHT: 136 LBS | DIASTOLIC BLOOD PRESSURE: 60 MMHG | RESPIRATION RATE: 14 BRPM | TEMPERATURE: 98 F | HEIGHT: 62 IN | BODY MASS INDEX: 25.03 KG/M2 | OXYGEN SATURATION: 100 %

## 2020-12-10 DIAGNOSIS — M79.646 PAIN OF FINGER, UNSPECIFIED LATERALITY: ICD-10-CM

## 2020-12-10 DIAGNOSIS — I63.50 CEREBRAL ARTERY OCCLUSION WITH CEREBRAL INFARCTION (H): ICD-10-CM

## 2020-12-10 DIAGNOSIS — Z00.00 WELLNESS EXAMINATION: Primary | ICD-10-CM

## 2020-12-10 DIAGNOSIS — F41.9 ANXIETY: ICD-10-CM

## 2020-12-10 DIAGNOSIS — B00.1 RECURRENT COLD SORES: ICD-10-CM

## 2020-12-10 DIAGNOSIS — Z12.11 COLON CANCER SCREENING: ICD-10-CM

## 2020-12-10 DIAGNOSIS — M65.30 TRIGGER FINGER, ACQUIRED: ICD-10-CM

## 2020-12-10 LAB
CHOLEST SERPL-MCNC: 184 MG/DL
HDLC SERPL-MCNC: 117 MG/DL
LDLC SERPL CALC-MCNC: 50 MG/DL
NONHDLC SERPL-MCNC: 67 MG/DL
TRIGL SERPL-MCNC: 85 MG/DL

## 2020-12-10 PROCEDURE — 99397 PER PM REEVAL EST PAT 65+ YR: CPT | Performed by: PHYSICIAN ASSISTANT

## 2020-12-10 PROCEDURE — 36415 COLL VENOUS BLD VENIPUNCTURE: CPT | Performed by: PHYSICIAN ASSISTANT

## 2020-12-10 PROCEDURE — 73120 X-RAY EXAM OF HAND: CPT | Mod: RT | Performed by: RADIOLOGY

## 2020-12-10 PROCEDURE — 86200 CCP ANTIBODY: CPT | Performed by: PHYSICIAN ASSISTANT

## 2020-12-10 PROCEDURE — 99213 OFFICE O/P EST LOW 20 MIN: CPT | Mod: 25 | Performed by: PHYSICIAN ASSISTANT

## 2020-12-10 PROCEDURE — 86431 RHEUMATOID FACTOR QUANT: CPT | Performed by: PHYSICIAN ASSISTANT

## 2020-12-10 PROCEDURE — 80061 LIPID PANEL: CPT | Performed by: PHYSICIAN ASSISTANT

## 2020-12-10 RX ORDER — PRAVASTATIN SODIUM 40 MG
TABLET ORAL
Qty: 90 TABLET | Refills: 3 | Status: SHIPPED | OUTPATIENT
Start: 2020-12-10 | End: 2021-12-08

## 2020-12-10 RX ORDER — VALACYCLOVIR HYDROCHLORIDE 1 G/1
TABLET, FILM COATED ORAL
Qty: 4 TABLET | Refills: 11 | Status: SHIPPED | OUTPATIENT
Start: 2020-12-10 | End: 2021-12-16

## 2020-12-10 RX ORDER — LORAZEPAM 0.5 MG/1
0.5 TABLET ORAL EVERY 6 HOURS PRN
Qty: 10 TABLET | Refills: 0 | Status: SHIPPED | OUTPATIENT
Start: 2020-12-10 | End: 2021-12-16

## 2020-12-10 ASSESSMENT — ENCOUNTER SYMPTOMS
HEMATURIA: 0
BREAST MASS: 0
WEAKNESS: 0
SORE THROAT: 0
PALPITATIONS: 0
CHILLS: 0
HEARTBURN: 0
CONSTIPATION: 0
NERVOUS/ANXIOUS: 0
SHORTNESS OF BREATH: 0
DIZZINESS: 0
NAUSEA: 0
EYE PAIN: 0
PARESTHESIAS: 0
DYSURIA: 0
HEADACHES: 0
DIARRHEA: 0
HEMATOCHEZIA: 0
ABDOMINAL PAIN: 0
ARTHRALGIAS: 1
FEVER: 0
COUGH: 0
FREQUENCY: 0
MYALGIAS: 0
JOINT SWELLING: 1

## 2020-12-10 ASSESSMENT — MIFFLIN-ST. JEOR: SCORE: 1099.11

## 2020-12-10 ASSESSMENT — ACTIVITIES OF DAILY LIVING (ADL): CURRENT_FUNCTION: NO ASSISTANCE NEEDED

## 2020-12-10 NOTE — PROGRESS NOTES
"SUBJECTIVE:   Chyna Moncada is a 69 year old female who presents for Preventive Visit.    Patient has been advised of split billing requirements and indicates understanding: Yes   Are you in the first 12 months of your Medicare coverage?  No    Healthy Habits:     In general, how would you rate your overall health?  Good    Frequency of exercise:  6-7 days/week    Duration of exercise:  Greater than 60 minutes    Do you usually eat at least 4 servings of fruit and vegetables a day, include whole grains    & fiber and avoid regularly eating high fat or \"junk\" foods?  No    Taking medications regularly:  Yes    Medication side effects:  Not applicable    Ability to successfully perform activities of daily living:  No assistance needed    Home Safety:  No safety concerns identified    Hearing Impairment:  No hearing concerns    In the past 6 months, have you been bothered by leaking of urine?  No    In general, how would you rate your overall mental or emotional health?  Good      PHQ-2 Total Score: 0    Additional concerns today:  Yes    * Hands- Very achy, going into her wrists. Pain is becoming worse.  R 4th finger getting stuck.    Pains in PIP and DIP.  Extremely hard to bend first thing in morning and late at night.  Does ok in daytime.  Has tried tylenol, voltaren, icy hot.    Heat feels good.   Mom with RA, though she questions this diagnosis.     Has question on if she should do life-line screening or not    History CVA and PAD.  No TIAs in past yr.  Taking asa and pravastatin.  Intolerant of other statins.     Anxiety.  Very rare use of ativan.  But notes tons of health struggles in the family this time.      Shingrix expensive for her.    Do you feel safe in your environment? Yes    Have you ever done Advance Care Planning? (For example, a Health Directive, POLST, or a discussion with a medical provider or your loved ones about your wishes): No, advance care planning information given to patient to " review.  Advanced care planning was discussed at today's visit.    Fall risk  Fallen 2 or more times in the past year?: No  Any fall with injury in the past year?: No    Cognitive Screening   1) Repeat 3 items (Leader, Season, Table)    2) Clock draw: NORMAL  3) 3 item recall: Recalls 3 objects  Results: 3 items recalled: COGNITIVE IMPAIRMENT LESS LIKELY    Mini-CogTM Copyright CASEY Ford. Licensed by the author for use in [facility]; reprinted with permission (yesika@[facility]). All rights reserved.      Reviewed and updated as needed this visit by clinical staff  Tobacco  Allergies  Meds  Problems  Med Hx  Surg Hx  Fam Hx  Soc Hx          Reviewed and updated as needed this visit by Provider  Tobacco  Allergies  Meds  Problems  Med Hx  Surg Hx  Fam Hx         Social History     Tobacco Use     Smoking status: Former Smoker     Types: Cigarettes     Quit date: 1/10/1983     Years since quittin.9     Smokeless tobacco: Never Used   Substance Use Topics     Alcohol use: Yes     Alcohol/week: 1.0 standard drinks     Types: 1 Glasses of wine per week     Comment: DAILY         Alcohol Use 12/10/2020   Prescreen: >3 drinks/day or >7 drinks/week? No   Prescreen: >3 drinks/day or >7 drinks/week? -     Current providers sharing in care for this patient include:   Patient Care Team:  Edilia Davison PA-C as PCP - General (Physician Assistant)  Edilia Davison PA-C as Assigned PCP  Nato Pace DPM as Assigned Musculoskeletal Provider    The following health maintenance items are reviewed in Epic and correct as of today:  Health Maintenance   Topic Date Due     ZOSTER IMMUNIZATION (2 of 3) 2011     COLORECTAL CANCER SCREENING  2020     MEDICARE ANNUAL WELLNESS VISIT  12/10/2021     FALL RISK ASSESSMENT  12/10/2021     MAMMO SCREENING  10/26/2022     DTAP/TDAP/TD IMMUNIZATION (3 - Td) 10/22/2023     LIPID  12/10/2025     ADVANCE CARE PLANNING  12/10/2025     DEXA   "11/26/2033     HEPATITIS C SCREENING  Completed     PHQ-2  Completed     INFLUENZA VACCINE  Completed     Pneumococcal Vaccine: 65+ Years  Completed     Pneumococcal Vaccine: Pediatrics (0 to 5 Years) and At-Risk Patients (6 to 64 Years)  Aged Out     IPV IMMUNIZATION  Aged Out     MENINGITIS IMMUNIZATION  Aged Out     HEPATITIS B IMMUNIZATION  Aged Out     Labs reviewed in EPIC  BP Readings from Last 3 Encounters:   12/10/20 110/60   07/24/20 106/60   12/04/19 111/62    Wt Readings from Last 3 Encounters:   12/10/20 61.7 kg (136 lb)   07/24/20 61.5 kg (135 lb 9.6 oz)   12/04/19 61.2 kg (135 lb)         Review of Systems   Constitutional: Negative for chills and fever.   HENT: Positive for hearing loss. Negative for congestion, ear pain and sore throat.    Eyes: Positive for visual disturbance. Negative for pain.   Respiratory: Negative for cough and shortness of breath.    Cardiovascular: Negative for chest pain, palpitations and peripheral edema.   Gastrointestinal: Negative for abdominal pain, constipation, diarrhea, heartburn, hematochezia and nausea.   Breasts:  Negative for tenderness, breast mass and discharge.   Genitourinary: Negative for dysuria, frequency, genital sores, hematuria, pelvic pain, urgency, vaginal bleeding and vaginal discharge.   Musculoskeletal: Positive for arthralgias and joint swelling. Negative for myalgias.   Skin: Negative for rash.   Neurological: Negative for dizziness, weakness, headaches and paresthesias.   Psychiatric/Behavioral: Negative for mood changes. The patient is not nervous/anxious.      OBJECTIVE:   /60 (BP Location: Right arm, Patient Position: Chair, Cuff Size: Adult Regular)   Pulse 73   Temp 98  F (36.7  C) (Tympanic)   Resp 14   Ht 1.581 m (5' 2.25\")   Wt 61.7 kg (136 lb)   SpO2 100%   BMI 24.68 kg/m   Estimated body mass index is 24.68 kg/m  as calculated from the following:    Height as of this encounter: 1.581 m (5' 2.25\").    Weight as of this " "encounter: 61.7 kg (136 lb).  Physical Exam  GENERAL: healthy, alert and no distress  EYES: Eyes grossly normal to inspection, PERRL and conjunctivae and sclerae normal  HENT: ear canals and TM's normal, nose and mouth without ulcers or lesions  NECK: no adenopathy, no asymmetry, masses, or scars and thyroid normal to palpation  RESP: lungs clear to auscultation - no rales, rhonchi or wheezes  CV: regular rate and rhythm, normal S1 S2, no S3 or S4, no murmur, click or rub, no peripheral edema   ABDOMEN: soft, nontender, no hepatosplenomegaly, no masses and bowel sounds normal  MS: no joint swelling or synovitis to wrists, R 3rd trigger finger, MCP or fingers, no gross musculoskeletal defects noted, no edema  SKIN: no suspicious lesions or rashes  NEURO: Normal strength and tone, mentation intact and speech normal  PSYCH: mentation appears normal, affect normal/bright    Diagnostic Test Results:  Labs reviewed in Epic    ASSESSMENT / PLAN:       ICD-10-CM    1. Wellness examination  Z00.00    2. Pain of finger, unspecified laterality  M79.646 Rheumatoid factor     Cyclic Citrullinated Peptide Antibody IgG     XR Hand Bilateral 2 Views     CANCELED: XR Hand Left G/E 3 Views   3. Trigger finger, acquired  M65.30 Orthopedic & Spine  Referral   4. Cerebral artery occlusion with cerebral infarction (H)  I63.50 pravastatin (PRAVACHOL) 40 MG tablet     Lipid panel reflex to direct LDL Fasting   5. Colon cancer screening  Z12.11 Fecal colorectal cancer screen (FIT)   6. Recurrent cold sores  B00.1 valACYclovir (VALTREX) 1000 mg tablet   7. Anxiety  F41.9 LORazepam (ATIVAN) 0.5 MG tablet       Patient has been advised of split billing requirements and indicates understanding: Yes  COUNSELING:  Reviewed preventive health counseling, as reflected in patient instructions    Estimated body mass index is 24.68 kg/m  as calculated from the following:    Height as of this encounter: 1.581 m (5' 2.25\").    Weight as of this " encounter: 61.7 kg (136 lb).    She reports that she quit smoking about 37 years ago. Her smoking use included cigarettes. She has never used smokeless tobacco.    Appropriate preventive services were discussed with this patient, including applicable screening as appropriate for cardiovascular disease, diabetes, osteopenia/osteoporosis, and glaucoma.  As appropriate for age/gender, discussed screening for colorectal cancer, prostate cancer, breast cancer, and cervical cancer. Checklist reviewing preventive services available has been given to the patient.    Reviewed patients plan of care and provided an AVS. The Basic Care Plan (routine screening as documented in Health Maintenance) for Chyna meets the Care Plan requirement. This Care Plan has been established and reviewed with the Patient.    Counseling Resources:  ATP IV Guidelines  Pooled Cohorts Equation Calculator  Breast Cancer Risk Calculator  Breast Cancer: Medication to Reduce Risk  FRAX Risk Assessment  ICSI Preventive Guidelines  Dietary Guidelines for Americans, 2010  USDA's MyPlate  ASA Prophylaxis  Lung CA Screening    Edilia Davison PA-C  [facility]      Patient Instructions   Xrays and labs to check on hands  Can try tart cherry juice meanwhile  Ortho referral for trigger finger    Decided on bone density next yr after pandemic     Mail the poop test     shingrix expensive        Identified Health Risks:

## 2020-12-10 NOTE — PATIENT INSTRUCTIONS
Xrays and labs to check on hands  Can try tart cherry juice meanwhile  Ortho referral for trigger finger    Decided on bone density next yr after pandemic     Mail the poop test     shingrix expensive

## 2020-12-10 NOTE — LETTER
December 11, 2020      Chyna Moncada  [address] [facility] [address]        Dear ,    We are writing to inform you of your test results.      Resulted Orders   Lipid panel reflex to direct LDL Fasting   Result Value Ref Range    Cholesterol 184 <200 mg/dL    Triglycerides 85 <150 mg/dL    HDL Cholesterol 117 >49 mg/dL    LDL Cholesterol Calculated 50 <100 mg/dL      Comment:      Desirable:       <100 mg/dl    Non HDL Cholesterol 67 <130 mg/dL   Rheumatoid factor   Result Value Ref Range    Rheumatoid Factor <7 <12 IU/mL   Cyclic Citrullinated Peptide Antibody IgG   Result Value Ref Range    Cyclic Citrullinated Peptide Antibody, IgG 1 <7 U/mL      Comment:      Negative       If you have any questions or concerns, please call the clinic at the number listed above.       Sincerely,      Edilia Davison PA-C

## 2020-12-10 NOTE — NURSING NOTE
"Chief Complaint   Patient presents with     Physical       Initial /60 (BP Location: Right arm, Patient Position: Chair, Cuff Size: Adult Regular)   Pulse 73   Temp 98  F (36.7  C) (Tympanic)   Resp 14   Ht 1.581 m (5' 2.25\")   Wt 61.7 kg (136 lb)   SpO2 100%   BMI 24.68 kg/m   Estimated body mass index is 24.68 kg/m  as calculated from the following:    Height as of this encounter: 1.581 m (5' 2.25\").    Weight as of this encounter: 61.7 kg (136 lb).    Patient presents to the clinic using No DME    Health Maintenance that is potentially due pending provider review:  NONE        Is there anyone who you would like to be able to receive your results? No  If yes have patient fill out KRISTY      "

## 2020-12-11 LAB
CCP AB SER IA-ACNC: 1 U/ML
RHEUMATOID FACT SER NEPH-ACNC: <7 IU/ML (ref 0–20)

## 2020-12-11 NOTE — RESULT ENCOUNTER NOTE
Please notify patient -- Xrays and labs show this is osteoarthritis (wear n tear arthritis), not rheumatoid arthritis.  Continue plan of trying tart cherry juice.

## 2020-12-18 ENCOUNTER — APPOINTMENT (OUTPATIENT)
Dept: GENERAL RADIOLOGY | Facility: CLINIC | Age: 70
End: 2020-12-18
Attending: EMERGENCY MEDICINE
Payer: COMMERCIAL

## 2020-12-18 ENCOUNTER — HOSPITAL ENCOUNTER (EMERGENCY)
Facility: CLINIC | Age: 70
Discharge: HOME | End: 2020-12-18
Attending: EMERGENCY MEDICINE | Admitting: EMERGENCY MEDICINE
Payer: COMMERCIAL

## 2020-12-18 VITALS
HEART RATE: 72 BPM | TEMPERATURE: 99 F | OXYGEN SATURATION: 97 % | SYSTOLIC BLOOD PRESSURE: 136 MMHG | DIASTOLIC BLOOD PRESSURE: 78 MMHG | RESPIRATION RATE: 16 BRPM

## 2020-12-18 DIAGNOSIS — M54.50 ACUTE LOW BACK PAIN WITHOUT SCIATICA, UNSPECIFIED BACK PAIN LATERALITY: ICD-10-CM

## 2020-12-18 PROCEDURE — 96374 THER/PROPH/DIAG INJ IV PUSH: CPT | Performed by: EMERGENCY MEDICINE

## 2020-12-18 PROCEDURE — 72100 X-RAY EXAM L-S SPINE 2/3 VWS: CPT

## 2020-12-18 PROCEDURE — 99285 EMERGENCY DEPT VISIT HI MDM: CPT | Performed by: EMERGENCY MEDICINE

## 2020-12-18 PROCEDURE — 250N000011 HC RX IP 250 OP 636: Performed by: EMERGENCY MEDICINE

## 2020-12-18 PROCEDURE — 99285 EMERGENCY DEPT VISIT HI MDM: CPT | Mod: 25 | Performed by: EMERGENCY MEDICINE

## 2020-12-18 PROCEDURE — 96375 TX/PRO/DX INJ NEW DRUG ADDON: CPT | Performed by: EMERGENCY MEDICINE

## 2020-12-18 PROCEDURE — 250N000013 HC RX MED GY IP 250 OP 250 PS 637: Performed by: EMERGENCY MEDICINE

## 2020-12-18 RX ORDER — KETOROLAC TROMETHAMINE 15 MG/ML
15 INJECTION, SOLUTION INTRAMUSCULAR; INTRAVENOUS ONCE
Status: COMPLETED | OUTPATIENT
Start: 2020-12-18 | End: 2020-12-18

## 2020-12-18 RX ORDER — LORAZEPAM 0.5 MG/1
0.5 TABLET ORAL ONCE
Status: COMPLETED | OUTPATIENT
Start: 2020-12-18 | End: 2020-12-18

## 2020-12-18 RX ORDER — MORPHINE SULFATE 2 MG/ML
2 INJECTION, SOLUTION INTRAMUSCULAR; INTRAVENOUS ONCE
Status: COMPLETED | OUTPATIENT
Start: 2020-12-18 | End: 2020-12-18

## 2020-12-18 RX ORDER — LIDOCAINE 4 G/G
1 PATCH TOPICAL ONCE
Status: DISCONTINUED | OUTPATIENT
Start: 2020-12-18 | End: 2020-12-19 | Stop reason: HOSPADM

## 2020-12-18 RX ORDER — ONDANSETRON 2 MG/ML
4 INJECTION INTRAMUSCULAR; INTRAVENOUS ONCE
Status: COMPLETED | OUTPATIENT
Start: 2020-12-18 | End: 2020-12-18

## 2020-12-18 RX ADMIN — ONDANSETRON 4 MG: 2 INJECTION INTRAMUSCULAR; INTRAVENOUS at 19:17

## 2020-12-18 RX ADMIN — MORPHINE SULFATE 2 MG: 2 INJECTION, SOLUTION INTRAMUSCULAR; INTRAVENOUS at 19:17

## 2020-12-18 RX ADMIN — LORAZEPAM 0.5 MG: 0.5 TABLET ORAL at 17:55

## 2020-12-18 RX ADMIN — KETOROLAC TROMETHAMINE 15 MG: 15 INJECTION, SOLUTION INTRAMUSCULAR; INTRAVENOUS at 17:52

## 2020-12-18 ASSESSMENT — ENCOUNTER SYMPTOMS
COLOR CHANGE: 0
HEADACHES: 0
BACK PAIN: 1

## 2020-12-18 NOTE — ED PROVIDER NOTES
"With a wound  History     Chief Complaint   Patient presents with     Fall     fall in  truck lower back pain     HPI  Chyna Moncada is a 69 year old female who presents with back pain. Hx of DGD, osteopenia. Was clearing brush with , was in bed of  truck, tripped mechanically, hit right back. On baby aspirin, no a TCA for fibromyalgia. Denies hitting head, \"it wasn't my head.\" Called 911, came via EMS. Allergic to numerous pain meds, including an anaphylactic reaction to tramadol. Denies fever, chest pain, cough, nausea, leg swelling, sore throat, headache, dysuria, hematuria, confusion, immune issues, rash.    Got fentanyl from EMS in route.     Allergies:  Allergies   Allergen Reactions     Tramadol Anaphylaxis     Benadryl [Altaryl] Other (See Comments)     Made her goofy     Caffeine Other (See Comments)     Jittery       Codeine Nausea and Vomiting     Darvocet [Propoxyphene N-Apap]      Other reaction(s): GI Upset     Diphenhydramine Other (See Comments)     Lovastatin Other (See Comments) and Muscle Pain (Myalgia)     Other reaction(s): Myalgia  pain     Percocet [Oxycodone-Acetaminophen] Other (See Comments)     Other reaction(s): Dizziness  Sick or dizzy?     Vicodin [Hydrocodone-Acetaminophen] Other (See Comments) and Nausea     Sick or dizzy?       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  I do not have previous study for comparison, so I think it is up to her what she wants to do regarding her actonel - resume med or repeat dexa in 3 years - 2018.         TGA (transient global amnesia) 03/31/2014     Priority: Medium     Osteoarthritis of hip 02/06/2013     Priority: Medium     Bilateral sensorineural hearing loss 09/30/2012     Priority: Medium     Overview:   Candidate " "for hearing aids       Tendinitis of shoulder 02/01/2012     Priority: Medium     Postmenopausal atrophic vaginitis 01/20/2010     Priority: Medium     Insomnia 06/19/2009     Priority: Medium     Allergic rhinitis 04/12/2007     Priority: Medium     Anxiety state 04/12/2007     Priority: Medium     Overview:   Infrequent Ativan use.         Cerebral artery occlusion with cerebral infarction (H) 04/12/2007     Priority: Medium     On her [facility] summary page, it says she has history of CVA in the left occipital region, noted incidentally on MRI. The problem entry is dated 4/2007. She recalls being told \"you must have had a stroke at some point\" but never recalls any incident. She does not remember why she was having an MRI of her brain in 2007.       Degeneration of cervical intervertebral disc 04/12/2007     Priority: Medium     Degeneration of lumbar or lumbosacral intervertebral disc 04/12/2007     Priority: Medium     Raynaud's syndrome 04/12/2007     Priority: Medium     Symptomatic menopausal or female climacteric states 04/12/2007     Priority: Medium     Peripheral vascular disease (H) 04/12/2007     Priority: Medium     Atopic rhinitis 04/12/2007     Priority: Medium     Pulmonary nodules 03/31/2014     Priority: Low        Past Medical History:    Past Medical History:   Diagnosis Date     Cerebral embolism with cerebral infarction (H)        Past Surgical History:    Past Surgical History:   Procedure Laterality Date     HYSTERECTOMY, PAP NO LONGER INDICATED         Family History:    Family History   Problem Relation Age of Onset     Arthritis Mother         was told was RA     Cancer Mother         GYN     Heart Disease Father      Alcohol/Drug Maternal Grandfather      Heart Disease Paternal Grandfather         Heart attack     Diabetes Paternal Grandfather      Cancer Brother         throat       Social History:  Marital Status:   [2]  Social History     Tobacco Use     Smoking status: Former " Smoker     Types: Cigarettes     Quit date: 1/10/1983     Years since quittin.9     Smokeless tobacco: Never Used   Substance Use Topics     Alcohol use: Yes     Alcohol/week: 1.0 standard drinks     Types: 1 Glasses of wine per week     Comment: DAILY     Drug use: No        Medications:         aspirin EC 81 MG tablet       calcium-vitamin D (CALTRATE) 600-400 MG-UNIT per tablet       Cholecalciferol (VITAMIN D-3 PO)       LORazepam (ATIVAN) 0.5 MG tablet       mometasone (NASONEX) 50 MCG/ACT spray       Multiple Vitamin (MULTI-VITAMINS) TABS       order for DME       pravastatin (PRAVACHOL) 40 MG tablet       Propylene Glycol-Glycerin 1-0.3 % SOLN       valACYclovir (VALTREX) 1000 mg tablet          Review of Systems   Musculoskeletal: Positive for back pain.   Skin: Negative for color change.   Neurological: Negative for headaches.   All other systems reviewed and are negative.      Physical Exam   BP: (!) 140/73  Pulse: 72  Temp: 99  F (37.2  C)  Resp: 16  SpO2: 100 %      Physical Exam  Constitutional:       General: She is not in acute distress.     Appearance: Normal appearance. She is not ill-appearing.   HENT:      Head: Normocephalic.      Right Ear: External ear normal.      Left Ear: External ear normal.      Nose: No rhinorrhea.      Mouth/Throat:      Pharynx: No oropharyngeal exudate or posterior oropharyngeal erythema.   Eyes:      General:         Right eye: No discharge.         Left eye: No discharge.   Neck:      Musculoskeletal: No neck rigidity.   Cardiovascular:      Rate and Rhythm: Normal rate.      Heart sounds: No murmur.   Pulmonary:      Effort: No respiratory distress.   Abdominal:      General: There is no distension.      Palpations: There is no mass.   Musculoskeletal:         General: Tenderness present. No swelling or deformity.      Comments: ttp over right lower lumbar/upper sacral area, no midline ttp. No abrasions, no masses, no ecchymoses, no crepitus, no skin findings,  no open wounds   Skin:     Coloration: Skin is not jaundiced or pale.   Neurological:      Comments: Strength intact in lower extremity  Sensation intact in lower extremity   Psychiatric:         Mood and Affect: Mood normal.         ED Course        Procedures                   Results for orders placed or performed during the hospital encounter of 12/18/20 (from the past 24 hour(s))   Lumbar spine XR, 2-3 views    Narrative    XR LUMBAR SPINE TWO-THREE VIEWS   12/18/2020 6:27 PM     HISTORY: Status post fall in pickup truck    COMPARISON: Lumbar spine MRI 11/8/2019      Impression    IMPRESSION: No fracture is identified. Mild to moderate lower lumbar  spine degenerative change is present. Cholecystectomy clips noted.    JONATAN CASE MD       Medications   Lidocaine (LIDOCARE) 4 % Patch 1 patch (1 patch Transdermal Not Given 12/18/20 1922)   LORazepam (ATIVAN) tablet 0.5 mg (0.5 mg Oral Given 12/18/20 1755)   ketorolac (TORADOL) injection 15 mg (15 mg Intravenous Given 12/18/20 1752)   morphine (PF) injection 2 mg (2 mg Intravenous Given 12/18/20 1917)   ondansetron (ZOFRAN) injection 4 mg (4 mg Intravenous Given 12/18/20 1917)       Assessments & Plan (with Medical Decision Making)     Suspect soft tissue contusion, low suspicion for cord pathology, pain is not midline. Doubt fracture but given hx of osteopenia will get xrays. Will give ativan and toradol for pain, reassess. Fluids running in room. No indications for head imaging.     629 - no obvious fractures on xrays. Patient still w/pain. Not tachy. Giving lidocaine patch. Giving morphine. Will monitor for reaction.     712 - xr w/o fracture. Degenerative changes. Will check ua out of an abundance of caution. Patient nad, resting in room. Says lidocaine patches don't help her.     945 - patient refusing to give urine specimen. Denies dysuria. Wants to go home. Getting a ride. Advised that she should use a heating pad and tylenol and motrin for pain given  her large number of allergies. Gave return precautions. She expressed understanding of these. Ok to discharge at this time.     I have reviewed the nursing notes.    I have reviewed the findings, diagnosis, plan and need for follow up with the patient.      New Prescriptions    No medications on file       Final diagnoses:   Acute low back pain without sciatica, unspecified back pain laterality       12/18/2020   [facility] EMERGENCY DEPT     Dipak Barker MD  12/18/20 2146       Dipak Barker MD  12/18/20 2144

## 2020-12-18 NOTE — ED AVS SNAPSHOT
[facility] Emergency Dept  [address] [address]  Phone: [phone]  Fax: [phone]                                    Chyna Moncada   MRN: [id number]    Department: [facility] Emergency Dept   Date of Visit: 12/18/2020           After Visit Summary Signature Page    I have received my discharge instructions, and my questions have been answered. I have discussed any challenges I see with this plan with the nurse or doctor.    ..........................................................................................................................................  Patient/Patient Representative Signature      ..........................................................................................................................................  Patient Representative Print Name and Relationship to Patient    ..................................................               ................................................  Date                                   Time    ..........................................................................................................................................  Reviewed by Signature/Title    ...................................................              ..............................................  Date                                               Time          22EPIC Rev 08/18

## 2020-12-19 NOTE — DISCHARGE INSTRUCTIONS
As you have a large number of allergies, we recommend heating pad and Tylenol and Motrin for pain.  Your back pain will likely get worse tomorrow, and then will start to improve.  I am sorry this happened to you and I hope that you feel better soon.  And happy birthday!

## 2020-12-21 ENCOUNTER — OFFICE VISIT (OUTPATIENT)
Dept: FAMILY MEDICINE | Facility: CLINIC | Age: 70
End: 2020-12-21
Payer: COMMERCIAL

## 2020-12-21 VITALS
WEIGHT: 136 LBS | SYSTOLIC BLOOD PRESSURE: 112 MMHG | TEMPERATURE: 97.7 F | RESPIRATION RATE: 16 BRPM | HEART RATE: 86 BPM | BODY MASS INDEX: 24.68 KG/M2 | DIASTOLIC BLOOD PRESSURE: 76 MMHG

## 2020-12-21 DIAGNOSIS — M47.816 ARTHRITIS OF LUMBAR SPINE: ICD-10-CM

## 2020-12-21 DIAGNOSIS — M54.50 ACUTE RIGHT-SIDED LOW BACK PAIN WITHOUT SCIATICA: Primary | ICD-10-CM

## 2020-12-21 PROCEDURE — 99213 OFFICE O/P EST LOW 20 MIN: CPT | Performed by: PHYSICIAN ASSISTANT

## 2020-12-21 ASSESSMENT — PAIN SCALES - GENERAL: PAINLEVEL: MODERATE PAIN (4)

## 2020-12-21 NOTE — PATIENT INSTRUCTIONS
For the next two weeks, take Tylenol 1000 mg three times per day as well. This is safe to all take together.     Try the muscle relaxer at night to help you sleep.     Acute Low Back Pain: Self Care at Home Instructions  Conservative Treatment    Remaining active supports a quicker recovery, keep moving. (ex. Walking, increase time & speed as tolerated).    Bed rest is not recommended. Minimize sitting. Do not remain in one position for extended periods of time.    Maintain routine activity with attention to correct posture; stop aggravating activities.    Over-the-counter pain medications for short term symptom control. (See below)    Muscle relaxants are sometimes helpful for few days, but may cause drowsiness. (See below)    Cold packs or heat based upon your preference.    Most people improve within 2 weeks; most have significant improvement within 4 weeks.    If symptoms worsen or you experience numbness, contact your provider immediately.    Medications for Pain Relief  Recommended over-the-counter non-steroidal anti-inflammatories:     Ibuprofen (Advil, Motrin) 600 mg. three times a day as needed for pain      OR   Naproxen Sodium (Aleve) 220-440 mg. two times per day as needed for pain    If you have been prescribed a muscle relaxant (*cyclobenzapine 10 mg.)  Take    - 1 tablet at bedtime  *May cause drowsiness. Do not drive when taking this medication.    Low Back Pain Exercises   Exercises that stretch and strengthen the muscles of your abdomen and spine can help prevent back problems. If your back and abdominal muscles are strong, you can maintain good posture and keep your spine in its correct position.     If your muscles are tight, take a warm shower or bath before doing the exercises. Exercise on a rug or mat. Stop doing any exercise that causes pain until you have talked with your provider.     These exercises are intended only as suggestions. Ask your provider or physical therapist to help you  develop an exercise program. Check with your provider before starting the exercises. Ask your provider how many times a week you need to do the exercises.     Low Back Pain Exercises                       Cat and camel: Get down on your hands and knees. Let your stomach sag, allowing your back to curve downward. Hold this position for 5 seconds. Then arch your back and hold for 5 seconds. Do 3 sets of 10.       Pelvic tilt: Lie on your back with your knees bent and your feet flat on the floor. Tighten your abdominal muscles and push your lower back into the floor. Hold this position for 5 seconds, then relax. Do 3 sets of 10.       Extension exercise: Lie face down on the floor for 5 minutes. If this hurts too much, lie face down with a pillow under your stomach. This should relieve your leg or back pain. When you can lie on your stomach for 5 minutes without a pillow, then you can continue with the rest of this exercise.     After lying on your stomach for 5 minutes, prop yourself up on your elbows for another 5 minutes. Lie flat again for 1 minute, then press down on your hands and extend your elbows while keeping your hips flat on the floor. Hold for 1 second and lower yourself to the floor. Repeat 10 times. Do 4 sets. Rest for 2 minutes between sets. You should have no pain in your legs when you do this, but it is normal to feel pain in your lower back. Do this several times a day.     Developed by [employer]   Published by [employer]   Last modified: 2009-02-08   Last reviewed: 2008-07-07   This content is reviewed periodically and is subject to change as new health information becomes available. The information is intended to inform and educate and is not a replacement for medical evaluation, advice, diagnosis or treatment by a healthcare professional.   Adult Health Advisor 2009.1 Index  Adult Health Advisor 2009.1 Credits     2009 [facility] and/or its affiliates. All Rights Reserved.

## 2020-12-21 NOTE — PROGRESS NOTES
"Subjective     Chyna Moncada is a 70 year old female who presents to clinic today for the following health issues:    HPI   ED/UC Followup:  Facility:  [facility]   Date of visit: 12/18/2020  Reason for visit: Fall   Current Status: Pain is a little better. Walking with Walker. Gets Sharp stabbing pains. Sleeping in a recliner, has been using the heating pad.   Pain is slowly improvement.   No weakness, numbness, tingling. No loss of bladder or bowel control.   Pain is dull constantly, but sharp \"zings\" of pain occur with certain movements.      Review of Systems   Constitutional, HEENT,msk, neuro, skin, gi and gu systems are negative, except as otherwise noted.      Objective    /76 (BP Location: Right arm, Patient Position: Sitting, Cuff Size: Adult Regular)   Pulse 86   Temp 97.7  F (36.5  C) (Tympanic)   Resp 16   Wt 61.7 kg (136 lb)   BMI 24.68 kg/m    Body mass index is 24.68 kg/m .  Physical Exam   General: Constitutional: healthy, alert, and no distress  Head: Normocephalic. Atraumatic  Eyes: No conjunctival injection, sclera anicteric  Neck: supple, no asymmetry.   Respiratory: No resp distress.  Musculoskeletal: extremities normal- no gross deformities noted, and normal muscle tone. Mild lumbar midline tenderness. R sided lumbar paraspinal muscle tenderness. Limited ROM of the spine due to pain.  Neurologic: Gait stable with walker. CN 2-12 grossly intact. Strength 5/5 in the bilateral lower extremities including hip flexion and extension, knee flexion and extension and dorsi and plantar flexion. Patellar DTRs symmetric.   Psychiatric: mentation appears normal and affect normal/bright   Skin: No lesions or rashes visualized.       Assessment & Plan   Acute right-sided low back pain without sciatica  History and exam is consistent with musculoskeletal low back pain. Seen in the ED 3 days ago. Notes reviewed. No red flag signs or symptoms, and symptoms are slowly improving. I " think she would benefit from muscle relaxers and topical NSAIDs. Both Rx'ed today. Recommended home exercises and formal PT if not improving. return to clinic in 4-6 weeks fore reevaluation.   - tiZANidine (ZANAFLEX) 4 MG tablet; Take 1 tablet (4 mg) by mouth 3 times daily as needed for muscle spasms (back pain)  - diclofenac (VOLTAREN) 1 % topical gel; Apply 4 g topically 4 times daily    Arthritis of lumbar spine  Likely making things worse. Rx for Diclofenac gel for her try.   - diclofenac (VOLTAREN) 1 % topical gel; Apply 4 g topically 4 times daily      Return in about 4 weeks (around 1/18/2021), or if symptoms worsen or fail to improve, for In-Clinic Visit.    TWAN Avilez [facility]

## 2021-01-11 ENCOUNTER — TRANSFERRED RECORDS (OUTPATIENT)
Dept: HEALTH INFORMATION MANAGEMENT | Facility: CLINIC | Age: 71
End: 2021-01-11

## 2021-02-19 ENCOUNTER — OFFICE VISIT (OUTPATIENT)
Dept: FAMILY MEDICINE | Facility: CLINIC | Age: 71
End: 2021-02-19
Payer: COMMERCIAL

## 2021-02-19 VITALS
BODY MASS INDEX: 26.13 KG/M2 | HEIGHT: 62 IN | HEART RATE: 76 BPM | SYSTOLIC BLOOD PRESSURE: 110 MMHG | WEIGHT: 142 LBS | DIASTOLIC BLOOD PRESSURE: 62 MMHG | RESPIRATION RATE: 12 BRPM | OXYGEN SATURATION: 99 % | TEMPERATURE: 97.2 F

## 2021-02-19 DIAGNOSIS — Z01.818 PREOP GENERAL PHYSICAL EXAM: Primary | ICD-10-CM

## 2021-02-19 DIAGNOSIS — H26.9 CATARACT, UNSPECIFIED CATARACT TYPE, UNSPECIFIED LATERALITY: ICD-10-CM

## 2021-02-19 PROCEDURE — 99213 OFFICE O/P EST LOW 20 MIN: CPT | Performed by: PHYSICIAN ASSISTANT

## 2021-02-19 RX ORDER — FLUOROMETHOLONE 0.1 %
SUSPENSION, DROPS(FINAL DOSAGE FORM)(ML) OPHTHALMIC (EYE)
COMMUNITY
Start: 2021-01-14 | End: 2022-04-11

## 2021-02-19 ASSESSMENT — MIFFLIN-ST. JEOR: SCORE: 1121.33

## 2021-02-19 NOTE — PROGRESS NOTES
[facility]  [address] [place] [id number]  Phone: [phone]  Fax: [phone]  Primary Provider: Edilia Davison    PREOPERATIVE EVALUATION:  Today's date: 2/19/2021    Chyna Moncada is a 70 year old female who presents for a preoperative evaluation.    Surgical Information:  Surgery/Procedure: Cataracts-   Surgery Location: [facility]  Surgeon: Dr. Vamshi Stanley  Surgery Date: (Right eye) 3/2/21 and (Left eye) 3/8/21  Time of Surgery: TBD  Where patient plans to recover: At home with family  Fax number for surgical facility: [phone]    Type of Anesthesia Anticipated: Retrobulbar    Assessment & Plan     The proposed surgical procedure is considered LOW risk.    Preop general physical exam  Cataract, unspecified cataract type, unspecified laterality      Risks and Recommendations:  The patient has the following additional risks and recommendations for perioperative complications:   - No identified additional risk factors other than previously addressed    Medication Instructions:  Patient is to take all scheduled medications on the day of surgery    RECOMMENDATION:  APPROVAL GIVEN to proceed with proposed procedure, without further diagnostic evaluation.      Subjective     HPI related to upcoming procedure: cataract    Preop Questions 2/19/2021   1. Have you ever had a heart attack or stroke? YES - history of stroke   2. Have you ever had surgery on your heart or blood vessels, such as a stent placement, a coronary artery bypass, or surgery on an artery in your head, neck, heart, or legs? No   3. Do you have chest pain with activity? No   4. Do you have a history of  heart failure? No   5. Do you currently have a cold, bronchitis or symptoms of other infection? No   6. Do you have a cough, shortness of breath, or wheezing? No   7. Do you or anyone in your family have previous history of blood clots? Yes - phlebitis for which hospitalized   8.  Do you or does anyone in your family have a serious bleeding problem such as prolonged bleeding following surgeries or cuts? No   9. Have you ever had problems with anemia or been told to take iron pills? No   10. Have you had any abnormal blood loss such as black, tarry or bloody stools, or abnormal vaginal bleeding? No   11. Have you ever had a blood transfusion? No   12. Are you willing to have a blood transfusion if it is medically needed before, during, or after your surgery? Yes   13. Have you or any of your relatives ever had problems with anesthesia? YES - PONV   14. Do you have sleep apnea, excessive snoring or daytime drowsiness? YES - positional snoring, wakes self up, no known apnea, does nap in afternoon   14a. Do you have a CPAP machine? No   15. Do you have any artifical heart valves or other implanted medical devices like a pacemaker, defibrillator, or continuous glucose monitor? No   16. Do you have artificial joints? No   17. Are you allergic to latex? No       Health Care Directive:  Patient does not have a Health Care Directive or Living Will: Discussed advance care planning with patient; information given to patient to review.    Preoperative Review of :   reviewed - no record of controlled substances prescribed.    Status of Chronic Conditions:  See problem list for active medical problems.  Problems all longstanding and stable, except as noted/documented.  See ROS for pertinent symptoms related to these conditions.      Review of Systems  Constitutional, neuro, ENT, endocrine, pulmonary, cardiac, gastrointestinal, genitourinary, musculoskeletal, integument and psychiatric systems are negative, except as otherwise noted.    Patient Active Problem List    Diagnosis Date Noted     Recurrent cold sores 04/30/2014     Priority: Medium     Osteopenia 03/31/2014     Priority: Medium     Dexa scan of bones shows moderate bone loss but not outright osteoporosis.  I do not have previous study for  "comparison, so I think it is up to her what she wants to do regarding her actonel - resume med or repeat dexa in 3 years - 2018.         TGA (transient global amnesia) 03/31/2014     Priority: Medium     Osteoarthritis of hip 02/06/2013     Priority: Medium     Bilateral sensorineural hearing loss 09/30/2012     Priority: Medium     Overview:   Candidate for hearing aids       Tendinitis of shoulder 02/01/2012     Priority: Medium     Postmenopausal atrophic vaginitis 01/20/2010     Priority: Medium     Insomnia 06/19/2009     Priority: Medium     Allergic rhinitis 04/12/2007     Priority: Medium     Anxiety state 04/12/2007     Priority: Medium     Overview:   Infrequent Ativan use.         Cerebral artery occlusion with cerebral infarction (H) 04/12/2007     Priority: Medium     On her [facility] summary page, it says she has history of CVA in the left occipital region, noted incidentally on MRI. The problem entry is dated 4/2007. She recalls being told \"you must have had a stroke at some point\" but never recalls any incident. She does not remember why she was having an MRI of her brain in 2007.       Degeneration of cervical intervertebral disc 04/12/2007     Priority: Medium     Degeneration of lumbar or lumbosacral intervertebral disc 04/12/2007     Priority: Medium     Raynaud's syndrome 04/12/2007     Priority: Medium     Peripheral vascular disease (H) 04/12/2007     Priority: Medium     Atopic rhinitis 04/12/2007     Priority: Medium     Pulmonary nodules 03/31/2014     Priority: Low      Past Medical History:   Diagnosis Date     Cerebral embolism with cerebral infarction (H)      Symptomatic menopausal or female climacteric states 4/12/2007     Past Surgical History:   Procedure Laterality Date     HYSTERECTOMY, PAP NO LONGER INDICATED       Current Outpatient Medications   Medication Sig Dispense Refill     aspirin EC 81 MG tablet Take 81 mg by mouth daily        calcium-vitamin D (CALTRATE) 600-400 MG-UNIT " per tablet Take 1 tablet by mouth daily        Cholecalciferol (VITAMIN D-3 PO) Take 1,000 Units by mouth daily       diclofenac (VOLTAREN) 1 % topical gel Apply 4 g topically 4 times daily 350 g 1     fluorometholone (FML LIQUIFILM) 0.1 % ophthalmic suspension        mometasone (NASONEX) 50 MCG/ACT spray Spray 2 sprays in nostril daily        Multiple Vitamin (MULTI-VITAMINS) TABS Take 1 tablet by mouth daily       order for DME Equipment being ordered: quarter inch heel lift 1 Units 0     pravastatin (PRAVACHOL) 40 MG tablet TAKE ONE TABLET BY MOUTH EVERY NIGHT AT BEDTIME 90 tablet 3     Propylene Glycol-Glycerin 1-0.3 % SOLN Place 1 drop into both eyes as needed       tiZANidine (ZANAFLEX) 4 MG tablet Take 1 tablet (4 mg) by mouth 3 times daily as needed for muscle spasms (back pain) 30 tablet 0     valACYclovir (VALTREX) 1000 mg tablet TAKE TWO TABLETS BY MOUTH TWICE A DAY 4 tablet 11     LORazepam (ATIVAN) 0.5 MG tablet Take 1 tablet (0.5 mg) by mouth every 6 hours as needed (Patient not taking: Reported on 2020) 10 tablet 0       Allergies   Allergen Reactions     Tramadol Anaphylaxis     Benadryl [Altaryl] Other (See Comments)     Made her goofy     Caffeine Other (See Comments)     Jittery       Codeine Nausea and Vomiting     Darvocet [Propoxyphene N-Apap]      Other reaction(s): GI Upset     Diphenhydramine Other (See Comments)     Lovastatin Other (See Comments) and Muscle Pain (Myalgia)     Other reaction(s): Myalgia  pain     Percocet [Oxycodone-Acetaminophen] Other (See Comments)     Other reaction(s): Dizziness  Sick or dizzy?     Vicodin [Hydrocodone-Acetaminophen] Other (See Comments) and Nausea     Sick or dizzy?        Social History     Tobacco Use     Smoking status: Former Smoker     Types: Cigarettes     Quit date: 1/10/1983     Years since quittin.1     Smokeless tobacco: Never Used   Substance Use Topics     Alcohol use: Yes     Alcohol/week: 1.0 standard drinks     Types: 1  "Glasses of wine per week     Comment: DAILY     Family History   Problem Relation Age of Onset     Arthritis Mother         was told was RA     Cancer Mother         GYN     Other - See Comments Mother         phlebitis for which hospitalized several times     Heart Disease Father      Alcohol/Drug Maternal Grandfather      Heart Disease Paternal Grandfather         Heart attack     Diabetes Paternal Grandfather      Cancer Brother         throat     History   Drug Use No         Objective     /62 (BP Location: Right arm, Patient Position: Chair, Cuff Size: Adult Regular)   Pulse 76   Temp 97.2  F (36.2  C) (Tympanic)   Resp 12   Ht 1.581 m (5' 2.25\")   Wt 64.4 kg (142 lb)   SpO2 99%   BMI 25.76 kg/m      Physical Exam    GENERAL APPEARANCE: healthy, alert and no distress     EYES: EOMI, PERRL     HENT: ear canals and TM's normal and nose and mouth without ulcers or lesions     NECK: no adenopathy, no asymmetry, masses, or scars and thyroid normal to palpation     RESP: lungs clear to auscultation - no rales, rhonchi or wheezes     CV: regular rates and rhythm, normal S1 S2, no S3 or S4 and no murmur, click or rub     ABDOMEN:  soft, nontender, no HSM or masses and bowel sounds normal     MS: extremities normal- no gross deformities noted, no evidence of inflammation in joints, FROM in all extremities.     SKIN: no suspicious lesions or rashes     NEURO: Normal strength and tone, sensory exam grossly normal, mentation intact and speech normal     PSYCH: mentation appears normal. and affect normal/bright    Recent Labs   Lab Test 07/24/20  1716 10/21/19  0903   HGB 13.5 14.0    178    140   POTASSIUM 3.7 4.0   CR 0.73 0.69        Diagnostics:  No labs were ordered during this visit.   No EKG required for low risk surgery (cataract, skin procedure, breast biopsy, etc).    Revised Cardiac Risk Index (RCRI):  The patient has the following serious cardiovascular risks for perioperative " complications:   - Cerebrovascular Disease (TIA or CVA) = 1 point     RCRI Interpretation: 1 point: Class II (low risk - 0.9% complication rate)         Signed Electronically by: Edilia Davison PA-C  Copy of this evaluation report is provided to requesting physician.    [facility] Preop Guidelines    Revised Cardiac Risk Index

## 2021-02-19 NOTE — PATIENT INSTRUCTIONS
Finish advanced care directive and do the poop test  Preparing for Your Surgery  Getting started  A nurse will call you to review your health history and instructions. They will give you an arrival time based on your scheduled surgery time.  Please be ready to share the following:    Your doctor's clinic name and phone number    Your medical, surgical and anesthesia history    A list of allergies and sensitivities    A list of medicines, including herbal treatments and over-the-counter drugs    Whether the patient has a legal guardian (ask how to send us the papers in advance)  If you have a child who's having surgery, please ask for a copy of Preparing for Your Child's Surgery.    Preparing for surgery    Within 30 days of surgery: Have a pre-op exam (sometimes called an H&P, or History and Physical). This can be done at a clinic or pre-operative center.  ? If you're having a , you may not need this exam. Talk to your care team    At your pre-op exam, talk to your care team about all medicines you take. If you need to stop any medicines before surgery, ask when to start taking them again.  ? We do this for your safety. Many medicines can make you bleed too much during surgery. Some change how well surgery (anesthesia) drugs work.    Call your insurance company to let them know you're having surgery. (If you don't have insurance, call [phone].)    Call your clinic if there's any change in your health. This includes signs of a cold or flu (sore throat, runny nose, cough, rash, fever). It also includes a scrape or scratch near the surgery site.    If you have questions on the day of surgery, call your hospital or surgery center.  Eating and drinking guidelines  For your safety: Unless your surgeon tells you otherwise, follow the guidelines below.    Eat and drink as usual until 8 hours before surgery. After that, no food or milk.    Drink clear liquids until 2 hours before surgery. These are liquids you  can see through, like water, Gatorade and Propel Water. You may also have black coffee and tea (no cream or milk).    Nothing by mouth within 2 hours of surgery. This includes gum, candy and breath mints.    If you drink, stop drinking alcohol the night before surgery.    If your care team tells you to take medicine on the morning of surgery, it's okay to take it with a sip of water.  Preventing infection    Shower or bathe the night before and morning of your surgery. Follow the instructions your clinic gave you. (If no instructions, use regular soap.)    Don't shave or clip hair near your surgery site. We'll remove the hair if needed.    Don't smoke or vape the morning of surgery. You may chew nicotine gum up to 2 hours before surgery. A nicotine patch is okay.  ? Note: Some surgeries require you to completely quit smoking and nicotine. Check with your surgeon.    Your care team will make every effort to keep you safe from infection. We will:  ? Clean our hands often with soap and water (or an alcohol-based hand rub).  ? Clean the skin at your surgery site with a special soap that kills germs.  ? Give you a special gown to keep you warm. (Cold raises the risk of infection.)  ? Wear special hair covers, masks, gowns and gloves during surgery.  ? Give antibiotic medicine, if prescribed. Not all surgeries need antibiotics.  What to bring on the day of surgery    Photo ID and insurance card    Copy of your health care directive, if you have one    Glasses and hearing aides (bring cases)  ? You can't wear contacts during surgery    Inhaler and eye drops, if you use them (tell us about these when you arrive)    CPAP machine or breathing device, if you use them    A few personal items, if spending the night    If you have . . .  ? A pacemaker or ICD (cardiac defibrillator): Bring the ID card.  ? An implanted stimulator: Bring the remote control.  ? A legal guardian: Bring a copy of the certified (court-stamped)  guardianship papers.  Please remove any jewelry, including body piercings. Leave jewelry and other valuables at home.  If you're going home the day of surgery  Important: If you don't follow the rules below, we must cancel your surgery.     Arrange for someone to drive you home after surgery. You may not drive, take a taxi or take public transportation by yourself (unless you'll have local anesthesia only).    Arrange for a responsible adult to stay with you overnight. If you don't, we may keep you in the hospital overnight, and you may need to pay the costs yourself.  Questions?   If you have any questions for your care team, list them here: _________________________________________________________________________________________________________________________________________________________________________________________________________________________________________________________________________________________________________________________  For informational purposes only. Not to replace the advice of your health care provider. Copyright   2003, 2019 [employer]. All rights reserved. Clinically reviewed by Sivan Santoro MD. [employer] 181838 - REV 4/20.

## 2021-02-19 NOTE — NURSING NOTE
"Chief Complaint   Patient presents with     Pre-Op Exam       Initial /62 (BP Location: Right arm, Patient Position: Chair, Cuff Size: Adult Regular)   Pulse 76   Temp 97.2  F (36.2  C) (Tympanic)   Resp 12   Ht 1.581 m (5' 2.25\")   Wt 64.4 kg (142 lb)   SpO2 99%   BMI 25.76 kg/m   Estimated body mass index is 25.76 kg/m  as calculated from the following:    Height as of this encounter: 1.581 m (5' 2.25\").    Weight as of this encounter: 64.4 kg (142 lb).    Patient presents to the clinic using No DME    Health Maintenance that is potentially due pending provider review:  NONE        Is there anyone who you would like to be able to receive your results? No  If yes have patient fill out KRISTY      "

## 2021-04-22 ENCOUNTER — OFFICE VISIT (OUTPATIENT)
Dept: FAMILY MEDICINE | Facility: CLINIC | Age: 71
End: 2021-04-22
Payer: COMMERCIAL

## 2021-04-22 ENCOUNTER — ANCILLARY PROCEDURE (OUTPATIENT)
Dept: GENERAL RADIOLOGY | Facility: CLINIC | Age: 71
End: 2021-04-22
Attending: PHYSICIAN ASSISTANT
Payer: COMMERCIAL

## 2021-04-22 VITALS
OXYGEN SATURATION: 100 % | DIASTOLIC BLOOD PRESSURE: 60 MMHG | SYSTOLIC BLOOD PRESSURE: 118 MMHG | HEIGHT: 62 IN | TEMPERATURE: 97.3 F | BODY MASS INDEX: 25.76 KG/M2 | HEART RATE: 66 BPM | WEIGHT: 140 LBS | RESPIRATION RATE: 14 BRPM

## 2021-04-22 DIAGNOSIS — R26.89 IMBALANCE: ICD-10-CM

## 2021-04-22 DIAGNOSIS — F43.9 STRESS: ICD-10-CM

## 2021-04-22 DIAGNOSIS — R07.89 CHEST TIGHTNESS: ICD-10-CM

## 2021-04-22 DIAGNOSIS — F41.9 ANXIETY: ICD-10-CM

## 2021-04-22 DIAGNOSIS — R00.2 PALPITATIONS: ICD-10-CM

## 2021-04-22 DIAGNOSIS — R06.02 SOB (SHORTNESS OF BREATH): Primary | ICD-10-CM

## 2021-04-22 DIAGNOSIS — R06.02 SOB (SHORTNESS OF BREATH): ICD-10-CM

## 2021-04-22 LAB
ANION GAP SERPL CALCULATED.3IONS-SCNC: 3 MMOL/L (ref 3–14)
BUN SERPL-MCNC: 15 MG/DL (ref 7–30)
CALCIUM SERPL-MCNC: 8.8 MG/DL (ref 8.5–10.1)
CHLORIDE SERPL-SCNC: 107 MMOL/L (ref 94–109)
CO2 SERPL-SCNC: 30 MMOL/L (ref 20–32)
CREAT SERPL-MCNC: 0.69 MG/DL (ref 0.52–1.04)
ERYTHROCYTE [DISTWIDTH] IN BLOOD BY AUTOMATED COUNT: 13.1 % (ref 10–15)
GFR SERPL CREATININE-BSD FRML MDRD: 88 ML/MIN/{1.73_M2}
GLUCOSE SERPL-MCNC: 84 MG/DL (ref 70–99)
HCT VFR BLD AUTO: 39.8 % (ref 35–47)
HGB BLD-MCNC: 12.8 G/DL (ref 11.7–15.7)
MCH RBC QN AUTO: 29.1 PG (ref 26.5–33)
MCHC RBC AUTO-ENTMCNC: 32.2 G/DL (ref 31.5–36.5)
MCV RBC AUTO: 91 FL (ref 78–100)
PLATELET # BLD AUTO: 176 10E9/L (ref 150–450)
POTASSIUM SERPL-SCNC: 4 MMOL/L (ref 3.4–5.3)
RBC # BLD AUTO: 4.4 10E12/L (ref 3.8–5.2)
SODIUM SERPL-SCNC: 140 MMOL/L (ref 133–144)
TSH SERPL DL<=0.005 MIU/L-ACNC: 1.64 MU/L (ref 0.4–4)
VIT B12 SERPL-MCNC: 576 PG/ML (ref 193–986)
WBC # BLD AUTO: 4.7 10E9/L (ref 4–11)

## 2021-04-22 PROCEDURE — 71046 X-RAY EXAM CHEST 2 VIEWS: CPT | Performed by: RADIOLOGY

## 2021-04-22 PROCEDURE — 99214 OFFICE O/P EST MOD 30 MIN: CPT | Performed by: PHYSICIAN ASSISTANT

## 2021-04-22 PROCEDURE — 80048 BASIC METABOLIC PNL TOTAL CA: CPT | Performed by: PHYSICIAN ASSISTANT

## 2021-04-22 PROCEDURE — 36415 COLL VENOUS BLD VENIPUNCTURE: CPT | Performed by: PHYSICIAN ASSISTANT

## 2021-04-22 PROCEDURE — 82607 VITAMIN B-12: CPT | Performed by: PHYSICIAN ASSISTANT

## 2021-04-22 PROCEDURE — 93000 ELECTROCARDIOGRAM COMPLETE: CPT | Performed by: PHYSICIAN ASSISTANT

## 2021-04-22 PROCEDURE — 85027 COMPLETE CBC AUTOMATED: CPT | Performed by: PHYSICIAN ASSISTANT

## 2021-04-22 PROCEDURE — 84443 ASSAY THYROID STIM HORMONE: CPT | Performed by: PHYSICIAN ASSISTANT

## 2021-04-22 ASSESSMENT — ANXIETY QUESTIONNAIRES
GAD7 TOTAL SCORE: 4
2. NOT BEING ABLE TO STOP OR CONTROL WORRYING: NOT AT ALL
3. WORRYING TOO MUCH ABOUT DIFFERENT THINGS: NOT AT ALL
1. FEELING NERVOUS, ANXIOUS, OR ON EDGE: SEVERAL DAYS
4. TROUBLE RELAXING: SEVERAL DAYS
GAD7 TOTAL SCORE: 4
7. FEELING AFRAID AS IF SOMETHING AWFUL MIGHT HAPPEN: NOT AT ALL
5. BEING SO RESTLESS THAT IT IS HARD TO SIT STILL: SEVERAL DAYS
7. FEELING AFRAID AS IF SOMETHING AWFUL MIGHT HAPPEN: NOT AT ALL
GAD7 TOTAL SCORE: 4
6. BECOMING EASILY ANNOYED OR IRRITABLE: SEVERAL DAYS

## 2021-04-22 ASSESSMENT — PATIENT HEALTH QUESTIONNAIRE - PHQ9
10. IF YOU CHECKED OFF ANY PROBLEMS, HOW DIFFICULT HAVE THESE PROBLEMS MADE IT FOR YOU TO DO YOUR WORK, TAKE CARE OF THINGS AT HOME, OR GET ALONG WITH OTHER PEOPLE: NOT DIFFICULT AT ALL
SUM OF ALL RESPONSES TO PHQ QUESTIONS 1-9: 2
SUM OF ALL RESPONSES TO PHQ QUESTIONS 1-9: 2

## 2021-04-22 ASSESSMENT — MIFFLIN-ST. JEOR: SCORE: 1112.26

## 2021-04-22 NOTE — PROGRESS NOTES
Assessment & Plan     SOB (shortness of breath)  Unclear etiology, testing as below.  Consider stress test depending on progression.  - XR Chest 2 Views; Future  - CBC with platelets    Chest tightness    Palpitations  testing  - EKG 12-lead complete w/read - Clinics  - TSH with free T4 reflex  - Basic metabolic panel  (Ca, Cl, CO2, Creat, Gluc, K, Na, BUN)  - Leadless EKG Monitor 3 to 7 Days; Future    Stress  New problem, treating  - sertraline (ZOLOFT) 50 MG tablet; Take 0.5 tablets (25 mg) by mouth daily X 2 wk then 1 tab daily.    Anxiety  treating  - sertraline (ZOLOFT) 50 MG tablet; Take 0.5 tablets (25 mg) by mouth daily X 2 wk then 1 tab daily.  - Vitamin B12    Imbalance  testing  - Vitamin B12    Patient Instructions   EKG, chest xray and labs today   If all looking normal, set up heart monitor to wear for 3 days.  Call cardiology at [phone] to set this up.    Start sertraline   Can try lorazepam for 1-2 days if just needing a break or wanting to see if seems to be anxiety    Try your rolaids if burning sensation    Recheck 3-4 weeks.  Will discuss balance more then.    If sudden worse symptoms - lightheaded, worse chest pain or pressure, pain into neck arm or back, or bad nausea or sweating, to emergency room.  If worsening more mild, can call nurseline for advice.        Return in about 4 weeks (around 5/20/2021).    [facility]    Deneen Clemente is a 70 year old who presents for the following health issues     HPI       * Anxiety?-  Several months but worsening. Not sure what is going on, just hasn't been feeling well, feels really stressed out lately.  had knee surgery 2 weeks ago, but was feeling this way just prior.   Heart feels erratic, beats hard painful for a beat and then lightens up.  Is feeling irregular more often, hasn't counted pulse.  Can wake her at nigh with the funny beats, can last maybe a couple minutes.   "Happens several times a week.  Chest actually hurts sometimes, has had some shortness of breath and heaviness all the time, but at times to the point where she almost called 911 a few times, feels heavy.  Less exercise endurance.  Not triggered by physical activity or the more stressed days.  Can last all days.  Tired all the time.  Feels she's getting plenty of sleep.  History of panic attacks when younger.      Balance off some.  Using handrails more. Stands slower.    * Elbow Pain-  Right elbow, not as painful as it was.  Pretty mild.        Objective    /60 (BP Location: Right arm, Patient Position: Chair, Cuff Size: Adult Regular)   Pulse 66   Temp 97.3  F (36.3  C) (Tympanic)   Resp 14   Ht 1.581 m (5' 2.25\")   Wt 63.5 kg (140 lb)   SpO2 100%   BMI 25.40 kg/m    Body mass index is 25.4 kg/m .  Physical Exam   GENERAL: healthy, alert and no distress  RESP: lungs clear to auscultation - no rales, rhonchi or wheezes  CV: regular rate and rhythm, normal S1 S2, no S3 or S4, no murmur, click or rub, no peripheral edema and peripheral pulses strong  PSYCH: mentation appears normal, affect normal/bright    EKG - Reviewed and interpreted by me sinus bradycardia          "

## 2021-04-22 NOTE — LETTER
April 26, 2021      Chyna Moncada  [address] [facility] [address]        Dear ,    We are writing to inform you of your test results.    Labs look fine.  Continue plan of heart monitor.    Resulted Orders   CBC with platelets   Result Value Ref Range    WBC 4.7 4.0 - 11.0 10e9/L    RBC Count 4.40 3.8 - 5.2 10e12/L    Hemoglobin 12.8 11.7 - 15.7 g/dL    Hematocrit 39.8 35.0 - 47.0 %    MCV 91 78 - 100 fl    MCH 29.1 26.5 - 33.0 pg    MCHC 32.2 31.5 - 36.5 g/dL    RDW 13.1 10.0 - 15.0 %    Platelet Count 176 150 - 450 10e9/L   TSH with free T4 reflex   Result Value Ref Range    TSH 1.64 0.40 - 4.00 mU/L   Basic metabolic panel  (Ca, Cl, CO2, Creat, Gluc, K, Na, BUN)   Result Value Ref Range    Sodium 140 133 - 144 mmol/L    Potassium 4.0 3.4 - 5.3 mmol/L    Chloride 107 94 - 109 mmol/L    Carbon Dioxide 30 20 - 32 mmol/L    Anion Gap 3 3 - 14 mmol/L    Glucose 84 70 - 99 mg/dL    Urea Nitrogen 15 7 - 30 mg/dL    Creatinine 0.69 0.52 - 1.04 mg/dL    GFR Estimate 88 >60 mL/min/[1.73_m2]      Comment:      Non  GFR Calc  Starting 12/18/2018, serum creatinine based estimated GFR (eGFR) will be   calculated using the Chronic Kidney Disease Epidemiology Collaboration   (CKD-EPI) equation.      GFR Estimate If Black >90 >60 mL/min/[1.73_m2]      Comment:       GFR Calc  Starting 12/18/2018, serum creatinine based estimated GFR (eGFR) will be   calculated using the Chronic Kidney Disease Epidemiology Collaboration   (CKD-EPI) equation.      Calcium 8.8 8.5 - 10.1 mg/dL   Vitamin B12   Result Value Ref Range    Vitamin B12 576 193 - 986 pg/mL       If you have any questions or concerns, please call the clinic at the number listed above.       Sincerely,      Edilia Davison PA-C

## 2021-04-22 NOTE — PATIENT INSTRUCTIONS
EKG, chest xray and labs today   If all looking normal, set up heart monitor to wear for 3 days.  Call cardiology at [phone] to set this up.    Start sertraline   Can try lorazepam for 1-2 days if just needing a break or wanting to see if seems to be anxiety    Try your rolaids if burning sensation    Recheck 3-4 weeks.  Will discuss balance more then.    If sudden worse symptoms - lightheaded, worse chest pain or pressure, pain into neck arm or back, or bad nausea or sweating, to emergency room.  If worsening more mild, can call nurseline for advice.

## 2021-04-23 ENCOUNTER — NURSE TRIAGE (OUTPATIENT)
Dept: NURSING | Facility: CLINIC | Age: 71
End: 2021-04-23

## 2021-04-23 ASSESSMENT — PATIENT HEALTH QUESTIONNAIRE - PHQ9: SUM OF ALL RESPONSES TO PHQ QUESTIONS 1-9: 2

## 2021-04-23 ASSESSMENT — ANXIETY QUESTIONNAIRES: GAD7 TOTAL SCORE: 4

## 2021-04-23 NOTE — RESULT ENCOUNTER NOTE
Please place any/all future orders discussed below.    Please notify patient -- Labs look fine.  Continue plan of heart monitor.

## 2021-04-23 NOTE — TELEPHONE ENCOUNTER
"\"I am returning a call from the clinic regarding my lab results.\"  Gave per epic/PCP  Gladys Ordonez RN [facility]      "

## 2021-05-10 ENCOUNTER — TRANSFERRED RECORDS (OUTPATIENT)
Dept: HEALTH INFORMATION MANAGEMENT | Facility: CLINIC | Age: 71
End: 2021-05-10

## 2021-09-01 ENCOUNTER — OFFICE VISIT (OUTPATIENT)
Dept: PODIATRY | Facility: CLINIC | Age: 71
End: 2021-09-01
Payer: COMMERCIAL

## 2021-09-01 VITALS
HEART RATE: 88 BPM | HEIGHT: 62 IN | WEIGHT: 140 LBS | SYSTOLIC BLOOD PRESSURE: 107 MMHG | BODY MASS INDEX: 25.76 KG/M2 | DIASTOLIC BLOOD PRESSURE: 69 MMHG

## 2021-09-01 DIAGNOSIS — L85.1 PLANTAR POROKERATOSIS, ACQUIRED: Primary | ICD-10-CM

## 2021-09-01 DIAGNOSIS — L90.9 PLANTAR FAT PAD ATROPHY: ICD-10-CM

## 2021-09-01 PROCEDURE — 17110 DESTRUCTION B9 LES UP TO 14: CPT | Performed by: PODIATRIST

## 2021-09-01 PROCEDURE — 99213 OFFICE O/P EST LOW 20 MIN: CPT | Mod: 25 | Performed by: PODIATRIST

## 2021-09-01 RX ORDER — VITS A,C,E/LUTEIN/MINERALS 300MCG-200
1 TABLET ORAL DAILY
COMMUNITY

## 2021-09-01 ASSESSMENT — MIFFLIN-ST. JEOR: SCORE: 1112.26

## 2021-09-01 NOTE — PROGRESS NOTES
PATIENT HISTORY:  Chyna Moncada is a 70 year old female who presents to clinic with a chief complaint of painful bumps on the bottom of the right foot.  The patient is seen by themselves.  The patient relates the pain is primarily located around the bottom of the ball of the right foot.   Any previous notes and studies that pertain to the patient's condition were reviewed.    Pertinent medical, surgical and family history was reviewed in Epic.    Medications:   Current Outpatient Medications:      aspirin EC 81 MG tablet, Take 81 mg by mouth daily , Disp: , Rfl:      calcium-vitamin D (CALTRATE) 600-400 MG-UNIT per tablet, Take 1 tablet by mouth daily , Disp: , Rfl:      Cholecalciferol (VITAMIN D-3 PO), Take 1,000 Units by mouth daily, Disp: , Rfl:      fluorometholone (FML LIQUIFILM) 0.1 % ophthalmic suspension, , Disp: , Rfl:      mometasone (NASONEX) 50 MCG/ACT spray, Spray 2 sprays in nostril daily , Disp: , Rfl:      Multiple Vitamin (MULTI-VITAMINS) TABS, Take 1 tablet by mouth daily, Disp: , Rfl:      multivitamin (OCUVITE) TABS tablet, Take 1 tablet by mouth daily, Disp: , Rfl:      pravastatin (PRAVACHOL) 40 MG tablet, TAKE ONE TABLET BY MOUTH EVERY NIGHT AT BEDTIME, Disp: 90 tablet, Rfl: 3     valACYclovir (VALTREX) 1000 mg tablet, TAKE TWO TABLETS BY MOUTH TWICE A DAY, Disp: 4 tablet, Rfl: 11     LORazepam (ATIVAN) 0.5 MG tablet, Take 1 tablet (0.5 mg) by mouth every 6 hours as needed (Patient not taking: Reported on 12/21/2020), Disp: 10 tablet, Rfl: 0     Allergies:    Allergies   Allergen Reactions     Tramadol Anaphylaxis     Benadryl [Altaryl] Other (See Comments)     Made her goofy     Caffeine Other (See Comments)     Jittery       Codeine Nausea and Vomiting     Darvocet [Propoxyphene N-Apap]      Other reaction(s): GI Upset     Diphenhydramine Other (See Comments)     Lovastatin Other (See Comments) and Muscle Pain (Myalgia)     Other reaction(s): Myalgia  pain     Percocet  "[Oxycodone-Acetaminophen] Other (See Comments)     Other reaction(s): Dizziness  Sick or dizzy?     Vicodin [Hydrocodone-Acetaminophen] Other (See Comments) and Nausea     Sick or dizzy?       Vitals: /69   Pulse 88   Ht 1.581 m (5' 2.25\")   Wt 63.5 kg (140 lb)   BMI 25.40 kg/m    BMI= Body mass index is 25.4 kg/m .    LOWER EXTREMITY PHYSICAL EXAM    Dermatologic: Noted pinpoint hyperkeratotic skin lesions on the plantar aspect of the ball of the right foot with no surrounding erythema or edema noted.  No drainage noted.  No subdermal hemorrhage noted.  Skin is intact to right lower extremity without significant lesions, rash or abrasion.        Vascular: DP & PT pulses are intact & regular on the right.   CFT and skin temperature is normal to the right lower extremity.     Neurologic: Lower extremity sensation is intact to light touch.  No evidence of weakness in the right lower extremity.        Musculoskeletal: Patient is ambulatory without assistive device or brace.  No gross ankle deformity noted.  No foot or ankle joint effusion is noted.  Noted fat pad atrophy to the forefoot on the right.         ASSESSMENT / PLAN:     ICD-10-CM    1. Plantar porokeratosis, acquired  L85.1 DESTRUCT BENIGN LESION, UP TO 14   2. Plantar fat pad atrophy  L90.9        I have explained to Chyna about the conditions.  We discussed the underlying contributing factors to the condition as well as treatment options along with expected length of recovery.  At this time, the skin lesion was sharply debrided down to healthy epidermis.  Next application of Cantharone was applied to the lesion and bandaged.  The patient was instructed to return in 4 weeks for reevaluation if problems persist.    Chyna verbalized agreement with and understanding of the rational for the diagnosis and treatment plan.  All questions were answered to best of my ability and the patient's satisfaction. The patient was advised to contact the clinic with " any questions that may arise after the clinic visit.      Disclaimer: This note consists of symbols derived from keyboarding, dictation and/or voice recognition software. As a result, there may be errors in the script that have gone undetected. Please consider this when interpreting information found in this chart.       GANGA Pace D.P.M., JACQUELYN.F.REFUGIOS.

## 2021-09-01 NOTE — NURSING NOTE
"Chief Complaint   Patient presents with     Consult     right foot       Initial /69   Pulse 88   Ht 1.581 m (5' 2.25\")   Wt 63.5 kg (140 lb)   BMI 25.40 kg/m   Estimated body mass index is 25.4 kg/m  as calculated from the following:    Height as of this encounter: 1.581 m (5' 2.25\").    Weight as of this encounter: 63.5 kg (140 lb).  Medications and allergies reviewed.      Jodi KIRBY MA    "

## 2021-09-01 NOTE — LETTER
9/1/2021         RE: Chyna Moncada  [address] [facility] [address]        Dear Colleague,    Thank you for referring your patient, Chyna Moncada, to the [facility]. Please see a copy of my visit note below.    PATIENT HISTORY:  Chyna Moncada is a 70 year old female who presents to clinic with a chief complaint of painful bumps on the bottom of the right foot.  The patient is seen by themselves.  The patient relates the pain is primarily located around the bottom of the ball of the right foot.   Any previous notes and studies that pertain to the patient's condition were reviewed.    Pertinent medical, surgical and family history was reviewed in Epic.    Medications:   Current Outpatient Medications:      aspirin EC 81 MG tablet, Take 81 mg by mouth daily , Disp: , Rfl:      calcium-vitamin D (CALTRATE) 600-400 MG-UNIT per tablet, Take 1 tablet by mouth daily , Disp: , Rfl:      Cholecalciferol (VITAMIN D-3 PO), Take 1,000 Units by mouth daily, Disp: , Rfl:      fluorometholone (FML LIQUIFILM) 0.1 % ophthalmic suspension, , Disp: , Rfl:      mometasone (NASONEX) 50 MCG/ACT spray, Spray 2 sprays in nostril daily , Disp: , Rfl:      Multiple Vitamin (MULTI-VITAMINS) TABS, Take 1 tablet by mouth daily, Disp: , Rfl:      multivitamin (OCUVITE) TABS tablet, Take 1 tablet by mouth daily, Disp: , Rfl:      pravastatin (PRAVACHOL) 40 MG tablet, TAKE ONE TABLET BY MOUTH EVERY NIGHT AT BEDTIME, Disp: 90 tablet, Rfl: 3     valACYclovir (VALTREX) 1000 mg tablet, TAKE TWO TABLETS BY MOUTH TWICE A DAY, Disp: 4 tablet, Rfl: 11     LORazepam (ATIVAN) 0.5 MG tablet, Take 1 tablet (0.5 mg) by mouth every 6 hours as needed (Patient not taking: Reported on 12/21/2020), Disp: 10 tablet, Rfl: 0     Allergies:    Allergies   Allergen Reactions     Tramadol Anaphylaxis     Benadryl [Altaryl] Other (See Comments)     Made her goofy     Caffeine Other (See Comments)     Jittery        "Codeine Nausea and Vomiting     Darvocet [Propoxyphene N-Apap]      Other reaction(s): GI Upset     Diphenhydramine Other (See Comments)     Lovastatin Other (See Comments) and Muscle Pain (Myalgia)     Other reaction(s): Myalgia  pain     Percocet [Oxycodone-Acetaminophen] Other (See Comments)     Other reaction(s): Dizziness  Sick or dizzy?     Vicodin [Hydrocodone-Acetaminophen] Other (See Comments) and Nausea     Sick or dizzy?       Vitals: /69   Pulse 88   Ht 1.581 m (5' 2.25\")   Wt 63.5 kg (140 lb)   BMI 25.40 kg/m    BMI= Body mass index is 25.4 kg/m .    LOWER EXTREMITY PHYSICAL EXAM    Dermatologic: Noted pinpoint hyperkeratotic skin lesions on the plantar aspect of the ball of the right foot with no surrounding erythema or edema noted.  No drainage noted.  No subdermal hemorrhage noted.  Skin is intact to right lower extremity without significant lesions, rash or abrasion.        Vascular: DP & PT pulses are intact & regular on the right.   CFT and skin temperature is normal to the right lower extremity.     Neurologic: Lower extremity sensation is intact to light touch.  No evidence of weakness in the right lower extremity.        Musculoskeletal: Patient is ambulatory without assistive device or brace.  No gross ankle deformity noted.  No foot or ankle joint effusion is noted.  Noted fat pad atrophy to the forefoot on the right.         ASSESSMENT / PLAN:     ICD-10-CM    1. Plantar porokeratosis, acquired  L85.1 DESTRUCT BENIGN LESION, UP TO 14   2. Plantar fat pad atrophy  L90.9        I have explained to Chyna about the conditions.  We discussed the underlying contributing factors to the condition as well as treatment options along with expected length of recovery.  At this time, the skin lesion was sharply debrided down to healthy epidermis.  Next application of Cantharone was applied to the lesion and bandaged.  The patient was instructed to return in 4 weeks for reevaluation if problems " kathryn    Chyna verbalized agreement with and understanding of the rational for the diagnosis and treatment plan.  All questions were answered to best of my ability and the patient's satisfaction. The patient was advised to contact the clinic with any questions that may arise after the clinic visit.      Disclaimer: This note consists of symbols derived from keyboarding, dictation and/or voice recognition software. As a result, there may be errors in the script that have gone undetected. Please consider this when interpreting information found in this chart.       MIRTA Walls.P.LUCINA., F.A.C.F.A.S.        Again, thank you for allowing me to participate in the care of your patient.        Sincerely,        Nato Pace DPM

## 2021-09-01 NOTE — PATIENT INSTRUCTIONS
Calluses of the Foot    I.  Calluses on the toes   A.  Tips of the toes    1.  Due to pressure on the tips of the toes when wearing shoes, sandals or barefoot.    2.  Related to hammertoe deformity of the toes.    3.  Treated with wearing soft cushioned toe caps or shoe liners to better offload the pressure to the tips of the toes    4.  Correcting the deformity of the toe is another option if cushions do not help   B.  Top of sides of the knuckles of the toes    1.  Due to pressure from adjacent toes or shoes on the knuckles of the toes.    2.  Can be related to hammertoe deformities    3.  Treated with wearing roomy shoes with soft top-cover material in order not to rub on the skin    4.  Silicone toe caps can also be used to protect rubbing from the knuckles of the adjacent toes.    5.  Correcting the deformity of the toe is another option if offloading measures do not work.  II. Calluses on the bottom of the foot   A.   Pressure points    1.  Caused by direct pressure overlying prominent bones such as metatarsal head on the balls of the foot.    2.  Can be related to either hammertoe deformities or fat pad atrophy    3.  Can be treated with additional cushion utilizing silicone shoe liners to better offload the pressure and supplement for fat pad atrophy.    4.  Surgical correction of hammertoe deformities is another option if offloading cushion treatment does not work.   B.   Shear forces    1.  Caused by sliding forces along the skin on the bottom of the forefoot including the great toe.    2.  This is not due to a rotational force as the foot pushes off against the ground.    3.  Treated with wearing a silicone shoe liner that can move with the skin reducing the amount of shear force causing the callus formation.   C.   IPK lesions or porokeratosis    1.  These are small hard callus lesions that are related to plugged sweat ducts.    2.  These ducts travel through the epidermis and dermis residing in the  subcutaneous tissue    3.  When the ducts get clogged, they can harden up resulting in callused skin around them.      4.  Treatment includes sharp excavation of the hyperkeratotic callus tissue core as far as can be tolerated, preferably without bleeding.    5.   Other treatment options   A.  Application of salicylic acid to soften the hyperkeratotic skin   B.  Cantharone which causes a blister in attempt to pull off the hyperkeratotic skin lesion.    Helpful products:    Soles  Silicone Shoe Inserts    Amazon: https://www.[employer]/Soles-Silicone-Orthopedic-Comfortable-Hypoallergenic/dp/P42BEWC6ZR/ref=sr_1_2?dchild=1&keywords=silicone+sole&xlu=1876431200&sr=8-2    Dr. Valdez's Gels Toe Caps      Sold at [employer] in [facility]    Amazon: https://www.[employer]/Byron-All-Mini-Size/dp/V84SZGPM47/ref=sr_1_2?crid=F8AUPGGJZVFT&dchild=1&keywords=+nav+all+gel+toe+cap&fpn=[id number]&sprefix=+Courtney%27s+gels+toe%2Caps%2C171&sr=8-2

## 2021-09-08 ENCOUNTER — TRANSFERRED RECORDS (OUTPATIENT)
Dept: HEALTH INFORMATION MANAGEMENT | Facility: CLINIC | Age: 71
End: 2021-09-08

## 2021-09-21 ENCOUNTER — TRANSFERRED RECORDS (OUTPATIENT)
Dept: HEALTH INFORMATION MANAGEMENT | Facility: CLINIC | Age: 71
End: 2021-09-21

## 2021-09-22 ENCOUNTER — HOSPITAL ENCOUNTER (OUTPATIENT)
Dept: MRI IMAGING | Facility: CLINIC | Age: 71
Discharge: HOME | End: 2021-09-22
Attending: STUDENT IN AN ORGANIZED HEALTH CARE EDUCATION/TRAINING PROGRAM | Admitting: STUDENT IN AN ORGANIZED HEALTH CARE EDUCATION/TRAINING PROGRAM
Payer: COMMERCIAL

## 2021-09-22 DIAGNOSIS — M54.9 BACK PAIN: ICD-10-CM

## 2021-09-22 DIAGNOSIS — M54.16 CHRONIC LUMBAR RADICULOPATHY: ICD-10-CM

## 2021-09-22 PROCEDURE — 72148 MRI LUMBAR SPINE W/O DYE: CPT

## 2021-10-05 ENCOUNTER — ANCILLARY PROCEDURE (OUTPATIENT)
Dept: GENERAL RADIOLOGY | Facility: CLINIC | Age: 71
End: 2021-10-05
Attending: FAMILY MEDICINE
Payer: COMMERCIAL

## 2021-10-05 ENCOUNTER — OFFICE VISIT (OUTPATIENT)
Dept: FAMILY MEDICINE | Facility: CLINIC | Age: 71
End: 2021-10-05
Payer: COMMERCIAL

## 2021-10-05 VITALS
SYSTOLIC BLOOD PRESSURE: 130 MMHG | DIASTOLIC BLOOD PRESSURE: 80 MMHG | HEART RATE: 107 BPM | WEIGHT: 140 LBS | RESPIRATION RATE: 18 BRPM | OXYGEN SATURATION: 99 % | TEMPERATURE: 99.5 F | BODY MASS INDEX: 25.76 KG/M2 | HEIGHT: 62 IN

## 2021-10-05 DIAGNOSIS — J06.9 UPPER RESPIRATORY TRACT INFECTION, UNSPECIFIED TYPE: Primary | ICD-10-CM

## 2021-10-05 DIAGNOSIS — R05.9 COUGH: ICD-10-CM

## 2021-10-05 PROCEDURE — 99000 SPECIMEN HANDLING OFFICE-LAB: CPT | Performed by: FAMILY MEDICINE

## 2021-10-05 PROCEDURE — 99213 OFFICE O/P EST LOW 20 MIN: CPT | Performed by: FAMILY MEDICINE

## 2021-10-05 PROCEDURE — U0005 INFEC AGEN DETEC AMPLI PROBE: HCPCS | Mod: 90 | Performed by: FAMILY MEDICINE

## 2021-10-05 PROCEDURE — U0003 INFECTIOUS AGENT DETECTION BY NUCLEIC ACID (DNA OR RNA); SEVERE ACUTE RESPIRATORY SYNDROME CORONAVIRUS 2 (SARS-COV-2) (CORONAVIRUS DISEASE [COVID-19]), AMPLIFIED PROBE TECHNIQUE, MAKING USE OF HIGH THROUGHPUT TECHNOLOGIES AS DESCRIBED BY CMS-2020-01-R: HCPCS | Mod: 90 | Performed by: FAMILY MEDICINE

## 2021-10-05 PROCEDURE — 71046 X-RAY EXAM CHEST 2 VIEWS: CPT | Performed by: RADIOLOGY

## 2021-10-05 ASSESSMENT — MIFFLIN-ST. JEOR: SCORE: 1112.26

## 2021-10-05 NOTE — NURSING NOTE
"Chief Complaint   Patient presents with     URI     /80 (Cuff Size: Adult Regular)   Pulse 107   Temp 99.5  F (37.5  C) (Tympanic)   Resp 18   Ht 1.581 m (5' 2.25\")   Wt 63.5 kg (140 lb)   SpO2 99%   Breastfeeding No   BMI 25.40 kg/m   Estimated body mass index is 25.4 kg/m  as calculated from the following:    Height as of this encounter: 1.581 m (5' 2.25\").    Weight as of this encounter: 63.5 kg (140 lb).  Patient presents to the clinic using No DME      Health Maintenance that is potentially due pending provider review:    Health Maintenance Due   Topic Date Due     ANNUAL REVIEW OF HM ORDERS  Never done     COLORECTAL CANCER SCREENING  09/12/2020     INFLUENZA VACCINE (1) 09/01/2021                "

## 2021-10-05 NOTE — PROGRESS NOTES
"    Assessment & Plan     Upper respiratory tract infection, unspecified type  Differentials discussed in detail including viral URI.  Chest x-ray findings reviewed independently, no acute abnormality noted.  COVID-19 test ordered.  Suggested to continue well hydration, warm fluids, over-the-counter analgesia/antitussive.  Return criteria discussed in detail.  Patient understood and in agreement with above plan.  All questions answered.    Cough  - Symptomatic COVID-19 Virus (Coronavirus) by PCR Nose  - XR Chest 2 Views; Future      Keshawn Edwards MD  [facility]    Deneen Clemente is a 70 year old who presents for the following health issues     HPI     Acute Illness  Acute illness concerns: Cough, fever   Onset/Duration: Saturday   Symptoms:  Fever: YES  Chills/Sweats: YES  Headache (location?): YES  Sinus Pressure: no  Conjunctivitis:  no  Ear Pain: no  Rhinorrhea: YES  Congestion: YES  Sore Throat: no  Cough: YES-non-productive  Wheeze: no  Decreased Appetite: YES  Nausea: no  Vomiting: no  Diarrhea: no  Dysuria/Freq.: no  Dysuria or Hematuria: no  Fatigue/Achiness: YES  Sick/Strep Exposure: YES- Possible- was at a birthday party   Therapies tried and outcome: Tylenol,       Review of Systems   Constitutional, HEENT, cardiovascular, pulmonary, gi and gu systems are negative, except as otherwise noted.      Objective    /80 (Cuff Size: Adult Regular)   Pulse 107   Temp 99.5  F (37.5  C) (Tympanic)   Resp 18   Ht 1.581 m (5' 2.25\")   Wt 63.5 kg (140 lb)   SpO2 99%   Breastfeeding No   BMI 25.40 kg/m    Body mass index is 25.4 kg/m .  Physical Exam   GENERAL: alert and no distress  EYES: Eyes grossly normal to inspection, PERRL and conjunctivae and sclerae normal  HENT: normal cephalic/atraumatic, ear canals and TM's normal, nose and mouth without ulcers or lesions, oropharynx clear and oral mucous membranes moist  NECK: no adenopathy, no asymmetry, masses, or scars and " thyroid normal to palpation  RESP: lungs clear to auscultation - no rales, rhonchi or wheezes  CV: regular rate and rhythm, normal S1 S2, no S3 or S4, no murmur, click or rub  ABDOMEN: soft, nontender, no hepatosplenomegaly, no masses and bowel sounds normal  MS: no gross musculoskeletal defects noted, no edema  NEURO: Normal strength and tone, mentation intact and speech normal  PSYCH: mentation appears normal, affect normal/bright             normal (ped)...

## 2021-10-07 ENCOUNTER — TELEPHONE (OUTPATIENT)
Dept: FAMILY MEDICINE | Facility: CLINIC | Age: 71
End: 2021-10-07

## 2021-10-07 LAB — SARS-COV-2 RNA RESP QL NAA+PROBE: DETECTED

## 2021-10-08 ENCOUNTER — OFFICE VISIT (OUTPATIENT)
Dept: URGENT CARE | Facility: URGENT CARE | Age: 71
End: 2021-10-08
Payer: COMMERCIAL

## 2021-10-08 ENCOUNTER — TELEPHONE (OUTPATIENT)
Dept: FAMILY MEDICINE | Facility: CLINIC | Age: 71
End: 2021-10-08

## 2021-10-08 VITALS
OXYGEN SATURATION: 100 % | DIASTOLIC BLOOD PRESSURE: 80 MMHG | HEART RATE: 72 BPM | BODY MASS INDEX: 24.13 KG/M2 | WEIGHT: 133 LBS | RESPIRATION RATE: 16 BRPM | TEMPERATURE: 97.1 F | SYSTOLIC BLOOD PRESSURE: 120 MMHG

## 2021-10-08 DIAGNOSIS — H69.93 DYSFUNCTION OF BOTH EUSTACHIAN TUBES: Primary | ICD-10-CM

## 2021-10-08 PROCEDURE — 99213 OFFICE O/P EST LOW 20 MIN: CPT | Performed by: NURSE PRACTITIONER

## 2021-10-08 NOTE — PATIENT INSTRUCTIONS
You most likely have little tubes in your ears clogged with mucus. The mucinex will help drain them.  I have not heard if hearing loss could be a part of covid infection but I imagine it could be a possibility.    Mucinex 600-1200 mg 12 hour formula for ear, head and chest congestion.  It can also thin post nasal drip which can cause a cough and sore throat.    Patient Education     Earache, No Infection (Adult)   Earaches can happen without an infection. They can occur when air and fluid build up behind the eardrum. They may cause a feeling of fullness and discomfort. They may also impair hearing. This is called otitis media with effusion (OME) or serous otitis media. It means there is fluid in the middle ear. It is not the same as acute otitis media, which is often from an infection.  OME can happen when you have a cold if congestion blocks the passage that drains the middle ear. This passage is called the eustachian tube. OME may also occur with nasal allergies or after a bacterial infection in the middle ear. Other causes are:    Trauma    Improper cleaning of wax from the ear    Bacterial infection of the mastoid bone (mastoiditis)    Tumor    Jaw pain    Changes in pressure, such as from flying or scuba diving    The pain or discomfort may come and go. You may hear clicking or popping sounds when you chew or swallow. You may feel that your balance is off. Or you may hear ringing in the ear.  It often takes from several weeks up to 3 months for the fluid to clear on its own. Oral pain relievers and ear drops help if there is pain. Decongestants and antihistamines sometimes help. Antibiotics don't help since there is no infection. Your healthcare provider may give you a nasal spray to help reduce swelling in the nose and eustachian tube. This can allow the ear to drain.  If your OME doesn't get better after 3 months, surgery may be used to drain the fluid. A small tube may also be put in the eardrum to help with  drainage.  Because the middle ear fluid can become infected, watch for signs of an infection. These may develop later. They may include increased ear pain, fever, or drainage from the ear.  Home care  These home-care tips will help you take care of yourself:    You may use over-the-counter medicine as directed by your healthcare provider to control pain, unless medicine was prescribed. If you have chronic liver or kidney disease or ever had a stomach ulcer or GI bleeding, talk with your healthcare provider before using any medicines.    Aspirin should never be used in anyone younger than age 18 who has a fever. It may cause severe liver damage.    Ask your healthcare provider if you may use over-the-counter decongestants such as phenylephrine or pseudoephedrine. Keep in mind they are not always helpful.    Talk with your healthcare provider about using nasal spray decongestants. Don't use them for more than 3 days, or as directed by your healthcare provider. Longer use can make congestion worse. Prescription nasal sprays from your healthcare provider don't often have such restrictions.    Antihistamines may help if you are also having allergy symptoms.    You may use medicines such as guaifenesin to thin mucus and help with drainage.  Follow-up care  Follow up with your healthcare provider or as advised if you are not feeling better after 3 days.  When to seek medical advice  Call your healthcare provider right away if any of these occur:    Ear pain that gets worse or that does not start to get better     Fever of 100.4 F (38 C) or higher, or as directed by your healthcare provider    Fluid or blood draining from the ear    Headache or sinus pain    Stiff neck    Unusual drowsiness or confusion  Karlos last reviewed this educational content on 12/1/2019 2000-2021 [employer]. All rights reserved. This information is not intended as a substitute for professional medical care. Always follow your  healthcare professional's instructions.

## 2021-10-08 NOTE — PROGRESS NOTES
"    Assessment & Plan   Problem List Items Addressed This Visit     None      Visit Diagnoses     Dysfunction of both eustachian tubes    -  Primary             15 minutes spent on the date of the encounter doing chart review, history and exam, documentation and further activities per the note       BMI:   Estimated body mass index is 24.13 kg/m  as calculated from the following:    Height as of 10/5/21: 1.581 m (5' 2.25\").    Weight as of this encounter: 60.3 kg (133 lb).       Patient Instructions   You most likely have little tubes in your ears clogged with mucus. The mucinex will help drain them.  I have not heard if hearing loss could be a part of covid infection but I imagine it could be a possibility.    Mucinex 600-1200 mg 12 hour formula for ear, head and chest congestion.  It can also thin post nasal drip which can cause a cough and sore throat.    Patient Education     Earache, No Infection (Adult)   Earaches can happen without an infection. They can occur when air and fluid build up behind the eardrum. They may cause a feeling of fullness and discomfort. They may also impair hearing. This is called otitis media with effusion (OME) or serous otitis media. It means there is fluid in the middle ear. It is not the same as acute otitis media, which is often from an infection.  OME can happen when you have a cold if congestion blocks the passage that drains the middle ear. This passage is called the eustachian tube. OME may also occur with nasal allergies or after a bacterial infection in the middle ear. Other causes are:    Trauma    Improper cleaning of wax from the ear    Bacterial infection of the mastoid bone (mastoiditis)    Tumor    Jaw pain    Changes in pressure, such as from flying or scuba diving    The pain or discomfort may come and go. You may hear clicking or popping sounds when you chew or swallow. You may feel that your balance is off. Or you may hear ringing in the ear.  It often takes from " several weeks up to 3 months for the fluid to clear on its own. Oral pain relievers and ear drops help if there is pain. Decongestants and antihistamines sometimes help. Antibiotics don't help since there is no infection. Your healthcare provider may give you a nasal spray to help reduce swelling in the nose and eustachian tube. This can allow the ear to drain.  If your OME doesn't get better after 3 months, surgery may be used to drain the fluid. A small tube may also be put in the eardrum to help with drainage.  Because the middle ear fluid can become infected, watch for signs of an infection. These may develop later. They may include increased ear pain, fever, or drainage from the ear.  Home care  These home-care tips will help you take care of yourself:    You may use over-the-counter medicine as directed by your healthcare provider to control pain, unless medicine was prescribed. If you have chronic liver or kidney disease or ever had a stomach ulcer or GI bleeding, talk with your healthcare provider before using any medicines.    Aspirin should never be used in anyone younger than age 18 who has a fever. It may cause severe liver damage.    Ask your healthcare provider if you may use over-the-counter decongestants such as phenylephrine or pseudoephedrine. Keep in mind they are not always helpful.    Talk with your healthcare provider about using nasal spray decongestants. Don't use them for more than 3 days, or as directed by your healthcare provider. Longer use can make congestion worse. Prescription nasal sprays from your healthcare provider don't often have such restrictions.    Antihistamines may help if you are also having allergy symptoms.    You may use medicines such as guaifenesin to thin mucus and help with drainage.  Follow-up care  Follow up with your healthcare provider or as advised if you are not feeling better after 3 days.  When to seek medical advice  Call your healthcare provider right away  if any of these occur:    Ear pain that gets worse or that does not start to get better     Fever of 100.4 F (38 C) or higher, or as directed by your healthcare provider    Fluid or blood draining from the ear    Headache or sinus pain    Stiff neck    Unusual drowsiness or confusion  Karlos last reviewed this educational content on 12/1/2019 2000-2021 [employer]. All rights reserved. This information is not intended as a substitute for professional medical care. Always follow your healthcare professional's instructions.               Return in about 1 week (around 10/15/2021), or if symptoms worsen or fail to improve, for Follow up with your primary care provider.    Sandra Garduno, GEORGE CNP  [facility]    Deneen Clemente is a 70 year old who presents for the following health issues     HPI     Chief Complaint   Patient presents with     Covid Concern     has COVID x 6 days - Rt ear is painful and can not hear x 1 day           Review of Systems   Constitutional, HEENT, cardiovascular, pulmonary, GI, , musculoskeletal, neuro, skin, endocrine and psych systems are negative, except as otherwise noted.      Objective    /80 (BP Location: Right arm)   Pulse 72   Temp 97.1  F (36.2  C) (Tympanic)   Resp 16   Wt 60.3 kg (133 lb)   SpO2 100%   BMI 24.13 kg/m    Body mass index is 24.13 kg/m .  Physical Exam   GENERAL: healthy, alert and no distress, nontoxic in appearance  EYES: Eyes grossly normal to inspection, PERRL and conjunctivae and sclerae normal  HENT: ear canals and TM's normal, nose and mouth without ulcers or lesions  NECK: no adenopathy, supple with full ROM  RESP: lungs clear to auscultation - no rales, rhonchi or wheezes  CV: regular rate and rhythm, normal S1 S2, no S3 or S4, no murmur, click or rub, no peripheral edema   ABDOMEN: soft, nontender  MS: no gross musculoskeletal defects noted, no edema    No results found for this or  any previous visit (from the past 24 hour(s)).

## 2021-10-08 NOTE — TELEPHONE ENCOUNTER
Reason for call:  Patient reporting a symptom    Symptom or request: Rt Ear Pain- Pt was tested positive for Covid. Symptoms started 10/2/21. Pt was into see Dr. Edwards 10/5/21. Pt is wondering if she can get Antibiotic since she was just seen 10/5/21    Phone Number patient can be reached at:  Home number on file [phone]     Best Time:  Any Time      Can we leave a detailed message on this number:  YES    Call taken on 10/8/2021 at 8:18 AM by Stacia Avila

## 2021-10-08 NOTE — TELEPHONE ENCOUNTER
"-Coronavirus (COVID-19) Notification    Caller Name (Patient, parent, daughter/son, grandparent, etc)  Patient    Reason for call  Notify of Positive Coronavirus (COVID-19) lab results, assess symptoms,  review  [facility] recommendations    Lab Result    Lab test:  2019-nCoV rRt-PCR or SARS-CoV-2 PCR    Oropharyngeal AND/OR nasopharyngeal swabs is POSITIVE for 2019-nCoV RNA/SARS-COV-2 PCR (COVID-19 virus)    RN Recommendations/Instructions per [facility] Coronavirus COVID-19 recommendations    Brief introduction script  Introduce self then review script:  \"I am calling on behalf of [facility].  We were notified that your Coronavirus test (COVID-19) for was POSITIVE for the virus.  I have some information to relay to you but first I wanted to mention that the [place] Dept of Health will be contacting you shortly [it's possible MD already called Patient] to talk to you more about how you are feeling and other people you have had contact with who might now also have the virus.  Also,  [facility] is Partnering with the [facility] for Covid-19 research, you may be contacted directly by research staff.\"    Assessment (Inquire about Patient's current symptoms)   Assessment   Current Symptoms at time of phone call: (if no symptoms, document No symptoms] Fever, cough, diarrhea,   Symptoms onset (if applicable) 10/2/2021     If at time of call, Patients symptoms hare worsened, the Patient should contact 911 or have someone drive them to Emergency Dept promptly:      If Patient calling 911, inform 911 personal that you have tested positive for the Coronavirus (COVID-19).  Place mask on and await 911 to arrive.    If Emergency Dept, If possible, please have another adult drive you to the Emergency Dept but you need to wear mask when in contact with other people.      Monoclonal Antibody Administration    You may be eligible to receive a new treatment with a monoclonal antibody for preventing " "hospitalization in patients at high risk for complications from COVID-19.   This medication is still experimental and available on a limited basis; it is given through an IV and must be given at an infusion center. Please note that not all people who are eligible will receive the medication since it is in limited supply.     Are you interested in being considered for this medication?  No.   Does the patient fit the criteria: No    If patient qualifies based on above criteria:  \"You will be contacted if you are selected to receive this treatment in the next 1-2 business days.   This is time sensitive and if you are not selected in the next 1-2 business days, you will not receive the medication.  If you do not receive a call to schedule, you have not been selected.\"      Review information with Patient    Your result was positive. This means you have COVID-19 (coronavirus).  We have sent you a letter that reviews the information that I'll be reviewing with you now.    How can I protect others?    If you have symptoms: stay home and away from others (self-isolate) until:    You've had no fever--and no medicine that reduces fever--for 1 full day (24 hours). And       Your other symptoms have gotten better. For example, your cough or breathing has improved. And     At least 10 days have passed since your symptoms started. (If you've been told by a doctor that you have a weak immune system, wait 20 days.)     If you don't have symptoms: Stay home and away from others (self-isolate) until at least 10 days have passed since your first positive COVID-19 test. (Date test collected)    During this time:    Stay in your own room, including for meals. Use your own bathroom if you can.    Stay away from others in your home. No hugging, kissing or shaking hands. No visitors.     Don't go to work, school or anywhere else.     Clean  high touch  surfaces often (doorknobs, counters, handles, etc.). Use a household cleaning spray or " wipes. You'll find a full list on the EPA website at www.epa.gov/pesticide-registration/list-n-disinfectants-use-against-sars-cov-2.     Cover your mouth and nose with a mask, tissue or other face covering to avoid spreading germs.    Wash your hands and face often with soap and water.    Make a list of people you have been in close contact with recently, even if either of you wore a face covering.   ; Start your list from 2 days before you became ill or had a positive test.  ; Include anyone that was within 6 feet of you for a cumulative total of 15 minutes or more in 24 hours. (Example: if you sat next to Nato for 5 minutes in the morning and 10 minutes in the afternoon, then you were in close contact for 15 minutes total that day. Nato would be added to your list.)    A public health worker will call or text you. It is important that you answer. They will ask you questions about possible exposures to COVID-19, such as people you have been in direct contact with and places you have visited.    Tell the people on your list that you have COVID-19; they should stay away from others for 14 days starting from the last time they were in contact with you (unless you are told something different from a public health worker).     Caregivers in these groups are at risk for severe illness due to COVID-19:  o People 65 years and older  o People who live in a nursing home or long-term care facility  o People with chronic disease (lung, heart, cancer, diabetes, kidney, liver, immunologic)  o People who have a weakened immune system, including those who:  - Are in cancer treatment  - Take medicine that weakens the immune system, such as corticosteroids  - Had a bone marrow or organ transplant  - Have an immune deficiency  - Have poorly controlled HIV or AIDS  - Are obese (body mass index of 40 or higher)  - Smoke regularly    Caregivers should wear gloves while washing dishes, handling laundry and cleaning bedrooms and  bathrooms.    Wash and dry laundry with special caution. Don't shake dirty laundry, and use the warmest water setting you can.    If you have a weakened immune system, ask your doctor about other actions you should take.    For more tips, go to www.cdc.gov/coronavirus/2019-ncov/downloads/10Things.pdf.    You should not go back to work until you meet the guidelines above for ending your home isolation. You don't need to be retested for COVID-19 before going back to work--studies show that you won't spread the virus if it's been at least 10 days since your symptoms started (or 20 days, if you have a weak immune system).    Employers: This document serves as formal notice of your employee's medical guidelines for going back to work. They must meet the above guidelines before going back to work in person.    How can I take care of myself?    1. Get lots of rest. Drink extra fluids (unless a doctor has told you not to).    2. Take Tylenol (acetaminophen) for fever or pain. If you have liver or kidney problems, ask your family doctor if it's okay to take Tylenol.     Take either:     650 mg (two 325 mg pills) every 4 to 6 hours, or     1,000 mg (two 500 mg pills) every 8 hours as needed.     Note: Don't take more than 3,000 mg in one day. Acetaminophen is found in many medicines (both prescribed and over-the-counter medicines). Read all labels to be sure you don't take too much.    For children, check the Tylenol bottle for the right dose (based on their age or weight).    3. If you have other health problems (like cancer, heart failure, an organ transplant or severe kidney disease): Call your specialty clinic if you don't feel better in the next 2 days.    4. Know when to call 911: Emergency warning signs include:    Trouble breathing or shortness of breath    Pain or pressure in the chest that doesn't go away    Feeling confused like you haven't felt before, or not being able to wake up    Bluish-colored lips or  face    5. Sign up for [email or web address]. We know it's scary to hear that you have COVID-19. We want to track your symptoms to make sure you're okay over the next 2 weeks. Please look for an email from [email or web address]--this is a free, online program that we'll use to keep in touch. To sign up, follow the link in the email. Learn more at [email or web address].    Where can I get more information?    [facility]: [email or web address]    Coronavirus Basics: [email or web address]/coronavirus/basics.html    What to Do If You're Sick: www.cdc.gov/coronavirus/2019-ncov/about/steps-when-sick.html    Ending Home Isolation: www.cdc.gov/coronavirus/2019-ncov/hcp/disposition-in-home-patients.html     Caring for Someone with COVID-19: www.cdc.gov/coronavirus/2019-ncov/if-you-are-sick/care-for-someone.html     [facility] clinical trials (COVID-19 research studies): clinicalaffairs.[facility]-clinical-trials     A Positive COVID-19 letter will be sent via "[employer] or the mail. (Exception, no letters sent to Presurgerical/Preprocedure Patients)    Eliane Ornelas LPN

## 2021-10-08 NOTE — TELEPHONE ENCOUNTER
"Call placed to patient.    She states she has pain in both ears but her right ear is \"so full\" it hurts. She is covid positive as of 10/2.    She is offered and appointment, declined. She will come to urgent care.  is aware of hours.    Destini CAM RN  "

## 2021-10-18 ENCOUNTER — TRANSFERRED RECORDS (OUTPATIENT)
Dept: HEALTH INFORMATION MANAGEMENT | Facility: CLINIC | Age: 71
End: 2021-10-18
Payer: COMMERCIAL

## 2021-10-25 ENCOUNTER — TRANSFERRED RECORDS (OUTPATIENT)
Dept: HEALTH INFORMATION MANAGEMENT | Facility: CLINIC | Age: 71
End: 2021-10-25
Payer: COMMERCIAL

## 2021-11-15 ENCOUNTER — TRANSFERRED RECORDS (OUTPATIENT)
Dept: HEALTH INFORMATION MANAGEMENT | Facility: CLINIC | Age: 71
End: 2021-11-15
Payer: COMMERCIAL

## 2021-11-22 ENCOUNTER — ANCILLARY PROCEDURE (OUTPATIENT)
Dept: MAMMOGRAPHY | Facility: CLINIC | Age: 71
End: 2021-11-22
Attending: PHYSICIAN ASSISTANT
Payer: COMMERCIAL

## 2021-11-22 DIAGNOSIS — Z12.31 VISIT FOR SCREENING MAMMOGRAM: ICD-10-CM

## 2021-11-22 PROCEDURE — 77063 BREAST TOMOSYNTHESIS BI: CPT | Mod: TC | Performed by: RADIOLOGY

## 2021-11-22 PROCEDURE — 77067 SCR MAMMO BI INCL CAD: CPT | Mod: TC | Performed by: RADIOLOGY

## 2021-12-08 DIAGNOSIS — I63.50 CEREBRAL ARTERY OCCLUSION WITH CEREBRAL INFARCTION (H): ICD-10-CM

## 2021-12-08 RX ORDER — PRAVASTATIN SODIUM 40 MG
TABLET ORAL
Qty: 90 TABLET | Refills: 0 | Status: SHIPPED | OUTPATIENT
Start: 2021-12-08 | End: 2021-12-16

## 2021-12-10 ENCOUNTER — TELEPHONE (OUTPATIENT)
Dept: FAMILY MEDICINE | Facility: CLINIC | Age: 71
End: 2021-12-10
Payer: COMMERCIAL

## 2021-12-10 NOTE — TELEPHONE ENCOUNTER
Reason for Call: Request for an order or referral:    Order or referral being requested: order    Date needed: as soon as possible    Has the patient been seen by the PCP for this problem? YES    Additional comments: Patient is calling in today in regards to right ear keeps plugging up. Patient is also hearing a screech in her ear as well. Patient is wondering if she can get a referral to see the ENT and Audiology. Thank you    Phone number Patient can be reached at:  Home number on file [phone] (home)    Best Time:  anytime    Can we leave a detailed message on this number?  YES    Call taken on 12/10/2021 at 8:48 AM by Kae Cade

## 2021-12-13 ENCOUNTER — TRANSFERRED RECORDS (OUTPATIENT)
Dept: HEALTH INFORMATION MANAGEMENT | Facility: CLINIC | Age: 71
End: 2021-12-13
Payer: COMMERCIAL

## 2021-12-14 ENCOUNTER — HOSPITAL ENCOUNTER (OUTPATIENT)
Dept: ULTRASOUND IMAGING | Facility: CLINIC | Age: 71
End: 2021-12-14
Attending: PHYSICIAN ASSISTANT
Payer: COMMERCIAL

## 2021-12-14 ENCOUNTER — HOSPITAL ENCOUNTER (OUTPATIENT)
Dept: MAMMOGRAPHY | Facility: CLINIC | Age: 71
End: 2021-12-14
Attending: PHYSICIAN ASSISTANT
Payer: COMMERCIAL

## 2021-12-14 DIAGNOSIS — R92.8 ABNORMAL MAMMOGRAM: ICD-10-CM

## 2021-12-14 PROCEDURE — 77065 DX MAMMO INCL CAD UNI: CPT | Mod: LT

## 2021-12-14 PROCEDURE — 76642 ULTRASOUND BREAST LIMITED: CPT | Mod: LT

## 2021-12-16 ENCOUNTER — OFFICE VISIT (OUTPATIENT)
Dept: FAMILY MEDICINE | Facility: CLINIC | Age: 71
End: 2021-12-16
Payer: COMMERCIAL

## 2021-12-16 VITALS
OXYGEN SATURATION: 99 % | HEIGHT: 65 IN | SYSTOLIC BLOOD PRESSURE: 122 MMHG | DIASTOLIC BLOOD PRESSURE: 78 MMHG | BODY MASS INDEX: 21.66 KG/M2 | RESPIRATION RATE: 20 BRPM | WEIGHT: 130 LBS | HEART RATE: 78 BPM | TEMPERATURE: 96.8 F

## 2021-12-16 DIAGNOSIS — Z00.00 ENCOUNTER FOR MEDICARE ANNUAL WELLNESS EXAM: Primary | ICD-10-CM

## 2021-12-16 DIAGNOSIS — I73.9 PERIPHERAL VASCULAR DISEASE (H): ICD-10-CM

## 2021-12-16 DIAGNOSIS — I63.50 CEREBRAL ARTERY OCCLUSION WITH CEREBRAL INFARCTION (H): ICD-10-CM

## 2021-12-16 DIAGNOSIS — M54.12 CERVICAL RADICULOPATHY: ICD-10-CM

## 2021-12-16 DIAGNOSIS — B00.1 RECURRENT COLD SORES: ICD-10-CM

## 2021-12-16 DIAGNOSIS — J44.9 CHRONIC OBSTRUCTIVE PULMONARY DISEASE, UNSPECIFIED COPD TYPE (H): ICD-10-CM

## 2021-12-16 DIAGNOSIS — F41.9 ANXIETY: ICD-10-CM

## 2021-12-16 DIAGNOSIS — R00.2 PALPITATIONS: ICD-10-CM

## 2021-12-16 LAB
ALBUMIN SERPL-MCNC: 3.7 G/DL (ref 3.4–5)
ALP SERPL-CCNC: 87 U/L (ref 40–150)
ALT SERPL W P-5'-P-CCNC: 23 U/L (ref 0–50)
ANION GAP SERPL CALCULATED.3IONS-SCNC: 3 MMOL/L (ref 3–14)
AST SERPL W P-5'-P-CCNC: 19 U/L (ref 0–45)
BILIRUB SERPL-MCNC: 0.7 MG/DL (ref 0.2–1.3)
BUN SERPL-MCNC: 18 MG/DL (ref 7–30)
CALCIUM SERPL-MCNC: 9.1 MG/DL (ref 8.5–10.1)
CHLORIDE BLD-SCNC: 108 MMOL/L (ref 94–109)
CHOLEST SERPL-MCNC: 186 MG/DL
CO2 SERPL-SCNC: 29 MMOL/L (ref 20–32)
CREAT SERPL-MCNC: 0.72 MG/DL (ref 0.52–1.04)
ERYTHROCYTE [DISTWIDTH] IN BLOOD BY AUTOMATED COUNT: 13.2 % (ref 10–15)
FASTING STATUS PATIENT QL REPORTED: YES
GFR SERPL CREATININE-BSD FRML MDRD: 85 ML/MIN/1.73M2
GLUCOSE BLD-MCNC: 97 MG/DL (ref 70–99)
HCT VFR BLD AUTO: 41.3 % (ref 35–47)
HDLC SERPL-MCNC: 105 MG/DL
HGB BLD-MCNC: 14.2 G/DL (ref 11.7–15.7)
LDLC SERPL CALC-MCNC: 63 MG/DL
MCH RBC QN AUTO: 29.5 PG (ref 26.5–33)
MCHC RBC AUTO-ENTMCNC: 34.4 G/DL (ref 31.5–36.5)
MCV RBC AUTO: 86 FL (ref 78–100)
NONHDLC SERPL-MCNC: 81 MG/DL
PLATELET # BLD AUTO: 157 10E3/UL (ref 150–450)
POTASSIUM BLD-SCNC: 3.8 MMOL/L (ref 3.4–5.3)
PROT SERPL-MCNC: 7.4 G/DL (ref 6.8–8.8)
RBC # BLD AUTO: 4.82 10E6/UL (ref 3.8–5.2)
SODIUM SERPL-SCNC: 140 MMOL/L (ref 133–144)
TRIGL SERPL-MCNC: 89 MG/DL
WBC # BLD AUTO: 6.8 10E3/UL (ref 4–11)

## 2021-12-16 PROCEDURE — 80061 LIPID PANEL: CPT | Performed by: NURSE PRACTITIONER

## 2021-12-16 PROCEDURE — 85027 COMPLETE CBC AUTOMATED: CPT | Performed by: NURSE PRACTITIONER

## 2021-12-16 PROCEDURE — 99397 PER PM REEVAL EST PAT 65+ YR: CPT | Performed by: NURSE PRACTITIONER

## 2021-12-16 PROCEDURE — 99214 OFFICE O/P EST MOD 30 MIN: CPT | Mod: 25 | Performed by: NURSE PRACTITIONER

## 2021-12-16 PROCEDURE — 80053 COMPREHEN METABOLIC PANEL: CPT | Performed by: NURSE PRACTITIONER

## 2021-12-16 PROCEDURE — 36415 COLL VENOUS BLD VENIPUNCTURE: CPT | Performed by: NURSE PRACTITIONER

## 2021-12-16 RX ORDER — VALACYCLOVIR HYDROCHLORIDE 1 G/1
TABLET, FILM COATED ORAL
Qty: 4 TABLET | Refills: 11 | Status: SHIPPED | OUTPATIENT
Start: 2021-12-16 | End: 2022-12-19

## 2021-12-16 RX ORDER — PRAVASTATIN SODIUM 40 MG
TABLET ORAL
Qty: 90 TABLET | Refills: 3 | Status: SHIPPED | OUTPATIENT
Start: 2021-12-16 | End: 2022-12-19

## 2021-12-16 RX ORDER — LORAZEPAM 0.5 MG/1
0.5 TABLET ORAL EVERY 6 HOURS PRN
Qty: 10 TABLET | Refills: 0 | Status: SHIPPED | OUTPATIENT
Start: 2021-12-16 | End: 2023-07-10

## 2021-12-16 ASSESSMENT — ACTIVITIES OF DAILY LIVING (ADL): CURRENT_FUNCTION: NO ASSISTANCE NEEDED

## 2021-12-16 ASSESSMENT — MIFFLIN-ST. JEOR: SCORE: 1114.52

## 2021-12-16 NOTE — LETTER
December 17, 2021      Chyna Moncada  [address] [facility] [id number]        Dear ,    We are writing to inform you of your test results.    comprehensive panel is within normal limits indicating good kidney and liver functions.  Lipid numbers are within normal limits continue current medications and low-cholesterol diet and recheck in 1 year.  Hemoglobin levels within the normal limits no further intervention.        Resulted Orders   Comprehensive metabolic panel (BMP + Alb, Alk Phos, ALT, AST, Total. Bili, TP)   Result Value Ref Range    Sodium 140 133 - 144 mmol/L    Potassium 3.8 3.4 - 5.3 mmol/L    Chloride 108 94 - 109 mmol/L    Carbon Dioxide (CO2) 29 20 - 32 mmol/L    Anion Gap 3 3 - 14 mmol/L    Urea Nitrogen 18 7 - 30 mg/dL    Creatinine 0.72 0.52 - 1.04 mg/dL    Calcium 9.1 8.5 - 10.1 mg/dL    Glucose 97 70 - 99 mg/dL    Alkaline Phosphatase 87 40 - 150 U/L    AST 19 0 - 45 U/L    ALT 23 0 - 50 U/L    Protein Total 7.4 6.8 - 8.8 g/dL    Albumin 3.7 3.4 - 5.0 g/dL    Bilirubin Total 0.7 0.2 - 1.3 mg/dL    GFR Estimate 85 >60 mL/min/1.73m2      Comment:      As of July 11, 2021, eGFR is calculated by the CKD-EPI creatinine equation, without race adjustment. eGFR can be influenced by muscle mass, exercise, and diet. The reported eGFR is an estimation only and is only applicable if the renal function is stable.   Lipid panel reflex to direct LDL Fasting   Result Value Ref Range    Cholesterol 186 <200 mg/dL    Triglycerides 89 <150 mg/dL    Direct Measure  >=50 mg/dL    LDL Cholesterol Calculated 63 <=100 mg/dL    Non HDL Cholesterol 81 <130 mg/dL    Patient Fasting > 8hrs? Yes     Narrative    Cholesterol  Desirable:  <200 mg/dL    Triglycerides  Normal:  Less than 150 mg/dL  Borderline High:  150-199 mg/dL  High:  200-499 mg/dL  Very High:  Greater than or equal to 500 mg/dL    Direct Measure HDL  Female:  Greater than or equal to 50 mg/dL   Male:  Greater than or equal  to 40 mg/dL    LDL Cholesterol  Desirable:  <100mg/dL  Above Desirable:  100-129 mg/dL   Borderline High:  130-159 mg/dL   High:  160-189 mg/dL   Very High:  >= 190 mg/dL    Non HDL Cholesterol  Desirable:  130 mg/dL  Above Desirable:  130-159 mg/dL  Borderline High:  160-189 mg/dL  High:  190-219 mg/dL  Very High:  Greater than or equal to 220 mg/dL   CBC with platelets   Result Value Ref Range    WBC Count 6.8 4.0 - 11.0 10e3/uL    RBC Count 4.82 3.80 - 5.20 10e6/uL    Hemoglobin 14.2 11.7 - 15.7 g/dL    Hematocrit 41.3 35.0 - 47.0 %    MCV 86 78 - 100 fL    MCH 29.5 26.5 - 33.0 pg    MCHC 34.4 31.5 - 36.5 g/dL    RDW 13.2 10.0 - 15.0 %    Platelet Count 157 150 - 450 10e3/uL       If you have any questions or concerns, please call the clinic at the number listed above.       Sincerely,      GEORGE Al CNP

## 2021-12-16 NOTE — PROGRESS NOTES
"SUBJECTIVE:   Chyna Moncada is a 70 year old female who presents for Preventive Visit.  Chief Complaint   Patient presents with     Physical     PHYSICAL- SHE IS FASTING FOR LABS      Ear Problem     RIGHT EAR KEEPS PLUGGING UP, SHE IS REQUESTING REFERALL TO ENT      Neck Pain     LEFT SIDED NECK AND SHOULDER      Lipids     RENEW PRAVASTATIN      Health Maintenance     DUE FOR FIT TEST      FAINT     HAS HAD 2 EPISODES RECENTLY OF FEELING FAINT- ONLY LASTS SECONDS THEN SHE FEELS FINE. SHE HAS NOT PASSED OUT OR FALLEN DUE TO THIS.        Patient has been advised of split billing requirements and indicates understanding: Yes   Are you in the first 12 months of your Medicare coverage?  No    Healthy Habits:    In general, how would you rate your overall health?  Good    Frequency of exercise:  None    Duration of exercise:  Other    Do you usually eat at least 4 servings of fruit and vegetables a day, include whole grains    & fiber and avoid regularly eating high fat or \"junk\" foods?  No    Taking medications regularly:  Yes    Barriers to taking medications:  Not applicable    Medication side effects:  None    Ability to successfully perform activities of daily living:  No assistance needed    Home Safety:  Lack of grab bars in the bathroom    Hearing impairment symptoms: WEARS HEARING AIDES.    In the past 6 months, have you been bothered by leaking of urine?  No    In general, how would you rate your overall mental or emotional health?  Good      PHQ-2 Total Score:    Additional concerns today:  Yes    Do you feel safe in your environment? Yes    Have you ever done Advance Care Planning? (For example, a Health Directive, POLST, or a discussion with a medical provider or your loved ones about your wishes): HAS PAPERS AT HOME TO FILL OUT        Fall risk  Fallen 2 or more times in the past year?: No  Any fall with injury in the past year?: No    Cognitive Screening   1) Repeat 3 items (Leader, Season, Table)  "   2) Clock draw: NORMAL  3) 3 item recall: Recalls 3 objects  Results: 3 items recalled: COGNITIVE IMPAIRMENT LESS LIKELY    Mini-CogTM Copyright CASEY [employer]. Licensed by the author for use in [facility]; reprinted with permission (yesika@[facility]). All rights reserved.      Do you have sleep apnea, excessive snoring or daytime drowsiness?: no    Reviewed and updated as needed this visit by clinical staff  Tobacco  Allergies    Med Hx  Surg Hx  Fam Hx  Soc Hx       Reviewed and updated as needed this visit by Provider               Social History     Tobacco Use     Smoking status: Former Smoker     Types: Cigarettes     Quit date: 1/10/1983     Years since quittin.9     Smokeless tobacco: Never Used   Substance Use Topics     Alcohol use: Not Currently     Alcohol/week: 1.0 standard drink     Types: 1 Glasses of wine per week     Comment: DAILY     If you drink alcohol do you typically have >3 drinks per day or >7 drinks per week? No    Alcohol Use 12/10/2020   Prescreen: >3 drinks/day or >7 drinks/week? No   Prescreen: >3 drinks/day or >7 drinks/week? -           Joint Pain    Onset: X 2 WEEKS     Description:   Location: left shoulder and NECK - LEFT   Character: Sharp    Intensity: mild    Progression of Symptoms: same    Accompanying Signs & Symptoms:  Other symptoms: none    History:   Previous similar pain: no       Precipitating factors:   Trauma or overuse: no     Alleviating factors:  Improved by: nothing    Therapies Tried and outcome: TYLENOL - NO RELIEF       Concern - RIGHT EAR - KEEPS PLUGGING UP   Onset: X 1 MONTH     Description:   PLUGGED RIGHT EAR- ON AND OFF   SHE IS REQUESTING A REFERRAL TO ENT FOR THIS     Hyperlipidemia Follow-Up      Are you regularly taking any medication or supplement to lower your cholesterol?   Yes- ATORVASTATIN    Are you having muscle aches or other side effects that you think could be       caused by your cholesterol lowering medication?  No          FAINT     HAS HAD 2 EPISODES RECENTLY OF FEELING FAINT- ONLY LASTS SECONDS THEN SHE FEELS FINE. SHE HAS NOT PASSED OUT OR FALLEN DUE TO THIS.          Current providers sharing in care for this patient include:   Patient Care Team:  Edilia Davison PA-C as PCP - General (Physician Assistant)  Edilia Davison PA-C as Assigned PCP  Nato Pace DPM as Assigned Musculoskeletal Provider    The following health maintenance items are reviewed in Epic and correct as of today:  Health Maintenance Due   Topic Date Due     ANNUAL REVIEW OF  ORDERS  Never done     COLORECTAL CANCER SCREENING  2020     FALL RISK ASSESSMENT  12/10/2021     BP Readings from Last 3 Encounters:   21 122/78   10/08/21 120/80   10/05/21 130/80    Wt Readings from Last 3 Encounters:   21 59 kg (130 lb)   10/08/21 60.3 kg (133 lb)   10/05/21 63.5 kg (140 lb)                  Patient Active Problem List   Diagnosis     Allergic rhinitis     Anxiety state     Cerebral artery occlusion with cerebral infarction (H)     Degeneration of cervical intervertebral disc     Degeneration of lumbar or lumbosacral intervertebral disc     Insomnia     Postmenopausal atrophic vaginitis     Raynaud's syndrome     Osteopenia     Pulmonary nodules     TGA (transient global amnesia)     Recurrent cold sores     Peripheral vascular disease (H)     Atopic rhinitis     Osteoarthritis of hip     Bilateral sensorineural hearing loss     Tendinitis of shoulder     COPD (chronic obstructive pulmonary disease) (H)     Past Surgical History:   Procedure Laterality Date     HYSTERECTOMY, PAP NO LONGER INDICATED         Social History     Tobacco Use     Smoking status: Former Smoker     Types: Cigarettes     Quit date: 1/10/1983     Years since quittin.9     Smokeless tobacco: Never Used   Substance Use Topics     Alcohol use: Not Currently     Alcohol/week: 1.0 standard drink     Types: 1 Glasses of wine per week     Comment: DAILY      Family History   Problem Relation Age of Onset     Arthritis Mother         was told was RA     Cancer Mother         GYN     Other - See Comments Mother         phlebitis for which hospitalized several times     Heart Disease Father      Alcohol/Drug Maternal Grandfather      Heart Disease Paternal Grandfather         Heart attack     Diabetes Paternal Grandfather      Cancer Brother         throat         Current Outpatient Medications   Medication Sig Dispense Refill     LORazepam (ATIVAN) 0.5 MG tablet Take 1 tablet (0.5 mg) by mouth every 6 hours as needed 10 tablet 0     pravastatin (PRAVACHOL) 40 MG tablet TAKE ONE TABLET BY MOUTH EVERY NIGHT AT BEDTIME 90 tablet 3     valACYclovir (VALTREX) 1000 mg tablet TAKE TWO TABLETS BY MOUTH TWICE A DAY 4 tablet 11     aspirin EC 81 MG tablet Take 81 mg by mouth daily        calcium-vitamin D (CALTRATE) 600-400 MG-UNIT per tablet Take 1 tablet by mouth daily        Cholecalciferol (VITAMIN D-3 PO) Take 1,000 Units by mouth daily       fluorometholone (FML LIQUIFILM) 0.1 % ophthalmic suspension        mometasone (NASONEX) 50 MCG/ACT spray Spray 2 sprays in nostril daily        Multiple Vitamin (MULTI-VITAMINS) TABS Take 1 tablet by mouth daily       multivitamin (OCUVITE) TABS tablet Take 1 tablet by mouth daily       Allergies   Allergen Reactions     Tramadol Anaphylaxis     Benadryl [Altaryl] Other (See Comments)     Made her goofy     Caffeine Other (See Comments)     Jittery       Codeine Nausea and Vomiting     Darvocet [Propoxyphene N-Apap]      Other reaction(s): GI Upset     Diphenhydramine Other (See Comments)     Lovastatin Other (See Comments) and Muscle Pain (Myalgia)     Other reaction(s): Myalgia  pain     Percocet [Oxycodone-Acetaminophen] Other (See Comments)     Other reaction(s): Dizziness  Sick or dizzy?     Vicodin [Hydrocodone-Acetaminophen] Other (See Comments) and Nausea     Sick or dizzy?     Recent Labs   Lab Test 04/22/21  0912  "12/10/20  0846 07/24/20  1716 10/21/19  0903 12/21/18  0727 10/19/18  0840   LDL  --  50  --  57  --  58   HDL  --  117  --  93  --  98   TRIG  --  85  --  106  --  82   ALT  --   --  25 24 25  --    CR 0.69  --  0.73 0.69 0.74 0.74   GFRESTIMATED 88  --  84 89 82 79   GFRESTBLACK >90  --  >90 >90 95 >90   POTASSIUM 4.0  --  3.7 4.0 4.4 4.0   TSH 1.64  --  2.32 1.74  --   --       Mammogram Screening: Mammogram Screening: Recommended mammography every 1-2 years with patient discussion and risk factor consideration  History of abnormal Pap smear: NO - age 65 - see link Cervical Cytology Screening Guidelines  Last 3 Pap and HPV Results:          Review of Systems  Constitutional, HEENT, cardiovascular, pulmonary, gi and gu systems are negative, except as otherwise noted.    OBJECTIVE:   /78 (BP Location: Right arm, Patient Position: Chair, Cuff Size: Adult Regular)   Pulse 78   Temp 96.8  F (36  C) (Tympanic)   Resp 20   Ht 1.657 m (5' 5.25\")   Wt 59 kg (130 lb)   SpO2 99%   BMI 21.47 kg/m   Estimated body mass index is 21.47 kg/m  as calculated from the following:    Height as of this encounter: 1.657 m (5' 5.25\").    Weight as of this encounter: 59 kg (130 lb).  Physical Exam  GENERAL APPEARANCE: healthy, alert and no distress  EYES: Eyes grossly normal to inspection, PERRL and conjunctivae and sclerae normal  HENT: ear canals and TM's normal, nose and mouth without ulcers or lesions, oropharynx clear and oral mucous membranes moist  NECK: no adenopathy, no asymmetry, masses, or scars and thyroid normal to palpation  RESP: lungs clear to auscultation - no rales, rhonchi or wheezes  BREAST: normal without masses, tenderness or nipple discharge and no palpable axillary masses or adenopathy  CV: regular rate and rhythm, normal S1 S2, no S3 or S4, no murmur, click or rub, no peripheral edema and peripheral pulses strong  ABDOMEN: soft, nontender, no hepatosplenomegaly, no masses and bowel sounds normal  MS: " no musculoskeletal defects are noted and gait is age appropriate without ataxia  SKIN: no suspicious lesions or rashes  NEURO: Normal strength and tone, sensory exam grossly normal, mentation intact and speech normal  PSYCH: mentation appears normal and affect normal/bright    Diagnostic Test Results:  Results for orders placed or performed in visit on 12/16/21   CBC with platelets     Status: Normal   Result Value Ref Range    WBC Count 6.8 4.0 - 11.0 10e3/uL    RBC Count 4.82 3.80 - 5.20 10e6/uL    Hemoglobin 14.2 11.7 - 15.7 g/dL    Hematocrit 41.3 35.0 - 47.0 %    MCV 86 78 - 100 fL    MCH 29.5 26.5 - 33.0 pg    MCHC 34.4 31.5 - 36.5 g/dL    RDW 13.2 10.0 - 15.0 %    Platelet Count 157 150 - 450 10e3/uL         ASSESSMENT / PLAN:   (Z00.00) Encounter for Medicare annual wellness exam  (primary encounter diagnosis)  Comment:   Plan: Fecal colorectal cancer screen (FIT),         Comprehensive metabolic panel (BMP + Alb, Alk         Phos, ALT, AST, Total. Bili, TP), Lipid panel         reflex to direct LDL Fasting, CBC with         platelets            (I63.50) Cerebral artery occlusion with cerebral infarction (H)  Comment:  Controlled no change in treatment plan  Plan: pravastatin (PRAVACHOL) 40 MG tablet,         OFFICE/OUTPT VISIT,EST,LEVL IV      (R00.2) Palpitations  Comment: Patient had history of dizziness and feeling of passing out not presently was to get a Holter monitor.  Did say symptoms are intermittent will obtain Holter monitor as previously ordered for 7 days continue to monitor closely labs within normal limits.  If not in Holter monitor negative neck steps would be MRI of the brain  Plan: Leadless EKG Monitor 3 to 7 Days, OFFICE/OUTPT         VISIT,EST,LEVL IV      (B00.1) Recurrent cold sores  Comment:  Controlled no change in treatment plan  Plan: valACYclovir (VALTREX) 1000 mg tablet      (M54.12) Cervical radiculopathy  Comment: Patient has increased muscle tenderness to left shoulder  "shoulder area recommend physical therapy for fascial release  Plan: Physical Therapy Referral, OFFICE/OUTPT         VISIT,EST,LEVL IV      (I73.9) Peripheral vascular disease (H)  Comment:  Controlled no change in treatment plan  Plan: OFFICE/OUTPT VISIT,EST,LEVL IV    (J44.9) Chronic obstructive pulmonary disease, unspecified COPD type (H)  Comment:  Controlled no change in treatment plan  Plan: OFFICE/OUTPT VISIT,EST,LEVL IV            (F41.9) Anxiety  Comment:  Controlled no change in treatment plan  Plan: LORazepam (ATIVAN) 0.5 MG tablet, OFFICE/OUTPT         VISIT,EST,LEVL IV        Patient has been advised of split billing requirements and indicates understanding: Yes  COUNSELING:  Reviewed preventive health counseling, as reflected in patient instructions       Regular exercise       Healthy diet/nutrition       Vision screening       Hearing screening       Dental care       Bladder control       Fall risk prevention       Folic Acid       Osteoporosis prevention/bone health       Colon cancer screening       Hepatitis C screening       (Laurie)menopause management       Advanced Planning     Estimated body mass index is 21.47 kg/m  as calculated from the following:    Height as of this encounter: 1.657 m (5' 5.25\").    Weight as of this encounter: 59 kg (130 lb).        She reports that she quit smoking about 38 years ago. Her smoking use included cigarettes. She has never used smokeless tobacco.      Appropriate preventive services were discussed with this patient, including applicable screening as appropriate for cardiovascular disease, diabetes, osteopenia/osteoporosis, and glaucoma.  As appropriate for age/gender, discussed screening for colorectal cancer, prostate cancer, breast cancer, and cervical cancer. Checklist reviewing preventive services available has been given to the patient.    Reviewed patients plan of care and provided an AVS. The Basic Care Plan (routine screening as documented in Health " Maintenance) for Chyna meets the Care Plan requirement. This Care Plan has been established and reviewed with the Patient.    Counseling Resources:  ATP IV Guidelines  Pooled Cohorts Equation Calculator  Breast Cancer Risk Calculator  Breast Cancer: Medication to Reduce Risk  FRAX Risk Assessment  ICSI Preventive Guidelines  Dietary Guidelines for Americans, 2010  USDA's MyPlate  ASA Prophylaxis  Lung CA Screening    GEORGE Al CNP [facility]    Identified Health Risks:

## 2021-12-16 NOTE — PATIENT INSTRUCTIONS
Patient Education   Personalized Prevention Plan  You are due for the preventive services outlined below.  Your care team is available to assist you in scheduling these services.  If you have already completed any of these items, please share that information with your care team to update in your medical record.  Health Maintenance Due   Topic Date Due     ANNUAL REVIEW OF  ORDERS  Never done     Colorectal Cancer Screening  09/12/2020     FALL RISK ASSESSMENT  12/10/2021       Exercise for a Healthier Heart  You may wonder how you can improve the health of your heart. If you re thinking about exercise, you re on the right track. You don t need to become an athlete. But you do need a certain amount of brisk exercise to help strengthen your heart. If you have been diagnosed with a heart condition, your healthcare provider may advise exercise to help stabilize your condition. To help make exercise a habit, choose safe, fun activities.      Exercise with a friend. When activity is fun, you're more likely to stick with it.   Before you start  Check with your healthcare provider before starting an exercise program. This is especially important if you have not been active for a while. It's also important if you have a long-term (chronic) health problem such as heart disease, diabetes, or obesity. Or if you are at high risk for having these problems.   Why exercise?  Exercising regularly offers many healthy rewards. It can help you do all of the following:     Improve your blood cholesterol level to help prevent further heart trouble    Lower your blood pressure to help prevent a stroke or heart attack    Control diabetes, or reduce your risk of getting this disease    Improve your heart and lung function    Reach and stay at a healthy weight    Make your muscles stronger so you can stay active    Prevent falls and fractures by slowing the loss of bone mass (osteoporosis)    Manage stress better    Reduce your blood  pressure    Improve your sense of self and your body image  Exercise tips      Ease into your routine. Set small goals. Then build on them. If you are not sure what your activity level should be, talk with your healthcare provider first before starting an exercise routine.    Exercise on most days. Aim for a total of 150 minutes (2 hours and 30 minutes) or more of moderate-intensity aerobic activity each week. Or 75 minutes (1 hour and 15 minutes) or more of vigorous-intensity aerobic activity each week. Or try for a combination of both. Moderate activity means that you breathe heavier and your heart rate increases but you can still talk. Think about doing 40 minutes of moderate exercise, 3 to 4 times a week. For best results, activity should last for about 40 minutes to lower blood pressure and cholesterol. It's OK to work up to the 40-minute period over time. Examples of moderate-intensity activity are walking 1 mile in 15 minutes. Or doing 30 to 45 minutes of yard work.    Step up your daily activity level.  Along with your exercise program, try being more active the whole day. Walk instead of drive. Or park further away so that you take more steps each day. Do more household tasks or yard work. You may not be able to meet the advised mount of physical activity. But doing some moderate- or vigorous-intensity aerobic activity can help reduce your risk for heart disease. Your healthcare provider can help you figure out what is best for you.    Choose 1 or more activities you enjoy.  Walking is one of the easiest things you can do. You can also try swimming, riding a bike, dancing, or taking an exercise class.    When to call your healthcare provider  Call your healthcare provider if you have any of these:     Chest pain or feel dizzy or lightheaded    Burning, tightness, pressure, or heaviness in your chest, neck, shoulders, back, or arms    Abnormal shortness of breath    More joint or muscle pain    A very fast  or irregular heartbeat (palpitations)  [employer] last reviewed this educational content on 7/1/2019 2000-2021 [employer]. All rights reserved. This information is not intended as a substitute for professional medical care. Always follow your healthcare professional's instructions.          Understanding USDA MyPlate  The USDA has guidelines to help you make healthy food choices. These are called MyPlate. MyPlate shows the food groups that make up healthy meals using the image of a place setting. Before you eat, think about the healthiest choices for what to put on your plate or in your cup or bowl. To learn more about building a healthy plate, visit www.choosemyplate.gov.    The food groups    Fruits. Any fruit or 100% fruit juice counts as part of the Fruit Group. Fruits may be fresh, canned, frozen, or dried, and may be whole, cut-up, or pureed. Make 1/2 of your plate fruits and vegetables.    Vegetables. Any vegetable or 100% vegetable juice counts as a member of the Vegetable Group. Vegetables may be fresh, frozen, canned, or dried. They can be served raw or cooked and may be whole, cut-up, or mashed. Make 1/2 of your plate fruits and vegetables.    Grains. All foods made from grains are part of the Grains Group. These include wheat, rice, oats, cornmeal, and barley. Grains are often used to make foods such as bread, pasta, oatmeal, cereal, tortillas, and grits. Grains should be no more than 1/4 of your plate. At least half of your grains should be whole grains.    Protein. This group includes meat, poultry, seafood, beans and peas, eggs, processed soy products (such as tofu), nuts (including nut butters), and seeds. Make protein choices no more than 1/4 of your plate. Meat and poultry choices should be lean or low fat.    Dairy. The Dairy Group includes all fluid milk products and foods made from milk that contain calcium, such as yogurt and cheese. (Foods that have little calcium, such as  cream, butter, and cream cheese, are not part of this group.) Most dairy choices should be low-fat or fat-free.    Oils. Oils aren't a food group, but they do contain essential nutrients. However it's important to watch your intake of oils. These are fats that are liquid at room temperature. They include canola, corn, olive, soybean, vegetable, and sunflower oil. Foods that are mainly oil include mayonnaise, certain salad dressings, and soft margarines. You likely already get your daily oil allowance from the foods you eat.  Things to limit  Eating healthy also means limiting these things in your diet:       Salt (sodium). Many processed foods have a lot of sodium. To keep sodium intake down, eat fresh vegetables, meats, poultry, and seafood when possible. Purchase low-sodium, reduced-sodium, or no-salt-added food products at the store. And don't add salt to your meals at home. Instead, season them with herbs and spices such as dill, oregano, cumin, and paprika. Or try adding flavor with lemon or lime zest and juice.    Saturated fat. Saturated fats are most often found in animal products such as beef, pork, and chicken. They are often solid at room temperature, such as butter. To reduce your saturated fat intake, choose leaner cuts of meat and poultry. And try healthier cooking methods such as grilling, broiling, roasting, or baking. For a simple lower-fat swap, use plain nonfat yogurt instead of mayonnaise when making potato salad or macaroni salad.    Added sugars. These are sugars added to foods. They are in foods such as ice cream, candy, soda, fruit drinks, sports drinks, energy drinks, cookies, pastries, jams, and syrups. Cut down on added sugars by sharing sweet treats with a family member or friend. You can also choose fruit for dessert, and drink water or other unsweetened beverages.     [employer] last reviewed this educational content on 6/1/2020 2000-2021 [employer]. All rights reserved.  This information is not intended as a substitute for professional medical care. Always follow your healthcare professional's instructions.           Patient Education     Back Spasm (No Trauma)    Spasm of the back muscles can occur after a sudden forceful twisting or bending such as in a car accident. A spasm can also happen after a simple awkward movement, or after lifting something heavy with poor body positioning. In any case, muscle spasm adds to the pain. Sleeping in an awkward position or on a poor quality mattress can also cause this. Some people respond to emotional stress by tensing the muscles of their back.  Pain that continues may need further assessment or other types of treatment such as physical therapy.  You don't always need X-rays for the first assessment of back pain, unless you had a physical injury such as from a car accident or fall. If your pain continues and doesn't respond to medical treatment, X-rays and other tests may then be done.   Home care    As soon as possible, start sitting or walking again. This will help prevent problems from a long bed rest. These problems include muscle weakness, worsening back stiffness and pain, and blood clots in the legs.    When in bed, try to find a position of comfort. A firm mattress is best. Try lying flat on your back with pillows under your knees. You can also try lying on your side with your knees bent up toward your chest and a pillow between your knees.    Don't sit for long periods. Also limit car rides and travel. This puts more stress on the lower back than standing or walking.     During the first 24 to 72 hours after an injury or flare-up, put an ice pack on the painful area for 20 minutes, then remove it for 20 minutes. Do this over a period of 60 to 90 minutes, or several times a day. This will reduce swelling and pain. Always wrap ice packs in a thin towel.    You can start with ice, then switch to heat. Heat from a hot shower, hot bath,  or heating pad reduces pain and works well for muscle spasms. Put heat on the painful area for 20 minutes, then remove it for 20 minutes. Do this over a period of 60 to 90 minutes, or several times a day. Don't sleep on a heating pad. It can burn or damage skin.    Alternate using ice and heat.    Be aware of safe lifting methods. don't lift anything over 15 pounds until all the pain is gone.  Gentle stretching will help your back heal faster. Do this simple routine 2 to 3 times a day until your back is feeling better.    Lie on your back with your knees bent and both feet on the ground.    Slowly raise your left knee to your chest as you flatten your lower back against the floor. Hold for 20 to 30 seconds.    Relax and repeat the exercise with your right knee.    Do 2 to 3 of these exercises for each leg.    Repeat, hugging both knees to your chest at the same time.    Don't bounce, but use a gentle pull.  Medicines  Talk with your doctor before using medicine, especially if you have other medical problems or are taking other medicines.  You may use over-the-counter medicines such as acetaminophen, ibuprofen, or naprosyn to control pain, unless your healthcare provider prescribed another pain medicine. Talk with your healthcare provider if you have a chronic condition such as diabetes, liver or kidney disease, stomach ulcer, or digestive bleeding, or are taking blood thinners.  Be careful if you are given prescription pain medicine, opioids, or medicine for muscle spasm. They can cause drowsiness, and affect your coordination, reflexes, and judgment. Don't drive or operate heavy machinery when taking these medicines. Take pain medicine only as prescribed by your healthcare provider.  Follow-up care  Follow up with your doctor, or as advised. You may need physical therapy or more tests.  If X-rays were taken, they may be reviewed by a radiologist. You will be told of any new findings that may affect your care.  Call    Call if any of these occur:    Trouble breathing    Confusion    Drowsiness or trouble awakening    Fainting or loss of consciousness    Rapid or very slow heart rate    Loss of bowel or bladder control  When to seek medical advice  Call your healthcare provider right away if any of these occur:    Pain becomes worse or spreads to your legs    Weakness or numbness in one or both legs    Numbness in the groin or genital area    Fever of 100.4 F (38 C) or higher , or as directed by your healthcare provider    Chills    Burning or pain when passing urine  [employer] last reviewed this educational content on 11/1/2018 2000-2021 [employer]. All rights reserved. This information is not intended as a substitute for professional medical care. Always follow your healthcare professional's instructions.           Patient Education     Earache, No Infection (Adult)   Earaches can happen without an infection. They can occur when air and fluid build up behind the eardrum. They may cause a feeling of fullness and discomfort. They may also impair hearing. This is called otitis media with effusion (OME) or serous otitis media. It means there is fluid in the middle ear. It is not the same as acute otitis media, which is often from an infection.  OME can happen when you have a cold if congestion blocks the passage that drains the middle ear. This passage is called the eustachian tube. OME may also occur with nasal allergies or after a bacterial infection in the middle ear. Other causes are:    Trauma    Improper cleaning of wax from the ear    Bacterial infection of the mastoid bone (mastoiditis)    Tumor    Jaw pain    Changes in pressure, such as from flying or scuba diving    The pain or discomfort may come and go. You may hear clicking or popping sounds when you chew or swallow. You may feel that your balance is off. Or you may hear ringing in the ear.  It often takes from several weeks up to 3 months for the  fluid to clear on its own. Oral pain relievers and ear drops help if there is pain. Decongestants and antihistamines sometimes help. Antibiotics don't help since there is no infection. Your healthcare provider may give you a nasal spray to help reduce swelling in the nose and eustachian tube. This can allow the ear to drain.  If your OME doesn't get better after 3 months, surgery may be used to drain the fluid. A small tube may also be put in the eardrum to help with drainage.  Because the middle ear fluid can become infected, watch for signs of an infection. These may develop later. They may include increased ear pain, fever, or drainage from the ear.  Home care  These home-care tips will help you take care of yourself:    You may use over-the-counter medicine as directed by your healthcare provider to control pain, unless medicine was prescribed. If you have chronic liver or kidney disease or ever had a stomach ulcer or GI bleeding, talk with your healthcare provider before using any medicines.    Aspirin should never be used in anyone younger than age 18 who has a fever. It may cause severe liver damage.    Ask your healthcare provider if you may use over-the-counter decongestants such as phenylephrine or pseudoephedrine. Keep in mind they are not always helpful.    Talk with your healthcare provider about using nasal spray decongestants. Don't use them for more than 3 days, or as directed by your healthcare provider. Longer use can make congestion worse. Prescription nasal sprays from your healthcare provider don't often have such restrictions.    Antihistamines may help if you are also having allergy symptoms.    You may use medicines such as guaifenesin to thin mucus and help with drainage.  Follow-up care  Follow up with your healthcare provider or as advised if you are not feeling better after 3 days.  When to seek medical advice  Call your healthcare provider right away if any of these occur:    Ear pain  that gets worse or that does not start to get better     Fever of 100.4 F (38 C) or higher, or as directed by your healthcare provider    Fluid or blood draining from the ear    Headache or sinus pain    Stiff neck    Unusual drowsiness or confusion  [employer] last reviewed this educational content on 12/1/2019 2000-2021 [employer]. All rights reserved. This information is not intended as a substitute for professional medical care. Always follow your healthcare professional's instructions.

## 2021-12-17 NOTE — RESULT ENCOUNTER NOTE
Please Notify Chyna  of test results comprehensive panel is within normal limits indicating good kidney and liver functions.  Lipid numbers are within normal limits continue current medications and low-cholesterol diet and recheck in 1 year.  Hemoglobin levels within the normal limits no further intervention.    Nereida Jacobs CNP

## 2021-12-20 ENCOUNTER — TELEPHONE (OUTPATIENT)
Dept: FAMILY MEDICINE | Facility: CLINIC | Age: 71
End: 2021-12-20
Payer: COMMERCIAL

## 2021-12-20 DIAGNOSIS — H90.3 BILATERAL SENSORINEURAL HEARING LOSS: Primary | ICD-10-CM

## 2021-12-20 NOTE — TELEPHONE ENCOUNTER
Reason for Call: Request for an order or referral:    Order or referral being requested: ENT and Audiology referral    Date needed: before my next appointment  1/5/22    Has the patient been seen by the PCP for this problem? YES  She saw Nereida Arlene    Additional comments: Pt thinks Nereida forgot to put in a referral for ENT. She says her R ear continues to plug with wax. When she called to make apt, they told her she didn't have a referral and will need to see Both Dr Fields and Audiologist.     Phone number Patient can be reached at:  Cell number on file:    Telephone Information:   Mobile [phone]       Best Time:  No need to call back  Call taken on 12/20/2021 at 8:39 AM by Cassy Slaughter

## 2021-12-22 ENCOUNTER — HOSPITAL ENCOUNTER (OUTPATIENT)
Dept: CARDIOLOGY | Facility: CLINIC | Age: 71
Discharge: HOME | End: 2021-12-22
Attending: NURSE PRACTITIONER | Admitting: NURSE PRACTITIONER
Payer: COMMERCIAL

## 2021-12-22 DIAGNOSIS — R00.2 PALPITATIONS: ICD-10-CM

## 2021-12-22 PROCEDURE — 93248 EXT ECG>7D<15D REV&INTERPJ: CPT | Performed by: INTERNAL MEDICINE

## 2021-12-22 PROCEDURE — 93242 EXT ECG>48HR<7D RECORDING: CPT

## 2021-12-30 ENCOUNTER — LAB (OUTPATIENT)
Dept: LAB | Facility: CLINIC | Age: 71
End: 2021-12-30
Payer: COMMERCIAL

## 2021-12-30 DIAGNOSIS — Z00.00 ENCOUNTER FOR MEDICARE ANNUAL WELLNESS EXAM: ICD-10-CM

## 2021-12-30 PROCEDURE — 82274 ASSAY TEST FOR BLOOD FECAL: CPT

## 2021-12-30 NOTE — LETTER
January 3, 2022      Chyna Moncada  [id number] [facility] [id number]        Dear ,    We are writing to inform you of your test results.    Fit test was negative        Resulted Orders   Fecal colorectal cancer screen (FIT)   Result Value Ref Range    Occult Blood Screen FIT Negative Negative       If you have any questions or concerns, please call the clinic at the number listed above.       Sincerely,      GEORGE Al CNP

## 2021-12-31 LAB — HEMOCCULT STL QL IA: NEGATIVE

## 2022-01-04 ENCOUNTER — HOSPITAL ENCOUNTER (OUTPATIENT)
Dept: PHYSICAL THERAPY | Facility: CLINIC | Age: 72
Setting detail: THERAPIES SERIES
End: 2022-01-04
Attending: NURSE PRACTITIONER
Payer: COMMERCIAL

## 2022-01-04 DIAGNOSIS — M54.12 CERVICAL RADICULOPATHY: ICD-10-CM

## 2022-01-04 PROCEDURE — 97140 MANUAL THERAPY 1/> REGIONS: CPT | Mod: GP | Performed by: PHYSICAL THERAPIST

## 2022-01-04 PROCEDURE — 97110 THERAPEUTIC EXERCISES: CPT | Mod: GP | Performed by: PHYSICAL THERAPIST

## 2022-01-04 PROCEDURE — 97161 PT EVAL LOW COMPLEX 20 MIN: CPT | Mod: GP | Performed by: PHYSICAL THERAPIST

## 2022-01-04 NOTE — PROGRESS NOTES
PHYSICAL THERAPY INITIAL EVALUATION  01/04/22 1000   General Information   Type of Visit Initial OP Ortho PT Evaluation   Start of Care Date 01/04/22   Referring Physician Nereida Jacobs APRN CNP   Patient/Family Goals Statement relieve pain    Orders Evaluate and Treat   Date of Order 12/16/22   Certification Required? Yes   Medical Diagnosis Cervical radiculopathy    Surgical/Medical history reviewed Yes   Precautions/Limitations no known precautions/limitations   Body Part(s)   Body Part(s) Cervical Spine   Presentation and Etiology   Pertinent history of current problem (include personal factors and/or comorbidities that impact the POC) Onset of pain begining of December. No specific injury to have caused it. With turning head left will cause shooting pain straight down the spine.    Impairments A. Pain;D. Decreased ROM   Functional Limitations perform activities of daily living;perform desired leisure / sports activities   Symptom Location Neck, L side    How/Where did it occur From insidious onset   Onset date of current episode/exacerbation 12/09/21   Chronicity New   Pain rating (0-10 point scale) Best (/10);Worst (/10)   Best (/10) 3   Pain quality A. Sharp;C. Aching   Frequency of pain/symptoms A. Constant   Pain/symptoms exacerbated by M. Other   Pain exacerbation comment turning head    Pain/symptoms eased by D. Nothing   Progression of symptoms since onset: Improved   Prior Level of Function   Prior Level of Function-Mobility independent    Prior Level of Function-ADLs independent   Current Level of Function   Patient role/employment history F. Retired   Fall Risk Screen   Fall screen completed by PT   Have you fallen 2 or more times in the past year? No   Have you fallen and had an injury in the past year? No   Is patient a fall risk? No   Abuse Screen (yes response referral indicated)   Feels Unsafe at Home or Work/School no   Feels Threatened by Someone no   Does Anyone Try to Keep You From  Having Contact with Others or Doing Things Outside Your Home? no   Physical Signs of Abuse Present no   Functional Scales   Functional Scales Other   Other Scales  SPADI: 15.56    Cervical Spine   Posture forward head, mild inc thoracic kyphosis   Cervical Flexion ROM WNL   Cervical Extension ROM 40, pain L side    Cervical Right Side Bending ROM 40, mild pain   Cervical Left Side Bending ROM 40, mild pain   Cervical Right Rotation ROM 70, mild pain   Cervical Left Rotation ROM 60, pain    Thoracic Right Rotation 75%   Thoracic Left Rotation 75%   Shoulder AROM Screen WNL Flexion and Abduction but painful L neck with L arm movements   Shoulder Shrug (C2-C4) Strength 5   Shoulder Abd (C5) Strength 5   Shoulder ER (C5, C6) Strength 5   Elbow Flexion (C5, C6) Strength 5   Elbow Extension (C7) Strength 5   Wrist Extension (C6) Strength 5   Wrist Flexion (C7) Strength 5   Thumb Abd (C8) Strength 5   5th Finger Add (T1) Strength 5   Upper Trapezius Flexibility tight   Levator Scapula Flexibility tight   Scalene Flexibility tight   Vertebral Artery Test -   Alar Ligament Test -   Transverse Ligament Test -   Spurling Test -   Cervical Distraction Test felt good   Segmental Mobility-Cervical hypomobile C2 L rot, post rib L T5   Palpation tender B UT R > L, SO   Planned Therapy Interventions   Planned Therapy Interventions joint mobilization;manual therapy;neuromuscular re-education;ROM;strengthening;stretching   Clinical Impression   Criteria for Skilled Therapeutic Interventions Met yes, treatment indicated   PT Diagnosis neck pain   Influenced by the following impairments pain, decreased ROM    Functional limitations due to impairments turning head, reaching, reading    Clinical Presentation Stable/Uncomplicated   Clinical Presentation Rationale sx stable, subacute   Clinical Decision Making (Complexity) Low complexity   Therapy Frequency 1 time/week   Predicted Duration of Therapy Intervention (days/wks) 8 weeks   Risk &  Benefits of therapy have been explained Yes   Patient, Family & other staff in agreement with plan of care Yes   Education Assessment   Preferred Learning Style Listening;Demonstration;Pictures/video   Barriers to Learning No barriers   ORTHO GOALS   PT Ortho Eval Goals 1;2;3   Ortho Goal 1   Goal Identifier 1   Goal Description Patient will be able to turn head equally to both sides without pain to easily check blind spots while driving.    Target Date 03/01/22   Ortho Goal 2   Goal Identifier 2   Goal Description Patient will be able to reach overhead without neck pain.   Target Date 03/01/22   Ortho Goal 3   Goal Identifier 3   Goal Description Patient will be independent with HEP to aid functional recovery.    Target Date 03/01/22   Total Evaluation Time   PT Blake, Low Complexity Minutes (40468) 20   Therapy Certification   Certification date from 01/04/22   Certification date to 03/01/22   Medical Diagnosis Cervical radiculopathy        Please contact me with any questions or concerns.  Thank you for your referral.    Keerthi Mon, PT, DPT, OCS  Physical Therapist, Orthopedic Certified Specialist    [address] [address]  [email or web address]   Office: [phone]   Employed by [employer] [employer]

## 2022-01-04 NOTE — PROGRESS NOTES
[facility]    OUTPATIENT PHYSICAL THERAPY ORTHOPEDIC EVALUATION  PLAN OF TREATMENT FOR OUTPATIENT REHABILITATION  (COMPLETE FOR INITIAL CLAIMS ONLY)  Patient's Last Name, First Name, M.I.  YOB: 1950  Chyna Moncada    Provider s Name:  [facility]   Medical Record No.  [id number]   Start of Care Date:  01/04/22   Onset Date:  12/09/21   Type:     _X__PT   ___OT   ___SLP Medical Diagnosis:  Cervical radiculopathy      PT Diagnosis:  neck pain   Visits from SOC:  1      _________________________________________________________________________________  Plan of Treatment/Functional Goals:  joint mobilization,manual therapy,neuromuscular re-education,ROM,strengthening,stretching           Goals  Goal Identifier: 1  Goal Description: Patient will be able to turn head equally to both sides without pain to easily check blind spots while driving.   Target Date: 03/01/22    Goal Identifier: 2  Goal Description: Patient will be able to reach overhead without neck pain.  Target Date: 03/01/22    Goal Identifier: 3  Goal Description: Patient will be independent with HEP to aid functional recovery.   Target Date: 03/01/22                     Therapy Frequency:  1 time/week  Predicted Duration of Therapy Intervention:  8 weeks    Keerthi Mon, PT                 I CERTIFY THE NEED FOR THESE SERVICES FURNISHED UNDER        THIS PLAN OF TREATMENT AND WHILE UNDER MY CARE     (Physician co-signature of this document indicates review and certification of the therapy plan).                       Certification Date From:  01/04/22   Certification Date To:  03/01/22    Referring Provider:  Nereida Jacobs APRN CNP    Initial Assessment        See Epic Evaluation Start of Care Date: 01/04/22

## 2022-01-10 ENCOUNTER — HOSPITAL ENCOUNTER (OUTPATIENT)
Dept: PHYSICAL THERAPY | Facility: CLINIC | Age: 72
Setting detail: THERAPIES SERIES
End: 2022-01-10
Attending: NURSE PRACTITIONER
Payer: COMMERCIAL

## 2022-01-10 PROCEDURE — 97110 THERAPEUTIC EXERCISES: CPT | Mod: GP | Performed by: PHYSICAL THERAPIST

## 2022-01-10 PROCEDURE — 97140 MANUAL THERAPY 1/> REGIONS: CPT | Mod: GP | Performed by: PHYSICAL THERAPIST

## 2022-01-13 ENCOUNTER — TELEPHONE (OUTPATIENT)
Dept: FAMILY MEDICINE | Facility: CLINIC | Age: 72
End: 2022-01-13
Payer: COMMERCIAL

## 2022-01-13 DIAGNOSIS — I47.10 SVT (SUPRAVENTRICULAR TACHYCARDIA) (H): Primary | ICD-10-CM

## 2022-01-14 ENCOUNTER — TELEPHONE (OUTPATIENT)
Dept: FAMILY MEDICINE | Facility: CLINIC | Age: 72
End: 2022-01-14
Payer: COMMERCIAL

## 2022-01-14 DIAGNOSIS — I44.30 AV HEART BLOCK: ICD-10-CM

## 2022-01-14 DIAGNOSIS — R00.2 PALPITATIONS: Primary | ICD-10-CM

## 2022-01-14 NOTE — TELEPHONE ENCOUNTER
S-(situation): call placed to    B-(background): unable to get in to card services in [place]. Unwilling to go to [facility]. She has appointment scheduled in [place] on 1/18/22. Needs new referral and o monitor results faxed.    A-(assessment): she states problems with ear and has ent appointment regarding off balance and jittery. Denies chest pain pressure, arm, back pain, changes in vision, thinking.    R-(recommendations): PCP out off office. Will route to provider for consideration

## 2022-01-14 NOTE — TELEPHONE ENCOUNTER
Reason for call:    Symptom or request:     Patient called stating that she can not get into [place] cards within 5 days, asking for new referral? [facility]/ [facility]?    Patient reports balance is off and jittery like she is high on caffene asking if this is related to heart?      Best Time:  any    Can we leave a detailed message on this number?  YES     Maggie PATTERSON  Station

## 2022-01-26 NOTE — PROGRESS NOTES
Chief Complaint   Patient presents with     Ear Problem     c/o constant  high pitched noise in ears > in right ear for about 2 months does wear bilateral hearing aids- c/o both ears plugged in morning/feeling off balance  daily/audio     History of Present Illness   Chyna Moncada is a 71 year old female who presents to me today for ear evaluation.  I am seeing this patient in consultation for bilateral sensorineural hearing loss at the request of the provider Nereida Jacobs CNP.  The patient reports long history of decreased hearing in both ears worse in the left ear secondary to in utero [place] measles exposure.  She is worn hearing aids since her early 20s and has a known asymmetric hearing loss in the left ear.  The patient denies any otalgia, otorrhea, bloody otorrhea.  She does struggle with significant tinnitus worse in the right ear.  No prior history of ear surgery.  No significant vertigo.  The patient denies prior significant recreational, , or work-related noise exposure.  No family history of hearing loss at a young age.     Past Medical History  Patient Active Problem List   Diagnosis     Allergic rhinitis     Anxiety state     Cerebral artery occlusion with cerebral infarction (H)     Degeneration of cervical intervertebral disc     Degeneration of lumbar or lumbosacral intervertebral disc     Insomnia     Postmenopausal atrophic vaginitis     Raynaud's syndrome     Osteopenia     Pulmonary nodules     TGA (transient global amnesia)     Recurrent cold sores     Peripheral vascular disease (H)     Atopic rhinitis     Osteoarthritis of hip     Bilateral sensorineural hearing loss     Tendinitis of shoulder     COPD (chronic obstructive pulmonary disease) (H)     Current Medications     Current Outpatient Medications:      aspirin EC 81 MG tablet, Take 81 mg by mouth daily , Disp: , Rfl:      calcium-vitamin D (CALTRATE) 600-400 MG-UNIT per tablet, Take 1 tablet by mouth daily , Disp: , Rfl:       Cholecalciferol (VITAMIN D-3 PO), Take 1,000 Units by mouth daily, Disp: , Rfl:      fluorometholone (FML LIQUIFILM) 0.1 % ophthalmic suspension, , Disp: , Rfl:      mometasone (NASONEX) 50 MCG/ACT spray, Spray 2 sprays in nostril daily , Disp: , Rfl:      Multiple Vitamin (MULTI-VITAMINS) TABS, Take 1 tablet by mouth daily, Disp: , Rfl:      multivitamin (OCUVITE) TABS tablet, Take 1 tablet by mouth daily, Disp: , Rfl:      pilocarpine (PILOCAR) 2 % ophthalmic solution, Place 2 drops Into the left eye 3 times daily, Disp: , Rfl:      pravastatin (PRAVACHOL) 40 MG tablet, TAKE ONE TABLET BY MOUTH EVERY NIGHT AT BEDTIME, Disp: 90 tablet, Rfl: 3     valACYclovir (VALTREX) 1000 mg tablet, TAKE TWO TABLETS BY MOUTH TWICE A DAY, Disp: 4 tablet, Rfl: 11     LORazepam (ATIVAN) 0.5 MG tablet, Take 1 tablet (0.5 mg) by mouth every 6 hours as needed (Patient not taking: Reported on 2022), Disp: 10 tablet, Rfl: 0    Allergies  Allergies   Allergen Reactions     Tramadol Anaphylaxis     Benadryl [Altaryl] Other (See Comments)     Made her goofy     Caffeine Other (See Comments)     Jittery       Codeine Nausea and Vomiting     Darvocet [Propoxyphene N-Apap]      Other reaction(s): GI Upset     Diphenhydramine Other (See Comments)     Lovastatin Other (See Comments) and Muscle Pain (Myalgia)     Other reaction(s): Myalgia  pain     Percocet [Oxycodone-Acetaminophen] Other (See Comments)     Other reaction(s): Dizziness  Sick or dizzy?     Vicodin [Hydrocodone-Acetaminophen] Other (See Comments) and Nausea     Sick or dizzy?       Social History   Social History     Socioeconomic History     Marital status:      Spouse name: Not on file     Number of children: Not on file     Years of education: Not on file     Highest education level: Not on file   Occupational History     Not on file   Tobacco Use     Smoking status: Former Smoker     Types: Cigarettes     Quit date: 1/10/1983     Years since quittin.0      Smokeless tobacco: Never Used   Vaping Use     Vaping Use: Never used   Substance and Sexual Activity     Alcohol use: Not Currently     Alcohol/week: 1.0 standard drink     Types: 1 Glasses of wine per week     Comment: DAILY     Drug use: No     Sexual activity: Yes     Partners: Male   Other Topics Concern     Parent/sibling w/ CABG, MI or angioplasty before 65F 55M? No   Social History Narrative     Not on file     Social Determinants of Health     Financial Resource Strain: Not on file   Food Insecurity: Not on file   Transportation Needs: Not on file   Physical Activity: Not on file   Stress: Not on file   Social Connections: Not on file   Intimate Partner Violence: Not on file   Housing Stability: Not on file       Family History  Family History   Problem Relation Age of Onset     Arthritis Mother         was told was RA     Cancer Mother         GYN     Other - See Comments Mother         phlebitis for which hospitalized several times     Heart Disease Father      Cancer Brother         throat     Alcohol/Drug Maternal Grandfather      Heart Disease Paternal Grandfather         Heart attack     Diabetes Paternal Grandfather        Review of Systems  As per HPI and PMHx, otherwise 10+ comprehensive system review is negative.    Physical Exam  /72 (BP Location: Right arm, Patient Position: Chair, Cuff Size: Adult Regular)   Pulse 72   Temp 97.2  F (36.2  C) (Tympanic)   Wt 59 kg (130 lb)   BMI 21.47 kg/m    GENERAL: Patient is a pleasant, cooperative 71 year old female in no acute distress.  HEAD: Normocephalic, atraumatic.  Hair and scalp are normal.  EYES: Pupils are equal, round, reactive to light and accommodation.  Extraocular movements are intact.  The sclera nonicteric without injection.  The extraocular structures are normal.  EARS: Normal shape and symmetry.  No tenderness when palpating the mastoid or tragal areas bilaterally.  The ears were examined with the otic microscope.  Otoscopic  exam on the right reveals a moderate amount of cerumen.  I remove the cerumen with an alligator forceps.  The right tympanic membrane is round, intact without evidence of effusion, good landmarks.  Otoscope exam on the left reveals a clear canal.  The left tympanic membrane is round, intact without evidence of effusion, good landmarks.    NEUROLOGIC: Cranial nerves II through XII are grossly intact.  Voice is strong.  Facial nerve examination incomplete due to the patient wearing a mask.  CARDIOVASCULAR: Extremities are warm and well-perfused.  No significant peripheral edema.  RESPIRATORY: Patient has nonlabored breathing without cough, wheeze, stridor.  PSYCHIATRIC: Patient is alert and oriented.  Mood and affect appear normal.  SKIN: Warm and dry.  No scalp, face, or neck lesions noted.    Audiogram  The patient underwent an audiogram performed today.  My review of the audiogram shows mild sloping to moderate sloping to moderately severe sensorineural hearing loss in the right ear and mild sloping to moderately severe sloping to profound mixed hearing loss in the left ear.  Pure-tone average is 40 dB on the right and 58 dB on the left.  Speech reception threshold is 35 dB on the right and 50 dB on the left.  The patient had 92% word recognition on the right and 72% word recognition on the left.  The patient had a type A tympanogram on the right and a type A tympanogram on the left.      Assessment and Plan     ICD-10-CM    1. Bilateral sensorineural hearing loss  H90.3 Adult Audiology Referral     Microscopy, Binocular   2. Asymmetrical sensorineural hearing loss  H90.3 Adult Audiology Referral     Microscopy, Binocular   3. Other neurological complications of rubella  B06.09 Adult Audiology Referral     Microscopy, Binocular   4. Excessive cerumen in ear canal, right  H61.21 Adult Audiology Referral     Microscopy, Binocular   5. Tinnitus, bilateral  H93.13 Adult Audiology Referral     Microscopy, Binocular      It was my pleasure seeing Chyna Moncada today in clinic.  The patient presents today with right sensorineural hearing loss and a left mixed hearing loss with some asymmetry.  She has a past history of intrauterine exposure to [place] measles, likely the cause of her vision and hearing issues.  I can find no evidence of serious CNS disorders or other complicating factors that could be causing this.  We spent the remainder of today's visit on education. We discussed hearing protection in noisy environments.    She struggles fairly significantly with tinnitus more so on the right.  We spent the remainder of today's visit on education. We discussed steps that can be taken to mask the noise, such as a low volume de-tuned radio, a fan in the background, and/or hearing aids.  Correlation with stress, anxiety, and depression were also discussed.  The patient was also cautioned on the numerous expensive non-pharmaceutical options that are advertised, and have no proven benefit.    The patient is medically cleared for consideration of a hearing aid evaluation.    The patient will follow up as necessary for worsening symptoms or changes in symptoms. I have also recommended repeat audiogram in 1-2 years, or sooner if symptoms warrant.  She knows to contact me if she would like to undergo tinnitus evaluation with tinnitus retraining therapy and cognitive behavioral therapy at [facility].    Cabrera Fields MD  Department of Otolaryngology-Head and Neck Surgery  [facility]

## 2022-01-31 ENCOUNTER — OFFICE VISIT (OUTPATIENT)
Dept: AUDIOLOGY | Facility: CLINIC | Age: 72
End: 2022-01-31
Payer: COMMERCIAL

## 2022-01-31 ENCOUNTER — OFFICE VISIT (OUTPATIENT)
Dept: OTOLARYNGOLOGY | Facility: CLINIC | Age: 72
End: 2022-01-31
Payer: COMMERCIAL

## 2022-01-31 VITALS
SYSTOLIC BLOOD PRESSURE: 116 MMHG | TEMPERATURE: 97.2 F | BODY MASS INDEX: 21.47 KG/M2 | WEIGHT: 130 LBS | DIASTOLIC BLOOD PRESSURE: 72 MMHG | HEART RATE: 72 BPM

## 2022-01-31 DIAGNOSIS — B06.09: ICD-10-CM

## 2022-01-31 DIAGNOSIS — H90.3 BILATERAL SENSORINEURAL HEARING LOSS: Primary | ICD-10-CM

## 2022-01-31 DIAGNOSIS — H90.3 BILATERAL SENSORINEURAL HEARING LOSS: ICD-10-CM

## 2022-01-31 DIAGNOSIS — H90.3 ASYMMETRICAL SENSORINEURAL HEARING LOSS: ICD-10-CM

## 2022-01-31 DIAGNOSIS — H93.13 TINNITUS, BILATERAL: ICD-10-CM

## 2022-01-31 DIAGNOSIS — H93.13 TINNITUS OF BOTH EARS: ICD-10-CM

## 2022-01-31 DIAGNOSIS — H90.3 ASYMMETRICAL SENSORINEURAL HEARING LOSS: Primary | ICD-10-CM

## 2022-01-31 DIAGNOSIS — H61.21 EXCESSIVE CERUMEN IN EAR CANAL, RIGHT: ICD-10-CM

## 2022-01-31 PROCEDURE — 99207 PR NO CHARGE LOS: CPT | Performed by: AUDIOLOGIST

## 2022-01-31 PROCEDURE — 99203 OFFICE O/P NEW LOW 30 MIN: CPT | Mod: 25 | Performed by: OTOLARYNGOLOGY

## 2022-01-31 PROCEDURE — 92504 EAR MICROSCOPY EXAMINATION: CPT | Performed by: OTOLARYNGOLOGY

## 2022-01-31 PROCEDURE — 92557 COMPREHENSIVE HEARING TEST: CPT | Performed by: AUDIOLOGIST

## 2022-01-31 PROCEDURE — 92567 TYMPANOMETRY: CPT | Performed by: AUDIOLOGIST

## 2022-01-31 RX ORDER — PILOCARPINE HYDROCHLORIDE 20 MG/ML
2 SOLUTION/ DROPS OPHTHALMIC 3 TIMES DAILY
COMMUNITY
End: 2023-07-10

## 2022-01-31 ASSESSMENT — PAIN SCALES - GENERAL: PAINLEVEL: NO PAIN (0)

## 2022-01-31 NOTE — LETTER
1/31/2022         RE: Chyna Moncada  [address] [facility] [address]        Dear Colleague,    Thank you for referring your patient, Chyna Moncada, to the [facility]. Please see a copy of my visit note below.    Chief Complaint   Patient presents with     Ear Problem     c/o constant  high pitched noise in ears > in right ear for about 2 months does wear bilateral hearing aids- c/o both ears plugged in morning/feeling off balance  daily/audio     History of Present Illness   Chyna Moncada is a 71 year old female who presents to me today for ear evaluation.  I am seeing this patient in consultation for bilateral sensorineural hearing loss at the request of the provider Nereida Jacobs CNP.  The patient reports long history of decreased hearing in both ears worse in the left ear secondary to in utero Chinese measles exposure.  She is worn hearing aids since her early 20s and has a known asymmetric hearing loss in the left ear.  The patient denies any otalgia, otorrhea, bloody otorrhea.  She does struggle with significant tinnitus worse in the right ear.  No prior history of ear surgery.  No significant vertigo.  The patient denies prior significant recreational, , or work-related noise exposure.  No family history of hearing loss at a young age.     Past Medical History  Patient Active Problem List   Diagnosis     Allergic rhinitis     Anxiety state     Cerebral artery occlusion with cerebral infarction (H)     Degeneration of cervical intervertebral disc     Degeneration of lumbar or lumbosacral intervertebral disc     Insomnia     Postmenopausal atrophic vaginitis     Raynaud's syndrome     Osteopenia     Pulmonary nodules     TGA (transient global amnesia)     Recurrent cold sores     Peripheral vascular disease (H)     Atopic rhinitis     Osteoarthritis of hip     Bilateral sensorineural hearing loss     Tendinitis of shoulder     COPD (chronic obstructive  pulmonary disease) (H)     Current Medications     Current Outpatient Medications:      aspirin EC 81 MG tablet, Take 81 mg by mouth daily , Disp: , Rfl:      calcium-vitamin D (CALTRATE) 600-400 MG-UNIT per tablet, Take 1 tablet by mouth daily , Disp: , Rfl:      Cholecalciferol (VITAMIN D-3 PO), Take 1,000 Units by mouth daily, Disp: , Rfl:      fluorometholone (FML LIQUIFILM) 0.1 % ophthalmic suspension, , Disp: , Rfl:      mometasone (NASONEX) 50 MCG/ACT spray, Spray 2 sprays in nostril daily , Disp: , Rfl:      Multiple Vitamin (MULTI-VITAMINS) TABS, Take 1 tablet by mouth daily, Disp: , Rfl:      multivitamin (OCUVITE) TABS tablet, Take 1 tablet by mouth daily, Disp: , Rfl:      pilocarpine (PILOCAR) 2 % ophthalmic solution, Place 2 drops Into the left eye 3 times daily, Disp: , Rfl:      pravastatin (PRAVACHOL) 40 MG tablet, TAKE ONE TABLET BY MOUTH EVERY NIGHT AT BEDTIME, Disp: 90 tablet, Rfl: 3     valACYclovir (VALTREX) 1000 mg tablet, TAKE TWO TABLETS BY MOUTH TWICE A DAY, Disp: 4 tablet, Rfl: 11     LORazepam (ATIVAN) 0.5 MG tablet, Take 1 tablet (0.5 mg) by mouth every 6 hours as needed (Patient not taking: Reported on 1/31/2022), Disp: 10 tablet, Rfl: 0    Allergies  Allergies   Allergen Reactions     Tramadol Anaphylaxis     Benadryl [Altaryl] Other (See Comments)     Made her goofy     Caffeine Other (See Comments)     Jittery       Codeine Nausea and Vomiting     Darvocet [Propoxyphene N-Apap]      Other reaction(s): GI Upset     Diphenhydramine Other (See Comments)     Lovastatin Other (See Comments) and Muscle Pain (Myalgia)     Other reaction(s): Myalgia  pain     Percocet [Oxycodone-Acetaminophen] Other (See Comments)     Other reaction(s): Dizziness  Sick or dizzy?     Vicodin [Hydrocodone-Acetaminophen] Other (See Comments) and Nausea     Sick or dizzy?       Social History   Social History     Socioeconomic History     Marital status:      Spouse name: Not on file     Number of  children: Not on file     Years of education: Not on file     Highest education level: Not on file   Occupational History     Not on file   Tobacco Use     Smoking status: Former Smoker     Types: Cigarettes     Quit date: 1/10/1983     Years since quittin.0     Smokeless tobacco: Never Used   Vaping Use     Vaping Use: Never used   Substance and Sexual Activity     Alcohol use: Not Currently     Alcohol/week: 1.0 standard drink     Types: 1 Glasses of wine per week     Comment: DAILY     Drug use: No     Sexual activity: Yes     Partners: Male   Other Topics Concern     Parent/sibling w/ CABG, MI or angioplasty before 65F 55M? No   Social History Narrative     Not on file     Social Determinants of Health     Financial Resource Strain: Not on file   Food Insecurity: Not on file   Transportation Needs: Not on file   Physical Activity: Not on file   Stress: Not on file   Social Connections: Not on file   Intimate Partner Violence: Not on file   Housing Stability: Not on file       Family History  Family History   Problem Relation Age of Onset     Arthritis Mother         was told was RA     Cancer Mother         GYN     Other - See Comments Mother         phlebitis for which hospitalized several times     Heart Disease Father      Cancer Brother         throat     Alcohol/Drug Maternal Grandfather      Heart Disease Paternal Grandfather         Heart attack     Diabetes Paternal Grandfather        Review of Systems  As per HPI and PMHx, otherwise 10+ comprehensive system review is negative.    Physical Exam  /72 (BP Location: Right arm, Patient Position: Chair, Cuff Size: Adult Regular)   Pulse 72   Temp 97.2  F (36.2  C) (Tympanic)   Wt 59 kg (130 lb)   BMI 21.47 kg/m    GENERAL: Patient is a pleasant, cooperative 71 year old female in no acute distress.  HEAD: Normocephalic, atraumatic.  Hair and scalp are normal.  EYES: Pupils are equal, round, reactive to light and accommodation.  Extraocular  movements are intact.  The sclera nonicteric without injection.  The extraocular structures are normal.  EARS: Normal shape and symmetry.  No tenderness when palpating the mastoid or tragal areas bilaterally.  The ears were examined with the otic microscope.  Otoscopic exam on the right reveals a moderate amount of cerumen.  I remove the cerumen with an alligator forceps.  The right tympanic membrane is round, intact without evidence of effusion, good landmarks.  Otoscope exam on the left reveals a clear canal.  The left tympanic membrane is round, intact without evidence of effusion, good landmarks.    NEUROLOGIC: Cranial nerves II through XII are grossly intact.  Voice is strong.  Facial nerve examination incomplete due to the patient wearing a mask.  CARDIOVASCULAR: Extremities are warm and well-perfused.  No significant peripheral edema.  RESPIRATORY: Patient has nonlabored breathing without cough, wheeze, stridor.  PSYCHIATRIC: Patient is alert and oriented.  Mood and affect appear normal.  SKIN: Warm and dry.  No scalp, face, or neck lesions noted.    Audiogram  The patient underwent an audiogram performed today.  My review of the audiogram shows mild sloping to moderate sloping to moderately severe sensorineural hearing loss in the right ear and mild sloping to moderately severe sloping to profound mixed hearing loss in the left ear.  Pure-tone average is 40 dB on the right and 58 dB on the left.  Speech reception threshold is 35 dB on the right and 50 dB on the left.  The patient had 92% word recognition on the right and 72% word recognition on the left.  The patient had a type A tympanogram on the right and a type A tympanogram on the left.      Assessment and Plan     ICD-10-CM    1. Bilateral sensorineural hearing loss  H90.3 Adult Audiology Referral     Microscopy, Binocular   2. Asymmetrical sensorineural hearing loss  H90.3 Adult Audiology Referral     Microscopy, Binocular   3. Other neurological  complications of rubella  B06.09 Adult Audiology Referral     Microscopy, Binocular   4. Excessive cerumen in ear canal, right  H61.21 Adult Audiology Referral     Microscopy, Binocular   5. Tinnitus, bilateral  H93.13 Adult Audiology Referral     Microscopy, Binocular     It was my pleasure seeing Chyna Moncada today in clinic.  The patient presents today with right sensorineural hearing loss and a left mixed hearing loss with some asymmetry.  She has a past history of intrauterine exposure to [place] measles, likely the cause of her vision and hearing issues.  I can find no evidence of serious CNS disorders or other complicating factors that could be causing this.  We spent the remainder of today's visit on education. We discussed hearing protection in noisy environments.    She struggles fairly significantly with tinnitus more so on the right.  We spent the remainder of today's visit on education. We discussed steps that can be taken to mask the noise, such as a low volume de-tuned radio, a fan in the background, and/or hearing aids.  Correlation with stress, anxiety, and depression were also discussed.  The patient was also cautioned on the numerous expensive non-pharmaceutical options that are advertised, and have no proven benefit.    The patient is medically cleared for consideration of a hearing aid evaluation.    The patient will follow up as necessary for worsening symptoms or changes in symptoms. I have also recommended repeat audiogram in 1-2 years, or sooner if symptoms warrant.  She knows to contact me if she would like to undergo tinnitus evaluation with tinnitus retraining therapy and cognitive behavioral therapy at [facility].    Cabrera Fields MD  Department of Otolaryngology-Head and Neck Surgery  [facility]         Again, thank you for allowing me to participate in the care of your patient.        Sincerely,        Cabrera Fields MD

## 2022-01-31 NOTE — PROGRESS NOTES
AUDIOLOGY REPORT:    Patient was referred from ENT by Dr. Fields for audiology evaluation. The patient reports that her right ear often feels plugged and has been getting worse. She also reports constant tinnitus that varies in loudness. She is unsure if it is in one ear or the other. The patient also notes balance problems. 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 The patient reports that she has not had ear surgery.    Testing:    Otoscopy:   Otoscopic exam indicates partial obstruction with cerumen in the right ear and a clear left ear.    Tympanograms:    RIGHT: normal eardrum mobility     LEFT:   normal eardrum mobility    Reflexes (reported by stimulus ear):  Could not seal    Thresholds:   Pure Tone Thresholds assessed using conventional audiometry with good reliability from 250-8000 Hz bilaterally using insert earphones and circumaural headphones     RIGHT:  normal hearing sensitivity through 500 Hz sloping to mild to severe sensorineural hearing loss    LEFT:    mild to profound essentially sensorineural hearing loss    Speech Reception Threshold:    RIGHT: 35 dB HL    LEFT:   50 dB HL  Results are in agreement with pure tone average.     Word Recognition Score:     RIGHT: 92% at 80 dB HL using NU-6 recorded word list.    LEFT:   72% at 95 dB HL using NU-6 recorded word list.    Discussed results with the patient.     Patient was returned to ENT for follow up.     Hugh Armendariz, CCC-A  Licensed Audiologist #[id number]  1/31/2022

## 2022-01-31 NOTE — NURSING NOTE
"Initial /72 (BP Location: Right arm, Patient Position: Chair, Cuff Size: Adult Regular)   Pulse 72   Temp 97.2  F (36.2  C) (Tympanic)   Wt 59 kg (130 lb)   BMI 21.47 kg/m   Estimated body mass index is 21.47 kg/m  as calculated from the following:    Height as of 12/16/21: 1.657 m (5' 5.25\").    Weight as of this encounter: 59 kg (130 lb). .    Aileen Syed LPN    "

## 2022-02-01 ENCOUNTER — TELEPHONE (OUTPATIENT)
Dept: FAMILY MEDICINE | Facility: CLINIC | Age: 72
End: 2022-02-01
Payer: COMMERCIAL

## 2022-02-01 NOTE — TELEPHONE ENCOUNTER
Patient should follow-up with  Dr. Manzano the cardiologist who ordered the test.       Nereida Jacobs CNP

## 2022-02-01 NOTE — TELEPHONE ENCOUNTER
Reason for Call:  CT Heart Results     Detailed comments: Pt is requesting her CT Heart Results from 1/25/22.  This is in epic.   Please advise.      Phone Number Patient can be reached at: Home number on file [phone] (home)    Best Time: Any Time      Can we leave a detailed message on this number? YES    Call taken on 2/1/2022 at 10:14 AM by Stacia Avila

## 2022-02-10 ENCOUNTER — OFFICE VISIT (OUTPATIENT)
Dept: FAMILY MEDICINE | Facility: CLINIC | Age: 72
End: 2022-02-10
Payer: COMMERCIAL

## 2022-02-10 ENCOUNTER — ANCILLARY PROCEDURE (OUTPATIENT)
Dept: GENERAL RADIOLOGY | Facility: CLINIC | Age: 72
End: 2022-02-10
Attending: NURSE PRACTITIONER
Payer: COMMERCIAL

## 2022-02-10 VITALS
HEIGHT: 65 IN | DIASTOLIC BLOOD PRESSURE: 64 MMHG | WEIGHT: 132 LBS | TEMPERATURE: 97.1 F | HEART RATE: 84 BPM | BODY MASS INDEX: 21.99 KG/M2 | SYSTOLIC BLOOD PRESSURE: 128 MMHG | RESPIRATION RATE: 18 BRPM

## 2022-02-10 DIAGNOSIS — Z13.820 SCREENING FOR OSTEOPOROSIS: ICD-10-CM

## 2022-02-10 DIAGNOSIS — M25.511 ACUTE PAIN OF RIGHT SHOULDER: ICD-10-CM

## 2022-02-10 DIAGNOSIS — M81.0 AGE-RELATED OSTEOPOROSIS WITHOUT CURRENT PATHOLOGICAL FRACTURE: ICD-10-CM

## 2022-02-10 DIAGNOSIS — N95.8 OTHER SPECIFIED MENOPAUSAL AND PERIMENOPAUSAL DISORDERS: ICD-10-CM

## 2022-02-10 DIAGNOSIS — N89.8 VAGINAL DISCHARGE: ICD-10-CM

## 2022-02-10 DIAGNOSIS — I73.9 PERIPHERAL VASCULAR DISEASE (H): ICD-10-CM

## 2022-02-10 DIAGNOSIS — M81.0 AGE RELATED OSTEOPOROSIS, UNSPECIFIED PATHOLOGICAL FRACTURE PRESENCE: ICD-10-CM

## 2022-02-10 DIAGNOSIS — I47.10 SVT (SUPRAVENTRICULAR TACHYCARDIA) (H): ICD-10-CM

## 2022-02-10 DIAGNOSIS — M75.51 BURSITIS OF RIGHT SHOULDER: Primary | ICD-10-CM

## 2022-02-10 DIAGNOSIS — J44.9 CHRONIC OBSTRUCTIVE PULMONARY DISEASE, UNSPECIFIED COPD TYPE (H): ICD-10-CM

## 2022-02-10 LAB
CLUE CELLS: ABNORMAL
TRICHOMONAS, WET PREP: ABNORMAL
WBC'S/HIGH POWER FIELD, WET PREP: ABNORMAL
YEAST, WET PREP: ABNORMAL

## 2022-02-10 PROCEDURE — 87210 SMEAR WET MOUNT SALINE/INK: CPT | Performed by: NURSE PRACTITIONER

## 2022-02-10 PROCEDURE — 73030 X-RAY EXAM OF SHOULDER: CPT | Mod: RT | Performed by: RADIOLOGY

## 2022-02-10 PROCEDURE — 99214 OFFICE O/P EST MOD 30 MIN: CPT | Performed by: NURSE PRACTITIONER

## 2022-02-10 ASSESSMENT — MIFFLIN-ST. JEOR: SCORE: 1118.59

## 2022-02-10 NOTE — PATIENT INSTRUCTIONS
Please contact [phone] to schedule your diagnostic procedure     Patient Education     Shoulder Pain with Uncertain Cause   Shoulder pain can have many causes. Pain often comes from the structures that surround the shoulder joint. These are the joint capsule, ligaments, tendons, muscles, and bursa. Pain can also come from cartilage in the joint. Cartilage can become worn out or injured. It s important to know what s causing your pain so the healthcare provider can use the correct treatment. But sometimes it s difficult to find the exact cause of shoulder pain. You may need to see a specialist (orthopedist). You may also need special tests such as a CT scan or MRI. The provider may need to use special tools to look inside the joint (arthroscopy).  Shoulder pain can be treated with a sling or a device that keeps your shoulder from moving. You can take an anti-inflammatory medicine such as ibuprofen to ease pain. You may need to do special shoulder exercises. Follow up with a specialist if the pain is severe or doesn t go away after a few weeks.  Home care  Follow these tips when caring for yourself at home:    If a sling was given to you, leave it in place for the time advised by your healthcare provider. If you aren t sure how long to wear it, ask for advice. If the sling becomes loose, adjust it so that your forearm is level with the ground. Your shoulder should feel well supported.    Put an ice pack on the injured area for 20 minutes every 1 to 2 hours the first day. You can make your own ice pack by putting ice cubes in a plastic bag. Wrap the bag in a thin towel. Continue with ice packs 3 to 4 times a day for the next 2 days. Then use the pack as needed to ease pain and swelling.    You may use acetaminophen or ibuprofen to control pain, unless another pain medicine was prescribed. If you have chronic liver or kidney disease, talk with your healthcare provider before using these medicines. Also talk with  your provider if you ve ever had a stomach ulcer or digestive bleeding.    Shoulder pain may seem worse at night, when there is less to distract you from the pain. If you sleep on your side, try to keep weight off your painful shoulder. Propping pillows behind you may stop you from rolling over onto that shoulder during sleep.     Shoulder and elbow joints can become stiff if left in a sling for too long. You should start range of motion exercises about 7 to 10 days after the injury. Talk with your provider to find out what type of exercises to do and how soon to start.    You can take the sling off to shower or bathe.  Follow-up care  Follow up with your healthcare provider if you don t start to get better in the next 5 days.  When to seek medical advice  Call your healthcare provider right away if any of these occur:    Pain or swelling gets worse or continues for more than a few days    Your hand or fingers become cold, blue, numb, or tingly    Large amount of bruising on your shoulder or upper arm    Trouble moving your hand or fingers    Weakness in your hand or fingers    Your shoulder becomes stiff    It feels like your shoulder is popping out    You are less able to do your daily activities  Karlos last reviewed this educational content on 1/1/2020 2000-2021 [employer]. All rights reserved. This information is not intended as a substitute for professional medical care. Always follow your healthcare professional's instructions.           Patient Education     Bursitis    You have bursitis. This is an inflammation of the bursa. These are small, fluid-filled sacs that surround the larger joints of the body. The bursa help the muscles and tendons move smoothly over the joints.  Bursitis often happens in the shoulder. But it can also affect the elbows, hips, pelvis, knees, toes, and heels. Bursitis can be caused by injury, overuse of the joint, or infection of the bursa. Symptoms include pain  and tenderness over a joint. Symptoms get worse with movement.  Bursitis is treated with an anti-inflammatory medicine and by resting the joint. More severe cases require injection of medicine directly into the bursa. In the case of infection, surgery and antibiotics may be needed.  Home care    Rest the painful joint and protect it from movement. This will allow the inflammation to heal faster.    Apply an ice pack over the injured area for no more than 15 to 20 minutes. Do this every 3 to 6 hours for the first 24 to 48 hours. Keep using ice packs 3 to 4 times a day until the pain and swelling improves.     To make an ice pack, put ice cubes in a sealed plastic bag. Wrap the bag in a clean, thin towel or cloth. Never put ice or an ice pack directly on the skin. As the ice melts, be careful to not to get the wrap or splint wet.    You may take over-the-counter pain medicine to treat pain and inflammation, unless another medicine was prescribed. Anti-inflammatory pain medicines may be more effective. Talk with your provider before using these medicines if you have chronic liver or kidney disease, or ever had a stomach ulcer or gastrointestinal bleeding.    As your symptoms improve, slowly begin to move the joint. Don't overuse the joint. This may cause the symptoms to flare up again.  When to seek medical advice  Call your healthcare provider right away if any of these occur:    Redness or warmth over the painful area    Increasing pain or swelling at the joint    Fever of 100.4 F (38 C) or above lasting for 24 to 48 hours, or as advised    Chills  Karlos last reviewed this educational content on 5/1/2018 2000-2021 [employer]. All rights reserved. This information is not intended as a substitute for professional medical care. Always follow your healthcare professional's instructions.

## 2022-02-10 NOTE — PROGRESS NOTES
Assessment & Plan     (M75.51) Bursitis of right shoulder  (primary encounter diagnosis)  Comment: Patient symptoms also representative of a bursitis of the right shoulder.  Recommend follow-up with orthopedics to determine further intervention arm physical therapy is the best option  Plan: Orthopedic  Referral           (J44.9) Chronic obstructive pulmonary disease, unspecified COPD type (H)  Comment:  Controlled no change in treatment plan  Plan: COPD ACTION PLAN      (N89.8) Vaginal discharge  Comment: Wet prep negative  Plan: Wet prep - lab collect      (M25.511) Acute pain of right shoulder  Comment: X-ray negative  Plan: XR Shoulder Right G/E 3 Views      (Z13.820) Screening for osteoporosis  Comment: Patient due for DEXA scan  Plan: DX Hip/Pelvis/Spine      (N95.8) Other specified menopausal and perimenopausal disorders   Comment:   Plan: DX Hip/Pelvis/Spine      Return in about 1 week (around 2/17/2022) for Follow up.    GEORGE Al CNP  [facility]    Deneen Clemente is a 71 year old who presents for the following health issues     Shoulder Pain    History of Present Illness       She eats 0-1 servings of fruits and vegetables daily.She consumes 1 sweetened beverage(s) daily.She exercises with enough effort to increase her heart rate 9 or less minutes per day.  She exercises with enough effort to increase her heart rate 3 or less days per week.   She is taking medications regularly.     Shoulder Pain    Onset: one month    Description:   Location: right shoulder  Character: Sharp    Intensity: moderate    Progression of Symptoms: better    Accompanying Signs & Symptoms:  Other symptoms: none    History:   Previous similar pain: no       Precipitating factors:   Trauma or overuse: no     Alleviating factors:  Improved by: nothing    Therapies Tried and outcome: none      Vaginal Symptoms  Onset: about one month    Description:  Vaginal Discharge: dark colored  "  Itching (Pruritis): no   Burning sensation:  no   Odor: no     Accompanying Signs & Symptoms:  Pain with Urination: no   Abdominal Pain: no   Fever: no     History:   Sexually active: no   New Partner: no   Possibility of Pregnancy:  No    Precipitating factors:   Recent Antibiotic Use: no     Alleviating factors:  none    Therapies Tried and outcome: none      Review of Systems   Constitutional, HEENT, cardiovascular, pulmonary, gi and gu systems are negative, except as otherwise noted.      Objective    /64 (BP Location: Right arm, Cuff Size: Adult Regular)   Pulse 84   Temp 97.1  F (36.2  C) (Tympanic)   Resp 18   Ht 1.657 m (5' 5.25\")   Wt 59.9 kg (132 lb)   BMI 21.80 kg/m    There is no height or weight on file to calculate BMI.  Physical Exam   GENERAL: healthy, alert and no distress  EYES: Eyes grossly normal to inspection, PERRL and conjunctivae and sclerae normal  HENT: ear canals and TM's normal, nose and mouth without ulcers or lesions  NECK: no adenopathy, no asymmetry, masses, or scars and thyroid normal to palpation  RESP: lungs clear to auscultation - no rales, rhonchi or wheezes  CV: regular rate and rhythm, normal S1 S2, no S3 or S4, no murmur, click or rub, no peripheral edema and peripheral pulses strong  MS: no gross musculoskeletal defects noted, no edema  SKIN: no suspicious lesions or rashes  NEURO: Normal strength and tone, mentation intact and speech normal  PSYCH: mentation appears normal, affect normal/bright      Palpation:  Non-tender SC joint, clavicle, AC joint, acromion, subacromial space, proximal bicep tendon and upper trapezius muscle   Range of Motion        Active:all normal        Passive: all normal  Strength: rotator cuff strength full  Special tests: Negative Neer's test, Negative Chadwick, Negative Cross-Arm adduction, Negative Anterior Apprehension, Negative Posterior Apprehension, Negative Sulcus Sign, Negative Nolasco's    X-ray reviewed and interpreted by " myself in office no acute findings will await final radiology report    Results for orders placed or performed in visit on 02/10/22   XR Shoulder Right G/E 3 Views     Status: None    Narrative    XR SHOULDER RIGHT G/E 3 VIEWS 2/10/2022 7:40 AM     HISTORY: 1 month history of shoulder pain.    COMPARISON: 5/18/2016      Impression    IMPRESSION: Normal glenohumeral alignment. Mild degenerative changes  in the acromioclavicular joint. No fractures are evident. Findings are  stable.     PJ PRESTON MD         SYSTEM ID:  [id number]   Results for orders placed or performed in visit on 02/10/22   Wet prep - lab collect     Status: Abnormal    Specimen: Vagina; Swab   Result Value Ref Range    Trichomonas Absent Absent    Yeast Absent Absent    Clue Cells Absent Absent    WBCs/high power field 3+ (A) None

## 2022-02-10 NOTE — LETTER
My COPD Action Plan     Name: Chyna Moncada    YOB: 1950   Date: 2/10/2022    My doctor: GEORGE Al CNP   My clinic: [address] [address] [phone]  My Controller Medicine:    Dose:      My Rescue Medicine:    Dose:      My Flare Up Medicine:    Dose:      My COPD Severity:       Use of Oxygen: Oxygen Not Prescribed      Make sure you've had your pneumonia   vaccines.          GREEN ZONE       Doing well today      Usual level of activity and exercise    Usual amount of cough and mucus    No shortness of breath    Usual level of health (thinking clearly, sleeping well, feel like eating) Actions:      Take daily medicines    Use oxygen as prescribed    Follow regular exercise and diet plan    Avoid cigarette smoke and other irritants that harm the lungs           YELLOW ZONE          Having a bad day or flare up      Short of breath more than usual    A lot more sputum (mucus) than usual    Sputum looks yellow, green, tan, brown or bloody    More coughing or wheezing    Fever or chills    Less energy; trouble completing activities    Trouble thinking or focusing    Using quick relief inhaler or nebulizer more often    Poor sleep; symptoms wake me up    Do not feel like eating Actions:      Get plenty of rest    Take daily medicines    Use quick relief inhaler every 4 hours    If you use oxygen, call you doctor to see if you should adjust your oxygen    Do breathing exercises or other things to help you relax    Let a loved one, friend or neighbor know you are feeling worse    Call your care team if you have 2 or more symptoms.  Start taking steroids or antibiotics if directed by your care team           RED ZONE       Need medical care now      Severe shortness of breath (feel you can't breathe)    Fever, chills    Not enough breath to do any activity    Trouble coughing up mucus, walking or talking    Blood in  mucus    Frequent coughing   Rescue medicines are not working    Not able to sleep because of breathing    Feel confused or drowsy    Chest pain    Actions:      Call your health care team.  If you cannot reach your care team, call 911 or go to the emergency room.        Annual Reminders:  Meet with Care Team, Flu Shot every Fall  Pharmacy:    [id number] - [place], [place] - [address]  [facility], [place] - [address] [place] [address]

## 2022-03-08 ENCOUNTER — HOSPITAL ENCOUNTER (OUTPATIENT)
Dept: BONE DENSITY | Facility: CLINIC | Age: 72
Discharge: HOME | End: 2022-03-08
Attending: NURSE PRACTITIONER | Admitting: NURSE PRACTITIONER
Payer: COMMERCIAL

## 2022-03-08 DIAGNOSIS — Z13.820 SCREENING FOR OSTEOPOROSIS: ICD-10-CM

## 2022-03-08 DIAGNOSIS — N95.8 OTHER SPECIFIED MENOPAUSAL AND PERIMENOPAUSAL DISORDERS: ICD-10-CM

## 2022-03-08 PROCEDURE — 77080 DXA BONE DENSITY AXIAL: CPT

## 2022-03-11 ENCOUNTER — TELEPHONE (OUTPATIENT)
Dept: FAMILY MEDICINE | Facility: CLINIC | Age: 72
End: 2022-03-11
Payer: COMMERCIAL

## 2022-03-11 PROBLEM — M81.0 AGE-RELATED OSTEOPOROSIS WITHOUT CURRENT PATHOLOGICAL FRACTURE: Status: ACTIVE | Noted: 2022-03-11

## 2022-03-11 PROBLEM — I47.10 SVT (SUPRAVENTRICULAR TACHYCARDIA) (H): Status: ACTIVE | Noted: 2022-03-11

## 2022-03-11 RX ORDER — ALENDRONATE SODIUM 70 MG/1
70 TABLET ORAL
Qty: 12 TABLET | Refills: 3 | Status: SHIPPED | OUTPATIENT
Start: 2022-03-11 | End: 2022-12-19

## 2022-03-11 NOTE — TELEPHONE ENCOUNTER
Reason for Call:  Osteoporosis Dexa Scan     Detailed comments: Pt is asking what the scoring is on her Dexa Scan. Please Advise     Phone Number Patient can be reached at: Cell number on file:    Telephone Information:   Mobile [phone]       Best Time: Any Time      Can we leave a detailed message on this number? YES    Call taken on 3/11/2022 at 11:34 AM by Stacia Avila

## 2022-03-31 ENCOUNTER — OFFICE VISIT (OUTPATIENT)
Dept: FAMILY MEDICINE | Facility: CLINIC | Age: 72
End: 2022-03-31
Payer: COMMERCIAL

## 2022-03-31 VITALS
BODY MASS INDEX: 21.99 KG/M2 | WEIGHT: 132 LBS | RESPIRATION RATE: 16 BRPM | SYSTOLIC BLOOD PRESSURE: 110 MMHG | HEIGHT: 65 IN | TEMPERATURE: 97.2 F | HEART RATE: 84 BPM | DIASTOLIC BLOOD PRESSURE: 70 MMHG

## 2022-03-31 DIAGNOSIS — S76.012A STRAIN OF GLUTEUS MEDIUS OF LEFT LOWER EXTREMITY, INITIAL ENCOUNTER: Primary | ICD-10-CM

## 2022-03-31 PROCEDURE — 99213 OFFICE O/P EST LOW 20 MIN: CPT | Performed by: NURSE PRACTITIONER

## 2022-03-31 RX ORDER — CYCLOBENZAPRINE HCL 5 MG
5 TABLET ORAL 2 TIMES DAILY PRN
Qty: 30 TABLET | Refills: 0 | Status: SHIPPED | OUTPATIENT
Start: 2022-03-31 | End: 2022-04-11

## 2022-03-31 ASSESSMENT — PAIN SCALES - GENERAL: PAINLEVEL: EXTREME PAIN (8)

## 2022-03-31 NOTE — PROGRESS NOTES
"  Assessment & Plan     (S76.012A) Strain of gluteus medius of left lower extremity, initial encounter  (primary encounter diagnosis)  Comment: Symptoms also representative of a strain of the muscle will have patient attempt low-dose muscle relaxers and physical therapy  Plan: cyclobenzaprine (FLEXERIL) 5 MG tablet,         Physical Therapy Referral           No follow-ups on file.    GEORGE Al CNP  [facility]    Deneen Clemente is a 71 year old who presents for the following health issues     HPI     Leg Pain    Onset: over one month    Description:   Location: left buttock and leg  Character: Dull ache    Intensity: 8/10    Progression of Symptoms: worse    Accompanying Signs & Symptoms:  Other symptoms: none    History:   Previous similar pain: no       Precipitating factors:   Trauma or overuse: no     Alleviating factors:  Improved by: nothing    Therapies Tried and outcome: tylenol          Review of Systems   Constitutional, HEENT, cardiovascular, pulmonary, gi and gu systems are negative, except as otherwise noted.      Objective    /70 (BP Location: Right arm, Cuff Size: Adult Regular)   Pulse 84   Temp 97.2  F (36.2  C) (Tympanic)   Resp 16   Ht 1.657 m (5' 5.25\")   Wt 59.9 kg (132 lb)   BMI 21.80 kg/m    There is no height or weight on file to calculate BMI.  Physical Exam   GENERAL: healthy, alert and no distress  EYES: Eyes grossly normal to inspection, PERRL and conjunctivae and sclerae normal  HENT: ear canals and TM's normal, nose and mouth without ulcers or lesions  NECK: no adenopathy, no asymmetry, masses, or scars and thyroid normal to palpation  RESP: lungs clear to auscultation - no rales, rhonchi or wheezes  CV: regular rate and rhythm, normal S1 S2, no S3 or S4, no murmur, click or rub, no peripheral edema and peripheral pulses strong  ABDOMEN: soft, nontender, no hepatosplenomegaly, no masses and bowel sounds normal  MS: no gross " musculoskeletal defects noted, no edema.  Tenderness to left gluteus muscle  SKIN: no suspicious lesions or rashes  NEURO: Normal strength and tone, mentation intact and speech normal  PSYCH: mentation appears normal, affect normal/bright    No results found for any visits on 03/31/22.

## 2022-04-04 ENCOUNTER — TRANSFERRED RECORDS (OUTPATIENT)
Dept: HEALTH INFORMATION MANAGEMENT | Facility: CLINIC | Age: 72
End: 2022-04-04
Payer: COMMERCIAL

## 2022-04-05 ENCOUNTER — HOSPITAL ENCOUNTER (OUTPATIENT)
Dept: PHYSICAL THERAPY | Facility: CLINIC | Age: 72
Setting detail: THERAPIES SERIES
Discharge: HOME | End: 2022-04-05
Attending: NURSE PRACTITIONER
Payer: COMMERCIAL

## 2022-04-05 PROCEDURE — 97161 PT EVAL LOW COMPLEX 20 MIN: CPT | Mod: GP | Performed by: PHYSICAL THERAPIST

## 2022-04-05 PROCEDURE — 97110 THERAPEUTIC EXERCISES: CPT | Mod: GP | Performed by: PHYSICAL THERAPIST

## 2022-04-05 PROCEDURE — 97140 MANUAL THERAPY 1/> REGIONS: CPT | Mod: GP | Performed by: PHYSICAL THERAPIST

## 2022-04-05 NOTE — PROGRESS NOTES
[facility]    OUTPATIENT PHYSICAL THERAPY ORTHOPEDIC EVALUATION  PLAN OF TREATMENT FOR OUTPATIENT REHABILITATION  (COMPLETE FOR INITIAL CLAIMS ONLY)  Patient's Last Name, First Name, M.I.  YOB: 1950  Chyna Moncada    Provider s Name:  [facility]   Medical Record No.  [id number]   Start of Care Date:  04/05/22   Onset Date:  03/05/22   Type:     _X__PT   ___OT   ___SLP Medical Diagnosis:  Strain of gluteus medius of left lower extremity, initial encounter S76.012A  - Primary     PT Diagnosis:  L piriformis pain   Visits from SOC:  1      _________________________________________________________________________________  Plan of Treatment/Functional Goals:  joint mobilization, manual therapy, neuromuscular re-education, ROM, strengthening, stretching, balance training, gait training     Cryotherapy, Electrical stimulation, Hot packs, TENS, Traction, Ultrasound     Goals  Goal Identifier: walking  Goal Description: Pt will be able to walk 30 min w less than 2/10 hip pain  Target Date: 04/26/22    Goal Identifier: stairs  Goal Description: Pt will demonstate 4/5 hip strength to be able to ascend/descend steps w/o pain  Target Date: 04/26/22    Goal Identifier: bend over to touch floor  Goal Description: Pt will demonstrate full lumbar flex w/o pain to  object off floor  Target Date: 05/17/22    Goal Identifier: sleeping  Goal Description: Pt will relate waking up less than 1x/night due to pain in L hip  Target Date: 05/17/22            Therapy Frequency:  1 time/week  Predicted Duration of Therapy Intervention:  6 weeks    Azul Ortega, PT                 I CERTIFY THE NEED FOR THESE SERVICES FURNISHED UNDER        THIS PLAN OF TREATMENT AND WHILE UNDER MY CARE     (Physician co-signature of this document indicates review and certification of the therapy  plan).                       Certification Date From:  04/05/22   Certification Date To:  05/17/22    Referring Provider:  Nereida Jacobs APRN CNP     Initial Assessment        See Epic Evaluation Start of Care Date: 04/05/22 04/05/22 0700   General Information   Type of Visit Initial OP Ortho PT Evaluation   Start of Care Date 04/05/22   Referring Physician Nereida Jacobs APRN CNP    Patient/Family Goals Statement reduce pain   Orders Evaluate and Treat   Date of Order 03/31/22   Certification Required? Yes   Medical Diagnosis Strain of gluteus medius of left lower extremity, initial encounter S76.012A  - Primary   Surgical/Medical history reviewed Yes   Precautions/Limitations no known precautions/limitations   General Information Comments PMH: broken R foot, meopause, OA, osteoprorosis, stroke,    Body Part(s)   Body Part(s) Lumbar Spine/SI;Hip   Presentation and Etiology   Pertinent history of current problem (include personal factors and/or comorbidities that impact the POC) Pt relates insideous onset L glute pain with increaseing towards end of the day.  Pt relates pain is constand but will go down to knee by the end of the day. Pt has a hx of back pain   Impairments A. Pain;B. Decreased WB tolerance;D. Decreased ROM   Functional Limitations perform activities of daily living;perform desired leisure / sports activities   Symptom Location L leg   How/Where did it occur From insidious onset   Onset date of current episode/exacerbation 03/05/22   Chronicity New   Pain rating (0-10 point scale) Best (/10);Worst (/10)   Best (/10) 0   Worst (/10) 8   Pain quality C. Aching;B. Dull;F. Stabbing;E. Shooting   Frequency of pain/symptoms A. Constant   Pain/symptoms exacerbated by A. Sitting;B. Walking;E. Rest;F. Nothing;K. Home tasks   Pain/symptoms eased by D. Nothing   Progression of symptoms since onset: Worsened   Current Level of Function   Current Community Support Family/friend caregiver    Patient role/employment history F. Retired   Living environment [facility]   Fall Risk Screen   Fall screen completed by PT   Have you fallen 2 or more times in the past year? No   Have you fallen and had an injury in the past year? No   Is patient a fall risk? No   Abuse Screen (yes response referral indicated)   Feels Unsafe at Home or Work/School no   Feels Threatened by Someone no   Does Anyone Try to Keep You From Having Contact with Others or Doing Things Outside Your Home? no   Physical Signs of Abuse Present no   Functional Scales   Other Scales  LEFS:39/80   Hip Objective Findings   Side (if bilateral, select both right and left) Left   Left Hip Flexion PROM 100   Left Hip Abduction PROM 40   Left Hip Adduction PROM 20   Left Hip ER PROM 60   Left Hip IR PROM 20   Left Hip Abduction Strength 3/5   Left Hip IR Strength 3/5   Left Hip ER Strength 3/5   Lumbar Spine/SI Objective Findings   Observation squat: fair WNL    Gait/Locomotion trend[place] noted   Flexion ROM to floor w pain    Extension % w/o pain    Pelvic Screen L posterior innnomiante rotation    Hip Flexion (L2) Strength 5/5 - pain on L   Knee Flexion Strength R: 5/5, L: 4/5 w pain   Knee Extension (L3) Strength 5/5   Ankle Dorsiflexion (L4) Strength R: 5/5 L: 4/5   Ankle Plantar Flexion (S1) Strength 5/5    Slump Test R: -, L: + for pulling no pain    Planned Therapy Interventions   Planned Therapy Interventions joint mobilization;manual therapy;neuromuscular re-education;ROM;strengthening;stretching;balance training;gait training   Planned Modality Interventions   Planned Modality Interventions Cryotherapy;Electrical stimulation;Hot packs;TENS;Traction;Ultrasound   Clinical Impression   Criteria for Skilled Therapeutic Interventions Met yes, treatment indicated   PT Diagnosis L piriformis pain   Influenced by the following impairments pain, decreased ROM    Functional limitations due to impairments walking, sitting, rest   Clinical  Presentation Stable/Uncomplicated   Clinical Presentation Rationale Pt is 71 yr old female who presents w pain in L piriformis w occasional radiucalr symptoms. Pt is motivated to get better but has a hx of LBP.   Clinical Decision Making (Complexity) Low complexity   Therapy Frequency 1 time/week   Predicted Duration of Therapy Intervention (days/wks) 6 weeks   Risk & Benefits of therapy have been explained Yes   Patient, Family & other staff in agreement with plan of care Yes   Education Assessment   Preferred Learning Style Listening;Demonstration;Pictures/video   Barriers to Learning No barriers   ORTHO GOALS   PT Ortho Eval Goals 1;2;3;4   Ortho Goal 1   Goal Identifier walking   Goal Description Pt will be able to walk 30 min w less than 2/10 hip pain   Target Date 04/26/22   Ortho Goal 2   Goal Identifier stairs   Goal Description Pt will demonstate 4/5 hip strength to be able to ascend/descend steps w/o pain   Target Date 04/26/22   Ortho Goal 3   Goal Identifier bend over to touch floor   Goal Description Pt will demonstrate full lumbar flex w/o pain to  object off floor   Target Date 05/17/22   Ortho Goal 4   Goal Identifier sleeping   Goal Description Pt will relate waking up less than 1x/night due to pain in L hip   Target Date 05/17/22   Total Evaluation Time   PT Eval, Low Complexity Minutes (71530) 20   Therapy Certification   Certification date from 04/05/22   Certification date to 05/17/22   Medical Diagnosis Strain of gluteus medius of left lower extremity, initial encounter S76.012A  - Primary

## 2022-04-11 ENCOUNTER — OFFICE VISIT (OUTPATIENT)
Dept: PODIATRY | Facility: CLINIC | Age: 72
End: 2022-04-11
Payer: COMMERCIAL

## 2022-04-11 VITALS
DIASTOLIC BLOOD PRESSURE: 87 MMHG | BODY MASS INDEX: 21.99 KG/M2 | WEIGHT: 132 LBS | SYSTOLIC BLOOD PRESSURE: 130 MMHG | HEART RATE: 86 BPM | OXYGEN SATURATION: 98 % | HEIGHT: 65 IN

## 2022-04-11 DIAGNOSIS — L90.9 PLANTAR FAT PAD ATROPHY: Primary | ICD-10-CM

## 2022-04-11 PROCEDURE — 99213 OFFICE O/P EST LOW 20 MIN: CPT | Performed by: PODIATRIST

## 2022-04-11 RX ORDER — CYCLOSPORINE 0.5 MG/ML
EMULSION OPHTHALMIC
COMMUNITY
Start: 2022-03-30

## 2022-04-11 RX ORDER — INFLUENZA A VIRUS A/MICHIGAN/45/2015 X-275 (H1N1) ANTIGEN (FORMALDEHYDE INACTIVATED), INFLUENZA A VIRUS A/SINGAPORE/INFIMH-16-0019/2016 IVR-186 (H3N2) ANTIGEN (FORMALDEHYDE INACTIVATED), INFLUENZA B VIRUS B/PHUKET/3073/2013 ANTIGEN (FORMALDEHYDE INACTIVATED), AND INFLUENZA B VIRUS B/MARYLAND/15/2016 BX-69A ANTIGEN (FORMALDEHYDE INACTIVATED) 60; 60; 60; 60 UG/.7ML; UG/.7ML; UG/.7ML; UG/.7ML
INJECTION, SUSPENSION INTRAMUSCULAR
COMMUNITY
Start: 2021-10-25 | End: 2022-12-19

## 2022-04-11 RX ORDER — ZOSTER VACCINE RECOMBINANT, ADJUVANTED 50 MCG/0.5
KIT INTRAMUSCULAR
COMMUNITY
Start: 2021-05-03 | End: 2022-12-19

## 2022-04-11 RX ORDER — TOBRAMYCIN AND DEXAMETHASONE 3; 1 MG/ML; MG/ML
SUSPENSION/ DROPS OPHTHALMIC
COMMUNITY
Start: 2021-10-13 | End: 2022-12-19

## 2022-04-11 NOTE — LETTER
"    4/11/2022         RE: Chyna Moncada  [address] [facility] [address]        Dear Colleague,    Thank you for referring your patient, Chyna Moncada, to the [facility] [place]. Please see a copy of my visit note below.    Chyna returns to the office for reevaluation of the left foot.  The patient relates following the instructions given at the last visit with noted overall more pain and less improvement in function of the left foot.   The patient relates no other problems.    Vitals: /87   Pulse 86   Ht 1.657 m (5' 5.25\")   Wt 59.9 kg (132 lb)   SpO2 98%   BMI 21.80 kg/m    BMI= Body mass index is 21.8 kg/m .    Lower Extremity Physical Exam:      Neurovascular status remains unchanged.  Muscular exam is within normal limits to major muscle groups.  Integument is intact.      Noted no pain on palpation under the fifth metatarsal head on the left foot.  No surrounding erythema edema noted.  None palpable fifth metatarsal head with loss of fat pad.         Assessment:     ICD-10-CM    1. Plantar fat pad atrophy  L90.9        Plan:    I have explained to Chyna about the progress of the conditions.  At this time, the patient was advised to wear silicone shoe inserts to better offload the pressure under the fifth metatarsal head on the left foot.  The patient may return to the office if problems persist.    Chyna verbalized agreement with and understanding of the rational for the diagnosis and treatment plan.  All questions were answered to best of my ability and the patient's satisfaction. The patient was advised to contact the clinic with any questions that may arise after the clinic visit.      Disclaimer: This note consists of symbols derived from keyboarding, dictation and/or voice recognition software. As a result, there may be errors in the script that have gone undetected. Please consider this when interpreting information found in this chart.       J. " Kofi Pace D.P.M., F.A.C.F.A.S.        Again, thank you for allowing me to participate in the care of your patient.        Sincerely,        Nato Pace DPM

## 2022-04-11 NOTE — PATIENT INSTRUCTIONS
Calluses of the Foot    I.  Calluses on the toes   A.  Tips of the toes    1.  Due to pressure on the tips of the toes when wearing shoes, sandals or barefoot.    2.  Related to hammertoe deformity of the toes.    3.  Treated with wearing soft cushioned toe caps or shoe liners to better offload the pressure to the tips of the toes    4.  Correcting the deformity of the toe is another option if cushions do not help   B.  Top of sides of the knuckles of the toes    1.  Due to pressure from adjacent toes or shoes on the knuckles of the toes.    2.  Can be related to hammertoe deformities    3.  Treated with wearing roomy shoes with soft top-cover material in order not to rub on the skin    4.  Silicone toe caps can also be used to protect rubbing from the knuckles of the adjacent toes.    5.  Correcting the deformity of the toe is another option if offloading measures do not work.  II. Calluses on the bottom of the foot   A.   Pressure points    1.  Caused by direct pressure overlying prominent bones such as metatarsal head on the balls of the foot.    2.  Can be related to either hammertoe deformities or fat pad atrophy    3.  Can be treated with additional cushion utilizing silicone shoe liners to better offload the pressure and supplement for fat pad atrophy.    4.  Surgical correction of hammertoe deformities is another option if offloading cushion treatment does not work.   B.   Shear forces    1.  Caused by sliding forces along the skin on the bottom of the forefoot including the great toe.    2.  This is not due to a rotational force as the foot pushes off against the ground.    3.  Treated with wearing a silicone shoe liner that can move with the skin reducing the amount of shear force causing the callus formation.   C.   IPK lesions or porokeratosis    1.  These are small hard callus lesions that are related to plugged sweat ducts.    2.  These ducts travel through the epidermis and dermis residing in the  subcutaneous tissue    3.  When the ducts get clogged, they can harden up resulting in callused skin around them.      4.  Treatment includes sharp excavation of the hyperkeratotic callus tissue core as far as can be tolerated, preferably without bleeding.    5.   Other treatment options   A.  Application of salicylic acid to soften the hyperkeratotic skin   B.  Cantharone which causes a blister in attempt to pull off the hyperkeratotic skin lesion.    Helpful products:    Soles  Silicone Shoe Inserts    Amazon: https://www.[employer]/Soles-Silicone-Orthopedic-Comfortable-Hypoallergenic/dp/R12KFNB1ZB/ref=sr_1_2?dchild=1&keywords=silicone+sole&zce=5238992245&sr=8-2    Dr. Valdez's Gels Toe Caps      Sold at [facility] in [facility]    Amazon: https://www.[employer]/Byron-All-Mini-Size/dp/H68NIWHU79/ref=sr_1_2?crid=X4CMABVXLTRG&dchild=1&keywords=+nav+all+gel+toe+cap&yao=[id number]&sprefix=+Courtney%27s+gels+toe%2Caps%2C171&sr=8-2

## 2022-04-11 NOTE — NURSING NOTE
"Chief Complaint   Patient presents with     Left Foot - Foot Pain       Initial /87   Pulse 86   Ht 1.657 m (5' 5.25\")   Wt 59.9 kg (132 lb)   SpO2 98%   BMI 21.80 kg/m   Estimated body mass index is 21.8 kg/m  as calculated from the following:    Height as of this encounter: 1.657 m (5' 5.25\").    Weight as of this encounter: 59.9 kg (132 lb).  Medications and allergies reviewed.      Jodi KIRBY MA'  "

## 2022-04-11 NOTE — PROGRESS NOTES
"Chyna returns to the office for reevaluation of the left foot.  The patient relates following the instructions given at the last visit with noted overall more pain and less improvement in function of the left foot.   The patient relates no other problems.    Vitals: /87   Pulse 86   Ht 1.657 m (5' 5.25\")   Wt 59.9 kg (132 lb)   SpO2 98%   BMI 21.80 kg/m    BMI= Body mass index is 21.8 kg/m .    Lower Extremity Physical Exam:      Neurovascular status remains unchanged.  Muscular exam is within normal limits to major muscle groups.  Integument is intact.      Noted no pain on palpation under the fifth metatarsal head on the left foot.  No surrounding erythema edema noted.  None palpable fifth metatarsal head with loss of fat pad.         Assessment:     ICD-10-CM    1. Plantar fat pad atrophy  L90.9        Plan:    I have explained to Chyna about the progress of the conditions.  At this time, the patient was advised to wear silicone shoe inserts to better offload the pressure under the fifth metatarsal head on the left foot.  The patient may return to the office if problems persist.    Chyna verbalized agreement with and understanding of the rational for the diagnosis and treatment plan.  All questions were answered to best of my ability and the patient's satisfaction. The patient was advised to contact the clinic with any questions that may arise after the clinic visit.      Disclaimer: This note consists of symbols derived from keyboarding, dictation and/or voice recognition software. As a result, there may be errors in the script that have gone undetected. Please consider this when interpreting information found in this chart.       GANGA Pace D.P.M., F.A.C.F.A.S.    "

## 2022-04-13 ENCOUNTER — HOSPITAL ENCOUNTER (OUTPATIENT)
Dept: PHYSICAL THERAPY | Facility: CLINIC | Age: 72
Setting detail: THERAPIES SERIES
Discharge: HOME | End: 2022-04-13
Attending: NURSE PRACTITIONER
Payer: COMMERCIAL

## 2022-04-13 PROCEDURE — 97140 MANUAL THERAPY 1/> REGIONS: CPT | Mod: GP | Performed by: PHYSICAL THERAPIST

## 2022-04-13 PROCEDURE — 97110 THERAPEUTIC EXERCISES: CPT | Mod: GP | Performed by: PHYSICAL THERAPIST

## 2022-04-21 ENCOUNTER — HOSPITAL ENCOUNTER (OUTPATIENT)
Dept: PHYSICAL THERAPY | Facility: CLINIC | Age: 72
Setting detail: THERAPIES SERIES
Discharge: HOME | End: 2022-04-21
Attending: NURSE PRACTITIONER
Payer: COMMERCIAL

## 2022-04-21 PROCEDURE — 97110 THERAPEUTIC EXERCISES: CPT | Mod: GP | Performed by: PHYSICAL THERAPIST

## 2022-05-04 ENCOUNTER — HOSPITAL ENCOUNTER (OUTPATIENT)
Dept: PHYSICAL THERAPY | Facility: CLINIC | Age: 72
Setting detail: THERAPIES SERIES
Discharge: HOME | End: 2022-05-04
Attending: NURSE PRACTITIONER
Payer: COMMERCIAL

## 2022-05-04 PROCEDURE — 97140 MANUAL THERAPY 1/> REGIONS: CPT | Mod: GP | Performed by: PHYSICAL THERAPIST

## 2022-05-04 PROCEDURE — 97110 THERAPEUTIC EXERCISES: CPT | Mod: GP | Performed by: PHYSICAL THERAPIST

## 2022-05-10 ENCOUNTER — HOSPITAL ENCOUNTER (OUTPATIENT)
Dept: PHYSICAL THERAPY | Facility: CLINIC | Age: 72
Setting detail: THERAPIES SERIES
Discharge: HOME | End: 2022-05-10
Attending: NURSE PRACTITIONER
Payer: COMMERCIAL

## 2022-05-10 PROCEDURE — 97140 MANUAL THERAPY 1/> REGIONS: CPT | Mod: GP | Performed by: PHYSICAL THERAPIST

## 2022-05-10 PROCEDURE — 97110 THERAPEUTIC EXERCISES: CPT | Mod: GP | Performed by: PHYSICAL THERAPIST

## 2022-05-12 ENCOUNTER — ANCILLARY PROCEDURE (OUTPATIENT)
Dept: GENERAL RADIOLOGY | Facility: CLINIC | Age: 72
End: 2022-05-12
Attending: NURSE PRACTITIONER
Payer: COMMERCIAL

## 2022-05-12 ENCOUNTER — OFFICE VISIT (OUTPATIENT)
Dept: FAMILY MEDICINE | Facility: CLINIC | Age: 72
End: 2022-05-12
Payer: COMMERCIAL

## 2022-05-12 VITALS
RESPIRATION RATE: 18 BRPM | TEMPERATURE: 97.6 F | SYSTOLIC BLOOD PRESSURE: 120 MMHG | BODY MASS INDEX: 21.47 KG/M2 | DIASTOLIC BLOOD PRESSURE: 70 MMHG | HEART RATE: 94 BPM | OXYGEN SATURATION: 100 % | WEIGHT: 133.6 LBS | HEIGHT: 66 IN

## 2022-05-12 DIAGNOSIS — I83.93 ASYMPTOMATIC SUPERFICIAL VARICOSE VEIN OF BOTH LOWER EXTREMITIES: ICD-10-CM

## 2022-05-12 DIAGNOSIS — M70.62 TROCHANTERIC BURSITIS OF LEFT HIP: ICD-10-CM

## 2022-05-12 DIAGNOSIS — I47.10 SVT (SUPRAVENTRICULAR TACHYCARDIA) (H): ICD-10-CM

## 2022-05-12 DIAGNOSIS — M70.62 TROCHANTERIC BURSITIS OF LEFT HIP: Primary | ICD-10-CM

## 2022-05-12 DIAGNOSIS — I73.9 PERIPHERAL VASCULAR DISEASE (H): ICD-10-CM

## 2022-05-12 PROCEDURE — 99214 OFFICE O/P EST MOD 30 MIN: CPT | Performed by: NURSE PRACTITIONER

## 2022-05-12 PROCEDURE — 73502 X-RAY EXAM HIP UNI 2-3 VIEWS: CPT | Mod: TC | Performed by: RADIOLOGY

## 2022-05-12 ASSESSMENT — PAIN SCALES - GENERAL: PAINLEVEL: NO PAIN (0)

## 2022-05-12 NOTE — NURSING NOTE
"No chief complaint on file.      Initial /70   Pulse 94   Temp 97.6  F (36.4  C) (Tympanic)   Resp 18   Ht 1.664 m (5' 5.5\")   Wt 60.6 kg (133 lb 9.6 oz)   SpO2 100%   BMI 21.89 kg/m   Estimated body mass index is 21.89 kg/m  as calculated from the following:    Height as of this encounter: 1.664 m (5' 5.5\").    Weight as of this encounter: 60.6 kg (133 lb 9.6 oz).    Patient presents to the clinic using     Health Maintenance that is potentially due pending provider review:          Is there anyone who you would like to be able to receive your results?   If yes have patient fill out KRISTY      "

## 2022-05-12 NOTE — PROGRESS NOTES
"  Assessment & Plan     (M70.62) Trochanteric bursitis of left hip  (primary encounter diagnosis)  Comment: Pain to lower legs and hip questionable back versus hip pain are not resolving with physical therapy concerning and may be more of a bursitis of the hip x-ray was negative will have patient follow-up with orthopedics  Plan: XR Pelvis and Hip Left 1 View, Orthopedic          Referral      (I83.93) Asymptomatic superficial varicose vein of both lower extremities  Comment: Patient also has varicose veins which she would like to have looked at by her prior vascular clinic referral was placed  Plan: Vascular Medicine Referral              No follow-ups on file.    Nereida Jacobs, GEORGE CNP  [facility]    Deneen Clemente is a 71 year old who presents for the following health issues     HPI     Concern - Left leg problem  Onset: years of left leg pain  Description:  ? Varicose veins   Intensity: mild  Progression of Symptoms:  worsening  Accompanying Signs & Symptoms: pain  Previous history of similar problem: yes  Precipitating factors:        Worsened by: ???  Alleviating factors:        Improved by:   Therapies tried and outcome: tried compression stockings and was helpfull        Review of Systems   Constitutional, HEENT, cardiovascular, pulmonary, gi and gu systems are negative, except as otherwise noted.      Objective    /70   Pulse 94   Temp 97.6  F (36.4  C) (Tympanic)   Resp 18   Ht 1.664 m (5' 5.5\")   Wt 60.6 kg (133 lb 9.6 oz)   SpO2 100%   BMI 21.89 kg/m    Body mass index is 21.89 kg/m .  Physical Exam   GENERAL: healthy, alert and no distress  EYES: Eyes grossly normal to inspection, PERRL and conjunctivae and sclerae normal  RESP: lungs clear to auscultation - no rales, rhonchi or wheezes  CV: regular rate and rhythm, normal S1 S2, no S3 or S4, no murmur, click or rub, no peripheral edema and peripheral pulses strong  MS: no gross musculoskeletal " defects noted, no edema  MS: Tenderness to the left trochanter area that extends to the leg full range of motion  SKIN: no suspicious lesions or rashes  NEURO: Normal strength and tone, mentation intact and speech normal  PSYCH: mentation appears normal, affect normal/bright    X-ray reviewed and interpreted by myself in office no acute findings will await final radiology report    Results for orders placed or performed in visit on 05/12/22   XR Pelvis and Hip Left 1 View     Status: None    Narrative    EXAM: PELVIS AND HIP LEFT ONE VIEW  DATE/TIME: 5/12/2022 7:58 AM     INDICATION: Left-sided hip pain.   COMPARISON: None.      Impression    IMPRESSION:  1.  Mild left hip degenerative arthrosis.  2.  Mild right hip degenerative arthrosis.  3.  Degenerative changes in the lower lumbar spine.  4.  No fracture.       [facility] ALARCON MD         SYSTEM ID:  [id number]

## 2022-05-31 ENCOUNTER — TRANSFERRED RECORDS (OUTPATIENT)
Dept: HEALTH INFORMATION MANAGEMENT | Facility: CLINIC | Age: 72
End: 2022-05-31
Payer: COMMERCIAL

## 2022-07-11 ENCOUNTER — TRANSFERRED RECORDS (OUTPATIENT)
Dept: HEALTH INFORMATION MANAGEMENT | Facility: CLINIC | Age: 72
End: 2022-07-11

## 2022-07-18 ENCOUNTER — TRANSFERRED RECORDS (OUTPATIENT)
Dept: FAMILY MEDICINE | Facility: CLINIC | Age: 72
End: 2022-07-18

## 2022-09-12 NOTE — PROGRESS NOTES
[facility]    Outpatient Physical Therapy Discharge Note  Patient: Chyna Moncada  : 1950    Beginning/End Dates of Reporting Period:  22 to 5/10/22    Referring Provider: Nereida Wheeler Diagnosis: L piriformis pain     Client Self Report: Pt relates last few days are bad. Not sure why, maybe new exercsises.    Objective Measurements:  Objective Measure: lumbar ROM  Details: flex: to floor w pain ext: 100%            Goals:  Goal Identifier walking   Goal Description Pt will be able to walk 30 min w less than 2/10 hip pain   Target Date 22   Date Met      Progress (detail required for progress note):  Not met     Goal Identifier stairs   Goal Description Pt will demonstate 4/5 hip strength to be able to ascend/descend steps w/o pain   Target Date 22   Date Met      Progress (detail required for progress note):  Not met     Goal Identifier bend over to touch floor   Goal Description Pt will demonstrate full lumbar flex w/o pain to  object off floor   Target Date 22   Date Met      Progress (detail required for progress note):  Not met     Goal Identifier sleeping   Goal Description Pt will relate waking up less than 1x/night due to pain in L hip   Target Date 22   Date Met      Progress (detail required for progress note):  Not met         Pt has been seen for 5 visits and was making min progress thus pt had f/u with MD. Pt failed to schedule f/u after that thus pt is being discharged to [facility].             Plan:  Discharge from therapy.    Discharge:    Reason for Discharge: Patient has failed to schedule further appointments.    Equipment Issued: NA    Discharge Plan: Patient to continue home program.

## 2022-09-16 NOTE — PROGRESS NOTES
PHYSICAL THERAPY DISCHARGE NOTE  Patient did not return for follow up treatments. The daily note from the patient's last visit will serve as the discharge note. Discharge from PT services at this time for this episode of treatment. Please see attached documentation under this episode of care for further information including dates of service, start of care date, referring physician, Dx, treatment plan, treatments, etc.    Please contact me with any questions or concerns.  Thank you for your referral.    Keerthi Mon, PT, DPT, OCS  Physical Therapist, Orthopedic Certified Specialist    [facility]  [address] [facility]   [address] [place] [address]  [email or web address]   Office: [phone]   Employed by [facility]     01/10/22 0900   Signing Clinician's Name / Credentials   Signing clinician's name / credentials Keerthi Mon PT, DPT, OCS    Session Number   Session Number 2   Progress Note/Recertification   Progress Note Due Date 03/01/22   Recertification Due Date 03/01/22   Adult Goals   PT Ortho Eval Goals 1;2;3   Ortho Goal 1   Goal Identifier 1   Goal Description Patient will be able to turn head equally to both sides without pain to easily check blind spots while driving.    Target Date 03/01/22   Ortho Goal 2   Goal Identifier 2   Goal Description Patient will be able to reach overhead without neck pain.   Target Date 03/01/22   Ortho Goal 3   Goal Identifier 3   Goal Description Patient will be independent with HEP to aid functional recovery.    Target Date 03/01/22   Subjective Report   Subjective Report Was really sore for a few days after last appt. Difficulty doing some of the ex.   Treatment Interventions   Interventions Therapeutic Procedure/Exercise;Manual Therapy   Therapeutic Procedure/exercise   Therapeutic Procedures: strength, endurance, ROM, flexibillity minutes (91479) 10   Skilled Intervention HEP instruction     Patient Response demo good form   Treatment Detail cont scap retractions. d/c thoracic extension and supine chin tuck as pt didn't likely the pressure. added SL bow and arrow x 10 B. pt to also use lumbar roll on wall for thoracic mobility   Manual Therapy   Manual Therapy: Mobilization, MFR, MLD, friction massage minutes (73981) 15   Skilled Intervention MT to improve mobility and decrease pain    Patient Response decreased pain with L cervical rot and improved ROM   Treatment Detail MET: post rib L T5, ERS L T1. TPR L UT and levator. manual cervical traction. In prone: gentle grade II rib mobs L 2-4 and PA T4-7.    Education   Learner Patient   Readiness Eager   Method Booklet/handout;Explanation;Demonstration   Response Verbalizes Understanding;Demonstrates Understanding   Plan   Homework PTRX   Home program [id number]   Plan for next session cont to work on thoracic mobility   Total Session Time   Timed Code Treatment Minutes 25   Total Treatment Time (sum of timed and untimed services) 25   AMBULATORY CLINICS ONLY-MEDICAL AND TREATMENT DIAGNOSIS   Medical Diagnosis Cervical radiculopathy    PT Diagnosis neck pain

## 2022-11-14 ENCOUNTER — TRANSFERRED RECORDS (OUTPATIENT)
Dept: HEALTH INFORMATION MANAGEMENT | Facility: CLINIC | Age: 72
End: 2022-11-14

## 2022-11-28 ENCOUNTER — TRANSCRIBE ORDERS (OUTPATIENT)
Dept: OTHER | Age: 72
End: 2022-11-28

## 2022-11-28 DIAGNOSIS — M54.50 ACUTE RIGHT-SIDED LOW BACK PAIN: Primary | ICD-10-CM

## 2022-12-07 ENCOUNTER — HOSPITAL ENCOUNTER (OUTPATIENT)
Dept: PHYSICAL THERAPY | Facility: CLINIC | Age: 72
Setting detail: THERAPIES SERIES
Discharge: HOME | End: 2022-12-07
Attending: STUDENT IN AN ORGANIZED HEALTH CARE EDUCATION/TRAINING PROGRAM
Payer: COMMERCIAL

## 2022-12-07 DIAGNOSIS — M54.50 ACUTE RIGHT-SIDED LOW BACK PAIN: ICD-10-CM

## 2022-12-07 PROCEDURE — 97161 PT EVAL LOW COMPLEX 20 MIN: CPT | Mod: GP | Performed by: PHYSICAL THERAPIST

## 2022-12-07 PROCEDURE — 97110 THERAPEUTIC EXERCISES: CPT | Mod: GP | Performed by: PHYSICAL THERAPIST

## 2022-12-07 NOTE — PROGRESS NOTES
[facility]    OUTPATIENT PHYSICAL THERAPY ORTHOPEDIC EVALUATION  PLAN OF TREATMENT FOR OUTPATIENT REHABILITATION  (COMPLETE FOR INITIAL CLAIMS ONLY)  Patient's Last Name, First Name, M.I.  YOB: 1950  Chyna Moncada    Provider s Name:  [facility]   Medical Record No.  [id number]   Start of Care Date:  12/07/22   Onset Date:  11/01/22   Type:     _X__PT   ___OT   ___SLP Medical Diagnosis:  acute R low back pain     PT Diagnosis:  LBP   Visits from SOC:  1      _________________________________________________________________________________  Plan of Treatment/Functional Goals:  balance training, gait training, joint mobilization, manual therapy, neuromuscular re-education, ROM, strengthening, stretching     Cryotherapy, Electrical stimulation, Hot packs, TENS, Traction     Goals  Goal Identifier: Standing - doing dishes  Goal Description: Pt will be able to stand for 30 min w/o pain to be able to complete dishes  Target Date: 12/28/22    Goal Identifier: lumbar ROM - getting dressed  Goal Description: Pt will demonstrate full lumbar ROM w/o pain to be able to get dressed.  Target Date: 12/28/22    Goal Identifier: walking  Goal Description: Pt will be able to walk 30 min w/o pain to be able to take dog for a walk  Target Date: 01/18/23    Goal Identifier: sitting - hair done  Goal Description: Pt will be able to sit for 60+ min to be able to get hair done.  Target Date: 01/18/23    Goal Identifier: Sleeping  Goal Description: Pt will be able to lie flat w/o pain to be able to get  a restful nights sleep  Target Date: 01/18/23                    Therapy Frequency:  1 time/week  Predicted Duration of Therapy Intervention:  6 weeks    Azul Ortega, PT                 I CERTIFY THE NEED FOR THESE SERVICES FURNISHED UNDER        THIS PLAN OF TREATMENT AND WHILE UNDER  MY CARE .             Physician Signature               Date    X_____________________________________________________                         Certification Date From:  12/07/22   Certification Date To:  01/18/23    Referring Provider:  Sterling Crooks DO    Initial Assessment        See Epic Evaluation Start of Care Date: 12/07/22 12/07/22 0600   General Information   Type of Visit Initial OP Ortho PT Evaluation   Start of Care Date 12/07/22   Referring Physician Sterling Crooks, DO   Patient/Family Goals Statement reduce pain, get stronger   Orders Evaluate and Treat   Date of Order 11/14/22   Certification Required? Yes   Medical Diagnosis acute R low back pain   Surgical/Medical history reviewed Yes   Precautions/Limitations no known precautions/limitations   General Information Comments PMH: ankle surgery   Body Part(s)   Body Part(s) Lumbar Spine/SI   Presentation and Etiology   Pertinent history of current problem (include personal factors and/or comorbidities that impact the POC) Pt relates had flare up of chronic LBP w cleaning up (raking) at the begining of Novemember. Pt relates had cortisone injections a few months ago x 2 which helped leg pain. Butt pain is coming back a little. Pt has tried meds, brace and tens w/o relief.   Impairments A. Pain;E. Decreased flexibility;D. Decreased ROM;G. Impaired balance;H. Impaired gait   Functional Limitations perform activities of daily living   Symptom Location low back and spine   How/Where did it occur With repetition/overuse   Onset date of current episode/exacerbation 11/01/22   Chronicity Recurrent   Pain rating (0-10 point scale) Best (/10);Worst (/10)   Best (/10) 4   Worst (/10) 10   Pain quality A. Sharp;C. Aching;F. Stabbing   Frequency of pain/symptoms A. Constant   Pain/symptoms exacerbated by A. Sitting;B. Walking;C. Lifting;E. Rest;H. Overhead reach;J. ADL;K. Home tasks;L. Work tasks   Pain/symptoms eased by D. Nothing   Progression of  symptoms since onset: Improved   Current Level of Function   Current Community Support Family/friend caregiver   Patient role/employment history F. Retired   Living [facility]   Fall Risk Screen   Fall screen completed by PT   Have you fallen 2 or more times in the past year? No   Have you fallen and had an injury in the past year? No   Is patient a fall risk? No   Abuse Screen (yes response referral indicated)   Feels Unsafe at Home or Work/School no   Feels Threatened by Someone no   Does Anyone Try to Keep You From Having Contact with Others or Doing Things Outside Your Home? no   Physical Signs of Abuse Present no   System Outcome Measures   Outcome Measures Low Back Pain (see Oswestry and Mary)   Lumbar Spine/SI Objective Findings   Gait/Locomotion WFL   Flexion ROM 6 in from floor w pain   Extension ROM 10 deg w/ pain   Pelvic Screen neutral   Transversus Abdominus Strength (Genaro Leg Lowering-deg) functional core weakness noted - unabel to test due to pain   Hip Flexion (L2) Strength 4/5   Hip Abduction Strength 3/5   Knee Flexion Strength 3/5   Knee Extension (L3) Strength 4/5   Ankle Dorsiflexion (L4) Strength able to walk on heels   Ankle Plantar Flexion (S1) Strength able to walk on toes   SLR L: 70 R: 45   Palpation Mod tightness R paraspinals and L glute   Slump Test + for pain in back - denies radicuarl symptoms   Planned Therapy Interventions   Planned Therapy Interventions balance training;gait training;joint mobilization;manual therapy;neuromuscular re-education;ROM;strengthening;stretching   Planned Modality Interventions   Planned Modality Interventions Cryotherapy;Electrical stimulation;Hot packs;TENS;Traction   Clinical Impression   Criteria for Skilled Therapeutic Interventions Met yes, treatment indicated   PT Diagnosis LBP   Influenced by the following impairments limited ROM, weakness, pain   Functional limitations due to impairments standing, walking, getting dressed    Clinical Presentation Stable/Uncomplicated   Clinical Presentation Rationale Pt is 71 yr old female w flare up of acute LBP. Pt is motivated to get better but has had moderate issues with LBP over last 2 years.   Clinical Decision Making (Complexity) Low complexity   Therapy Frequency 1 time/week   Predicted Duration of Therapy Intervention (days/wks) 6 weeks   Risk & Benefits of therapy have been explained Yes   Patient, Family & other staff in agreement with plan of care Yes   Education Assessment   Preferred Learning Style Listening;Reading;Demonstration;Pictures/video   Barriers to Learning No barriers   ORTHO GOALS   PT Ortho Eval Goals 1;2;3;4;5   Ortho Goal 1   Goal Identifier Standing - doing dishes   Goal Description Pt will be able to stand for 30 min w/o pain to be able to complete dishes   Target Date 12/28/22   Ortho Goal 2   Goal Identifier lumbar ROM - getting dressed   Goal Description Pt will demonstrate full lumbar ROM w/o pain to be able to get dressed.   Target Date 12/28/22   Ortho Goal 3   Goal Identifier walking   Goal Description Pt will be able to walk 30 min w/o pain to be able to take dog for a walk   Target Date 01/18/23   Ortho Goal 4   Goal Identifier sitting - hair done   Goal Description Pt will be able to sit for 60+ min to be able to get hair done.   Target Date 01/18/23   Ortho Goal 5   Goal Identifier Sleeping   Goal Description Pt will be able to lie flat w/o pain to be able to get  a restful nights sleep   Target Date 01/18/23   Total Evaluation Time   PT Eval, Low Complexity Minutes (82617) 20   Therapy Certification   Certification date from 12/07/22   Certification date to 01/18/23   Medical Diagnosis acute R low back pain

## 2022-12-12 ENCOUNTER — HOSPITAL ENCOUNTER (OUTPATIENT)
Dept: PHYSICAL THERAPY | Facility: CLINIC | Age: 72
Setting detail: THERAPIES SERIES
Discharge: HOME | End: 2022-12-12
Attending: STUDENT IN AN ORGANIZED HEALTH CARE EDUCATION/TRAINING PROGRAM
Payer: COMMERCIAL

## 2022-12-12 PROCEDURE — 97110 THERAPEUTIC EXERCISES: CPT | Mod: GP | Performed by: PHYSICAL THERAPIST

## 2022-12-14 ENCOUNTER — TRANSCRIBE ORDERS (OUTPATIENT)
Dept: OTHER | Age: 72
End: 2022-12-14

## 2022-12-14 DIAGNOSIS — M18.12 ARTHRITIS OF CARPOMETACARPAL (CMC) JOINT OF LEFT THUMB: ICD-10-CM

## 2022-12-14 DIAGNOSIS — M15.2 DEGENERATIVE ARTHRITIS OF PROXIMAL INTERPHALANGEAL JOINT OF INDEX FINGER OF LEFT HAND: Primary | ICD-10-CM

## 2022-12-19 ENCOUNTER — OFFICE VISIT (OUTPATIENT)
Dept: FAMILY MEDICINE | Facility: CLINIC | Age: 72
End: 2022-12-19
Payer: COMMERCIAL

## 2022-12-19 ENCOUNTER — TELEPHONE (OUTPATIENT)
Dept: FAMILY MEDICINE | Facility: CLINIC | Age: 72
End: 2022-12-19

## 2022-12-19 VITALS
DIASTOLIC BLOOD PRESSURE: 70 MMHG | BODY MASS INDEX: 21.53 KG/M2 | HEIGHT: 66 IN | OXYGEN SATURATION: 99 % | TEMPERATURE: 97 F | SYSTOLIC BLOOD PRESSURE: 130 MMHG | WEIGHT: 134 LBS | HEART RATE: 77 BPM | RESPIRATION RATE: 18 BRPM

## 2022-12-19 DIAGNOSIS — I73.9 PERIPHERAL VASCULAR DISEASE (H): ICD-10-CM

## 2022-12-19 DIAGNOSIS — M81.0 AGE RELATED OSTEOPOROSIS, UNSPECIFIED PATHOLOGICAL FRACTURE PRESENCE: ICD-10-CM

## 2022-12-19 DIAGNOSIS — M62.830 BACK MUSCLE SPASM: ICD-10-CM

## 2022-12-19 DIAGNOSIS — Z00.00 ANNUAL PHYSICAL EXAM: Primary | ICD-10-CM

## 2022-12-19 DIAGNOSIS — D69.6 LOW PLATELET COUNT (H): ICD-10-CM

## 2022-12-19 DIAGNOSIS — I63.50 CEREBRAL ARTERY OCCLUSION WITH CEREBRAL INFARCTION (H): ICD-10-CM

## 2022-12-19 DIAGNOSIS — Z12.11 SCREEN FOR COLON CANCER: ICD-10-CM

## 2022-12-19 DIAGNOSIS — E87.0 SERUM SODIUM ELEVATED: ICD-10-CM

## 2022-12-19 DIAGNOSIS — I47.10 SVT (SUPRAVENTRICULAR TACHYCARDIA) (H): ICD-10-CM

## 2022-12-19 DIAGNOSIS — B00.1 RECURRENT COLD SORES: ICD-10-CM

## 2022-12-19 LAB
ALBUMIN SERPL BCG-MCNC: 4.2 G/DL (ref 3.5–5.2)
ALP SERPL-CCNC: 79 U/L (ref 35–104)
ALT SERPL W P-5'-P-CCNC: 18 U/L (ref 10–35)
ANION GAP SERPL CALCULATED.3IONS-SCNC: 9 MMOL/L (ref 7–15)
AST SERPL W P-5'-P-CCNC: 25 U/L (ref 10–35)
BILIRUB SERPL-MCNC: 0.5 MG/DL
BUN SERPL-MCNC: 12.7 MG/DL (ref 8–23)
CALCIUM SERPL-MCNC: 9.3 MG/DL (ref 8.8–10.2)
CHLORIDE SERPL-SCNC: 108 MMOL/L (ref 98–107)
CHOLEST SERPL-MCNC: 179 MG/DL
CREAT SERPL-MCNC: 0.68 MG/DL (ref 0.51–0.95)
DEPRECATED HCO3 PLAS-SCNC: 31 MMOL/L (ref 22–29)
ERYTHROCYTE [DISTWIDTH] IN BLOOD BY AUTOMATED COUNT: 12.5 % (ref 10–15)
GFR SERPL CREATININE-BSD FRML MDRD: >90 ML/MIN/1.73M2
GLUCOSE SERPL-MCNC: 100 MG/DL (ref 70–99)
HCT VFR BLD AUTO: 40 % (ref 35–47)
HDLC SERPL-MCNC: 103 MG/DL
HGB BLD-MCNC: 13.3 G/DL (ref 11.7–15.7)
LDLC SERPL CALC-MCNC: 63 MG/DL
MCH RBC QN AUTO: 29 PG (ref 26.5–33)
MCHC RBC AUTO-ENTMCNC: 33.3 G/DL (ref 31.5–36.5)
MCV RBC AUTO: 87 FL (ref 78–100)
NONHDLC SERPL-MCNC: 76 MG/DL
PLATELET # BLD AUTO: 144 10E3/UL (ref 150–450)
POTASSIUM SERPL-SCNC: 4.2 MMOL/L (ref 3.4–5.3)
PROT SERPL-MCNC: 6.6 G/DL (ref 6.4–8.3)
RBC # BLD AUTO: 4.59 10E6/UL (ref 3.8–5.2)
SODIUM SERPL-SCNC: 148 MMOL/L (ref 136–145)
TRIGL SERPL-MCNC: 63 MG/DL
WBC # BLD AUTO: 4.4 10E3/UL (ref 4–11)

## 2022-12-19 PROCEDURE — 99213 OFFICE O/P EST LOW 20 MIN: CPT | Mod: 25 | Performed by: NURSE PRACTITIONER

## 2022-12-19 PROCEDURE — 80061 LIPID PANEL: CPT | Performed by: NURSE PRACTITIONER

## 2022-12-19 PROCEDURE — 80053 COMPREHEN METABOLIC PANEL: CPT | Performed by: NURSE PRACTITIONER

## 2022-12-19 PROCEDURE — G0439 PPPS, SUBSEQ VISIT: HCPCS | Performed by: NURSE PRACTITIONER

## 2022-12-19 PROCEDURE — 36415 COLL VENOUS BLD VENIPUNCTURE: CPT | Performed by: NURSE PRACTITIONER

## 2022-12-19 PROCEDURE — 85027 COMPLETE CBC AUTOMATED: CPT | Performed by: NURSE PRACTITIONER

## 2022-12-19 RX ORDER — CYCLOBENZAPRINE HCL 5 MG
5 TABLET ORAL 2 TIMES DAILY PRN
Qty: 60 TABLET | Refills: 0 | Status: SHIPPED | OUTPATIENT
Start: 2022-12-19 | End: 2023-05-23

## 2022-12-19 RX ORDER — ALENDRONATE SODIUM 70 MG/1
70 TABLET ORAL
Qty: 12 TABLET | Refills: 3 | Status: SHIPPED | OUTPATIENT
Start: 2022-12-19 | End: 2023-12-21

## 2022-12-19 RX ORDER — VALACYCLOVIR HYDROCHLORIDE 1 G/1
TABLET, FILM COATED ORAL
Qty: 4 TABLET | Refills: 11 | Status: SHIPPED | OUTPATIENT
Start: 2022-12-19 | End: 2024-05-22

## 2022-12-19 RX ORDER — PRAVASTATIN SODIUM 40 MG
TABLET ORAL
Qty: 90 TABLET | Refills: 3 | Status: SHIPPED | OUTPATIENT
Start: 2022-12-19 | End: 2023-12-21

## 2022-12-19 ASSESSMENT — ACTIVITIES OF DAILY LIVING (ADL): CURRENT_FUNCTION: NO ASSISTANCE NEEDED

## 2022-12-19 NOTE — PROGRESS NOTES
"SUBJECTIVE:   Chyna is a 72 year old who presents for Preventive Visit.    Patient has been advised of split billing requirements and indicates understanding: Yes  Are you in the first 12 months of your Medicare coverage?  No    Healthy Habits:     In general, how would you rate your overall health?  Fair    Frequency of exercise:  None    Do you usually eat at least 4 servings of fruit and vegetables a day, include whole grains    & fiber and avoid regularly eating high fat or \"junk\" foods?  No    Taking medications regularly:  Yes    Medication side effects:  None    Ability to successfully perform activities of daily living:  No assistance needed    Home Safety:  Lack of grab bars in the bathroom    Hearing Impairment:  Need to ask people to speak up or repeat themselves    In the past 6 months, have you been bothered by leaking of urine?  No    In general, how would you rate your overall mental or emotional health?  Fair      PHQ-2 Total Score: 0    Additional concerns today:  No      Have you ever done Advance Care Planning? (For example, a Health Directive, POLST, or a discussion with a medical provider or your loved ones about your wishes): No, advance care planning information given to patient to review.  Patient declined advance care planning discussion at this time.      Fall risk  Fallen 2 or more times in the past year?: No  Any fall with injury in the past year?: No    Cognitive Screening   1) Repeat 3 items (Leader, Season, Table)    2) Clock draw: NORMAL  3) 3 item recall: Recalls 3 objects  Results: 3 items recalled: COGNITIVE IMPAIRMENT LESS LIKELY    Mini-CogTM Copyright CASEY Ford. Licensed by the author for use in [facility]; reprinted with permission (yesika@.[facility]). All rights reserved.      Do you have sleep apnea, excessive snoring or daytime drowsiness?: no    Reviewed and updated as needed this visit by clinical staff                  Reviewed and updated as needed this visit by " Provider                 Social History     Tobacco Use     Smoking status: Former     Types: Cigarettes     Quit date: 1/10/1983     Years since quittin.9     Smokeless tobacco: Never   Substance Use Topics     Alcohol use: Not Currently     Alcohol/week: 1.0 standard drink     Types: 1 Glasses of wine per week     If you drink alcohol do you typically have >3 drinks per day or >7 drinks per week? No    Alcohol Use 2021   Prescreen: >3 drinks/day or >7 drinks/week? -   Prescreen: >3 drinks/day or >7 drinks/week? No           Current providers sharing in care for this patient include:   Patient Care Team:  Nereida Jacobs APRN CNP as PCP - General (Family Medicine)  Nato Pace DPM as Assigned Musculoskeletal Provider  Cabrera Fields MD as Assigned Surgical Provider  Nereida Jacobs APRN CNP as Assigned PCP    The following health maintenance items are reviewed in Epic and correct as of today:  Health Maintenance   Topic Date Due     FALL RISK ASSESSMENT  2022     MEDICARE ANNUAL WELLNESS VISIT  2022     COLORECTAL CANCER SCREENING  2022     ANNUAL REVIEW OF HM ORDERS  02/10/2023     DTAP/TDAP/TD IMMUNIZATION (4 - Td or Tdap) 10/22/2023     MAMMO SCREENING  2023     ADVANCE CARE PLANNING  2025     LIPID  2026     DEXA  2037     SPIROMETRY  Completed     HEPATITIS C SCREENING  Completed     COPD ACTION PLAN  Completed     PHQ-2 (once per calendar year)  Completed     INFLUENZA VACCINE  Completed     Pneumococcal Vaccine: 65+ Years  Completed     ZOSTER IMMUNIZATION  Completed     COVID-19 Vaccine  Completed     IPV IMMUNIZATION  Aged Out     MENINGITIS IMMUNIZATION  Aged Out     Labs reviewed in EPIC  BP Readings from Last 3 Encounters:   22 130/70   22 120/70   22 130/87    Wt Readings from Last 3 Encounters:   22 60.8 kg (134 lb)   22 60.6 kg (133 lb 9.6 oz)   22 59.9 kg (132 lb)                  Patient Active  Problem List   Diagnosis     Allergic rhinitis     Anxiety state     Cerebral artery occlusion with cerebral infarction (H)     Degeneration of cervical intervertebral disc     Degeneration of lumbar or lumbosacral intervertebral disc     Insomnia     Postmenopausal atrophic vaginitis     Raynaud's syndrome     Osteopenia     Pulmonary nodules     TGA (transient global amnesia)     Recurrent cold sores     Peripheral vascular disease (H)     Atopic rhinitis     Osteoarthritis of hip     Bilateral sensorineural hearing loss     Tendinitis of shoulder     COPD (chronic obstructive pulmonary disease) (H)     SVT (supraventricular tachycardia) (H)     Age-related osteoporosis without current pathological fracture     Past Surgical History:   Procedure Laterality Date     HYSTERECTOMY, PAP NO LONGER INDICATED         Social History     Tobacco Use     Smoking status: Former     Types: Cigarettes     Quit date: 1/10/1983     Years since quittin.9     Smokeless tobacco: Never   Substance Use Topics     Alcohol use: Not Currently     Alcohol/week: 1.0 standard drink     Types: 1 Glasses of wine per week     Family History   Problem Relation Age of Onset     Arthritis Mother         was told was RA     Cancer Mother         GYN     Other - See Comments Mother         phlebitis for which hospitalized several times     Heart Disease Father      Cancer Brother         throat     Alcohol/Drug Maternal Grandfather      Heart Disease Paternal Grandfather         Heart attack     Diabetes Paternal Grandfather          Current Outpatient Medications   Medication Sig Dispense Refill     alendronate (FOSAMAX) 70 MG tablet Take 1 tablet (70 mg) by mouth every 7 days 12 tablet 3     aspirin EC 81 MG tablet Take 81 mg by mouth daily        calcium-vitamin D (CALTRATE) 600-400 MG-UNIT per tablet Take 1 tablet by mouth daily        Cholecalciferol (VITAMIN D-3 PO) Take 1,000 Units by mouth daily       LORazepam (ATIVAN) 0.5 MG tablet  Take 1 tablet (0.5 mg) by mouth every 6 hours as needed 10 tablet 0     mometasone (NASONEX) 50 MCG/ACT spray Spray 2 sprays in nostril daily        Multiple Vitamin (MULTI-VITAMINS) TABS Take 1 tablet by mouth daily       multivitamin (OCUVITE) TABS tablet Take 1 tablet by mouth daily       pilocarpine (PILOCAR) 2 % ophthalmic solution Place 2 drops Into the left eye 3 times daily       pravastatin (PRAVACHOL) 40 MG tablet TAKE ONE TABLET BY MOUTH EVERY NIGHT AT BEDTIME 90 tablet 3     RESTASIS 0.05 % ophthalmic emulsion        valACYclovir (VALTREX) 1000 mg tablet TAKE TWO TABLETS BY MOUTH TWICE A DAY 4 tablet 11     FLUZONE HIGH-DOSE QUADRIVALENT 0.7 ML ANSHU injection        SHINGRIX injection        tobramycin-dexamethasone (TOBRADEX) 0.3-0.1 % ophthalmic suspension        Allergies   Allergen Reactions     Tramadol Anaphylaxis     Benadryl [Altaryl] Other (See Comments)     Made her goofy     Caffeine Other (See Comments)     Jittery       Codeine Nausea and Vomiting     Darvocet [Propoxyphene N-Apap]      Other reaction(s): GI Upset     Diphenhydramine Other (See Comments)     Lovastatin Other (See Comments) and Muscle Pain (Myalgia)     Other reaction(s): Myalgia  pain     Percocet [Oxycodone-Acetaminophen] Other (See Comments)     Other reaction(s): Dizziness  Sick or dizzy?     Vicodin [Hydrocodone-Acetaminophen] Other (See Comments) and Nausea     Sick or dizzy?     Recent Labs   Lab Test 12/16/21  0843 04/22/21  0912 12/10/20  0846 07/24/20  1716 10/21/19  0903   LDL 63  --  50  --  57     --  117  --  93   TRIG 89  --  85  --  106   ALT 23  --   --  25 24   CR 0.72 0.69  --  0.73 0.69   GFRESTIMATED 85 88  --  84 89   GFRESTBLACK  --  >90  --  >90 >90   POTASSIUM 3.8 4.0  --  3.7 4.0   TSH  --  1.64  --  2.32 1.74      Pneumonia Vaccine:For adults 65 years or older who do not have an immunocompromising condition, cerebrospinal fluid leak, or cochlear implant and want to receive PPSV23 ONLY:  "Administer 1 dose of PPSV23. Anyone who received any doses of PPSV23 before age 65 should receive 1 final dose of the vaccine at age 65 or older. Administer this last dose at least 5 years after the prior PPSV23 dose.  Mammogram Screening: Mammogram Screening: Recommended mammography every 1-2 years with patient discussion and risk factor consideration  History of abnormal Pap smear: NO - age 65 - see link Cervical Cytology Screening Guidelines  Last 3 Pap and HPV Results:          Review of Systems  Constitutional, HEENT, cardiovascular, pulmonary, gi and gu systems are negative, except as otherwise noted.    OBJECTIVE:   /70 (BP Location: Right arm, Cuff Size: Adult Regular)   Pulse 77   Temp 97  F (36.1  C) (Tympanic)   Resp 18   Ht 1.664 m (5' 5.51\")   Wt 60.8 kg (134 lb)   SpO2 99%   BMI 21.95 kg/m   Estimated body mass index is 21.89 kg/m  as calculated from the following:    Height as of 5/12/22: 1.664 m (5' 5.5\").    Weight as of 5/12/22: 60.6 kg (133 lb 9.6 oz).  Physical Exam  GENERAL: healthy, alert and no distress  EYES: Eyes grossly normal to inspection, PERRL and conjunctivae and sclerae normal  HENT: ear canals and TM's normal, nose and mouth without ulcers or lesions  NECK: no adenopathy, no asymmetry, masses, or scars and thyroid normal to palpation  RESP: lungs clear to auscultation - no rales, rhonchi or wheezes  CV: regular rate and rhythm, normal S1 S2, no S3 or S4, no murmur, click or rub, no peripheral edema and peripheral pulses strong  ABDOMEN: soft, nontender, no hepatosplenomegaly, no masses and bowel sounds normal  MS: no gross musculoskeletal defects noted, no edema  SKIN: no suspicious lesions or rashes  NEURO: Normal strength and tone, mentation intact and speech normal  PSYCH: mentation appears normal, affect normal/bright    Diagnostic Test Results:  Labs reviewed in Epic  No results found for any visits on 12/19/22.    ASSESSMENT / PLAN:   (Z00.00) Annual physical exam  " (primary encounter diagnosis)  Comment:   Plan: CBC with platelets, Lipid panel reflex to         direct LDL Fasting, Comprehensive metabolic         panel (BMP + Alb, Alk Phos, ALT, AST, Total.         Bili, TP)            (Z12.11) Screen for colon cancer  Comment:   Plan: COLOGUARD([employer] SCI[employer]ES)            (I63.50) Cerebral artery occlusion with cerebral infarction (H)  Comment:  Controlled no change in treatment plan  Plan: pravastatin (PRAVACHOL) 40 MG tablet    (M81.0) Age related osteoporosis, unspecified pathological fracture presence  Comment:  Controlled no change in treatment plan  Plan: alendronate (FOSAMAX) 70 MG tablet            (B00.1) Recurrent cold sores  Comment:   Plan: valACYclovir (VALTREX) 1000 mg tablet            (M62.830) Back muscle spasm  Comment: will attempt flexeril   Plan: cyclobenzaprine (FLEXERIL) 5 MG tablet      (I73.9) Peripheral vascular disease (H)  Comment:  Controlled no change in treatment plan  Plan:     (I47.1) SVT (supraventricular tachycardia) (H)  Comment:  Controlled no change in treatment plan  Plan:     Patient has been advised of split billing requirements and indicates understanding: Yes      COUNSELING:  Reviewed preventive health counseling, as reflected in patient instructions       Regular exercise       Healthy diet/nutrition       Vision screening       Hearing screening       Bladder control       Fall risk prevention       Aspirin prophylaxis        Alcohol Use        Folic Acid       Osteoporosis prevention/bone health       Colon cancer screening       (Laurie)menopause management        She reports that she quit smoking about 39 years ago. Her smoking use included cigarettes. She has never used smokeless tobacco.      Appropriate preventive services were discussed with this patient, including applicable screening as appropriate for cardiovascular disease, diabetes, osteopenia/osteoporosis, and glaucoma.  As appropriate for age/gender, discussed screening  for colorectal cancer, prostate cancer, breast cancer, and cervical cancer. Checklist reviewing preventive services available has been given to the patient.    Reviewed patients plan of care and provided an AVS. The  shyam Clemente meets the Care Plan requirement. This Care Plan has been established and reviewed with the Patient.          GEORGE Al [facility]    Identified Health Risks:

## 2022-12-20 ENCOUNTER — HOSPITAL ENCOUNTER (OUTPATIENT)
Dept: PHYSICAL THERAPY | Facility: CLINIC | Age: 72
Setting detail: THERAPIES SERIES
Discharge: HOME | End: 2022-12-20
Attending: STUDENT IN AN ORGANIZED HEALTH CARE EDUCATION/TRAINING PROGRAM
Payer: COMMERCIAL

## 2022-12-20 DIAGNOSIS — Z12.11 SCREEN FOR COLON CANCER: ICD-10-CM

## 2022-12-20 PROCEDURE — 97110 THERAPEUTIC EXERCISES: CPT | Mod: GP | Performed by: PHYSICAL THERAPIST

## 2022-12-28 ENCOUNTER — HOSPITAL ENCOUNTER (OUTPATIENT)
Dept: OCCUPATIONAL THERAPY | Facility: CLINIC | Age: 72
Setting detail: THERAPIES SERIES
Discharge: HOME | End: 2022-12-28
Attending: ORTHOPAEDIC SURGERY
Payer: COMMERCIAL

## 2022-12-28 DIAGNOSIS — M15.2 DEGENERATIVE ARTHRITIS OF PROXIMAL INTERPHALANGEAL JOINT OF INDEX FINGER OF LEFT HAND: ICD-10-CM

## 2022-12-28 DIAGNOSIS — M18.12 ARTHRITIS OF CARPOMETACARPAL (CMC) JOINT OF LEFT THUMB: ICD-10-CM

## 2022-12-28 PROCEDURE — 97110 THERAPEUTIC EXERCISES: CPT | Mod: GO | Performed by: OCCUPATIONAL THERAPIST

## 2022-12-28 PROCEDURE — 97535 SELF CARE MNGMENT TRAINING: CPT | Mod: GO | Performed by: OCCUPATIONAL THERAPIST

## 2022-12-28 PROCEDURE — 97165 OT EVAL LOW COMPLEX 30 MIN: CPT | Mod: GO | Performed by: OCCUPATIONAL THERAPIST

## 2022-12-28 PROCEDURE — 97022 WHIRLPOOL THERAPY: CPT | Mod: GO | Performed by: OCCUPATIONAL THERAPIST

## 2022-12-28 NOTE — PROGRESS NOTES
[facility]      OUTPATIENT OCCUPATIONAL THERAPY HAND EVALUATION  PLAN OF TREATMENT FOR OUTPATIENT REHABILITATION  (COMPLETE FOR INITIAL CLAIMS ONLY)  Patient's Last Name, First Name, M.I.  YOB: 1950  Chyna Moncada                        Provider s Name: [facility] Medical Record No.  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  Goal Identifier: home program       Goal Description: Patient to be independent with home program, not needing more than 15% verbal or physical cuing to perform correctly       Target Date: 01/18/23   2. Goal Identifier: cleaning       Goal Description: patient to score 3 or higher on UEFI to be able to clean home with minimal difficulty       Target Date: 02/01/23   3. Goal Identifier: opening jar       Goal Description: Patient to score 3 or higher on UEFI to be able to open a jar with min difficulty with the use of Dycem       Target Date: 02/15/23             Treatment Frequency: Therapy Frequency: 1x/week  Predicated Duration of Therapy Intervention:  Predicted Duration of Therapy Intervention  (days/wks): 7 weeks    Magda Sanchez, OTR/L CHT  Occupational Therapist, Certified Hand Therapist         I CERTIFY THE NEED FOR THESE SERVICES FURNISHED UNDER        THIS PLAN OF TREATMENT AND WHILE UNDER MY CARE .             Physician Signature               Date    X_____________________________________________________                      Certification Period:  12/28/22 to 02/15/23            Referring Physician:  Dr. Aguilar    Initial Assessment        See Epic Evaluation Start of Care Date: 12/28/22

## 2022-12-28 NOTE — PROGRESS NOTES
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  (Physician co-signature of this document indicates review and certification of the therapy plan).   General Information/History   Start Of Care Date 12/28/22   Referring Physician Dr. 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 She complaints of pain in left index and right hand ,. She says she can no longer get anymore injections so she is hoping that there is something from a therapy standpoint that will help.   Previous treatment or current condition injections- last one being about 3 months ago   Past medical history stroke, smoker   How/Where did it occur From Degenerative Joint Disease;At work   Onset date of current episode/exacerbation 10/04/22  (approximate when got really bad)   Chronicity New   Hand Dominance Right   Affected side Bilateral   Functional limitations perform activities of daily living;perform desired leisure / sports activities   Reported Symptoms Pain;Loss of Motion/Stiffness   Prior level of function Independent ADL;Independent IADL   Important Activities reading, play with dog   Level of functions comments Lives with    Patient role/Employment history Retired   Patient/Family goals statement Patient would like to be able to use her hands more without pain for daily tasks.   Fall Risk Screen   Fall screen completed by OT   Have you fallen 2 or more times in the past year?  No   Have you fallen and had an injury in the past year? No   Is patient a fall risk? No   Abuse Screen (yes response referral indicated)   Feels Unsafe at Home or Work/School no   Feels Threatened by Someone no   Does Anyone Try to Keep You From Having Contact with Others or Doing Things Outside Your Home? no   Physical Signs of Abuse Present no   Pain   Pain Primary Pain Report   Primary Pain Report   Location Left Index PIP and left thumb IP and right ring finger palm and into finger ( has had trigger finger there in the past)   Pain Quality Sharp;Stabbing;Aching;Burning   Frequency Intermittent   Scale 3/10;9/10   Pain Is Same All Of The Time   Pain Is Exacerbated By Pinching;Gripping;Twisting , Pulling;Activity/movement   Pain Is Relieved By Nothing   Progression Since Onset Gradually Worsening   Edema   Edema Index;Thumb   Thumb (measured in cm)   IP -  - Left 6.5   IP -  - Right 6   Index (measured in cm)   PIP - Left 6.5   PIP  - Right 5.8   Tenderness   Comment Mod to severe tenderness in right ring Al  talat area and left IP of thumb and PIP of  index and bilateral wrists   Palpation   Palpation   (same as above)   ROM   ROM AROM   AROM   Comments can make near fist bilaterally   Sensation Findings   Sensation Findings Other (see comments)   Sensation Comments No sensory complaints   Strength   Strength Strength;Other (see comments)    Avg - Left 20    Avg - Right 24    Avg Comments mild pain   Lateral Pinch - Left 4   Lateral Pinch - Right 8   3 Point Pinch - Left 4   3 Point Pinch - Right 5   Education Assessment   Preferred Learning Style Listening;Reading;Demonstration;Pictures/video   Barriers to Learning No barriers   Therapy Interventions   Planned Therapy Interventions Ultrasound;Fluidotherapy;Strengthening;Stretching;Manual Therapy;Splinting;Education of splint wear, care, fit and precautions;Edema Management;Self Care/Home Management;Adaptive Equipment Training;Ergonomic Patient  Education;Energy Conservation/Work Simplification Training;Joint Protection Instruction;Home Program   Therapy plan comments To be added as appropriate   Clinical Impression   Criteria for Skilled Therapeutic Interventions Met yes;treatment indicated   OT Diagnosis decreased ADL's IADL's   Influenced by the following impairments Pain;Edema;Decreased strength   Assessment of Occupational Performance 5 or more Performance Deficits   Identified Performance Deficits cleaning, dressing, hobbies, laundry , opening things   Clinical Decision Making (Complexity) Low complexity   Therapy Frequency 1x/week   Predicted Duration of Therapy Intervention (days/wks) 7 weeks   Risks and Benefits of Treatment have been explained. Yes   Patient, Family & other staff in agreement with plan of care Yes   Hand Eval Goals   Hand Eval Goals 1;2;3   Hand Goal 1   Goal Identifier home program   Goal Description Patient to be independent with home program, not needing more than 15% verbal or physical cuing to perform correctly   Target Date 01/18/23   Hand Goal 2   Goal Identifier cleaning   Goal Description patient to score 3 or higher on UEFI to be able to clean home with minimal difficulty   Target Date 02/01/23   Hand Goal 3   Goal Identifier opening jar   Goal Description Patient to score 3 or higher on UEFI to be able to open a jar with min difficulty with the use of Dycem   Target Date 02/15/23   Total Evaluation Time       SUKHDEV Frank/L CHT  Occupational Therapist, Certified Hand Therapist

## 2023-01-03 ENCOUNTER — TELEPHONE (OUTPATIENT)
Dept: NURSING | Facility: CLINIC | Age: 73
End: 2023-01-03

## 2023-01-03 NOTE — TELEPHONE ENCOUNTER
Patient calling to request lab results from 12/19/22. Patient is unable to open her MyChart to read message and would like results mailed to her.    Read the following message to patient from provider:          Patient has a follow-up appointment with her provider on 1/16/23 and would like to do the lab work at that time. Transferred patient to scheduling to request lab appointment as well.    Day Weiss RN  01/03/23 8:11 AM  [facility] Nurse Advisor

## 2023-01-07 LAB — NONINV COLON CA DNA+OCC BLD SCRN STL QL: NEGATIVE

## 2023-01-09 ENCOUNTER — TRANSFERRED RECORDS (OUTPATIENT)
Dept: HEALTH INFORMATION MANAGEMENT | Facility: CLINIC | Age: 73
End: 2023-01-09
Payer: COMMERCIAL

## 2023-01-10 ENCOUNTER — HOSPITAL ENCOUNTER (OUTPATIENT)
Dept: OCCUPATIONAL THERAPY | Facility: CLINIC | Age: 73
Setting detail: THERAPIES SERIES
Discharge: HOME | End: 2023-01-10
Attending: ORTHOPAEDIC SURGERY
Payer: COMMERCIAL

## 2023-01-10 PROCEDURE — 97140 MANUAL THERAPY 1/> REGIONS: CPT | Mod: GO | Performed by: OCCUPATIONAL THERAPIST

## 2023-01-10 PROCEDURE — 97110 THERAPEUTIC EXERCISES: CPT | Mod: GO | Performed by: OCCUPATIONAL THERAPIST

## 2023-01-10 PROCEDURE — 97022 WHIRLPOOL THERAPY: CPT | Mod: GO | Performed by: OCCUPATIONAL THERAPIST

## 2023-01-16 ENCOUNTER — OFFICE VISIT (OUTPATIENT)
Dept: FAMILY MEDICINE | Facility: CLINIC | Age: 73
End: 2023-01-16
Payer: COMMERCIAL

## 2023-01-16 ENCOUNTER — TRANSCRIBE ORDERS (OUTPATIENT)
Dept: OTHER | Age: 73
End: 2023-01-16

## 2023-01-16 VITALS
BODY MASS INDEX: 22.33 KG/M2 | WEIGHT: 134 LBS | OXYGEN SATURATION: 99 % | HEIGHT: 65 IN | TEMPERATURE: 97.2 F | SYSTOLIC BLOOD PRESSURE: 130 MMHG | DIASTOLIC BLOOD PRESSURE: 62 MMHG | HEART RATE: 84 BPM | RESPIRATION RATE: 18 BRPM

## 2023-01-16 DIAGNOSIS — M25.562 CHRONIC PAIN OF LEFT KNEE: ICD-10-CM

## 2023-01-16 DIAGNOSIS — I47.10 SVT (SUPRAVENTRICULAR TACHYCARDIA) (H): ICD-10-CM

## 2023-01-16 DIAGNOSIS — M25.50 MULTIPLE JOINT PAIN: Primary | ICD-10-CM

## 2023-01-16 DIAGNOSIS — M70.71 ISCHIAL BURSITIS, RIGHT: Primary | ICD-10-CM

## 2023-01-16 DIAGNOSIS — F32.1 CURRENT MODERATE EPISODE OF MAJOR DEPRESSIVE DISORDER WITHOUT PRIOR EPISODE (H): ICD-10-CM

## 2023-01-16 DIAGNOSIS — I73.9 PERIPHERAL VASCULAR DISEASE (H): ICD-10-CM

## 2023-01-16 DIAGNOSIS — M76.899 HAMSTRING TENDONITIS: ICD-10-CM

## 2023-01-16 DIAGNOSIS — D69.6 LOW PLATELET COUNT (H): ICD-10-CM

## 2023-01-16 DIAGNOSIS — M17.12 PRIMARY OSTEOARTHRITIS OF LEFT KNEE: ICD-10-CM

## 2023-01-16 DIAGNOSIS — G89.29 CHRONIC PAIN OF LEFT KNEE: ICD-10-CM

## 2023-01-16 DIAGNOSIS — J44.9 CHRONIC OBSTRUCTIVE PULMONARY DISEASE, UNSPECIFIED COPD TYPE (H): ICD-10-CM

## 2023-01-16 LAB
ALBUMIN SERPL BCG-MCNC: 4.2 G/DL (ref 3.5–5.2)
ALP SERPL-CCNC: 78 U/L (ref 35–104)
ALT SERPL W P-5'-P-CCNC: 19 U/L (ref 10–35)
ANION GAP SERPL CALCULATED.3IONS-SCNC: 6 MMOL/L (ref 7–15)
AST SERPL W P-5'-P-CCNC: 27 U/L (ref 10–35)
BILIRUB SERPL-MCNC: 0.5 MG/DL
BUN SERPL-MCNC: 12.9 MG/DL (ref 8–23)
CALCIUM SERPL-MCNC: 9.2 MG/DL (ref 8.8–10.2)
CHLORIDE SERPL-SCNC: 105 MMOL/L (ref 98–107)
CREAT SERPL-MCNC: 0.7 MG/DL (ref 0.51–0.95)
CRP SERPL-MCNC: <3 MG/L
DEPRECATED HCO3 PLAS-SCNC: 31 MMOL/L (ref 22–29)
ERYTHROCYTE [DISTWIDTH] IN BLOOD BY AUTOMATED COUNT: 12.6 % (ref 10–15)
ERYTHROCYTE [SEDIMENTATION RATE] IN BLOOD BY WESTERGREN METHOD: 6 MM/HR (ref 0–30)
GFR SERPL CREATININE-BSD FRML MDRD: >90 ML/MIN/1.73M2
GLUCOSE SERPL-MCNC: 100 MG/DL (ref 70–99)
HCT VFR BLD AUTO: 42.1 % (ref 35–47)
HGB BLD-MCNC: 14.1 G/DL (ref 11.7–15.7)
MCH RBC QN AUTO: 28.8 PG (ref 26.5–33)
MCHC RBC AUTO-ENTMCNC: 33.5 G/DL (ref 31.5–36.5)
MCV RBC AUTO: 86 FL (ref 78–100)
PLATELET # BLD AUTO: 146 10E3/UL (ref 150–450)
POTASSIUM SERPL-SCNC: 4.3 MMOL/L (ref 3.4–5.3)
PROT SERPL-MCNC: 6.8 G/DL (ref 6.4–8.3)
RBC # BLD AUTO: 4.9 10E6/UL (ref 3.8–5.2)
SODIUM SERPL-SCNC: 142 MMOL/L (ref 136–145)
WBC # BLD AUTO: 4.6 10E3/UL (ref 4–11)

## 2023-01-16 PROCEDURE — 86038 ANTINUCLEAR ANTIBODIES: CPT | Performed by: NURSE PRACTITIONER

## 2023-01-16 PROCEDURE — 36415 COLL VENOUS BLD VENIPUNCTURE: CPT | Performed by: NURSE PRACTITIONER

## 2023-01-16 PROCEDURE — 86140 C-REACTIVE PROTEIN: CPT | Performed by: NURSE PRACTITIONER

## 2023-01-16 PROCEDURE — 85027 COMPLETE CBC AUTOMATED: CPT | Performed by: NURSE PRACTITIONER

## 2023-01-16 PROCEDURE — 86431 RHEUMATOID FACTOR QUANT: CPT | Performed by: NURSE PRACTITIONER

## 2023-01-16 PROCEDURE — 99214 OFFICE O/P EST MOD 30 MIN: CPT | Performed by: NURSE PRACTITIONER

## 2023-01-16 PROCEDURE — 80053 COMPREHEN METABOLIC PANEL: CPT | Performed by: NURSE PRACTITIONER

## 2023-01-16 PROCEDURE — 85652 RBC SED RATE AUTOMATED: CPT | Performed by: NURSE PRACTITIONER

## 2023-01-16 RX ORDER — DULOXETIN HYDROCHLORIDE 30 MG/1
30 CAPSULE, DELAYED RELEASE ORAL DAILY
Qty: 90 CAPSULE | Refills: 3 | Status: SHIPPED | OUTPATIENT
Start: 2023-01-16 | End: 2023-05-23

## 2023-01-16 ASSESSMENT — ANXIETY QUESTIONNAIRES
2. NOT BEING ABLE TO STOP OR CONTROL WORRYING: SEVERAL DAYS
1. FEELING NERVOUS, ANXIOUS, OR ON EDGE: SEVERAL DAYS
GAD7 TOTAL SCORE: 7
7. FEELING AFRAID AS IF SOMETHING AWFUL MIGHT HAPPEN: SEVERAL DAYS
6. BECOMING EASILY ANNOYED OR IRRITABLE: NEARLY EVERY DAY
GAD7 TOTAL SCORE: 7
5. BEING SO RESTLESS THAT IT IS HARD TO SIT STILL: NOT AT ALL
3. WORRYING TOO MUCH ABOUT DIFFERENT THINGS: SEVERAL DAYS

## 2023-01-16 ASSESSMENT — PATIENT HEALTH QUESTIONNAIRE - PHQ9
SUM OF ALL RESPONSES TO PHQ QUESTIONS 1-9: 8
5. POOR APPETITE OR OVEREATING: NOT AT ALL

## 2023-01-16 NOTE — LETTER
[facility]  [address] [place] [id number]  Phone: [phone]  Fax: [phone]    January 16, 2023        Chyna Moncada  [address] [place] [id number]          To whom it may concern:    RE: Chyna Moncada    .lab    Please contact me for questions or concerns.      Sincerely,        GEORGE Al CNP

## 2023-01-16 NOTE — PROGRESS NOTES
Assessment & Plan     Multiple joint pain  Will obtain labs   - ESR: Erythrocyte sedimentation rate  - CRP, inflammation  - Rheumatoid factor  - Anti Nuclear Regina IgG by IFA with Reflex  - CBC with platelets  - Comprehensive metabolic panel (BMP + Alb, Alk Phos, ALT, AST, Total. Bili, TP)  - Comprehensive metabolic panel (BMP + Alb, Alk Phos, ALT, AST, Total. Bili, TP)  - CBC with platelets  - Anti Nuclear Regina IgG by IFA with Reflex  - Rheumatoid factor  - CRP, inflammation  - ESR: Erythrocyte sedimentation rate    Chronic pain of left knee  Xr pending   - XR Knee Standing AP Bilat & Lateral Left    Current moderate episode of major depressive disorder without prior episode (H)  Uncontrolled is not suicidal over whelmed will start Cymbalta    - DULoxetine (CYMBALTA) 30 MG capsule  Dispense: 90 capsule; Refill: 3    Low platelet count (H)  Rechecking today     Chronic obstructive pulmonary disease, unspecified COPD type (H)   Controlled no change in treatment plan    Peripheral vascular disease (H)   Controlled no change in treatment plan    SVT (supraventricular tachycardia) (H)   Controlled no change in treatment plan       Depression Screening Follow Up    PHQ 1/16/2023   PHQ-9 Total Score 8   Q9: Thoughts of better off dead/self-harm past 2 weeks Several days     Last PHQ-9 1/16/2023   1.  Little interest or pleasure in doing things 1   2.  Feeling down, depressed, or hopeless 2   3.  Trouble falling or staying asleep, or sleeping too much 2   4.  Feeling tired or having little energy 1   5.  Poor appetite or overeating 0   6.  Feeling bad about yourself 1   7.  Trouble concentrating 0   8.  Moving slowly or restless 0   Q9: Thoughts of better off dead/self-harm past 2 weeks 1   PHQ-9 Total Score 8         No flowsheet data found.      Follow Up      Follow Up Actions Taken  Crisis resource information provided in the After Visit Summary    Discussed the following ways the patient can remain in a safe  "environment:  be around others  See Patient Instructions    No follow-ups on file.    Nereida Jacobs, GEORGE CNP  [facility]    Deneen Clemente is a 72 year old, presenting for the following health issues:  Joint Pain and Results      HPI     Knee Pain    Onset: ongoing issue    Description:   Location: left knee  Character: Sharp    Intensity: moderate    Progression of Symptoms: worse    Accompanying Signs & Symptoms:  Other symptoms: none    History:   Previous similar pain: no       Precipitating factors:   Trauma or overuse: no     Alleviating factors:  Improved by: nothing    Therapies Tried and outcome: tyelnol        Joint Pain    Onset: about one year    Description:   Location: all of her joints  Character: Sharp    Intensity: moderate    Progression of Symptoms: worse    Accompanying Signs & Symptoms:  Other symptoms: none    History:   Previous similar pain: no       Precipitating factors:   Trauma or overuse: no     Alleviating factors:  Improved by: nothing    Therapies Tried and outcome: tylenol          Review of Systems   Constitutional, HEENT, cardiovascular, pulmonary, gi and gu systems are negative, except as otherwise noted.      Objective    /62 (BP Location: Right arm, Cuff Size: Adult Regular)   Pulse 84   Temp 97.2  F (36.2  C) (Tympanic)   Resp 18   Ht 1.657 m (5' 5.25\")   Wt 60.8 kg (134 lb)   SpO2 99%   BMI 22.13 kg/m    Body mass index is 22.13 kg/m .  Physical Exam   GENERAL: healthy, alert and no distress  EYES: Eyes grossly normal to inspection, PERRL and conjunctivae and sclerae normal  HENT: ear canals and TM's normal, nose and mouth without ulcers or lesions  RESP: lungs clear to auscultation - no rales, rhonchi or wheezes  CV: regular rate and rhythm, normal S1 S2, no S3 or S4, no murmur, click or rub, no peripheral edema and peripheral pulses strong  SKIN: no suspicious lesions or rashes  NEURO: Normal strength and tone, mentation intact " and speech normal  PSYCH: mentation appears normal, affect normal/bright      Inspection:       AP/lateral alignment normal      Non-tender: patellar facets, MCL, LCL, lateral joint line, medial joint line, IT band, posterior knee   Active Range of Motion: all normal  Strength: quad  5/5, Hamstrings  5/5, Gastroc  5/5, Tibialis anterior  5/5, Permeals  5/5 and core strength  5/5 hip abductors and other core muscles   Special tests: normal Valgus stress test, normal Varus, negative Lachman's test, negative pivot shift, negative posterior drawer, no posterolateral corner signs, negative Madison's, no apprehension with lateral stress of the patella.

## 2023-01-17 ENCOUNTER — ANCILLARY PROCEDURE (OUTPATIENT)
Dept: GENERAL RADIOLOGY | Facility: CLINIC | Age: 73
End: 2023-01-17
Attending: NURSE PRACTITIONER
Payer: COMMERCIAL

## 2023-01-17 DIAGNOSIS — M25.562 CHRONIC PAIN OF LEFT KNEE: ICD-10-CM

## 2023-01-17 DIAGNOSIS — G89.29 CHRONIC PAIN OF LEFT KNEE: ICD-10-CM

## 2023-01-17 LAB
ANA SER QL IF: NEGATIVE
RHEUMATOID FACT SER NEPH-ACNC: <7 IU/ML

## 2023-01-17 PROCEDURE — 73560 X-RAY EXAM OF KNEE 1 OR 2: CPT | Mod: TC | Performed by: RADIOLOGY

## 2023-01-20 ENCOUNTER — TELEPHONE (OUTPATIENT)
Dept: FAMILY MEDICINE | Facility: CLINIC | Age: 73
End: 2023-01-20
Payer: COMMERCIAL

## 2023-01-20 NOTE — TELEPHONE ENCOUNTER
Pt calls asking for results from lab work done on 1/16. Pt Plts 144 12/19 & 146 1/19. Notes from provider: will cont to monitor at next office visit. Pt verbalized understanding & would like copy of labs from 1/19 mailed to her.  Labs printed & put in mail.    Alexandra Verdin RN

## 2023-01-24 ENCOUNTER — HOSPITAL ENCOUNTER (OUTPATIENT)
Dept: MAMMOGRAPHY | Facility: CLINIC | Age: 73
Discharge: HOME | End: 2023-01-24
Attending: NURSE PRACTITIONER
Payer: COMMERCIAL

## 2023-01-24 ENCOUNTER — TELEPHONE (OUTPATIENT)
Dept: FAMILY MEDICINE | Facility: CLINIC | Age: 73
End: 2023-01-24

## 2023-01-24 ENCOUNTER — HOSPITAL ENCOUNTER (OUTPATIENT)
Dept: MRI IMAGING | Facility: CLINIC | Age: 73
Discharge: HOME | End: 2023-01-24
Attending: STUDENT IN AN ORGANIZED HEALTH CARE EDUCATION/TRAINING PROGRAM
Payer: COMMERCIAL

## 2023-01-24 DIAGNOSIS — Z12.31 VISIT FOR SCREENING MAMMOGRAM: ICD-10-CM

## 2023-01-24 DIAGNOSIS — M71.9 BURSITIS: ICD-10-CM

## 2023-01-24 PROCEDURE — 77067 SCR MAMMO BI INCL CAD: CPT

## 2023-01-24 PROCEDURE — 73721 MRI JNT OF LWR EXTRE W/O DYE: CPT | Mod: LT

## 2023-01-24 PROCEDURE — 73721 MRI JNT OF LWR EXTRE W/O DYE: CPT | Mod: 26 | Performed by: RADIOLOGY

## 2023-01-24 NOTE — TELEPHONE ENCOUNTER
Pt calling for 01-17-23 knee xray results:    Chyna,     Your knee did show some osteoarthritis I recommend following up with orthopedics.  I have placed the order they will contact you to schedule.  Nereida Jacobs CNP    Pt informed of results. Gave number for karly Ortega RN

## 2023-01-26 ENCOUNTER — HOSPITAL ENCOUNTER (OUTPATIENT)
Dept: PHYSICAL THERAPY | Facility: CLINIC | Age: 73
Setting detail: THERAPIES SERIES
Discharge: HOME | End: 2023-01-26
Attending: ORTHOPAEDIC SURGERY
Payer: COMMERCIAL

## 2023-01-26 DIAGNOSIS — M70.71 ISCHIAL BURSITIS, RIGHT: ICD-10-CM

## 2023-01-26 DIAGNOSIS — M76.899 HAMSTRING TENDONITIS: ICD-10-CM

## 2023-01-26 PROCEDURE — 97110 THERAPEUTIC EXERCISES: CPT | Mod: GP | Performed by: PHYSICAL THERAPIST

## 2023-01-26 PROCEDURE — 97140 MANUAL THERAPY 1/> REGIONS: CPT | Mod: GP | Performed by: PHYSICAL THERAPIST

## 2023-01-26 PROCEDURE — 97161 PT EVAL LOW COMPLEX 20 MIN: CPT | Mod: GP | Performed by: PHYSICAL THERAPIST

## 2023-01-26 NOTE — PROGRESS NOTES
[facility]    OUTPATIENT PHYSICAL THERAPY ORTHOPEDIC EVALUATION  PLAN OF TREATMENT FOR OUTPATIENT REHABILITATION  (COMPLETE FOR INITIAL CLAIMS ONLY)  Patient's Last Name, First Name, M.I.  YOB: 1950  Chyna Moncada    Provider s Name:  [facility]   Medical Record No.  [id number]   Start of Care Date:  01/26/23   Onset Date:  12/26/22   Type:     _X__PT   ___OT   ___SLP Medical Diagnosis:  Ischial bursitis, right (M70.71) Hamstring tendonitis (M76.899)     PT Diagnosis:  L hamstring strain   Visits from SOC:  1      _________________________________________________________________________________  Plan of Treatment/Functional Goals:  balance training, gait training, joint mobilization, manual therapy, neuromuscular re-education, ROM, strengthening, stretching     Cryotherapy, Electrical stimulation, Hot packs, TENS, Traction     Goals  Goal Identifier: walking  Goal Description: Pt will be able to walk w less than 1/10 hamstring pain  Target Date: 02/16/23    Goal Identifier: bending over  Goal Description: Pt will demonstrate full lumbar ROM w/o pain to  object off the ground  Target Date: 02/16/23    Goal Identifier: sitting  Goal Description: Pt will be able to sit w less than 2/10 pain  Target Date: 03/09/23    Goal Identifier: stairs  Goal Description: Pt will demonstrate 5/5 L LE strength to be able to ascend/descend stairs  Target Date: 03/09/23            Therapy Frequency:  1 time/week  Predicted Duration of Therapy Intervention:  6 weeks    Azul Ortega, PT                 I CERTIFY THE NEED FOR THESE SERVICES FURNISHED UNDER        THIS PLAN OF TREATMENT AND WHILE UNDER MY CARE .             Physician Signature               Date    X_____________________________________________________                         Certification Date From:  01/26/23    Certification Date To:  03/09/23    Referring Provider:  Daksha, DO    Initial Assessment        See Epic Evaluation Start of Care Date: 01/26/23 01/26/23 0900   General Information   Type of Visit Initial OP Ortho PT Evaluation   Start of Care Date 01/26/23   Referring Physician Daksha, DO   Patient/Family Goals Statement reduce pain, get active again   Orders Evaluate and Treat   Date of Order 01/09/23   Certification Required? Yes   Medical Diagnosis Ischial bursitis, right (M70.71) Hamstring tendonitis (M76.899)   Surgical/Medical history reviewed Yes   Precautions/Limitations no known precautions/limitations   General Information Comments PMH: ankle surgery, OA< osteoprorosis, stroke   Body Part(s)   Body Part(s) Hip;Lumbar Spine/SI   Presentation and Etiology   Pertinent history of current problem (include personal factors and/or comorbidities that impact the POC) Pt previously seen in December for LBP however pt relates that has pretty much resolved and she is now having pain in L hamstring/glute. Pt relates worse with standing/sitting, walking, stairs. Pt relates she did have an MRI of her hip. Pt relates she was offered injection but declined at this time. Pt also dealing with knee OA and will see ortho next week. Pt goal is to return to activity.   Impairments A. Pain;B. Decreased WB tolerance;D. Decreased ROM;H. Impaired gait   Functional Limitations perform activities of daily living   Symptom Location low back, L hamstring   How/Where did it occur Other   Onset date of current episode/exacerbation 12/26/22   Chronicity Recurrent   Pain rating (0-10 point scale) Best (/10);Worst (/10)   Best (/10) 3   Worst (/10) 8   Pain quality B. Dull;C. Aching;E. Shooting   Frequency of pain/symptoms A. Constant   Pain/symptoms exacerbated by A. Sitting;B. Walking;C. Lifting;G. Certain positions;I. Bending;J. ADL;K. Home tasks   Pain/symptoms eased by D. Nothing   Current Level of Function    Current Community Support Family/friend caregiver   Patient role/employment history F. Retired   Living environment House/[facility]   Fall Risk Screen   Fall screen completed by PT   Have you fallen 2 or more times in the past year? No   Have you fallen and had an injury in the past year? No   Is patient a fall risk? No   Abuse Screen (yes response referral indicated)   Feels Unsafe at Home or Work/School no   Feels Threatened by Someone no   Does Anyone Try to Keep You From Having Contact with Others or Doing Things Outside Your Home? no   Physical Signs of Abuse Present no   Functional Scales   Functional Scales Other   Other Scales  LEFS: 27/80   Hip Objective Findings   Gait/Locomotion shortened stride length   Balance/Proprioception (Single Leg Stance) L: 1-2 secs w pain R: 5+ secs   Side (if bilateral, select both right and left) Left   Left Hamstring Flexibility 80 deg   Left Hip Flexion PROM 120   Left Hip Abduction PROM 30 w pain   Left Hip Adduction PROM 10 - no pain   Left Hip ER PROM 45   Left Hip IR PROM 20   Left Hip Flexion Strength 4/5   Left Hip Abduction Strength 3/5   Left Hip IR Strength 3/5   Left Hip ER Strength 4/5   Left Knee Flexion Strength 3/5 w pain   Left Knee Extension Strength 5/5   Lumbar Spine/SI Objective Findings   Flexion ROM 10 in from floor iniitall but pulling in L   Planned Therapy Interventions   Planned Therapy Interventions balance training;gait training;joint mobilization;manual therapy;neuromuscular re-education;ROM;strengthening;stretching   Planned Modality Interventions   Planned Modality Interventions Cryotherapy;Electrical stimulation;Hot packs;TENS;Traction   Clinical Impression   Criteria for Skilled Therapeutic Interventions Met yes, treatment indicated   PT Diagnosis L hamstring strain   Influenced by the following impairments limited ROM, weakness, pain   Functional limitations due to impairments walking, stairs   Clinical Presentation Stable/Uncomplicated    Clinical Presentation Rationale Pt is 72 yr old female who presents with chronic LBP/hip pain. Pt relates LBP has improved however is having more pain in hamstring consistent w tendonitis. Pt is motived to get better but has been dealing with issues chronically   Clinical Decision Making (Complexity) Low complexity   Therapy Frequency 1 time/week   Predicted Duration of Therapy Intervention (days/wks) 6 weeks   Risk & Benefits of therapy have been explained Yes   Patient, Family & other staff in agreement with plan of care Yes   Education Assessment   Preferred Learning Style Listening;Reading;Pictures/video;Demonstration   Barriers to Learning No barriers   ORTHO GOALS   PT Ortho Eval Goals 1;2;3;4   Ortho Goal 1   Goal Identifier walking   Goal Description Pt will be able to walk w less than 1/10 hamstring pain   Target Date 02/16/23   Ortho Goal 2   Goal Identifier bending over   Goal Description Pt will demonstrate full lumbar ROM w/o pain to  object off the ground   Target Date 02/16/23   Ortho Goal 3   Goal Identifier sitting   Goal Description Pt will be able to sit w less than 2/10 pain   Target Date 03/09/23   Ortho Goal 4   Goal Identifier stairs   Goal Description Pt will demonstrate 5/5 L LE strength to be able to ascend/descend stairs   Target Date 03/09/23   Total Evaluation Time   PT Eval, Low Complexity Minutes (82524) 20   Therapy Certification   Certification date from 01/26/23   Certification date to 03/09/23   Medical Diagnosis Ischial bursitis, right (M70.71) Hamstring tendonitis (M76.899)

## 2023-01-26 NOTE — PROGRESS NOTES
[facility]    Outpatient Physical Therapy Discharge Note  Patient: Chyna Moncada  : 1950    Beginning/End Dates of Reporting Period:  22 to 22    Referring Provider:DO Liam Mixon Diagnosis: LBP     Client Self Report: Pt relates must have slept wrong. Pt is really sore.    Objective Measurements:  Objective Measure: Lumbar ROM               Goals:  Goal Identifier Standing - doing dishes   Goal Description Pt will be able to stand for 30 min w/o pain to be able to complete dishes   Target Date 22   Date Met      Progress (detail required for progress note):       Goal Identifier lumbar ROM - getting dressed   Goal Description Pt will demonstrate full lumbar ROM w/o pain to be able to get dressed.   Target Date 22   Date Met      Progress (detail required for progress note):       Goal Identifier walking   Goal Description Pt will be able to walk 30 min w/o pain to be able to take dog for a walk   Target Date 23   Date Met      Progress (detail required for progress note):       Goal Identifier sitting - hair done   Goal Description Pt will be able to sit for 60+ min to be able to get hair done.   Target Date 23   Date Met      Progress (detail required for progress note):       Goal Identifier Sleeping   Goal Description Pt will be able to lie flat w/o pain to be able to get  a restful nights sleep   Target Date 23   Date Met      Progress (detail required for progress note):           Pt has been seen for 3 visits over this POC. Pt continued to have pain thus was going to follow up with MD. Pt failed to return thus is being discharged at this time.         Plan:  Discharge from therapy.    Discharge:    Reason for Discharge: Patient has failed to schedule further appointments.    Equipment Issued: NA    Discharge Plan: Patient to continue home  program.

## 2023-01-31 NOTE — PROGRESS NOTES
"ASSESSMENT & PLAN    Chyna was seen today for pain.    Diagnoses and all orders for this visit:    Primary osteoarthritis of left knee  -     Orthopedic  Referral  -     Physical Therapy Referral; Future      This issue is acute and Unchanged.    Discussed nature of degenerative arthrosis of the knee. Discussed symptom treatment with over-the-counter medications, ice or heat, topical treatments, and rest if needed. Discussed use of sleeve or wrap for comfort. Discussed benefits of exercise and physical therapy. Discussed injection therapy. Also briefly discussed future consideration of referral to orthopedic surgery for further evaluation and discussion of arthroplasty.    Plan:  - Today's Plan of Care:  [phone]  Discussed activity considerations and other supportive care including Ice/Heat, OTC and other topical medications as needed.    -We also discussed other future treatment options:  Consideration of left knee corticosteroid injection    Follow Up: 6 - 8 weeks and as needed    Concerning signs and symptoms were reviewed.  The patient expressed understanding of this management plan and all questions were answered at this time.    Thanks for the opportunity to participate in the care of this patient, I will keep you updated on their progress.    CC: Nereida Ernandez MD [facility]  Sports Medicine Physician  [facility]      -----  Chief Complaint   Patient presents with     Left Knee - Pain       SUBJECTIVE  Chyna Moncada is a/an 72 year old female who is seen in consultation at the request of  Nereida Jacobs C.N.P. for evaluation of left knee pain.     The patient is seen by themselves.  The patient is Right handed    Onset: 2 month(s) ago. Patient describes injury as \"overdoing it\" after working on yard work in November   Location of Pain: left knee, over the anterior aspect of the knee  Worsened by: Use of " "the left knee, going up or down stairs, getting up out of a chair  Better with: Nothing  Treatments tried: XR on 1/24/23, rest/activity avoidance and Tylenol She is working on physical therapy for other locations of the body, she has not does physical therapy specifically for the left knee  Associated symptoms: no distal numbness or tingling; denies swelling or warmth    Orthopedic/Surgical history: NO  Social History/Occupation: She is retired and enjoys being active    No family history pertinent to patient's problem today.    REVIEW OF SYSTEMS:  Review of Systems  Skin: no bruising, no swelling  Musculoskeletal: as above  Neurologic: no numbness, paresthesias  Remainder of review of systems is negative including constitutional, CV, pulmonary, GI, except as noted in HPI or medical history.    OBJECTIVE:  /76   Pulse 80   Ht 1.67 m (5' 5.75\")   Wt 59.4 kg (131 lb)   BMI 21.31 kg/m     General: healthy, alert and in no distress  HEENT: no scleral icterus or conjunctival erythema  Skin: no suspicious lesions or rash. No jaundice.  CV: distal perfusion intact  Resp: normal respiratory effort without conversational dyspnea   Psych: normal mood and affect  Gait: NORMAL  Neuro: Normal light sensory exam of lower extremity    Bilateral Knee exam    Inspection:      no effusion, swelling of bruising bilateral    Patella:      Crepitus noted in the patellofemoral joint bilateral       + J Sign bilateral    Tender:      medial patellar border left       medial joint line left    Non Tender:      remainder of knee area bilateral    Knee ROM:      Full active and passive ROM with flexion and extension bilateral    Hip ROM:     Full active and passive ROM bilateral    Strength:      4+/5 with hip abduction left       5/5 with hip flexion bilateral       5/5 with hip adduction bilateral       5/5 with knee flexion bilateral    Special Tests:     neg (-) Madison left       neg (-) anterior drawer left       neg (-) " posterior drawer left       neg (-) varus at 0 deg and 30 deg left       neg (-) valgus at 0 deg and 30 deg left    Gait:      normal    Neurovascular:      2+ peripheral pulses bilaterally and brisk capillary refill       sensation grossly intact      RADIOLOGY:  I independently visualized and reviewed these images with the patient  AP bilateral and left lateral XR views of knees reviewed: no acute bony abnormality, mild degenerative changes worse on the right including chondromalacia  - will follow official read    Review of the result(s) of each unique test - XR

## 2023-02-01 ENCOUNTER — OFFICE VISIT (OUTPATIENT)
Dept: ORTHOPEDICS | Facility: CLINIC | Age: 73
End: 2023-02-01
Attending: NURSE PRACTITIONER
Payer: COMMERCIAL

## 2023-02-01 VITALS
HEIGHT: 66 IN | SYSTOLIC BLOOD PRESSURE: 113 MMHG | BODY MASS INDEX: 21.05 KG/M2 | WEIGHT: 131 LBS | HEART RATE: 80 BPM | DIASTOLIC BLOOD PRESSURE: 76 MMHG

## 2023-02-01 DIAGNOSIS — M17.12 PRIMARY OSTEOARTHRITIS OF LEFT KNEE: ICD-10-CM

## 2023-02-01 PROCEDURE — 99203 OFFICE O/P NEW LOW 30 MIN: CPT | Performed by: PEDIATRICS

## 2023-02-01 NOTE — PATIENT INSTRUCTIONS
Discussed nature of degenerative arthrosis of the knee. Discussed symptom treatment with over-the-counter medications, ice or heat, topical treatments, and rest if needed. Discussed use of sleeve or wrap for comfort. Discussed benefits of exercise and physical therapy. Discussed injection therapy. Also briefly discussed future consideration of referral to orthopedic surgery for further evaluation and discussion of arthroplasty.    Plan:  - Today's Plan of Care:  [phone]  Discussed activity considerations and other supportive care including Ice/Heat, OTC and other topical medications as needed.    -We also discussed other future treatment options:  Consideration of left knee corticosteroid injection    Follow Up: 6 - 8 weeks and as needed    If you have any further questions for your physician or physician s care team you can call [phone] and use option 3 to leave a voice message.

## 2023-02-01 NOTE — Clinical Note
I just saw Chyna for her left knee, more PF pain but she does have arthritis. I added on a PT referral for he knee if needed, though I imagine you're working on hip strength given her hip issues as well. I told her you'd let her know if she needs a new evaluation. Thanks and let me know if you have any concerns. Tiffany

## 2023-02-01 NOTE — LETTER
2/1/2023         RE: Chyna Moncada  [address] [facility] [address]        Dear Colleague,    Thank you for referring your patient, Chyna Moncada, to the [facility] [place]. Please see a copy of my visit note below.    ASSESSMENT & PLAN    Chyna was seen today for pain.    Diagnoses and all orders for this visit:    Primary osteoarthritis of left knee  -     Orthopedic  Referral  -     Physical Therapy Referral; Future      This issue is acute and Unchanged.    Discussed nature of degenerative arthrosis of the knee. Discussed symptom treatment with over-the-counter medications, ice or heat, topical treatments, and rest if needed. Discussed use of sleeve or wrap for comfort. Discussed benefits of exercise and physical therapy. Discussed injection therapy. Also briefly discussed future consideration of referral to orthopedic surgery for further evaluation and discussion of arthroplasty.    Plan:  - Today's Plan of Care:  Rehab: Physical Therapy: [facility] - [phone]  Discussed activity considerations and other supportive care including Ice/Heat, OTC and other topical medications as needed.    -We also discussed other future treatment options:  Consideration of left knee corticosteroid injection    Follow Up: 6 - 8 weeks and as needed    Concerning signs and symptoms were reviewed.  The patient expressed understanding of this management plan and all questions were answered at this time.    Thanks for the opportunity to participate in the care of this patient, I will keep you updated on their progress.    CC: Nereida Ernandez MD [facility]  Sports Medicine Physician  [facility]      -----  Chief Complaint   Patient presents with     Left Knee - Pain       SUBJECTIVE  Chyna Moncada is a/an 72 year old female who is seen in consultation at the request of  Nereida Jacobs C.N.P. for evaluation of left  "knee pain.     The patient is seen by themselves.  The patient is Right handed    Onset: 2 month(s) ago. Patient describes injury as \"overdoing it\" after working on yard work in November   Location of Pain: left knee, over the anterior aspect of the knee  Worsened by: Use of the left knee, going up or down stairs, getting up out of a chair  Better with: Nothing  Treatments tried: XR on 1/24/23, rest/activity avoidance and Tylenol She is working on physical therapy for other locations of the body, she has not does physical therapy specifically for the left knee  Associated symptoms: no distal numbness or tingling; denies swelling or warmth    Orthopedic/Surgical history: NO  Social History/Occupation: She is retired and enjoys being active    No family history pertinent to patient's problem today.    REVIEW OF SYSTEMS:  Review of Systems  Skin: no bruising, no swelling  Musculoskeletal: as above  Neurologic: no numbness, paresthesias  Remainder of review of systems is negative including constitutional, CV, pulmonary, GI, except as noted in HPI or medical history.    OBJECTIVE:  /76   Pulse 80   Ht 1.67 m (5' 5.75\")   Wt 59.4 kg (131 lb)   BMI 21.31 kg/m     General: healthy, alert and in no distress  HEENT: no scleral icterus or conjunctival erythema  Skin: no suspicious lesions or rash. No jaundice.  CV: distal perfusion intact  Resp: normal respiratory effort without conversational dyspnea   Psych: normal mood and affect  Gait: NORMAL  Neuro: Normal light sensory exam of lower extremity    Bilateral Knee exam    Inspection:      no effusion, swelling of bruising bilateral    Patella:      Crepitus noted in the patellofemoral joint bilateral       + J Sign bilateral    Tender:      medial patellar border left       medial joint line left    Non Tender:      remainder of knee area bilateral    Knee ROM:      Full active and passive ROM with flexion and extension bilateral    Hip ROM:     Full active and " passive ROM bilateral    Strength:      4+/5 with hip abduction left       5/5 with hip flexion bilateral       5/5 with hip adduction bilateral       5/5 with knee flexion bilateral    Special Tests:     neg (-) [place] left       neg (-) anterior drawer left       neg (-) posterior drawer left       neg (-) varus at 0 deg and 30 deg left       neg (-) valgus at 0 deg and 30 deg left    Gait:      normal    Neurovascular:      2+ peripheral pulses bilaterally and brisk capillary refill       sensation grossly intact      RADIOLOGY:  I independently visualized and reviewed these images with the patient  AP bilateral and left lateral XR views of knees reviewed: no acute bony abnormality, mild degenerative changes worse on the right including chondromalacia  - will follow official read    Review of the result(s) of each unique test - XR               Again, thank you for allowing me to participate in the care of your patient.        Sincerely,        Tiffany Ernandez MD

## 2023-02-02 ENCOUNTER — HOSPITAL ENCOUNTER (OUTPATIENT)
Dept: PHYSICAL THERAPY | Facility: CLINIC | Age: 73
Setting detail: THERAPIES SERIES
Discharge: HOME | End: 2023-02-02
Attending: ORTHOPAEDIC SURGERY
Payer: COMMERCIAL

## 2023-02-02 PROCEDURE — 97110 THERAPEUTIC EXERCISES: CPT | Mod: GP | Performed by: PHYSICAL THERAPIST

## 2023-02-02 PROCEDURE — 97140 MANUAL THERAPY 1/> REGIONS: CPT | Mod: GP | Performed by: PHYSICAL THERAPIST

## 2023-02-16 ENCOUNTER — HOSPITAL ENCOUNTER (OUTPATIENT)
Dept: PHYSICAL THERAPY | Facility: CLINIC | Age: 73
Setting detail: THERAPIES SERIES
Discharge: HOME | End: 2023-02-16
Attending: ORTHOPAEDIC SURGERY
Payer: COMMERCIAL

## 2023-02-16 PROCEDURE — 97110 THERAPEUTIC EXERCISES: CPT | Mod: GP | Performed by: PHYSICAL THERAPIST

## 2023-03-02 ENCOUNTER — HOSPITAL ENCOUNTER (OUTPATIENT)
Dept: PHYSICAL THERAPY | Facility: CLINIC | Age: 73
Setting detail: THERAPIES SERIES
Discharge: HOME | End: 2023-03-02
Attending: ORTHOPAEDIC SURGERY
Payer: COMMERCIAL

## 2023-03-02 PROCEDURE — 97110 THERAPEUTIC EXERCISES: CPT | Mod: GP | Performed by: PHYSICAL THERAPIST

## 2023-03-07 ENCOUNTER — TRANSFERRED RECORDS (OUTPATIENT)
Dept: HEALTH INFORMATION MANAGEMENT | Facility: CLINIC | Age: 73
End: 2023-03-07
Payer: COMMERCIAL

## 2023-03-09 ENCOUNTER — HOSPITAL ENCOUNTER (OUTPATIENT)
Dept: PHYSICAL THERAPY | Facility: CLINIC | Age: 73
Setting detail: THERAPIES SERIES
Discharge: HOME | End: 2023-03-09
Attending: ORTHOPAEDIC SURGERY
Payer: COMMERCIAL

## 2023-03-09 PROCEDURE — 97110 THERAPEUTIC EXERCISES: CPT | Mod: GP | Performed by: PHYSICAL THERAPIST

## 2023-03-09 PROCEDURE — 97140 MANUAL THERAPY 1/> REGIONS: CPT | Mod: GP | Performed by: PHYSICAL THERAPIST

## 2023-03-09 NOTE — PROGRESS NOTES
[facility]    OUTPATIENT PHYSICAL THERAPY  PLAN OF TREATMENT FOR OUTPATIENT REHABILITATION AND PROGRESS/DISCHARGE NOTE           Patient's Last Name, First Name, Chyna Porter Date of Birth  1950   Provider's Name  [facility] Medical Record No.  [id number]    Onset Date  12/26/22 Start of Care Date  1/26/22   Type:     _X_PT   ___OT   ___SLP Medical Diagnosis  Ischial bursitis, right (M70.71) Hamstring tendonitis (M76.899)   PT Diagnosis  L hamstring strain Plan of Treatment  Frequency/Duration: 1x/week  Certification date from 1/26/23 to 3/9/23     Goals:  Goal Identifier walking   Goal Description Pt will be able to walk w less than 1/10 hamstring pain   Target Date 02/16/23   Date Met      Progress (detail required for progress note): can grocery shop w min soreness     Goal Identifier bending over   Goal Description Pt will demonstrate full lumbar ROM w/o pain to  object off the ground   Target Date 02/16/23   Date Met      Progress (detail required for progress note): mod pain - stops at ankles     Goal Identifier sitting   Goal Description Pt will be able to sit w less than 2/10 pain   Target Date 03/09/23   Date Met      Progress (detail required for progress note): has to adjust     Goal Identifier stairs   Goal Description Pt will demonstrate 5/5 L LE strength to be able to ascend/descend stairs   Target Date 03/09/23   Date Met      Progress (detail required for progress note): able to complete but sore           Beginning/End Dates of Progress Note Reporting Period:  1/26/23 to 3/9/23    Progress Toward Goals:   Progress this reporting period: Pt has been seen 5 visits over this POC. Min progress had been seen thus pt was referred back to MD. Pt will get injection and continue HEP. Plan for pt to return after injection to continue  with strengthening and stretching as pt is able to tolerate    Client Self (Subjective) Report for Progress Note Reporting Period: Pt saw MD and her hamstring tendon is deteriorating. Pt will get injection. Pt is to continue PT      Objective Measurements:   Objective Measure: L knee  Details: 0-5-115    Objective Measure: Palpation  Details: TTP L ITB    Objective Measure: Slump  Details: negative    Objective Measure: Gait  Details: WFL - pain in L hamstring w mid stance                      I CERTIFY THE NEED FOR THESE SERVICES FURNISHED UNDER        THIS PLAN OF TREATMENT AND WHILE UNDER MY CARE .             Physician Signature               Date    X_____________________________________________________                  Referring Provider: DO Azul Mixon, PT          DISCHARGE  Reason for Discharge: Patient has failed to schedule further appointments.    Equipment Issued: NA    Discharge Plan: Patient to continue home program.    Referring Provider:  Maxim Aguilar

## 2023-03-10 ENCOUNTER — TRANSCRIBE ORDERS (OUTPATIENT)
Dept: OTHER | Age: 73
End: 2023-03-10

## 2023-03-10 DIAGNOSIS — M70.71 ISCHIAL BURSITIS, RIGHT: Primary | ICD-10-CM

## 2023-03-10 DIAGNOSIS — M67.90 TENDINOPATHY: ICD-10-CM

## 2023-03-28 ENCOUNTER — TRANSFERRED RECORDS (OUTPATIENT)
Dept: HEALTH INFORMATION MANAGEMENT | Facility: CLINIC | Age: 73
End: 2023-03-28

## 2023-04-27 ENCOUNTER — OFFICE VISIT (OUTPATIENT)
Dept: FAMILY MEDICINE | Facility: CLINIC | Age: 73
End: 2023-04-27
Payer: COMMERCIAL

## 2023-04-27 VITALS
BODY MASS INDEX: 22.18 KG/M2 | SYSTOLIC BLOOD PRESSURE: 120 MMHG | OXYGEN SATURATION: 100 % | HEIGHT: 66 IN | HEART RATE: 84 BPM | DIASTOLIC BLOOD PRESSURE: 60 MMHG | TEMPERATURE: 97 F | WEIGHT: 138 LBS | RESPIRATION RATE: 16 BRPM

## 2023-04-27 DIAGNOSIS — M25.50 MULTIPLE JOINT PAIN: Primary | ICD-10-CM

## 2023-04-27 DIAGNOSIS — D69.6 LOW PLATELET COUNT (H): ICD-10-CM

## 2023-04-27 DIAGNOSIS — Z12.11 SPECIAL SCREENING FOR MALIGNANT NEOPLASMS, COLON: ICD-10-CM

## 2023-04-27 DIAGNOSIS — L98.9 SKIN LESIONS: ICD-10-CM

## 2023-04-27 DIAGNOSIS — M54.50 LUMBAR PAIN: ICD-10-CM

## 2023-04-27 LAB
ANION GAP SERPL CALCULATED.3IONS-SCNC: 6 MMOL/L (ref 7–15)
BUN SERPL-MCNC: 13.7 MG/DL (ref 8–23)
CALCIUM SERPL-MCNC: 9.8 MG/DL (ref 8.8–10.2)
CHLORIDE SERPL-SCNC: 106 MMOL/L (ref 98–107)
CREAT SERPL-MCNC: 0.75 MG/DL (ref 0.51–0.95)
DEPRECATED HCO3 PLAS-SCNC: 32 MMOL/L (ref 22–29)
ERYTHROCYTE [DISTWIDTH] IN BLOOD BY AUTOMATED COUNT: 13 % (ref 10–15)
GFR SERPL CREATININE-BSD FRML MDRD: 84 ML/MIN/1.73M2
GLUCOSE SERPL-MCNC: 77 MG/DL (ref 70–99)
HCT VFR BLD AUTO: 40.2 % (ref 35–47)
HGB BLD-MCNC: 13 G/DL (ref 11.7–15.7)
MCH RBC QN AUTO: 28.4 PG (ref 26.5–33)
MCHC RBC AUTO-ENTMCNC: 32.3 G/DL (ref 31.5–36.5)
MCV RBC AUTO: 88 FL (ref 78–100)
PLATELET # BLD AUTO: 154 10E3/UL (ref 150–450)
POTASSIUM SERPL-SCNC: 4.7 MMOL/L (ref 3.4–5.3)
RBC # BLD AUTO: 4.57 10E6/UL (ref 3.8–5.2)
SODIUM SERPL-SCNC: 144 MMOL/L (ref 136–145)
WBC # BLD AUTO: 4.6 10E3/UL (ref 4–11)

## 2023-04-27 PROCEDURE — 88305 TISSUE EXAM BY PATHOLOGIST: CPT | Performed by: DERMATOLOGY

## 2023-04-27 PROCEDURE — 80048 BASIC METABOLIC PNL TOTAL CA: CPT | Performed by: NURSE PRACTITIONER

## 2023-04-27 PROCEDURE — 11301 SHAVE SKIN LESION 0.6-1.0 CM: CPT | Performed by: NURSE PRACTITIONER

## 2023-04-27 PROCEDURE — 36415 COLL VENOUS BLD VENIPUNCTURE: CPT | Performed by: NURSE PRACTITIONER

## 2023-04-27 PROCEDURE — 99214 OFFICE O/P EST MOD 30 MIN: CPT | Mod: 25 | Performed by: NURSE PRACTITIONER

## 2023-04-27 PROCEDURE — 85027 COMPLETE CBC AUTOMATED: CPT | Performed by: NURSE PRACTITIONER

## 2023-04-27 ASSESSMENT — PAIN SCALES - GENERAL: PAINLEVEL: EXTREME PAIN (8)

## 2023-04-27 NOTE — PROGRESS NOTES
Assessment & Plan     (M25.50) Multiple joint pain  (primary encounter diagnosis)  Comment:   Plan: REVIEW OF HEALTH MAINTENANCE PROTOCOL ORDERS,         Basic metabolic panel  (Ca, Cl, CO2, Creat,         Gluc, K, Na, BUN)            (M54.50) Lumbar pain  Comment:  Continue to monitor   Plan:     (D69.6) Low platelet count (H)  Comment:resloved Plan: CBC with platelets      (L98.9) Skin lesions  Comment: bx completed   Plan: Dermatological Path Order and Indications            (Z12.11) Special screening for malignant neoplasms, colon  Comment:   Plan: Colonoscopy Screening  Referral    Procedure: : After informed consent the lesion was cleansed with Betadyne and injected with 1%Xylocaine with Epi.  The entire lesion was shaved off with a sharp razor blade and the base cauterized. THe lesion was  sent for pathology.    GEORGE Al CNP [facility]    Deneen Clemente is a 72 year old, presenting for the following health issues:  Breast Problem and Gastrointestinal Problem        4/27/2023     7:10 AM   Additional Questions   Roomed by Genna Gillespie     HPI     She would like to discuss CBD cream to help with some of her aches and pains.    Patient has a small growth on her right breast that she would like checked. She just noticed it last week. It does not hurt, it has not changed since she first noticed it.    Patient states that her bowel movements have been softer than normal for her lately. She would like to discuss fiber supplements.    Back Pain       Duration: a couple months        Specific cause: none    Description:   Location of pain: low back left  Character of pain: sharp and dull ache  Pain radiation:none  New numbness or weakness in legs, not attributed to pain:  no     Intensity: moderate    History:   Pain interferes with job: Not applicable  History of back problems: Yes  Any previous MRI or X-rays: Yes- at [facility].   Sees a specialist for back pain:  " No  Therapies tried without relief: ice, tylenol, heat    Alleviating factors:   Improved by: nothing      Precipitating factors:  Worsened by: lying on side, moving in general      Accompanying Signs & Symptoms:  Risk of Fracture:  None  Risk of Cauda Equina:  None  Risk of Infection:  None  Risk of Cancer:  None  Risk of Ankylosing Spondylitis:  Onset at age <35, male, AND morning back stiffness. no            Review of Systems   Constitutional, HEENT, cardiovascular, pulmonary, gi and gu systems are negative, except as otherwise noted.      Objective    /60 (BP Location: Right arm, Cuff Size: Adult Regular)   Pulse 84   Temp 97  F (36.1  C) (Tympanic)   Resp 16   Ht 1.67 m (5' 5.75\")   Wt 62.6 kg (138 lb)   SpO2 100%   BMI 22.44 kg/m    There is no height or weight on file to calculate BMI.  Physical Exam   GENERAL: healthy, alert and no distress  EYES: Eyes grossly normal to inspection, PERRL and conjunctivae and sclerae normal  HENT: ear canals and TM's normal, nose and mouth without ulcers or lesions  NECK: no adenopathy, no asymmetry, masses, or scars and thyroid normal to palpation  RESP: lungs clear to auscultation - no rales, rhonchi or wheezes  BREAST: normal without masses, tenderness or nipple discharge and no palpable axillary masses or adenopathy  BREAST: 2 mm lesion right breast   CV: regular rate and rhythm, normal S1 S2, no S3 or S4, no murmur, click or rub, no peripheral edema and peripheral pulses strong  ABDOMEN: soft, nontender, no hepatosplenomegaly, no masses and bowel sounds normal  MS: no gross musculoskeletal defects noted, no edema  SKIN: no suspicious lesions or rashes  NEURO: Normal strength and tone, mentation intact and speech normal  PSYCH: mentation appears normal, affect normal/bright    Tender:  lumbar spinous processes, left para lumbar muscles, right para lumbar muscles  Non-tender:  thoracic spinous processes, thoracic facet joints, left parathoracic muscles, " right parathoracic muscles  Range of Motion:  left lateral thoracic bending   full, right lateral thoracic bending  full, left thoracic rotation  full, right thoracic rotation  full, lumbar flexion  decreased, lumbar extension  decreased, left lateral lumbar bending  decreased, right lateral lumbar bending  decreased, left lateral lumbar rotation  decreased, right lateral lumbar rotation  decreased  Strength:  able to heel walk  Special tests:  negative straight leg raises    Hip Exam: Hip ROM full    Results for orders placed or performed in visit on 04/27/23   Basic metabolic panel  (Ca, Cl, CO2, Creat, Gluc, K, Na, BUN)     Status: Abnormal   Result Value Ref Range    Sodium 144 136 - 145 mmol/L    Potassium 4.7 3.4 - 5.3 mmol/L    Chloride 106 98 - 107 mmol/L    Carbon Dioxide (CO2) 32 (H) 22 - 29 mmol/L    Anion Gap 6 (L) 7 - 15 mmol/L    Urea Nitrogen 13.7 8.0 - 23.0 mg/dL    Creatinine 0.75 0.51 - 0.95 mg/dL    Calcium 9.8 8.8 - 10.2 mg/dL    Glucose 77 70 - 99 mg/dL    GFR Estimate 84 >60 mL/min/1.73m2   CBC with platelets     Status: Normal   Result Value Ref Range    WBC Count 4.6 4.0 - 11.0 10e3/uL    RBC Count 4.57 3.80 - 5.20 10e6/uL    Hemoglobin 13.0 11.7 - 15.7 g/dL    Hematocrit 40.2 35.0 - 47.0 %    MCV 88 78 - 100 fL    MCH 28.4 26.5 - 33.0 pg    MCHC 32.3 31.5 - 36.5 g/dL    RDW 13.0 10.0 - 15.0 %    Platelet Count 154 150 - 450 10e3/uL   Dermatological Path Order and Indications     Status: None   Result Value Ref Range    Case Report       Surgical Pathology Report                         Case: [id number]                                  Authorizing Provider:  Nereida Jacobs APRN CNP Collected:           04/27/2023 08:02 AM          Ordering Location:     [facility]   Received:            04/27/2023 08:09 AM                                 [place]                                                                 Pathologist:           Buddy Kenny MD                      "                                    Specimen:    Skin, 3 mm disclored lesion to right breast                                                Final Diagnosis       A. Right breast:  - Seborrheic keratosis, inflamed - (see description)      Clinical Information       The patient is a 72 year old female.        Gross Description       A(1). Skin, 3 mm disclored lesion to right breast:  The specimen is received in formalin with proper patient identification, labeled \"3 mm discolored lesion to right breast\".  The specimen consists of a 0.6 x 0.6 cm irregular tan skin shave.  The skin surface contains a flat brown lesion measuring 0.1 x 0.1 cm.  The resection margin is inked, the specimen is bisected and submitted entirely in cassette A1.       Microscopic Description       The specimen exhibits compact orthokeratosis, papillomatous epidermal hyperplasia with horn cyst formation and a patchy lichenoid lymphocytic infiltrate.  There is no evidence of malignancy.      Performing Labs       The technical component of this testing was completed at [facility]     '         "

## 2023-04-27 NOTE — LETTER
"May 1, 2023      Chyna Moncada  [address] [facility] [address]        Dear ,    We are writing to inform you of your test results.    CBC was within normal limits with normal hemoglobin levels and basic metabolic panel was within normal limits with normal renal functions and electrolyte balance.     Resulted Orders   Dermatological Path Order and Indications   Result Value Ref Range    Case Report       Surgical Pathology Report                         Case: [id number]                                  Authorizing Provider:  Nereida Jacobs APRN CNP Collected:           04/27/2023 08:02 AM          Ordering Location:     [facility]   Received:            04/27/2023 08:09 AM                                 [place]                                                                 Pathologist:           Buddy Kenny MD                                                         Specimen:    Skin, 3 mm disclored lesion to right breast                                                Final Diagnosis       A. Right breast:  - Seborrheic keratosis, inflamed - (see description)      Clinical Information       The patient is a 72 year old female.        Gross Description       A(1). Skin, 3 mm disclored lesion to right breast:  The specimen is received in formalin with proper patient identification, labeled \"3 mm discolored lesion to right breast\".  The specimen consists of a 0.6 x 0.6 cm irregular tan skin shave.  The skin surface contains a flat brown lesion measuring 0.1 x 0.1 cm.  The resection margin is inked, the specimen is bisected and submitted entirely in cassette A1.       Microscopic Description       The specimen exhibits compact orthokeratosis, papillomatous epidermal hyperplasia with horn cyst formation and a patchy lichenoid lymphocytic infiltrate.  There is no evidence of malignancy.      Performing Labs       The technical component of this testing was completed " at [facility]       Basic metabolic panel  (Ca, Cl, CO2, Creat, Gluc, K, Na, BUN)   Result Value Ref Range    Sodium 144 136 - 145 mmol/L    Potassium 4.7 3.4 - 5.3 mmol/L    Chloride 106 98 - 107 mmol/L    Carbon Dioxide (CO2) 32 (H) 22 - 29 mmol/L    Anion Gap 6 (L) 7 - 15 mmol/L    Urea Nitrogen 13.7 8.0 - 23.0 mg/dL    Creatinine 0.75 0.51 - 0.95 mg/dL    Calcium 9.8 8.8 - 10.2 mg/dL    Glucose 77 70 - 99 mg/dL    GFR Estimate 84 >60 mL/min/1.73m2      Comment:      eGFR calculated using 2021 CKD-EPI equation.   CBC with platelets   Result Value Ref Range    WBC Count 4.6 4.0 - 11.0 10e3/uL    RBC Count 4.57 3.80 - 5.20 10e6/uL    Hemoglobin 13.0 11.7 - 15.7 g/dL    Hematocrit 40.2 35.0 - 47.0 %    MCV 88 78 - 100 fL    MCH 28.4 26.5 - 33.0 pg    MCHC 32.3 31.5 - 36.5 g/dL    RDW 13.0 10.0 - 15.0 %    Platelet Count 154 150 - 450 10e3/uL       If you have any questions or concerns, please call the clinic at the number listed above.       Sincerely,      GEORGE lA CNP

## 2023-04-27 NOTE — LETTER
"May 1, 2023      Chyna Moncada  [address] [facility] [address]        Dear ,    We are writing to inform you of your test results.    Skin biopsy does show benign cyst of seborrheic keratosis.  No further intervention.     Resulted Orders   Dermatological Path Order and Indications   Result Value Ref Range    Case Report       Surgical Pathology Report                         Case: [id number]                                  Authorizing Provider:  Nereida Jacobs APRN CNP Collected:           04/27/2023 08:02 AM          Ordering Location:     [facility]   Received:            04/27/2023 08:09 AM                                 [place]                                                                 Pathologist:           Buddy Kenny MD                                                         Specimen:    Skin, 3 mm disclored lesion to right breast                                                Final Diagnosis       A. Right breast:  - Seborrheic keratosis, inflamed - (see description)      Clinical Information       The patient is a 72 year old female.        Gross Description       A(1). Skin, 3 mm disclored lesion to right breast:  The specimen is received in formalin with proper patient identification, labeled \"3 mm discolored lesion to right breast\".  The specimen consists of a 0.6 x 0.6 cm irregular tan skin shave.  The skin surface contains a flat brown lesion measuring 0.1 x 0.1 cm.  The resection margin is inked, the specimen is bisected and submitted entirely in cassette A1.       Microscopic Description       The specimen exhibits compact orthokeratosis, papillomatous epidermal hyperplasia with horn cyst formation and a patchy lichenoid lymphocytic infiltrate.  There is no evidence of malignancy.      Performing Labs       The technical component of this testing was completed at [facility]       Basic metabolic panel  (Ca, Cl, CO2, Creat, Gluc, K, Na, BUN)   Result Value Ref Range    Sodium 144 136 - 145 mmol/L    Potassium 4.7 3.4 - 5.3 mmol/L    Chloride 106 98 - 107 mmol/L    Carbon Dioxide (CO2) 32 (H) 22 - 29 mmol/L    Anion Gap 6 (L) 7 - 15 mmol/L    Urea Nitrogen 13.7 8.0 - 23.0 mg/dL    Creatinine 0.75 0.51 - 0.95 mg/dL    Calcium 9.8 8.8 - 10.2 mg/dL    Glucose 77 70 - 99 mg/dL    GFR Estimate 84 >60 mL/min/1.73m2      Comment:      eGFR calculated using 2021 CKD-EPI equation.   CBC with platelets   Result Value Ref Range    WBC Count 4.6 4.0 - 11.0 10e3/uL    RBC Count 4.57 3.80 - 5.20 10e6/uL    Hemoglobin 13.0 11.7 - 15.7 g/dL    Hematocrit 40.2 35.0 - 47.0 %    MCV 88 78 - 100 fL    MCH 28.4 26.5 - 33.0 pg    MCHC 32.3 31.5 - 36.5 g/dL    RDW 13.0 10.0 - 15.0 %    Platelet Count 154 150 - 450 10e3/uL       If you have any questions or concerns, please call the clinic at the number listed above.       Sincerely,      GEORGE lA CNP

## 2023-04-29 LAB
PATH REPORT.COMMENTS IMP SPEC: NORMAL
PATH REPORT.COMMENTS IMP SPEC: NORMAL
PATH REPORT.FINAL DX SPEC: NORMAL
PATH REPORT.GROSS SPEC: NORMAL
PATH REPORT.MICROSCOPIC SPEC OTHER STN: NORMAL
PATH REPORT.RELEVANT HX SPEC: NORMAL

## 2023-05-09 ENCOUNTER — TELEPHONE (OUTPATIENT)
Dept: SURGERY | Facility: CLINIC | Age: 73
End: 2023-05-09
Payer: COMMERCIAL

## 2023-05-09 NOTE — TELEPHONE ENCOUNTER
Screening Questions  BLUE  KIND OF PREP RED  LOCATION [review exclusion criteria] GREEN  SEDATION TYPE        N Are you active on mychart?       Arlene Ordering/Referring Provider?        BCBS/ Medicare What type of coverage do you have?      N Have you had a positive covid test in the last 14 days?     22.44 1. BMI  [BMI 40+ - review exclusion criteria& smart-phrase document]    Y  2. Are you able to give consent for your medical care? [IF NO,RN REVIEW]          N  3. Are you taking any prescription pain medications on a routine schedule   (ex narcotics: oxycodone, roxicodone, oxycontin,  and percocet)? [RN Review]        N  3a. EXTENDED PREP What kind of prescription?     N 4. Do you have any chemical dependencies such as alcohol, street drugs, or methadone?        **If yes 3- 5 , please schedule with MAC sedation.**          IF YES TO ANY 6 - 10 - HOSPITAL SETTING ONLY.     N 6.   Do you need assistance transferring?     N 7.   Have you had a heart or lung transplant?    N 8.   Are you currently on dialysis?   n 9.   Do you use daily home oxygen?   N 10. Do you take nitroglycerin?   10a. N If yes, how often?     11. [FEMALES]  N Are you currently pregnant?    11a. N If yes, how many weeks? [ Greater than 12 weeks, OR NEEDED]    N 12. Do you have Pulmonary Hypertension? *NEED PAC APPT AT [facility] w/ MAC*     N 13. [review exclusion criteria]  Do you have any implantable devices in your body (pacemaker, defib, LVAD)?    N 14. In the past 6 months, have you had any heart related issues including cardiomyopathy or heart attack?     14a. N If yes, did it require cardiac stenting if so when?     N 15. Have you had a stroke or Transient ischemic attack (TIA - aka  mini stroke ) within 6 months?      N 16. Do you have mod to severe Obstructive Sleep Apnea?  [Hospital only]    N 17. Do you have SEVERE AND UNCONTROLLED asthma? *NEED PAC APPT AT [facility] w/MAC*     18. Are you currently taking any blood thinners?     18a. No.  "Continue to 19.   18b. Yes/no Blood Thinner: No [CONTINUE TO #19]    N 19. Do you take the medication Phentermine?    19a. If yes, \"Hold for 7 days before procedure.  Please consult your prescribing provider if you have questions about holding this medication.\"     N  20. Do you have chronic kidney disease?      N  21. Do you have a diagnosis of diabetes?     N  22. On a regular basis do you go 3-5 days between bowel movements?     See below 23. Preferred LOCAL Pharmacy for Pre Prescription    [ LIST ONLY ONE PHARMACY]        [place] [id number]        - CLOSING REMINDERS -    Informed patient they will need an adult    Cannot take any type of public or medical transportation alone    Conscious Sedation- Needs  for 6 hours after the procedure       MAC/General-Needs  for 24 hours after procedure    Pre-Procedure Covid test to be completed [[facility] PCR Testing Required]    Confirmed Nurse will call to complete assessment       - SCHEDULING DETAILS -  N Hospital Setting Required? If yes, what is the exclusion?: RADHA Paulino  Surgeon    8-17-23  Date of Procedure  Lower Endoscopy [Colonoscopy]  Type of Procedure Scheduled  [facility] [place]- If you answer yes to questions #8, #20, #21 [  pts ]Which Colonoscopy Prep was Sent?     GEN Sedation Type     N PAC / Pre-op Required                 "

## 2023-05-19 ENCOUNTER — TELEPHONE (OUTPATIENT)
Dept: FAMILY MEDICINE | Facility: CLINIC | Age: 73
End: 2023-05-19
Payer: COMMERCIAL

## 2023-05-19 DIAGNOSIS — M62.830 BACK MUSCLE SPASM: ICD-10-CM

## 2023-05-19 NOTE — TELEPHONE ENCOUNTER
Patient called and said she has been getting [employer] and it working and was wondering if Nereida Jacobs would write a letter stating that she needs to have these done to save on the taxes.    Also wants to know if you have any suggestions on what else she could take besides the muscle relaxer. The ones she is taking is making her too groggy the next day.

## 2023-05-19 NOTE — LETTER
May 23, 2023      Chyna Moncada  [address] [facility] [address]        To Whom It May Concern:    Chyna Moncada was seen in our clinic. Will benefit from Message for treatment of her lumbar strain.    Sincerely,        GEORGE Al CNP

## 2023-05-23 RX ORDER — CYCLOBENZAPRINE HCL 5 MG
5 TABLET ORAL 2 TIMES DAILY PRN
Qty: 60 TABLET | Refills: 3 | Status: SHIPPED | OUTPATIENT
Start: 2023-05-23

## 2023-06-05 NOTE — PROGRESS NOTES
01/10/23 0900   Appointment Info   Treating Provider Magda Sanchez OTR/L CHT   Visits Used 2   Quick Add  Hand   Goals   Hand Eval Goals 1;2;3   Subjective Report   Subjective Report Patient relates she is a little better. Likes contrast baths, found compression gloves- cortisone on IP joints was done in April on the right. Trigger finger injection on right in July   Initial Pain level 9/10  (at worst 3 at best)   Current Pain level 1/10  (1.5)   Objective Measures   Objective Measures Objective Measure 1   Objective Measure 1   Objective Measure palpation to A1 pulley right ring   Details mild discomfort   OT Modalities   OT Modalities Fluidotherapy   Fluidotherapy       Skilled Intervention to enhance motion , decrease pain   Treatment Detail up to 117 with gentle ex   Patient Response/Progress pain relief   Treatment Interventions (OT)   Vestibular Interventions Self Care/Home Management;Therapeutic Procedure/Exercise;Manual Therapy   Self Care/Home Management       Skilled Intervention to enhance ADL independence   Treatment Detail review joint protection   Therapeutic Procedure/Exercise       Skilled Intervention education for home program   Treatment Detail add light sponge ex for  and pinch   Manual Therapy       Skilled Intervention to enhance flexibility   Treatment Detail augmented STM with and with out Gua Sha tools to both involved joints and A1 pully area followed by stretch   Education   Learner/Method Patient   Readiness Eager   Education Notes see home program   Plan   Home program heat, contrast baths, adaptive equipment use,  gentle ex, activity modification   Plan for next session patient to try on own. Keep chart open for questions or concerns   Homework [email or web address]   Hand   Referring Physician Dr. 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   Orders Evaluate And Treat As Indicated   Hand Goal 1   Goal Identifier home program   Goal  Description Patient to be independent with home program, not needing more than 15% verbal or physical cuing to perform correctly   Target Date 01/18/23   Goal Progress new ex were given today   Date Met 01/10/23   Hand Goal 2   Goal Identifier cleaning   Goal Description patient to score 3 or higher on UEFI to be able to clean home with minimal difficulty   Target Date 02/01/23   Hand Goal 3   Goal Identifier opening jar   Goal Description Patient to score 3 or higher on UEFI to be able to open a jar with min difficulty with the use of Dycem   Target Date 02/15/23   RETIRED Equipment   Assessments Completed managing better, tool new ex well   General Information/History   Start Of Care Date 12/28/22   Onset date of current episode/exacerbation 10/04/22  (approximate when got really bad)   Therapy Certification   Certification date from 12/28/22       DISCHARGE  Reason for Discharge: Patient has failed to schedule further appointments.    Equipment Issued:     Discharge Plan: Patient to continue home program.    Referring Provider:  Maxim Aguilar

## 2023-06-12 ENCOUNTER — ANCILLARY PROCEDURE (OUTPATIENT)
Dept: GENERAL RADIOLOGY | Facility: CLINIC | Age: 73
End: 2023-06-12
Attending: PODIATRIST
Payer: COMMERCIAL

## 2023-06-12 ENCOUNTER — OFFICE VISIT (OUTPATIENT)
Dept: PODIATRY | Facility: CLINIC | Age: 73
End: 2023-06-12
Payer: COMMERCIAL

## 2023-06-12 VITALS
HEART RATE: 80 BPM | DIASTOLIC BLOOD PRESSURE: 72 MMHG | SYSTOLIC BLOOD PRESSURE: 124 MMHG | BODY MASS INDEX: 22.44 KG/M2 | WEIGHT: 138 LBS

## 2023-06-12 DIAGNOSIS — M20.42 ACQUIRED HAMMER TOE DEFORMITY OF LESSER TOE OF LEFT FOOT: Primary | ICD-10-CM

## 2023-06-12 PROCEDURE — 99213 OFFICE O/P EST LOW 20 MIN: CPT | Performed by: PODIATRIST

## 2023-06-12 PROCEDURE — 73630 X-RAY EXAM OF FOOT: CPT | Mod: TC | Performed by: RADIOLOGY

## 2023-06-12 NOTE — LETTER
6/12/2023         RE: Chyna Moncada  [address] [facility] [address]        Dear Colleague,    Thank you for referring your patient, Chyna Moncada, to the [facility] [place]. Please see a copy of my visit note below.    PATIENT HISTORY:  Chyna Moncada is a 72 year old female who presents to clinic as a self referral with a chief complaint of left 5th toe pain.  The patient is seen by themselves.  The patient relates the pain is primarily located around the pip joint of the 5th toe.  Patient describes injury as when she was putting on her slippers the shoe came in between her 4th and 5th toe. Since then she is having increased pain. The patient relates that the symptoms have been going on for several 2 week(s).  The patient has previously tried different shoes with little relief.  The patient is retired.  Any previous notes and studies that pertain to the patient's condition were reviewed.    Pertinent medical, surgical and family history was reviewed in the Epic chart.    Past Medical History:   Past Medical History:   Diagnosis Date     Cerebral embolism with cerebral infarction (H)      Symptomatic menopausal or female climacteric states 04/12/2007       Medications:   Current Outpatient Medications:      alendronate (FOSAMAX) 70 MG tablet, Take 1 tablet (70 mg) by mouth every 7 days, Disp: 12 tablet, Rfl: 3     aspirin EC 81 MG tablet, Take 81 mg by mouth daily , Disp: , Rfl:      calcium-vitamin D (CALTRATE) 600-400 MG-UNIT per tablet, Take 1 tablet by mouth daily , Disp: , Rfl:      Cholecalciferol (VITAMIN D-3 PO), Take 1,000 Units by mouth daily, Disp: , Rfl:      cyclobenzaprine (FLEXERIL) 5 MG tablet, Take 1 tablet (5 mg) by mouth 2 times daily as needed for muscle spasms, Disp: 60 tablet, Rfl: 3     LORazepam (ATIVAN) 0.5 MG tablet, Take 1 tablet (0.5 mg) by mouth every 6 hours as needed, Disp: 10 tablet, Rfl: 0     mometasone (NASONEX) 50 MCG/ACT spray,  Spray 2 sprays in nostril daily , Disp: , Rfl:      Multiple Vitamin (MULTI-VITAMINS) TABS, Take 1 tablet by mouth daily, Disp: , Rfl:      multivitamin (OCUVITE) TABS tablet, Take 1 tablet by mouth daily, Disp: , Rfl:      pilocarpine (PILOCAR) 2 % ophthalmic solution, Place 2 drops Into the left eye 3 times daily, Disp: , Rfl:      pravastatin (PRAVACHOL) 40 MG tablet, TAKE ONE TABLET BY MOUTH EVERY NIGHT AT BEDTIME, Disp: 90 tablet, Rfl: 3     RESTASIS 0.05 % ophthalmic emulsion, , Disp: , Rfl:      valACYclovir (VALTREX) 1000 mg tablet, TAKE TWO TABLETS BY MOUTH TWICE A DAY, Disp: 4 tablet, Rfl: 11     Allergies:    Allergies   Allergen Reactions     Tramadol Anaphylaxis     Benadryl [Diphenhydramine Hcl] Other (See Comments)     Made her goofy     Caffeine Other (See Comments)     Jittery       Codeine Nausea and Vomiting     Darvocet [Propoxyphene N-Apap]      Other reaction(s): GI Upset     Diphenhydramine Other (See Comments)     Lovastatin Other (See Comments) and Muscle Pain (Myalgia)     Other reaction(s): Myalgia  pain     Percocet [Oxycodone-Acetaminophen] Other (See Comments)     Other reaction(s): Dizziness  Sick or dizzy?     Vicodin [Hydrocodone-Acetaminophen] Other (See Comments) and Nausea     Sick or dizzy?       Vitals: /72   Pulse 80   Wt 62.6 kg (138 lb)   BMI 22.44 kg/m    BMI= Body mass index is 22.44 kg/m .    LOWER EXTREMITY PHYSICAL EXAM    Dermatologic: Skin is intact to left lower extremity without significant lesions, rash or abrasion.        Vascular: DP & PT pulses are intact & regular on the left.   CFT and skin temperature is normal to the left lower extremity.     Neurologic: Lower extremity sensation is intact to light touch.  No evidence of weakness in the left lower extremity.        Musculoskeletal: Patient is ambulatory without assistive device or brace.  No gross ankle deformity noted.  No foot or ankle joint effusion is noted.  Noted contracted hammertoe deformity  of the fifth toe on the foot.    Diagnostics:  Radiographs included three views of the left foot demonstrating a contracted hammertoe deformity of the fifth toe with no cortical erosions or periosteal elevation.  All joint margins appear stable.  There is no apparent fracture or tumor formation noted.  There is no evidence of foreign body.  The images were independently reviewed by myself along with the patient explaining the findings.      ASSESSMENT / PLAN:     ICD-10-CM    1. Acquired hammer toe deformity of lesser toe of left foot  M20.42 XR Foot Left G/E 3 Views    fifth toe          I have explained to Chyna about the conditions.  We discussed the underlying contributing factors to the condition as well as both conservative and surgical treatment options along with expected length of recovery.  At this time, the patient was educated on the importance of offloading supportive shoes and other devices.  I demonstrated to the patient calf stretches to perform every hour daily until symptoms resolve.  After symptoms resolve, the patient was advised to perform the stretches 3 times daily to prevent future recurrence.  The patient was instructed to perform warm soaks with Epson salt after which to also apply over-the-counter Voltaren gel to deeply massage the injured tissue.  The patient was instructed to do this on a daily basis until symptoms resolve.   The patient may return in four weeks for reevaluation to determine if any further treatment will be needed.        Chyna verbalized agreement with and understanding of the rational for the diagnosis and treatment plan.  All questions were answered to best of my ability and the patient's satisfaction. The patient was advised to contact the clinic with any questions that may arise after the clinic visit.      Disclaimer: This note consists of symbols derived from keyboarding, dictation and/or voice recognition software. As a result, there may be errors in the script  that have gone undetected. Please consider this when interpreting information found in this chart.       GANGA Pace D.P.M., F.COLE.C.F.A.S.        Again, thank you for allowing me to participate in the care of your patient.        Sincerely,        Nato Pace DPM

## 2023-06-12 NOTE — PATIENT INSTRUCTIONS

## 2023-06-12 NOTE — PROGRESS NOTES
PATIENT HISTORY:  Chyna Moncada is a 72 year old female who presents to clinic as a self referral with a chief complaint of left 5th toe pain.  The patient is seen by themselves.  The patient relates the pain is primarily located around the pip joint of the 5th toe.  Patient describes injury as when she was putting on her slippers the shoe came in between her 4th and 5th toe. Since then she is having increased pain. The patient relates that the symptoms have been going on for several 2 week(s).  The patient has previously tried different shoes with little relief.  The patient is retired.  Any previous notes and studies that pertain to the patient's condition were reviewed.    Pertinent medical, surgical and family history was reviewed in the Epic chart.    Past Medical History:   Past Medical History:   Diagnosis Date     Cerebral embolism with cerebral infarction (H)      Symptomatic menopausal or female climacteric states 04/12/2007       Medications:   Current Outpatient Medications:      alendronate (FOSAMAX) 70 MG tablet, Take 1 tablet (70 mg) by mouth every 7 days, Disp: 12 tablet, Rfl: 3     aspirin EC 81 MG tablet, Take 81 mg by mouth daily , Disp: , Rfl:      calcium-vitamin D (CALTRATE) 600-400 MG-UNIT per tablet, Take 1 tablet by mouth daily , Disp: , Rfl:      Cholecalciferol (VITAMIN D-3 PO), Take 1,000 Units by mouth daily, Disp: , Rfl:      cyclobenzaprine (FLEXERIL) 5 MG tablet, Take 1 tablet (5 mg) by mouth 2 times daily as needed for muscle spasms, Disp: 60 tablet, Rfl: 3     LORazepam (ATIVAN) 0.5 MG tablet, Take 1 tablet (0.5 mg) by mouth every 6 hours as needed, Disp: 10 tablet, Rfl: 0     mometasone (NASONEX) 50 MCG/ACT spray, Spray 2 sprays in nostril daily , Disp: , Rfl:      Multiple Vitamin (MULTI-VITAMINS) TABS, Take 1 tablet by mouth daily, Disp: , Rfl:      multivitamin (OCUVITE) TABS tablet, Take 1 tablet by mouth daily, Disp: , Rfl:      pilocarpine (PILOCAR) 2 % ophthalmic  solution, Place 2 drops Into the left eye 3 times daily, Disp: , Rfl:      pravastatin (PRAVACHOL) 40 MG tablet, TAKE ONE TABLET BY MOUTH EVERY NIGHT AT BEDTIME, Disp: 90 tablet, Rfl: 3     RESTASIS 0.05 % ophthalmic emulsion, , Disp: , Rfl:      valACYclovir (VALTREX) 1000 mg tablet, TAKE TWO TABLETS BY MOUTH TWICE A DAY, Disp: 4 tablet, Rfl: 11     Allergies:    Allergies   Allergen Reactions     Tramadol Anaphylaxis     Benadryl [Diphenhydramine Hcl] Other (See Comments)     Made her goofy     Caffeine Other (See Comments)     Jittery       Codeine Nausea and Vomiting     Darvocet [Propoxyphene N-Apap]      Other reaction(s): GI Upset     Diphenhydramine Other (See Comments)     Lovastatin Other (See Comments) and Muscle Pain (Myalgia)     Other reaction(s): Myalgia  pain     Percocet [Oxycodone-Acetaminophen] Other (See Comments)     Other reaction(s): Dizziness  Sick or dizzy?     Vicodin [Hydrocodone-Acetaminophen] Other (See Comments) and Nausea     Sick or dizzy?       Vitals: /72   Pulse 80   Wt 62.6 kg (138 lb)   BMI 22.44 kg/m    BMI= Body mass index is 22.44 kg/m .    LOWER EXTREMITY PHYSICAL EXAM    Dermatologic: Skin is intact to left lower extremity without significant lesions, rash or abrasion.        Vascular: DP & PT pulses are intact & regular on the left.   CFT and skin temperature is normal to the left lower extremity.     Neurologic: Lower extremity sensation is intact to light touch.  No evidence of weakness in the left lower extremity.        Musculoskeletal: Patient is ambulatory without assistive device or brace.  No gross ankle deformity noted.  No foot or ankle joint effusion is noted.  Noted contracted hammertoe deformity of the fifth toe on the foot.    Diagnostics:  Radiographs included three views of the left foot demonstrating a contracted hammertoe deformity of the fifth toe with no cortical erosions or periosteal elevation.  All joint margins appear stable.  There is no  apparent fracture or tumor formation noted.  There is no evidence of foreign body.  The images were independently reviewed by myself along with the patient explaining the findings.      ASSESSMENT / PLAN:     ICD-10-CM    1. Acquired hammer toe deformity of lesser toe of left foot  M20.42 XR Foot Left G/E 3 Views    fifth toe          I have explained to Chyna about the conditions.  We discussed the underlying contributing factors to the condition as well as both conservative and surgical treatment options along with expected length of recovery.  At this time, the patient was educated on the importance of offloading supportive shoes and other devices.  I demonstrated to the patient calf stretches to perform every hour daily until symptoms resolve.  After symptoms resolve, the patient was advised to perform the stretches 3 times daily to prevent future recurrence.  The patient was instructed to perform warm soaks with Epson salt after which to also apply over-the-counter Voltaren gel to deeply massage the injured tissue.  The patient was instructed to do this on a daily basis until symptoms resolve.   The patient may return in four weeks for reevaluation to determine if any further treatment will be needed.        Chyna verbalized agreement with and understanding of the rational for the diagnosis and treatment plan.  All questions were answered to best of my ability and the patient's satisfaction. The patient was advised to contact the clinic with any questions that may arise after the clinic visit.      Disclaimer: This note consists of symbols derived from keyboarding, dictation and/or voice recognition software. As a result, there may be errors in the script that have gone undetected. Please consider this when interpreting information found in this chart.       GANGA Pace D.P.M., [email or web address].

## 2023-07-10 ENCOUNTER — OFFICE VISIT (OUTPATIENT)
Dept: FAMILY MEDICINE | Facility: CLINIC | Age: 73
End: 2023-07-10
Payer: COMMERCIAL

## 2023-07-10 VITALS
BODY MASS INDEX: 21.83 KG/M2 | OXYGEN SATURATION: 97 % | HEIGHT: 65 IN | TEMPERATURE: 97.7 F | HEART RATE: 85 BPM | DIASTOLIC BLOOD PRESSURE: 60 MMHG | RESPIRATION RATE: 16 BRPM | SYSTOLIC BLOOD PRESSURE: 120 MMHG | WEIGHT: 131 LBS

## 2023-07-10 DIAGNOSIS — R79.9 ABNORMAL FINDING OF BLOOD CHEMISTRY, UNSPECIFIED: ICD-10-CM

## 2023-07-10 DIAGNOSIS — H61.21 IMPACTED CERUMEN OF RIGHT EAR: ICD-10-CM

## 2023-07-10 DIAGNOSIS — R53.83 OTHER FATIGUE: ICD-10-CM

## 2023-07-10 DIAGNOSIS — L73.9 FOLLICULITIS: ICD-10-CM

## 2023-07-10 DIAGNOSIS — E55.9 VITAMIN D DEFICIENCY, UNSPECIFIED: ICD-10-CM

## 2023-07-10 DIAGNOSIS — R39.89 URINARY PROBLEM: Primary | ICD-10-CM

## 2023-07-10 LAB
ALBUMIN UR-MCNC: ABNORMAL MG/DL
APPEARANCE UR: CLEAR
BACTERIA #/AREA URNS HPF: ABNORMAL /HPF
BILIRUB UR QL STRIP: NEGATIVE
COLOR UR AUTO: YELLOW
DEPRECATED CALCIDIOL+CALCIFEROL SERPL-MC: 94 UG/L (ref 20–75)
ERYTHROCYTE [DISTWIDTH] IN BLOOD BY AUTOMATED COUNT: 12.7 % (ref 10–15)
FERRITIN SERPL-MCNC: 119 NG/ML (ref 11–328)
GLUCOSE UR STRIP-MCNC: NEGATIVE MG/DL
HCT VFR BLD AUTO: 41.7 % (ref 35–47)
HGB BLD-MCNC: 13.7 G/DL (ref 11.7–15.7)
HGB UR QL STRIP: NEGATIVE
KETONES UR STRIP-MCNC: ABNORMAL MG/DL
LEUKOCYTE ESTERASE UR QL STRIP: NEGATIVE
MCH RBC QN AUTO: 29 PG (ref 26.5–33)
MCHC RBC AUTO-ENTMCNC: 32.9 G/DL (ref 31.5–36.5)
MCV RBC AUTO: 88 FL (ref 78–100)
NITRATE UR QL: NEGATIVE
PH UR STRIP: 5.5 [PH] (ref 5–7)
PLATELET # BLD AUTO: 179 10E3/UL (ref 150–450)
RBC # BLD AUTO: 4.72 10E6/UL (ref 3.8–5.2)
RBC #/AREA URNS AUTO: ABNORMAL /HPF
SP GR UR STRIP: 1.02 (ref 1–1.03)
SQUAMOUS #/AREA URNS AUTO: ABNORMAL /LPF
TSH SERPL DL<=0.005 MIU/L-ACNC: 2.69 UIU/ML (ref 0.3–4.2)
UROBILINOGEN UR STRIP-ACNC: 0.2 E.U./DL
VIT B12 SERPL-MCNC: 852 PG/ML (ref 232–1245)
WBC # BLD AUTO: 5.3 10E3/UL (ref 4–11)
WBC #/AREA URNS AUTO: ABNORMAL /HPF

## 2023-07-10 PROCEDURE — 82306 VITAMIN D 25 HYDROXY: CPT | Performed by: NURSE PRACTITIONER

## 2023-07-10 PROCEDURE — 82728 ASSAY OF FERRITIN: CPT | Performed by: NURSE PRACTITIONER

## 2023-07-10 PROCEDURE — 69209 REMOVE IMPACTED EAR WAX UNI: CPT | Mod: RT | Performed by: NURSE PRACTITIONER

## 2023-07-10 PROCEDURE — 36415 COLL VENOUS BLD VENIPUNCTURE: CPT | Performed by: NURSE PRACTITIONER

## 2023-07-10 PROCEDURE — 84443 ASSAY THYROID STIM HORMONE: CPT | Performed by: NURSE PRACTITIONER

## 2023-07-10 PROCEDURE — 99214 OFFICE O/P EST MOD 30 MIN: CPT | Mod: 25 | Performed by: NURSE PRACTITIONER

## 2023-07-10 PROCEDURE — 82607 VITAMIN B-12: CPT | Performed by: NURSE PRACTITIONER

## 2023-07-10 PROCEDURE — 81001 URINALYSIS AUTO W/SCOPE: CPT | Performed by: NURSE PRACTITIONER

## 2023-07-10 PROCEDURE — 85027 COMPLETE CBC AUTOMATED: CPT | Performed by: NURSE PRACTITIONER

## 2023-07-10 RX ORDER — CALCIUM CARBONATE 500(1250)
1000 TABLET,CHEWABLE ORAL
COMMUNITY
Start: 2023-07-03 | End: 2023-10-10

## 2023-07-10 RX ORDER — CEPHALEXIN 500 MG/1
500 CAPSULE ORAL 2 TIMES DAILY
Qty: 14 CAPSULE | Refills: 0 | Status: SHIPPED | OUTPATIENT
Start: 2023-07-10 | End: 2023-07-17

## 2023-07-10 ASSESSMENT — ANXIETY QUESTIONNAIRES
GAD7 TOTAL SCORE: 1
4. TROUBLE RELAXING: NOT AT ALL
3. WORRYING TOO MUCH ABOUT DIFFERENT THINGS: NOT AT ALL
2. NOT BEING ABLE TO STOP OR CONTROL WORRYING: NOT AT ALL
IF YOU CHECKED OFF ANY PROBLEMS ON THIS QUESTIONNAIRE, HOW DIFFICULT HAVE THESE PROBLEMS MADE IT FOR YOU TO DO YOUR WORK, TAKE CARE OF THINGS AT HOME, OR GET ALONG WITH OTHER PEOPLE: NOT DIFFICULT AT ALL
1. FEELING NERVOUS, ANXIOUS, OR ON EDGE: NOT AT ALL
7. FEELING AFRAID AS IF SOMETHING AWFUL MIGHT HAPPEN: NOT AT ALL
GAD7 TOTAL SCORE: 1
5. BEING SO RESTLESS THAT IT IS HARD TO SIT STILL: NOT AT ALL
6. BECOMING EASILY ANNOYED OR IRRITABLE: SEVERAL DAYS

## 2023-07-10 ASSESSMENT — PATIENT HEALTH QUESTIONNAIRE - PHQ9
SUM OF ALL RESPONSES TO PHQ QUESTIONS 1-9: 3
SUM OF ALL RESPONSES TO PHQ QUESTIONS 1-9: 3
10. IF YOU CHECKED OFF ANY PROBLEMS, HOW DIFFICULT HAVE THESE PROBLEMS MADE IT FOR YOU TO DO YOUR WORK, TAKE CARE OF THINGS AT HOME, OR GET ALONG WITH OTHER PEOPLE: SOMEWHAT DIFFICULT

## 2023-07-10 ASSESSMENT — PAIN SCALES - GENERAL: PAINLEVEL: NO PAIN (0)

## 2023-07-10 NOTE — LETTER
July 12, 2023      Chyna Moncada  [address] [facility] [address]        Dear ,    We are writing to inform you of your test results.    CBC was within normal limits with normal hemoglobin levels and iron levels Vitamin B-12 was within normal limits and the thyroid was within normal limits.  The vitamin D was mildly elevated at 90 we like to see that below 70 if taking vitamin D supplements please switch to every other day and we will recheck in 1 month lab only appointment.     Resulted Orders   UA Macroscopic with reflex to Microscopic and Culture - Clinic Collect   Result Value Ref Range    Color Urine Yellow Colorless, Straw, Light Yellow, Yellow    Appearance Urine Clear Clear    Glucose Urine Negative Negative mg/dL    Bilirubin Urine Negative Negative    Ketones Urine Trace (A) Negative mg/dL    Specific Gravity Urine 1.025 1.003 - 1.035    Blood Urine Negative Negative    pH Urine 5.5 5.0 - 7.0    Protein Albumin Urine Trace (A) Negative mg/dL    Urobilinogen Urine 0.2 0.2, 1.0 E.U./dL    Nitrite Urine Negative Negative    Leukocyte Esterase Urine Negative Negative   UA Microscopic with Reflex to Culture   Result Value Ref Range    Bacteria Urine Few (A) None Seen /HPF    RBC Urine 0-2 0-2 /HPF /HPF    WBC Urine 0-5 0-5 /HPF /HPF    Squamous Epithelials Urine Few (A) None Seen /LPF    Narrative    Urine Culture not indicated   Ferritin   Result Value Ref Range    Ferritin 119 11 - 328 ng/mL   Vitamin D Deficiency   Result Value Ref Range    Vitamin D, Total (25-Hydroxy) 94 (H) 20 - 75 ug/L    Narrative    Season, race, dietary intake, and treatment affect the concentration of 25-hydroxy-Vitamin D. Values may decrease during winter months and increase during summer months. Values 20-29 ug/L may indicate Vitamin D insufficiency and values <20 ug/L may indicate Vitamin D deficiency.    Vitamin D determination is routinely performed by an immunoassay specific for 25 hydroxyvitamin D3.   If an individual is on vitamin D2(ergocalciferol) supplementation, please specify 25 OH vitamin D2 and D3 level determination by LCMSMS test VITD23.     Vitamin B12   Result Value Ref Range    Vitamin B12 852 232 - 1,245 pg/mL   CBC with platelets   Result Value Ref Range    WBC Count 5.3 4.0 - 11.0 10e3/uL    RBC Count 4.72 3.80 - 5.20 10e6/uL    Hemoglobin 13.7 11.7 - 15.7 g/dL    Hematocrit 41.7 35.0 - 47.0 %    MCV 88 78 - 100 fL    MCH 29.0 26.5 - 33.0 pg    MCHC 32.9 31.5 - 36.5 g/dL    RDW 12.7 10.0 - 15.0 %    Platelet Count 179 150 - 450 10e3/uL   TSH with free T4 reflex   Result Value Ref Range    TSH 2.69 0.30 - 4.20 uIU/mL       If you have any questions or concerns, please call the clinic at the number listed above.       Sincerely,      Nereida Jacobs, APRN CNP/kf

## 2023-07-10 NOTE — PROGRESS NOTES
Assessment & Plan     (R39.89) Urinary problem  (primary encounter diagnosis)  Comment: urine negative   Plan: UA Macroscopic with reflex to Microscopic and         Culture - Clinic Collect, UA Microscopic with         Reflex to Culture      (R53.83) Other fatigue  Comment: will obtain labs   Plan: TSH with free T4 reflex, CBC with platelets,         Vitamin B12, Vitamin D Deficiency, Ferritin      (E55.9) Vitamin D deficiency, unspecified  Comment:   Plan: Vitamin D Deficiency            (R79.9) Abnormal finding of blood chemistry, unspecified  Comment:   Plan: Ferritin            (L73.9) Folliculitis  Comment: recommend Keflex   Plan: cephALEXin (KEFLEX) 500 MG capsule      (H61.21) Impacted cerumen of right ear  Comment: removed   Plan: REMOVE IMPACTED CERUMEN          GEORGE Al CNP  [facility]    Deneen Cleemnte is a 72 year old, presenting for the following health issues:  Urinary Problem and Ear Problem        7/10/2023     7:09 AM   Additional Questions   Roomed by [facility]     History of Present Illness       Reason for visit:  Tired and plugged right ear  Symptom onset:  More than a month  Symptoms include:  Tired and loss of hearing in right ear  Symptom intensity:  Severe  Symptom progression:  Worsening  Had these symptoms before:  No  What makes it worse:  No  What makes it better:  No    She eats 0-1 servings of fruits and vegetables daily.She consumes 0 sweetened beverage(s) daily.She exercises with enough effort to increase her heart rate 30 to 60 minutes per day.  She exercises with enough effort to increase her heart rate 6 days per week.   She is taking medications regularly.    Today's PHQ-9         PHQ-9 Total Score: 3    PHQ-9 Q9 Thoughts of better off dead/self-harm past 2 weeks :   Not at all    How difficult have these problems made it for you to do your work, take care of things at home, or get along with other people: Somewhat  "difficult  Today's LALITA-7 Score: 1       Patient's right ear has been feeling plugged on and off for several months.     She has been feeling very fatigued even though she's getting about 8 hours of sleep at night. She feels like she has to take a nap most days.    URINARY TRACT SYMPTOMS  Onset: a couple months    Description:   Painful urination (Dysuria): YES           Frequency: no   Blood in urine (Hematuria): no   Delay in urine (Hesitency): no     Intensity: moderate    Progression of Symptoms:  same    Accompanying Signs & Symptoms:  Fever/chills: no   Flank pain no   Nausea and vomiting: no   Any vaginal symptoms: none  Abdominal/Pelvic Pain: no     History:   History of frequent UTI's: YES  History of kidney stones: no   Sexually Active: no   Possibility of pregnancy: No    Precipitating factors:   none    Therapies Tried and outcome: none          Review of Systems   Constitutional, HEENT, cardiovascular, pulmonary, gi and gu systems are negative, except as otherwise noted.      Objective    /60 (BP Location: Right arm, Cuff Size: Adult Regular)   Pulse 85   Temp 97.7  F (36.5  C) (Tympanic)   Resp 16   Ht 1.657 m (5' 5.25\")   Wt 59.4 kg (131 lb)   SpO2 97%   BMI 21.63 kg/m    Body mass index is 21.63 kg/m .  Physical Exam   GENERAL: healthy, alert and no distress  EYES: Eyes grossly normal to inspection, PERRL and conjunctivae and sclerae normal  HENT: ear canals and TM's normal, nose and mouth without ulcers or lesions  NECK: no adenopathy, no asymmetry, masses, or scars and thyroid normal to palpation  RESP: lungs clear to auscultation - no rales, rhonchi or wheezes  CV: regular rate and rhythm, normal S1 S2, no S3 or S4, no murmur, click or rub, no peripheral edema and peripheral pulses strong  ABDOMEN: soft, nontender, no hepatosplenomegaly, no masses and bowel sounds normal  MS: no gross musculoskeletal defects noted, no edema  SKIN: no suspicious lesions or rashes  NEURO: Normal strength " and tone, mentation intact and speech normal  PSYCH: mentation appears normal, affect normal/bright    Results for orders placed or performed in visit on 07/10/23   UA Macroscopic with reflex to Microscopic and Culture - Clinic Collect     Status: Abnormal    Specimen: Urine, Clean Catch   Result Value Ref Range    Color Urine Yellow Colorless, Straw, Light Yellow, Yellow    Appearance Urine Clear Clear    Glucose Urine Negative Negative mg/dL    Bilirubin Urine Negative Negative    Ketones Urine Trace (A) Negative mg/dL    Specific Gravity Urine 1.025 1.003 - 1.035    Blood Urine Negative Negative    pH Urine 5.5 5.0 - 7.0    Protein Albumin Urine Trace (A) Negative mg/dL    Urobilinogen Urine 0.2 0.2, 1.0 E.U./dL    Nitrite Urine Negative Negative    Leukocyte Esterase Urine Negative Negative   UA Microscopic with Reflex to Culture     Status: Abnormal   Result Value Ref Range    Bacteria Urine Few (A) None Seen /HPF    RBC Urine 0-2 0-2 /HPF /HPF    WBC Urine 0-5 0-5 /HPF /HPF    Squamous Epithelials Urine Few (A) None Seen /LPF    Narrative    Urine Culture not indicated

## 2023-07-11 ENCOUNTER — TRANSFERRED RECORDS (OUTPATIENT)
Dept: HEALTH INFORMATION MANAGEMENT | Facility: CLINIC | Age: 73
End: 2023-07-11
Payer: COMMERCIAL

## 2023-07-14 ENCOUNTER — ALLIED HEALTH/NURSE VISIT (OUTPATIENT)
Dept: FAMILY MEDICINE | Facility: CLINIC | Age: 73
End: 2023-07-14
Payer: COMMERCIAL

## 2023-07-14 DIAGNOSIS — H61.21 IMPACTED CERUMEN OF RIGHT EAR: Primary | ICD-10-CM

## 2023-07-14 PROCEDURE — 99207 PR NO CHARGE NURSE ONLY: CPT

## 2023-07-14 NOTE — PROGRESS NOTES
Pt was seen Monday in clinic and right ear was cleared per provider   Pt is complaining of ear pain and irritation   No wax was seen in right ear   Was advised to see a provider   Magda Quiñones MA on 7/14/2023 at 10:10 AM

## 2023-07-15 ENCOUNTER — OFFICE VISIT (OUTPATIENT)
Dept: URGENT CARE | Facility: URGENT CARE | Age: 73
End: 2023-07-15
Payer: COMMERCIAL

## 2023-07-15 VITALS
OXYGEN SATURATION: 99 % | DIASTOLIC BLOOD PRESSURE: 75 MMHG | BODY MASS INDEX: 22.46 KG/M2 | TEMPERATURE: 98 F | SYSTOLIC BLOOD PRESSURE: 129 MMHG | HEART RATE: 79 BPM | WEIGHT: 136 LBS

## 2023-07-15 DIAGNOSIS — H91.91 HEARING LOSS OF RIGHT EAR, UNSPECIFIED HEARING LOSS TYPE: Primary | ICD-10-CM

## 2023-07-15 PROCEDURE — 99213 OFFICE O/P EST LOW 20 MIN: CPT | Performed by: EMERGENCY MEDICINE

## 2023-07-15 NOTE — PROGRESS NOTES
CC: Decreased hearing and sound distortion right ear    HPI: Patient seen here 6 days ago at which time her ear was irrigated.  She had chronic hearing and ear.  Distribution, she had distorted sounds.  No drainage.  No pain.  No chronic disease        PMH: Chart reviewed.      PE: No acute distress..  Afebrile.  Examination right ear reveals no evidence of external otitis.  External meatus is clear.  The TM is intact but dull.  No perforation.      Impression: Some distortion and decreased hearing, right ear uncertain etiology.  Concern for  middle ear disease.  ENT referral indicated.    Diagnosis: Hearing loss right ear    Plan: ENT referral placed patient to confirm.

## 2023-08-08 NOTE — PROGRESS NOTES
Chief Complaint   Patient presents with    Hearing Problem     Hearing issues ear cleaning      History of Present Illness   Chyna Moncada is a 72 year old female who presents today for follow up. The patient developed ear pain and decreased hearing on the right. The ear was irrigated without improvement in symptoms. I have seen the patient previously for her ears. The patient has a long history of decreased hearing in both ears worse in the left ear secondary to in utero [place] measles exposure.  She is worn hearing aids since her early 20s and has a known asymmetric hearing loss in the left ear.      The patient feels like the right ear is plugged with fullness, echoing, and sense that the ear is in a bucket.  She is not having any otalgia or otorrhea.  No bloody otorrhea.  Does intermittently hear breathing in her right ear.  Symptoms have been going on for about 1 month or so.  She does struggle with significant tinnitus worse in the right ear.  No prior history of ear surgery.  No significant vertigo.  The patient denies prior significant recreational, , or work-related noise exposure.     The patient underwent an outside audiogram performed 3/28/2023.  My review of the audiogram showed stable hearing from her prior audiogram in January 2022.  The patient has mild sloping to moderate sloping to moderately severe sloping to severe sensorineural hearing loss in the right ear and moderate sloping to moderately severe sloping to severe sloping to profound sensorineural hearing loss in the left ear.  Pure-tone average was 47 dB on the right and 62 dB on the left.  Speech reception threshold was 45 dB on the right and 55 dB on the left.  The patient had 100% word recognition on the right and 60% word recognition on the left.      Past Medical History  Patient Active Problem List   Diagnosis    Allergic rhinitis    Anxiety state    Cerebral artery occlusion with cerebral infarction (H)    Degeneration of  cervical intervertebral disc    Degeneration of lumbar or lumbosacral intervertebral disc    Insomnia    Postmenopausal atrophic vaginitis    Raynaud's syndrome    Osteopenia    Pulmonary nodules    TGA (transient global amnesia)    Recurrent cold sores    Peripheral vascular disease (H)    Atopic rhinitis    Osteoarthritis of hip    Bilateral sensorineural hearing loss    Tendinitis of shoulder    COPD (chronic obstructive pulmonary disease) (H)    SVT (supraventricular tachycardia) (H)    Age-related osteoporosis without current pathological fracture    Low platelet count (H)     Current Medications    Current Outpatient Medications:     alendronate (FOSAMAX) 70 MG tablet, Take 1 tablet (70 mg) by mouth every 7 days, Disp: 12 tablet, Rfl: 3    aspirin EC 81 MG tablet, Take 81 mg by mouth daily , Disp: , Rfl:     bisacodyl (DULCOLAX) 5 MG EC tablet, Take 2 tablets at 3 pm the day before your procedure. If your procedure is before 11 am, take 2 additional tablets at 11 pm. If your procedure is after 11 am, take 2 additional tablets at 6 am. For additional instructions refer to your colonoscopy prep instructions., Disp: 4 tablet, Rfl: 0    calcium carbonate 500 mg, elemental, 1250 (500 Ca) MG tablet chewable, Take 1,000 mg by mouth, Disp: , Rfl:     calcium-vitamin D (CALTRATE) 600-400 MG-UNIT per tablet, Take 1 tablet by mouth daily , Disp: , Rfl:     Cholecalciferol (VITAMIN D-3 PO), Take 1,000 Units by mouth daily, Disp: , Rfl:     cyclobenzaprine (FLEXERIL) 5 MG tablet, Take 1 tablet (5 mg) by mouth 2 times daily as needed for muscle spasms, Disp: 60 tablet, Rfl: 3    Inulin 2 g CHEW, , Disp: , Rfl:     mometasone (NASONEX) 50 MCG/ACT spray, Spray 2 sprays in nostril daily , Disp: , Rfl:     Multiple Vitamin (MULTI-VITAMINS) TABS, Take 1 tablet by mouth daily, Disp: , Rfl:     multivitamin (OCUVITE) TABS tablet, Take 1 tablet by mouth daily, Disp: , Rfl:     polyethylene glycol (GOLYTELY) 236 g suspension, The  night before the exam at 6 pm drink an 8-ounce glass every 15 minutes until the jug is half empty. If you arrive before 11 AM: Drink the other half of the Golytely jug at 11 PM night before procedure. If you arrive after 11 AM: Drink the other half of the Golytely jug at 6 AM day of procedure. For additional instructions refer to your colonoscopy prep instructions., Disp: 4000 mL, Rfl: 0    pravastatin (PRAVACHOL) 40 MG tablet, TAKE ONE TABLET BY MOUTH EVERY NIGHT AT BEDTIME, Disp: 90 tablet, Rfl: 3    RESTASIS 0.05 % ophthalmic emulsion, , Disp: , Rfl:     valACYclovir (VALTREX) 1000 mg tablet, TAKE TWO TABLETS BY MOUTH TWICE A DAY, Disp: 4 tablet, Rfl: 11    Allergies  Allergies   Allergen Reactions    Tramadol Anaphylaxis    Benadryl [Diphenhydramine Hcl] Other (See Comments)     Made her goofy    Caffeine Other (See Comments)     Jittery      Codeine Nausea and Vomiting    Darvocet [Propoxyphene N-Apap]      Other reaction(s): GI Upset    Diphenhydramine Other (See Comments)    Lovastatin Other (See Comments) and Muscle Pain (Myalgia)     Other reaction(s): Myalgia  pain    Percocet [Oxycodone-Acetaminophen] Other (See Comments)     Other reaction(s): Dizziness  Sick or dizzy?    Vicodin [Hydrocodone-Acetaminophen] Other (See Comments) and Nausea     Sick or dizzy?       Social History  Social History     Socioeconomic History    Marital status:    Tobacco Use    Smoking status: Former     Types: Cigarettes     Quit date: 1/10/1983     Years since quittin.6    Smokeless tobacco: Never   Vaping Use    Vaping Use: Never used   Substance and Sexual Activity    Alcohol use: Not Currently     Alcohol/week: 1.0 standard drink of alcohol     Types: 1 Glasses of wine per week    Drug use: No    Sexual activity: Yes     Partners: Male   Other Topics Concern    Parent/sibling w/ CABG, MI or angioplasty before 65F 55M? No       Family History  Family History   Problem Relation Age of Onset    Arthritis Mother          was told was RA    Cancer Mother         GYN    Other - See Comments Mother         phlebitis for which hospitalized several times    Heart Disease Father     Cancer Brother         throat    Alcohol/Drug Maternal Grandfather     Heart Disease Paternal Grandfather         Heart attack    Diabetes Paternal Grandfather        Review of Systems  As per HPI and PMHx, otherwise 7 system review of the head and neck negative.    Physical Exam  /70 (BP Location: Right arm, Patient Position: Chair, Cuff Size: Adult Regular)   Pulse 76   Temp 96.8  F (36  C) (Tympanic)   Wt 61.7 kg (136 lb)   SpO2 99%   BMI 22.46 kg/m    GENERAL: Patient is a pleasant, cooperative 72 year old female in no acute distress.  HEAD: Normocephalic, atraumatic.  Hair and scalp are normal.  EYES: Pupils are equal, round, reactive to light and accommodation.  Extraocular movements are intact.  The sclera nonicteric without injection.  The extraocular structures are normal.  EARS: See below.  NOSE: Nares are patent.  Nasal mucosa is pink and moist.  Negative anterior rhinoscopy.  NEUROLOGIC: Cranial nerves II through XII are grossly intact.  Voice is strong.  Patient is House-Brackmann I/VI bilaterally.  CARDIOVASCULAR: Extremities are warm and well-perfused.  No significant peripheral edema.  RESPIRATORY: Patient has nonlabored breathing without cough, wheeze, stridor.  PSYCHIATRIC: Patient is alert and oriented.  Mood and affect appear normal.  SKIN: Warm and dry.  No scalp, face, or neck lesions noted.    Physical Exam and Procedure    EARS: Normal shape and symmetry.  No tenderness when palpating the mastoid or tragal areas bilaterally.  The ears were then examined under the otic binocular microscope.  Otoscopic exam on the right was a clear canal.  The right tympanic membrane was round, intact without evidence of effusion.  No retraction, granulation, drainage, or evidence of cholesteatoma.  With gentle inhalation exhalation the  patient has obvious respiratory variation of the right tympanic membrane.    Attention was turned to the left ear.  Otoscopic exam on the left reveals. The left tympanic membrane was round, intact without evidence of effusion.  No retraction, granulation, drainage, or evidence of cholesteatoma.  No obvious respiratory variation.    Assessment and Plan     ICD-10-CM    1. Patulous Eustachian tube, right  H69.01 Microscopy, Binocular     Adult ENT  Referral      2. Sensorineural hearing loss, bilateral  H90.3 Microscopy, Binocular     Adult ENT  Referral     Adult ENT  Referral      3. Asymmetrical sensorineural hearing loss  H90.3 Microscopy, Binocular     Adult ENT  Referral        It was my pleasure seeing Chyna Moncada today in clinic.  The patient has a history of significant sensorineural hearing loss and asymmetry secondary to interuterine exposure to [place] measles.  She has had a roughly 1 month history of ear plugging, fullness, echoing, and hearing her respiratory rate in her right ear.  She she has obvious respiratory variation of her tympanic membrane consistent with patulous eustachian tube on the right-hand side.     We discussed treatment options for patulous eustachian tube including observation, topical estrogen nasal spray, ear tube placement in office, or eustachian tube augmentation or procedures.  Given the relatively recent timeframe of symptoms, I think it recommend observation with referral to [facility] with evaluation by rhinologist and consideration of options.  She is not very excited about the idea of estrogen nasal spray given its cost the potential other side effects.  She would consider potential ear tube placement on the right to see if this would help symptoms.     We discussed the risks, benefits, alternatives, options of right myringotomy with tube placement including, but not limited to: Risk of bleeding, risk of infection, risk  of retained tympanostomy tube, risk of tympanic membrane perforation, risk of recurrent otorrhea, tympanostomy tube failure, potential need for additional procedures including tube removal/replacement.  We discussed the postoperative course and convalescence including using eardrops.  The patient voiced understanding.    I will place referral to [facility].  The patient will let me know if she would like to move forward with ear tube placement in the office in the future.      Cabrera Fields MD  Department of Otolaryngology-Head and Neck Surgery  [facility]

## 2023-08-09 RX ORDER — BISACODYL 5 MG/1
TABLET, DELAYED RELEASE ORAL
Qty: 4 TABLET | Refills: 0 | Status: SHIPPED | OUTPATIENT
Start: 2023-08-09 | End: 2023-10-10

## 2023-08-10 ENCOUNTER — LAB (OUTPATIENT)
Dept: LAB | Facility: CLINIC | Age: 73
End: 2023-08-10
Payer: COMMERCIAL

## 2023-08-10 ENCOUNTER — OFFICE VISIT (OUTPATIENT)
Dept: OTOLARYNGOLOGY | Facility: CLINIC | Age: 73
End: 2023-08-10
Payer: COMMERCIAL

## 2023-08-10 ENCOUNTER — OFFICE VISIT (OUTPATIENT)
Dept: AUDIOLOGY | Facility: CLINIC | Age: 73
End: 2023-08-10
Payer: COMMERCIAL

## 2023-08-10 VITALS
HEART RATE: 76 BPM | TEMPERATURE: 96.8 F | BODY MASS INDEX: 22.46 KG/M2 | OXYGEN SATURATION: 99 % | DIASTOLIC BLOOD PRESSURE: 70 MMHG | SYSTOLIC BLOOD PRESSURE: 110 MMHG | WEIGHT: 136 LBS

## 2023-08-10 DIAGNOSIS — E87.0 SERUM SODIUM ELEVATED: ICD-10-CM

## 2023-08-10 DIAGNOSIS — E55.9 VITAMIN D DEFICIENCY, UNSPECIFIED: ICD-10-CM

## 2023-08-10 DIAGNOSIS — H90.3 ASYMMETRICAL SENSORINEURAL HEARING LOSS: ICD-10-CM

## 2023-08-10 DIAGNOSIS — H69.01 PATULOUS EUSTACHIAN TUBE, RIGHT: Primary | ICD-10-CM

## 2023-08-10 DIAGNOSIS — H90.3 SENSORINEURAL HEARING LOSS, BILATERAL: ICD-10-CM

## 2023-08-10 DIAGNOSIS — D69.6 LOW PLATELET COUNT (H): ICD-10-CM

## 2023-08-10 DIAGNOSIS — H90.3 ASYMMETRICAL SENSORINEURAL HEARING LOSS: Primary | ICD-10-CM

## 2023-08-10 LAB
ANION GAP SERPL CALCULATED.3IONS-SCNC: 9 MMOL/L (ref 7–15)
BUN SERPL-MCNC: 17.9 MG/DL (ref 8–23)
CALCIUM SERPL-MCNC: 9.5 MG/DL (ref 8.8–10.2)
CHLORIDE SERPL-SCNC: 103 MMOL/L (ref 98–107)
CREAT SERPL-MCNC: 0.77 MG/DL (ref 0.51–0.95)
DEPRECATED CALCIDIOL+CALCIFEROL SERPL-MC: 96 UG/L (ref 20–75)
DEPRECATED HCO3 PLAS-SCNC: 28 MMOL/L (ref 22–29)
ERYTHROCYTE [DISTWIDTH] IN BLOOD BY AUTOMATED COUNT: 12.5 % (ref 10–15)
GFR SERPL CREATININE-BSD FRML MDRD: 82 ML/MIN/1.73M2
GLUCOSE SERPL-MCNC: 85 MG/DL (ref 70–99)
HCT VFR BLD AUTO: 41.4 % (ref 35–47)
HGB BLD-MCNC: 13.4 G/DL (ref 11.7–15.7)
MCH RBC QN AUTO: 28.7 PG (ref 26.5–33)
MCHC RBC AUTO-ENTMCNC: 32.4 G/DL (ref 31.5–36.5)
MCV RBC AUTO: 89 FL (ref 78–100)
PLATELET # BLD AUTO: 177 10E3/UL (ref 150–450)
POTASSIUM SERPL-SCNC: 4.2 MMOL/L (ref 3.4–5.3)
RBC # BLD AUTO: 4.67 10E6/UL (ref 3.8–5.2)
SODIUM SERPL-SCNC: 140 MMOL/L (ref 136–145)
WBC # BLD AUTO: 5.4 10E3/UL (ref 4–11)

## 2023-08-10 PROCEDURE — 92567 TYMPANOMETRY: CPT | Performed by: AUDIOLOGIST

## 2023-08-10 PROCEDURE — 36415 COLL VENOUS BLD VENIPUNCTURE: CPT

## 2023-08-10 PROCEDURE — 82306 VITAMIN D 25 HYDROXY: CPT

## 2023-08-10 PROCEDURE — 80048 BASIC METABOLIC PNL TOTAL CA: CPT

## 2023-08-10 PROCEDURE — 92504 EAR MICROSCOPY EXAMINATION: CPT | Performed by: OTOLARYNGOLOGY

## 2023-08-10 PROCEDURE — 85027 COMPLETE CBC AUTOMATED: CPT

## 2023-08-10 PROCEDURE — 99214 OFFICE O/P EST MOD 30 MIN: CPT | Mod: 25 | Performed by: OTOLARYNGOLOGY

## 2023-08-10 ASSESSMENT — PAIN SCALES - GENERAL: PAINLEVEL: NO PAIN (0)

## 2023-08-10 NOTE — PROGRESS NOTES
AUDIOLOGY REPORT:    Patient was referred to [facility] from ENT by Dr. Fields for a hearing examination. Patient reports a odd feeling and sound in her right ear following an ear cleaning. Patient was unaccompanied to today's visit.     Testing:    Otoscopy:   Otoscopic exam indicates ears are clear of cerumen bilaterally  NOTE: The right ear tympanic membrane looks to be quite dull     Tympanograms:    RIGHT: normal eardrum mobility     LEFT:   normal eardrum mobility    Patient has a hearing test from [facility] from a few months ago. Tympanometry was not performed at [facility].     Discussed results with the patient.     Patient was returned to ENT for follow up.     Tomer Gillis CCC-A  Licensed Audiologist  8/10/2023

## 2023-08-10 NOTE — NURSING NOTE
"Initial /70 (BP Location: Right arm, Patient Position: Chair, Cuff Size: Adult Regular)   Pulse 76   Temp 96.8  F (36  C) (Tympanic)   Wt 61.7 kg (136 lb)   SpO2 99%   BMI 22.46 kg/m   Estimated body mass index is 22.46 kg/m  as calculated from the following:    Height as of 7/10/23: 1.657 m (5' 5.25\").    Weight as of this encounter: 61.7 kg (136 lb). .  Pedro Agrawal on 8/10/2023 at 2:47 PM    "

## 2023-08-10 NOTE — LETTER
August 10, 2023      Chyna Moncada  [id number] [facility] [id number]        Dear ,    We are writing to inform you of your test results.    CBC was within normal limits with normal hemoglobin levels and your basic metabolic panel was within normal limits.     Resulted Orders   Basic metabolic panel  (Ca, Cl, CO2, Creat, Gluc, K, Na, BUN)   Result Value Ref Range    Sodium 140 136 - 145 mmol/L    Potassium 4.2 3.4 - 5.3 mmol/L    Chloride 103 98 - 107 mmol/L    Carbon Dioxide (CO2) 28 22 - 29 mmol/L    Anion Gap 9 7 - 15 mmol/L    Urea Nitrogen 17.9 8.0 - 23.0 mg/dL    Creatinine 0.77 0.51 - 0.95 mg/dL    Calcium 9.5 8.8 - 10.2 mg/dL    Glucose 85 70 - 99 mg/dL    GFR Estimate 82 >60 mL/min/1.73m2   CBC with platelets   Result Value Ref Range    WBC Count 5.4 4.0 - 11.0 10e3/uL    RBC Count 4.67 3.80 - 5.20 10e6/uL    Hemoglobin 13.4 11.7 - 15.7 g/dL    Hematocrit 41.4 35.0 - 47.0 %    MCV 89 78 - 100 fL    MCH 28.7 26.5 - 33.0 pg    MCHC 32.4 31.5 - 36.5 g/dL    RDW 12.5 10.0 - 15.0 %    Platelet Count 177 150 - 450 10e3/uL       If you have any questions or concerns, please call the clinic at the number listed above.       Sincerely,      GEORGE Al CNP/dw

## 2023-08-10 NOTE — LETTER
8/10/2023         RE: Chyna Moncada  [address]  [facility] [address]        Dear Colleague,    Thank you for referring your patient, Chyna Moncada, to the [facility]. Please see a copy of my visit note below.    Chief Complaint   Patient presents with     Hearing Problem     Hearing issues ear cleaning      History of Present Illness   Chyna Moncada is a 72 year old female who presents today for follow up. The patient developed ear pain and decreased hearing on the right. The ear was irrigated without improvement in symptoms. I have seen the patient previously for her ears. The patient has a long history of decreased hearing in both ears worse in the left ear secondary to in utero [place] measles exposure.  She is worn hearing aids since her early 20s and has a known asymmetric hearing loss in the left ear.      The patient feels like the right ear is plugged with fullness, echoing, and sense that the ear is in a bucket.  She is not having any otalgia or otorrhea.  No bloody otorrhea.  Does intermittently hear breathing in her right ear.  Symptoms have been going on for about 1 month or so.  She does struggle with significant tinnitus worse in the right ear.  No prior history of ear surgery.  No significant vertigo.  The patient denies prior significant recreational, , or work-related noise exposure.     The patient underwent an outside audiogram performed 3/28/2023.  My review of the audiogram showed stable hearing from her prior audiogram in January 2022.  The patient has mild sloping to moderate sloping to moderately severe sloping to severe sensorineural hearing loss in the right ear and moderate sloping to moderately severe sloping to severe sloping to profound sensorineural hearing loss in the left ear.  Pure-tone average was 47 dB on the right and 62 dB on the left.  Speech reception threshold was 45 dB on the right and 55 dB on the left.  The patient  had 100% word recognition on the right and 60% word recognition on the left.      Past Medical History  Patient Active Problem List   Diagnosis     Allergic rhinitis     Anxiety state     Cerebral artery occlusion with cerebral infarction (H)     Degeneration of cervical intervertebral disc     Degeneration of lumbar or lumbosacral intervertebral disc     Insomnia     Postmenopausal atrophic vaginitis     Raynaud's syndrome     Osteopenia     Pulmonary nodules     TGA (transient global amnesia)     Recurrent cold sores     Peripheral vascular disease (H)     Atopic rhinitis     Osteoarthritis of hip     Bilateral sensorineural hearing loss     Tendinitis of shoulder     COPD (chronic obstructive pulmonary disease) (H)     SVT (supraventricular tachycardia) (H)     Age-related osteoporosis without current pathological fracture     Low platelet count (H)     Current Medications    Current Outpatient Medications:      alendronate (FOSAMAX) 70 MG tablet, Take 1 tablet (70 mg) by mouth every 7 days, Disp: 12 tablet, Rfl: 3     aspirin EC 81 MG tablet, Take 81 mg by mouth daily , Disp: , Rfl:      bisacodyl (DULCOLAX) 5 MG EC tablet, Take 2 tablets at 3 pm the day before your procedure. If your procedure is before 11 am, take 2 additional tablets at 11 pm. If your procedure is after 11 am, take 2 additional tablets at 6 am. For additional instructions refer to your colonoscopy prep instructions., Disp: 4 tablet, Rfl: 0     calcium carbonate 500 mg, elemental, 1250 (500 Ca) MG tablet chewable, Take 1,000 mg by mouth, Disp: , Rfl:      calcium-vitamin D (CALTRATE) 600-400 MG-UNIT per tablet, Take 1 tablet by mouth daily , Disp: , Rfl:      Cholecalciferol (VITAMIN D-3 PO), Take 1,000 Units by mouth daily, Disp: , Rfl:      cyclobenzaprine (FLEXERIL) 5 MG tablet, Take 1 tablet (5 mg) by mouth 2 times daily as needed for muscle spasms, Disp: 60 tablet, Rfl: 3     Inulin 2 g CHEW, , Disp: , Rfl:      mometasone (NASONEX) 50  MCG/ACT spray, Spray 2 sprays in nostril daily , Disp: , Rfl:      Multiple Vitamin (MULTI-VITAMINS) TABS, Take 1 tablet by mouth daily, Disp: , Rfl:      multivitamin (OCUVITE) TABS tablet, Take 1 tablet by mouth daily, Disp: , Rfl:      polyethylene glycol (GOLYTELY) 236 g suspension, The night before the exam at 6 pm drink an 8-ounce glass every 15 minutes until the jug is half empty. If you arrive before 11 AM: Drink the other half of the Golytely jug at 11 PM night before procedure. If you arrive after 11 AM: Drink the other half of the Golytely jug at 6 AM day of procedure. For additional instructions refer to your colonoscopy prep instructions., Disp: 4000 mL, Rfl: 0     pravastatin (PRAVACHOL) 40 MG tablet, TAKE ONE TABLET BY MOUTH EVERY NIGHT AT BEDTIME, Disp: 90 tablet, Rfl: 3     RESTASIS 0.05 % ophthalmic emulsion, , Disp: , Rfl:      valACYclovir (VALTREX) 1000 mg tablet, TAKE TWO TABLETS BY MOUTH TWICE A DAY, Disp: 4 tablet, Rfl: 11    Allergies  Allergies   Allergen Reactions     Tramadol Anaphylaxis     Benadryl [Diphenhydramine Hcl] Other (See Comments)     Made her goofy     Caffeine Other (See Comments)     Jittery       Codeine Nausea and Vomiting     Darvocet [Propoxyphene N-Apap]      Other reaction(s): GI Upset     Diphenhydramine Other (See Comments)     Lovastatin Other (See Comments) and Muscle Pain (Myalgia)     Other reaction(s): Myalgia  pain     Percocet [Oxycodone-Acetaminophen] Other (See Comments)     Other reaction(s): Dizziness  Sick or dizzy?     Vicodin [Hydrocodone-Acetaminophen] Other (See Comments) and Nausea     Sick or dizzy?       Social History  Social History     Socioeconomic History     Marital status:    Tobacco Use     Smoking status: Former     Types: Cigarettes     Quit date: 1/10/1983     Years since quittin.6     Smokeless tobacco: Never   Vaping Use     Vaping Use: Never used   Substance and Sexual Activity     Alcohol use: Not Currently      Alcohol/week: 1.0 standard drink of alcohol     Types: 1 Glasses of wine per week     Drug use: No     Sexual activity: Yes     Partners: Male   Other Topics Concern     Parent/sibling w/ CABG, MI or angioplasty before 65F 55M? No       Family History  Family History   Problem Relation Age of Onset     Arthritis Mother         was told was RA     Cancer Mother         GYN     Other - See Comments Mother         phlebitis for which hospitalized several times     Heart Disease Father      Cancer Brother         throat     Alcohol/Drug Maternal Grandfather      Heart Disease Paternal Grandfather         Heart attack     Diabetes Paternal Grandfather        Review of Systems  As per HPI and PMHx, otherwise 7 system review of the head and neck negative.    Physical Exam  /70 (BP Location: Right arm, Patient Position: Chair, Cuff Size: Adult Regular)   Pulse 76   Temp 96.8  F (36  C) (Tympanic)   Wt 61.7 kg (136 lb)   SpO2 99%   BMI 22.46 kg/m    GENERAL: Patient is a pleasant, cooperative 72 year old female in no acute distress.  HEAD: Normocephalic, atraumatic.  Hair and scalp are normal.  EYES: Pupils are equal, round, reactive to light and accommodation.  Extraocular movements are intact.  The sclera nonicteric without injection.  The extraocular structures are normal.  EARS: See below.  NOSE: Nares are patent.  Nasal mucosa is pink and moist.  Negative anterior rhinoscopy.  NEUROLOGIC: Cranial nerves II through XII are grossly intact.  Voice is strong.  Patient is House-Brackmann I/VI bilaterally.  CARDIOVASCULAR: Extremities are warm and well-perfused.  No significant peripheral edema.  RESPIRATORY: Patient has nonlabored breathing without cough, wheeze, stridor.  PSYCHIATRIC: Patient is alert and oriented.  Mood and affect appear normal.  SKIN: Warm and dry.  No scalp, face, or neck lesions noted.    Physical Exam and Procedure    EARS: Normal shape and symmetry.  No tenderness when palpating the mastoid  or tragal areas bilaterally.  The ears were then examined under the otic binocular microscope.  Otoscopic exam on the right was a clear canal.  The right tympanic membrane was round, intact without evidence of effusion.  No retraction, granulation, drainage, or evidence of cholesteatoma.  With gentle inhalation exhalation the patient has obvious respiratory variation of the right tympanic membrane.    Attention was turned to the left ear.  Otoscopic exam on the left reveals. The left tympanic membrane was round, intact without evidence of effusion.  No retraction, granulation, drainage, or evidence of cholesteatoma.  No obvious respiratory variation.    Assessment and Plan     ICD-10-CM    1. Patulous Eustachian tube, right  H69.01 Microscopy, Binocular     Adult ENT  Referral      2. Sensorineural hearing loss, bilateral  H90.3 Microscopy, Binocular     Adult ENT  Referral     Adult ENT  Referral      3. Asymmetrical sensorineural hearing loss  H90.3 Microscopy, Binocular     Adult ENT  Referral        It was my pleasure seeing Chyna Moncada today in clinic.  The patient has a history of significant sensorineural hearing loss and asymmetry secondary to interuterine exposure to [place] measles.  She has had a roughly 1 month history of ear plugging, fullness, echoing, and hearing her respiratory rate in her right ear.  She she has obvious respiratory variation of her tympanic membrane consistent with patulous eustachian tube on the right-hand side.     We discussed treatment options for patulous eustachian tube including observation, topical estrogen nasal spray, ear tube placement in office, or eustachian tube augmentation or procedures.  Given the relatively recent timeframe of symptoms, I think it recommend observation with referral to [facility] with evaluation by rhinologist and consideration of options.  She is not very excited about the idea of estrogen nasal  spray given its cost the potential other side effects.  She would consider potential ear tube placement on the right to see if this would help symptoms.     We discussed the risks, benefits, alternatives, options of right myringotomy with tube placement including, but not limited to: Risk of bleeding, risk of infection, risk of retained tympanostomy tube, risk of tympanic membrane perforation, risk of recurrent otorrhea, tympanostomy tube failure, potential need for additional procedures including tube removal/replacement.  We discussed the postoperative course and convalescence including using eardrops.  The patient voiced understanding.    I will place referral to [facility].  The patient will let me know if she would like to move forward with ear tube placement in the office in the future.      Cabrera Fields MD  Department of Otolaryngology-Head and Neck Surgery  [facility]       Again, thank you for allowing me to participate in the care of your patient.        Sincerely,        Cabrera Fields MD

## 2023-08-11 ENCOUNTER — TELEPHONE (OUTPATIENT)
Dept: OTOLARYNGOLOGY | Facility: CLINIC | Age: 73
End: 2023-08-11
Payer: COMMERCIAL

## 2023-08-11 NOTE — TELEPHONE ENCOUNTER
[facility] Call Center    Phone Message    May a detailed message be left on voicemail: yes     Reason for Call: Other: Pt has a referral from Dr. Cheryr to be seen for Patulous Eustachian tube, right, he's referring directly to Robbi Ontiveros or Eder - per our protocols none of those providers would see for eustachian concerns, wondering if we should be scheduling per the protocols with [facility] since Shane template is not coming up yet or if Pt can be scheduled with one of the recommended providers, please contact Pt to discuss further, thanks    Action Taken: Other: ENT    Travel Screening: Not Applicable

## 2023-08-15 NOTE — TELEPHONE ENCOUNTER
FUTURE VISIT INFORMATION:      FUTURE VISIT INFORMATION:  Date: 10/24/23  Time: 11:30 AM  Location: [facility] [place]  REFERRAL INFORMATION:  Referring provider: Cabrera Fields MD   Referring providers clinic: [facility]  Reason for visit/diagnosis:  Sensorineural hearing loss, bilateral [H90.3]  Patulous Eustachian tube, right [H69.01]  Asymmetrical sensorineural hearing loss [H90., ref'd by Cabrera Fields, rec's in [facility], pt made appt, [facility] location     RECORDS REQUESTED FROM:       Clinic name Comments Records Status Imaging Status   [facility] 8/10/23 note- Cabrera Fields MD     Audiogram: 8/10/23, 1/31/22 [facility] 3/28/23 audiogram Scanned in [facility] MRA/ MR brain 3/30/2014  CT head 3/30/2014 Epic Pacs

## 2023-08-16 ENCOUNTER — ANESTHESIA EVENT (OUTPATIENT)
Dept: GASTROENTEROLOGY | Facility: CLINIC | Age: 73
End: 2023-08-16
Payer: COMMERCIAL

## 2023-08-16 ASSESSMENT — COPD QUESTIONNAIRES: COPD: 1

## 2023-08-16 ASSESSMENT — ENCOUNTER SYMPTOMS: DYSRHYTHMIAS: 1

## 2023-08-16 NOTE — ANESTHESIA PREPROCEDURE EVALUATION
Anesthesia Pre-Procedure Evaluation    Patient: Chyna Moncada   MRN: [id number] : 1950        Procedure : Procedure(s):  Colonoscopy          Past Medical History:   Diagnosis Date    Cerebral embolism with cerebral infarction (H)     Symptomatic menopausal or female climacteric states 2007      Past Surgical History:   Procedure Laterality Date    HYSTERECTOMY, PAP NO LONGER INDICATED        Allergies   Allergen Reactions    Tramadol Anaphylaxis    Benadryl [Diphenhydramine Hcl] Other (See Comments)     Made her goofy    Caffeine Other (See Comments)     Jittery      Codeine Nausea and Vomiting    Darvocet [Propoxyphene N-Apap]      Other reaction(s): GI Upset    Diphenhydramine Other (See Comments)    Lovastatin Other (See Comments) and Muscle Pain (Myalgia)     Other reaction(s): Myalgia  pain    Percocet [Oxycodone-Acetaminophen] Other (See Comments)     Other reaction(s): Dizziness  Sick or dizzy?    Vicodin [Hydrocodone-Acetaminophen] Other (See Comments) and Nausea     Sick or dizzy?      Social History     Tobacco Use    Smoking status: Former     Types: Cigarettes     Quit date: 1/10/1983     Years since quittin.6    Smokeless tobacco: Never   Substance Use Topics    Alcohol use: Not Currently     Alcohol/week: 1.0 standard drink of alcohol     Types: 1 Glasses of wine per week      Wt Readings from Last 1 Encounters:   08/10/23 61.7 kg (136 lb)        Anesthesia Evaluation   Pt has had prior anesthetic. Type: General and MAC.        ROS/MED HX  ENT/Pulmonary:     (+)           allergic rhinitis,     tobacco use,         COPD,              Neurologic:     (+)          CVA,                      Cardiovascular:     (+)  - -   -  - -                        dysrhythmias, Other,             METS/Exercise Tolerance:     Hematologic:     (+)      anemia,          Musculoskeletal: Comment: Degeneration of cervical intervertebral disc  Degeneration of lumbar or lumbosacral intervertebral  disc      (+)  arthritis,             GI/Hepatic:       Renal/Genitourinary:       Endo:       Psychiatric/Substance Use:       Infectious Disease:       Malignancy:       Other:            Physical Exam    Airway  airway exam normal      Mallampati: II   TM distance: > 3 FB   Neck ROM: full   Mouth opening: > 3 cm    Respiratory Devices and Support         Dental  no notable dental history     (+) Minor Abnormalities - some fillings, tiny chips      Cardiovascular   cardiovascular exam normal          Pulmonary   pulmonary exam normal                OUTSIDE LABS:  CBC:   Lab Results   Component Value Date    WBC 5.4 08/10/2023    WBC 5.3 07/10/2023    HGB 13.4 08/10/2023    HGB 13.7 07/10/2023    HCT 41.4 08/10/2023    HCT 41.7 07/10/2023     08/10/2023     07/10/2023     BMP:   Lab Results   Component Value Date     08/10/2023     04/27/2023    POTASSIUM 4.2 08/10/2023    POTASSIUM 4.7 04/27/2023    CHLORIDE 103 08/10/2023    CHLORIDE 106 04/27/2023    CO2 28 08/10/2023    CO2 32 (H) 04/27/2023    BUN 17.9 08/10/2023    BUN 13.7 04/27/2023    CR 0.77 08/10/2023    CR 0.75 04/27/2023    GLC 85 08/10/2023    GLC 77 04/27/2023     COAGS:   Lab Results   Component Value Date    PTT 27 11/05/2007    INR 0.99 11/05/2007     POC: No results found for: BGM, HCG, HCGS  HEPATIC:   Lab Results   Component Value Date    ALBUMIN 4.2 01/16/2023    PROTTOTAL 6.8 01/16/2023    ALT 19 01/16/2023    AST 27 01/16/2023    ALKPHOS 78 01/16/2023    BILITOTAL 0.5 01/16/2023     OTHER:   Lab Results   Component Value Date    FARRAH 9.5 08/10/2023    LIPASE 221 03/27/2011    TSH 2.69 07/10/2023    CRP <2.9 10/16/2017    SED 6 01/16/2023       Anesthesia Plan    ASA Status:  3    NPO Status:  NPO Appropriate    Anesthesia Type: General.   Induction: Propofol, Intravenous.   Maintenance: TIVA.        Consents    Anesthesia Plan(s) and associated risks, benefits, and realistic alternatives discussed. Questions answered  and patient/representative(s) expressed understanding.     - Discussed: Risks, Benefits and Alternatives for BOTH SEDATION and the PROCEDURE were discussed     - Discussed with:  Patient            Postoperative Care    Pain management: Multi-modal analgesia, IV analgesics, Oral pain medications.   PONV prophylaxis: Ondansetron (or other 5HT-3), Dexamethasone or Solumedrol, Background Propofol Infusion     Comments:                GEORGE Sabillon CRNA

## 2023-08-17 ENCOUNTER — HOSPITAL ENCOUNTER (OUTPATIENT)
Facility: CLINIC | Age: 73
Discharge: HOME | End: 2023-08-17
Attending: SURGERY | Admitting: SURGERY
Payer: COMMERCIAL

## 2023-08-17 ENCOUNTER — ANESTHESIA (OUTPATIENT)
Dept: GASTROENTEROLOGY | Facility: CLINIC | Age: 73
End: 2023-08-17
Payer: COMMERCIAL

## 2023-08-17 VITALS
BODY MASS INDEX: 22.66 KG/M2 | RESPIRATION RATE: 14 BRPM | WEIGHT: 136 LBS | HEIGHT: 65 IN | SYSTOLIC BLOOD PRESSURE: 120 MMHG | TEMPERATURE: 97.6 F | OXYGEN SATURATION: 97 % | DIASTOLIC BLOOD PRESSURE: 62 MMHG | HEART RATE: 68 BPM

## 2023-08-17 DIAGNOSIS — Z12.11 SPECIAL SCREENING FOR MALIGNANT NEOPLASMS, COLON: Primary | ICD-10-CM

## 2023-08-17 LAB — COLONOSCOPY: NORMAL

## 2023-08-17 PROCEDURE — 250N000009 HC RX 250: Performed by: NURSE ANESTHETIST, CERTIFIED REGISTERED

## 2023-08-17 PROCEDURE — 250N000011 HC RX IP 250 OP 636: Performed by: NURSE ANESTHETIST, CERTIFIED REGISTERED

## 2023-08-17 PROCEDURE — 45378 DIAGNOSTIC COLONOSCOPY: CPT | Performed by: SURGERY

## 2023-08-17 PROCEDURE — 370N000017 HC ANESTHESIA TECHNICAL FEE, PER MIN: Performed by: SURGERY

## 2023-08-17 PROCEDURE — G0121 COLON CA SCRN NOT HI RSK IND: HCPCS | Performed by: SURGERY

## 2023-08-17 PROCEDURE — 258N000003 HC RX IP 258 OP 636: Performed by: SURGERY

## 2023-08-17 PROCEDURE — 45380 COLONOSCOPY AND BIOPSY: CPT | Mod: PT | Performed by: SURGERY

## 2023-08-17 RX ORDER — ONDANSETRON 2 MG/ML
4 INJECTION INTRAMUSCULAR; INTRAVENOUS EVERY 6 HOURS PRN
Status: DISCONTINUED | OUTPATIENT
Start: 2023-08-17 | End: 2023-08-17 | Stop reason: HOSPADM

## 2023-08-17 RX ORDER — ALBUTEROL SULFATE 0.83 MG/ML
2.5 SOLUTION RESPIRATORY (INHALATION) EVERY 4 HOURS PRN
Status: DISCONTINUED | OUTPATIENT
Start: 2023-08-17 | End: 2023-08-17 | Stop reason: HOSPADM

## 2023-08-17 RX ORDER — SODIUM CHLORIDE, SODIUM LACTATE, POTASSIUM CHLORIDE, CALCIUM CHLORIDE 600; 310; 30; 20 MG/100ML; MG/100ML; MG/100ML; MG/100ML
INJECTION, SOLUTION INTRAVENOUS CONTINUOUS
Status: DISCONTINUED | OUTPATIENT
Start: 2023-08-17 | End: 2023-08-17 | Stop reason: HOSPADM

## 2023-08-17 RX ORDER — LIDOCAINE HYDROCHLORIDE 20 MG/ML
INJECTION, SOLUTION INFILTRATION; PERINEURAL PRN
Status: DISCONTINUED | OUTPATIENT
Start: 2023-08-17 | End: 2023-08-17

## 2023-08-17 RX ORDER — LIDOCAINE 40 MG/G
CREAM TOPICAL
Status: DISCONTINUED | OUTPATIENT
Start: 2023-08-17 | End: 2023-08-17 | Stop reason: HOSPADM

## 2023-08-17 RX ORDER — NALOXONE HYDROCHLORIDE 0.4 MG/ML
0.2 INJECTION, SOLUTION INTRAMUSCULAR; INTRAVENOUS; SUBCUTANEOUS
Status: DISCONTINUED | OUTPATIENT
Start: 2023-08-17 | End: 2023-08-17 | Stop reason: HOSPADM

## 2023-08-17 RX ORDER — ONDANSETRON 4 MG/1
4 TABLET, ORALLY DISINTEGRATING ORAL EVERY 6 HOURS PRN
Status: DISCONTINUED | OUTPATIENT
Start: 2023-08-17 | End: 2023-08-17 | Stop reason: HOSPADM

## 2023-08-17 RX ORDER — PROPOFOL 10 MG/ML
INJECTION, EMULSION INTRAVENOUS CONTINUOUS PRN
Status: DISCONTINUED | OUTPATIENT
Start: 2023-08-17 | End: 2023-08-17

## 2023-08-17 RX ORDER — PROPOFOL 10 MG/ML
INJECTION, EMULSION INTRAVENOUS PRN
Status: DISCONTINUED | OUTPATIENT
Start: 2023-08-17 | End: 2023-08-17

## 2023-08-17 RX ORDER — ONDANSETRON 4 MG/1
4 TABLET, ORALLY DISINTEGRATING ORAL EVERY 30 MIN PRN
Status: DISCONTINUED | OUTPATIENT
Start: 2023-08-17 | End: 2023-08-17 | Stop reason: HOSPADM

## 2023-08-17 RX ORDER — NALOXONE HYDROCHLORIDE 0.4 MG/ML
0.4 INJECTION, SOLUTION INTRAMUSCULAR; INTRAVENOUS; SUBCUTANEOUS
Status: DISCONTINUED | OUTPATIENT
Start: 2023-08-17 | End: 2023-08-17 | Stop reason: HOSPADM

## 2023-08-17 RX ORDER — FLUMAZENIL 0.1 MG/ML
0.2 INJECTION, SOLUTION INTRAVENOUS
Status: DISCONTINUED | OUTPATIENT
Start: 2023-08-17 | End: 2023-08-17 | Stop reason: HOSPADM

## 2023-08-17 RX ORDER — PROCHLORPERAZINE MALEATE 5 MG
5 TABLET ORAL EVERY 6 HOURS PRN
Status: DISCONTINUED | OUTPATIENT
Start: 2023-08-17 | End: 2023-08-17 | Stop reason: HOSPADM

## 2023-08-17 RX ORDER — ONDANSETRON 2 MG/ML
4 INJECTION INTRAMUSCULAR; INTRAVENOUS EVERY 30 MIN PRN
Status: DISCONTINUED | OUTPATIENT
Start: 2023-08-17 | End: 2023-08-17 | Stop reason: HOSPADM

## 2023-08-17 RX ORDER — DEXAMETHASONE SODIUM PHOSPHATE 4 MG/ML
4 INJECTION, SOLUTION INTRA-ARTICULAR; INTRALESIONAL; INTRAMUSCULAR; INTRAVENOUS; SOFT TISSUE
Status: DISCONTINUED | OUTPATIENT
Start: 2023-08-17 | End: 2023-08-17 | Stop reason: HOSPADM

## 2023-08-17 RX ORDER — GLYCOPYRROLATE 0.2 MG/ML
INJECTION, SOLUTION INTRAMUSCULAR; INTRAVENOUS PRN
Status: DISCONTINUED | OUTPATIENT
Start: 2023-08-17 | End: 2023-08-17

## 2023-08-17 RX ADMIN — PROPOFOL 60 MG: 10 INJECTION, EMULSION INTRAVENOUS at 09:47

## 2023-08-17 RX ADMIN — LIDOCAINE HYDROCHLORIDE 0.1 ML: 10 INJECTION, SOLUTION EPIDURAL; INFILTRATION; INTRACAUDAL; PERINEURAL at 09:41

## 2023-08-17 RX ADMIN — LIDOCAINE HYDROCHLORIDE 30 MG: 20 INJECTION, SOLUTION INFILTRATION; PERINEURAL at 09:47

## 2023-08-17 RX ADMIN — PROPOFOL 150 MCG/KG/MIN: 10 INJECTION, EMULSION INTRAVENOUS at 09:47

## 2023-08-17 RX ADMIN — GLYCOPYRROLATE 0.1 MG: 0.2 INJECTION, SOLUTION INTRAMUSCULAR; INTRAVENOUS at 09:46

## 2023-08-17 RX ADMIN — SODIUM CHLORIDE, POTASSIUM CHLORIDE, SODIUM LACTATE AND CALCIUM CHLORIDE: 600; 310; 30; 20 INJECTION, SOLUTION INTRAVENOUS at 09:41

## 2023-08-17 ASSESSMENT — ACTIVITIES OF DAILY LIVING (ADL): ADLS_ACUITY_SCORE: 37

## 2023-08-17 ASSESSMENT — LIFESTYLE VARIABLES: TOBACCO_USE: 1

## 2023-08-17 NOTE — ANESTHESIA CARE TRANSFER NOTE
Patient: Chyna Moncada    Procedure: Procedure(s):  Colonoscopy       Diagnosis: Special screening for malignant neoplasm of colon [Z12.11]  Diagnosis Additional Information: No value filed.    Anesthesia Type:   General     Note:    Oropharynx: oropharynx clear of all foreign objects and spontaneously breathing  Level of Consciousness: awake  Oxygen Supplementation: room air    Independent Airway: airway patency satisfactory and stable  Dentition: dentition unchanged  Vital Signs Stable: post-procedure vital signs reviewed and stable  Report to RN Given: handoff report given  Patient transferred to: Phase II    Handoff Report: Identifed the Patient, Identified the Reponsible Provider, Reviewed the pertinent medical history, Discussed the surgical course, Reviewed Intra-OP anesthesia mangement and issues during anesthesia, Set expectations for post-procedure period and Allowed opportunity for questions and acknowledgement of understanding      Vitals:  Vitals Value Taken Time   BP     Temp     Pulse     Resp     SpO2 99 % 08/17/23 1016   Vitals shown include unvalidated device data.    Electronically Signed By: GEORGE Garrido CRNA  August 17, 2023  10:17 AM

## 2023-08-17 NOTE — ANESTHESIA POSTPROCEDURE EVALUATION
Patient: Chyna Moncada    Procedure: Procedure(s):  Colonoscopy       Anesthesia Type:  General    Note:  Disposition: Outpatient   Postop Pain Control: Uneventful            Sign Out: Well controlled pain   PONV: No   Neuro/Psych: Uneventful            Sign Out: Acceptable/Baseline neuro status   Airway/Respiratory: Uneventful            Sign Out: Acceptable/Baseline resp. status   CV/Hemodynamics: Uneventful            Sign Out: Acceptable CV status; No obvious hypovolemia; No obvious fluid overload   Other NRE: NONE   DID A NON-ROUTINE EVENT OCCUR? No           Last vitals:  Vitals:    08/17/23 0921   BP: (!) 142/71   Pulse: 62   Resp: 16   Temp: 36.6  C (97.9  F)   SpO2: 100%       Electronically Signed By: GEORGE Garrido CRNA  August 17, 2023  10:17 AM

## 2023-08-17 NOTE — H&P
ENDOSCOPY PRE-SEDATION H&P FOR OUTPATIENT PROCEDURES    Chyna Moncada  [id number]  1950    Procedure:   Colonoscopy possible biopsy, possible polypectomy, with MAC sedation.     Pre-procedure diagnosis: screen    Past medical history:   Past Medical History:   Diagnosis Date    Cerebral embolism with cerebral infarction (H)     Symptomatic menopausal or female climacteric states 04/12/2007       Past surgical history:   Past Surgical History:   Procedure Laterality Date    HYSTERECTOMY, PAP NO LONGER INDICATED         Current Facility-Administered Medications   Medication    lactated ringers infusion    lactated ringers infusion    lidocaine (LMX4) kit    lidocaine (LMX4) kit    lidocaine 1 % 0.1-1 mL    lidocaine 1 % 0.1-1 mL    sodium chloride (PF) 0.9% PF flush 3 mL    sodium chloride (PF) 0.9% PF flush 3 mL    sodium chloride (PF) 0.9% PF flush 3 mL    sodium chloride (PF) 0.9% PF flush 3 mL       Allergies   Allergen Reactions    Tramadol Anaphylaxis    Benadryl [Diphenhydramine Hcl] Other (See Comments)     Made her goofy    Caffeine Other (See Comments)     Jittery      Codeine Nausea and Vomiting    Darvocet [Propoxyphene N-Apap]      Other reaction(s): GI Upset    Diphenhydramine Other (See Comments)    Lovastatin Other (See Comments) and Muscle Pain (Myalgia)     Other reaction(s): Myalgia  pain    Percocet [Oxycodone-Acetaminophen] Other (See Comments)     Other reaction(s): Dizziness  Sick or dizzy?    Vicodin [Hydrocodone-Acetaminophen] Other (See Comments) and Nausea     Sick or dizzy?       History of Anesthesia/Sedation Problems: yes nausea    Physical Exam:    Mental status: alert  Heart: Normal  Lung: Normal  Assessment of patient's airway: Normal  Other as pertinent for procedure: None     ASA Score: See Provation note    Mallampati score:  I - Faucial pillars, soft palate, and uvula are visible    Assessment/Plan:     The patient is an appropriate candidate to receive  sedation.    Informed consent was discussed with the patient/family, including the risks, benefits, potential complications and any alternative options associated with sedation.    Patient assessment completed just prior to sedation and while under constant observation by the provider. Condition determined to be adequate for proceeding with sedation.    The specific risks for the procedure were discussed with the patient at the time of informed consent and include but are not limited to perforation which could require surgery, missing significant neoplasm or lesion, hemorrhage and adverse sedative complication.      Isak Paulino MD

## 2023-08-29 ENCOUNTER — TRANSFERRED RECORDS (OUTPATIENT)
Dept: HEALTH INFORMATION MANAGEMENT | Facility: CLINIC | Age: 73
End: 2023-08-29
Payer: COMMERCIAL

## 2023-09-18 ENCOUNTER — TELEPHONE (OUTPATIENT)
Dept: OTOLARYNGOLOGY | Facility: CLINIC | Age: 73
End: 2023-09-18
Payer: COMMERCIAL

## 2023-09-18 NOTE — TELEPHONE ENCOUNTER
LOV 8/10: Patulous Eustachian Tube  Offered: Observation, topical estrogen nasal spray, eustachian tube augmentation or procedure or ear tube placement in office.   Appointment with Dr. Ontiveros 10/24 (rhinology)    Patient would like to pursue in clinic ear tube placement   Okay to continue with plan per last note? If so, I will find spot.     Thank you,   Prem DAWKINS RN  [facility]

## 2023-09-18 NOTE — TELEPHONE ENCOUNTER
[facility] Call Center    Phone Message    May a detailed message be left on voicemail: yes     Reason for Call: Other: Per pt she says she would like to have the tube put in her ear and she wants Dr. Fields to do it. Pt states he said to call and leave a message if that is what she wanted. Please call to discuss. Thank you     Action Taken: Message routed to:  [facility] ENT    Travel Screening: Not Applicable

## 2023-09-18 NOTE — TELEPHONE ENCOUNTER
Cabrera Fields MD  You57 minutes ago (12:09 PM)     This Friday or next Friday at 2 PM     Patient called. Happily accepted appointment.     Prem DAWKINS RN  [facility]

## 2023-09-22 ENCOUNTER — OFFICE VISIT (OUTPATIENT)
Dept: OTOLARYNGOLOGY | Facility: CLINIC | Age: 73
End: 2023-09-22
Payer: COMMERCIAL

## 2023-09-22 VITALS
HEIGHT: 65 IN | TEMPERATURE: 97.5 F | OXYGEN SATURATION: 98 % | BODY MASS INDEX: 22.66 KG/M2 | SYSTOLIC BLOOD PRESSURE: 124 MMHG | WEIGHT: 136 LBS | HEART RATE: 68 BPM | DIASTOLIC BLOOD PRESSURE: 75 MMHG

## 2023-09-22 DIAGNOSIS — H93.13 TINNITUS, BILATERAL: ICD-10-CM

## 2023-09-22 DIAGNOSIS — H69.01 PATULOUS EUSTACHIAN TUBE, RIGHT: Primary | ICD-10-CM

## 2023-09-22 DIAGNOSIS — H90.3 ASYMMETRICAL SENSORINEURAL HEARING LOSS: ICD-10-CM

## 2023-09-22 DIAGNOSIS — H90.3 SENSORINEURAL HEARING LOSS, BILATERAL: ICD-10-CM

## 2023-09-22 PROCEDURE — 99213 OFFICE O/P EST LOW 20 MIN: CPT | Mod: 25 | Performed by: OTOLARYNGOLOGY

## 2023-09-22 PROCEDURE — 69433 CREATE EARDRUM OPENING: CPT | Mod: RT | Performed by: OTOLARYNGOLOGY

## 2023-09-22 ASSESSMENT — PAIN SCALES - GENERAL: PAINLEVEL: NO PAIN (0)

## 2023-09-22 NOTE — NURSING NOTE
"Initial /75 (BP Location: Right arm, Patient Position: Chair, Cuff Size: Adult Regular)   Pulse 68   Temp 97.5  F (36.4  C) (Tympanic)   Ht 1.657 m (5' 5.24\")   Wt 61.7 kg (136 lb)   SpO2 98%   BMI 22.47 kg/m   Estimated body mass index is 22.47 kg/m  as calculated from the following:    Height as of this encounter: 1.657 m (5' 5.24\").    Weight as of this encounter: 61.7 kg (136 lb). .  Pedro Agrawal on 9/22/2023 at 1:51 PM    "

## 2023-09-22 NOTE — PROGRESS NOTES
Chief Complaint   Patient presents with    RECHECK     Ear tube placement.     History of Present Illness  Chyna Moncada is a 72 year old female who presents today for follow-up.  I evaluated the patient on 8/10/2023 with right ear complaints.  She developed some ear symptoms and underwent irrigation of the ear.  She continued to have symptoms of ear plugging, fullness, echoing, and and sense that the ear is in a bucket.  She had obvious respiratory variation of her tympanic membrane and I diagnosed her with patulous eustachian tube on the right.  Discussed observation, trial of estrogen nasal spray, ear tube placement on the right, or referral for discussion of potential eustachian tube procedures to help with her patulous eustachian tube.  Upon initial evaluation, she elected for observation.  She contacted our office about pursuing an ear tube on the right.    The patient has a long history of decreased hearing in both ears worse in the left ear secondary to in utero [place] measles exposure.  She is worn hearing aids since her early 20s and has a known asymmetric hearing loss in the left ear.  She is not having any otalgia or otorrhea.  No bloody otorrhea.  Does intermittently hear breathing in her right ear.  Symptoms have been going on for about 1 month or so.  She does struggle with significant tinnitus worse in the right ear.  No prior history of ear surgery.  No significant vertigo.  The patient denies prior significant recreational, , or work-related noise exposure.      The patient underwent an outside audiogram performed 3/28/2023.  My review of the audiogram showed stable hearing from her prior audiogram in January 2022.  The patient has mild sloping to moderate sloping to moderately severe sloping to severe sensorineural hearing loss in the right ear and moderate sloping to moderately severe sloping to severe sloping to profound sensorineural hearing loss in the left ear.  Pure-tone average  was 47 dB on the right and 62 dB on the left.  Speech reception threshold was 45 dB on the right and 55 dB on the left.  The patient had 100% word recognition on the right and 60% word recognition on the left.      Past Medical History  Patient Active Problem List   Diagnosis    Allergic rhinitis    Anxiety state    Cerebral artery occlusion with cerebral infarction (H)    Degeneration of cervical intervertebral disc    Degeneration of lumbar or lumbosacral intervertebral disc    Insomnia    Postmenopausal atrophic vaginitis    Raynaud's syndrome    Osteopenia    Pulmonary nodules    TGA (transient global amnesia)    Recurrent cold sores    Peripheral vascular disease (H)    Atopic rhinitis    Osteoarthritis of hip    Bilateral sensorineural hearing loss    Tendinitis of shoulder    COPD (chronic obstructive pulmonary disease) (H)    SVT (supraventricular tachycardia) (H)    Age-related osteoporosis without current pathological fracture    Low platelet count (H)     Current Medications    Current Outpatient Medications:     alendronate (FOSAMAX) 70 MG tablet, Take 1 tablet (70 mg) by mouth every 7 days, Disp: 12 tablet, Rfl: 3    aspirin EC 81 MG tablet, Take 81 mg by mouth daily , Disp: , Rfl:     bisacodyl (DULCOLAX) 5 MG EC tablet, Take 2 tablets at 3 pm the day before your procedure. If your procedure is before 11 am, take 2 additional tablets at 11 pm. If your procedure is after 11 am, take 2 additional tablets at 6 am. For additional instructions refer to your colonoscopy prep instructions., Disp: 4 tablet, Rfl: 0    calcium carbonate 500 mg, elemental, 1250 (500 Ca) MG tablet chewable, Take 1,000 mg by mouth, Disp: , Rfl:     calcium-vitamin D (CALTRATE) 600-400 MG-UNIT per tablet, Take 1 tablet by mouth daily , Disp: , Rfl:     Cholecalciferol (VITAMIN D-3 PO), Take 1,000 Units by mouth daily, Disp: , Rfl:     cyclobenzaprine (FLEXERIL) 5 MG tablet, Take 1 tablet (5 mg) by mouth 2 times daily as needed for  muscle spasms, Disp: 60 tablet, Rfl: 3    Inulin 2 g CHEW, , Disp: , Rfl:     mometasone (NASONEX) 50 MCG/ACT spray, Spray 2 sprays in nostril daily , Disp: , Rfl:     Multiple Vitamin (MULTI-VITAMINS) TABS, Take 1 tablet by mouth daily, Disp: , Rfl:     multivitamin (OCUVITE) TABS tablet, Take 1 tablet by mouth daily, Disp: , Rfl:     polyethylene glycol (GOLYTELY) 236 g suspension, The night before the exam at 6 pm drink an 8-ounce glass every 15 minutes until the jug is half empty. If you arrive before 11 AM: Drink the other half of the Golytely jug at 11 PM night before procedure. If you arrive after 11 AM: Drink the other half of the Golytely jug at 6 AM day of procedure. For additional instructions refer to your colonoscopy prep instructions., Disp: 4000 mL, Rfl: 0    pravastatin (PRAVACHOL) 40 MG tablet, TAKE ONE TABLET BY MOUTH EVERY NIGHT AT BEDTIME, Disp: 90 tablet, Rfl: 3    RESTASIS 0.05 % ophthalmic emulsion, , Disp: , Rfl:     valACYclovir (VALTREX) 1000 mg tablet, TAKE TWO TABLETS BY MOUTH TWICE A DAY, Disp: 4 tablet, Rfl: 11    Allergies  Allergies   Allergen Reactions    Tramadol Anaphylaxis    Benadryl [Diphenhydramine Hcl] Other (See Comments)     Made her goofy    Caffeine Other (See Comments)     Jittery      Codeine Nausea and Vomiting    Darvocet [Propoxyphene N-Apap]      Other reaction(s): GI Upset    Diphenhydramine Other (See Comments)    Lovastatin Other (See Comments) and Muscle Pain (Myalgia)     Other reaction(s): Myalgia  pain    Percocet [Oxycodone-Acetaminophen] Other (See Comments)     Other reaction(s): Dizziness  Sick or dizzy?    Vicodin [Hydrocodone-Acetaminophen] Other (See Comments) and Nausea     Sick or dizzy?       Social History  Social History     Socioeconomic History    Marital status:    Tobacco Use    Smoking status: Former     Types: Cigarettes     Quit date: 1/10/1983     Years since quittin.7    Smokeless tobacco: Never   Vaping Use    Vaping Use:  "Never used   Substance and Sexual Activity    Alcohol use: Not Currently     Alcohol/week: 1.0 standard drink of alcohol     Types: 1 Glasses of wine per week    Drug use: No    Sexual activity: Yes     Partners: Male   Other Topics Concern    Parent/sibling w/ CABG, MI or angioplasty before 65F 55M? No       Family History  Family History   Problem Relation Age of Onset    Arthritis Mother         was told was RA    Cancer Mother         GYN    Other - See Comments Mother         phlebitis for which hospitalized several times    Heart Disease Father     Cancer Brother         throat    Alcohol/Drug Maternal Grandfather     Heart Disease Paternal Grandfather         Heart attack    Diabetes Paternal Grandfather        Review of Systems  As per HPI and PMHx, otherwise 10 system review including the head and neck, constitutional, eyes, respiratory, GI, skin, neurologic, lymphatic, endocrine, and allergy systems is negative.    Physical Exam  /75 (BP Location: Right arm, Patient Position: Chair, Cuff Size: Adult Regular)   Pulse 68   Temp 97.5  F (36.4  C) (Tympanic)   Ht 1.657 m (5' 5.24\")   Wt 61.7 kg (136 lb)   SpO2 98%   BMI 22.47 kg/m    GENERAL: Patient is a pleasant, cooperative 72 year old female in no acute distress.  HEAD: Normocephalic, atraumatic.  Hair and scalp are normal.  EYES: Pupils are equal, round, reactive to light and accommodation.  Extraocular movements are intact.  The sclera nonicteric without injection.  The extraocular structures are normal.  EARS: Normal shape and symmetry.  No tenderness when palpating the mastoid or tragal areas bilaterally.  Otoscopic exam on the right was a clear canal.  The right tympanic membrane was round, intact without evidence of effusion.  No retraction, granulation, drainage, or evidence of cholesteatoma.  With gentle inhalation exhalation the patient has obvious respiratory variation of the right tympanic membrane. Otoscopic exam on the left " reveals. The left tympanic membrane was round, intact without evidence of effusion.  No retraction, granulation, drainage, or evidence of cholesteatoma.  No obvious respiratory variation.  NOSE: Nares are patent.  Nasal mucosa is pink and moist.  Negative anterior rhinoscopy.  NEUROLOGIC: Cranial nerves II through XII are grossly intact.  Voice is strong.  Patient is House-Brackmann I/VI bilaterally.  CARDIOVASCULAR: Extremities are warm and well-perfused.  No significant peripheral edema.  RESPIRATORY: Patient has nonlabored breathing without cough, wheeze, stridor.  PSYCHIATRIC: Patient is alert and oriented.  Mood and affect appear normal.  SKIN: Warm and dry.  No scalp, face, or neck lesions noted.     Assessment and Plan     ICD-10-CM    1. Patulous Eustachian tube, right  H69.01 Tympanotomy W/Tube      2. Sensorineural hearing loss, bilateral  H90.3 Tympanotomy W/Tube      3. Asymmetrical sensorineural hearing loss  H90.3 Tympanotomy W/Tube      4. Tinnitus, bilateral  H93.13 Tympanotomy W/Tube         It was my pleasure seeing Chyna Santoyodolores today in clinic.  The patient has a history of significant sensorineural hearing loss and asymmetry secondary to interuterine exposure to [place] measles.  She has had a roughly 2 month history of ear plugging, fullness, echoing, and hearing her respiratory rate in her right ear.  She she has obvious respiratory variation of her tympanic membrane consistent with patulous eustachian tube on the right-hand side.      We discussed treatment options for patulous eustachian tube including observation, topical estrogen nasal spray, ear tube placement in office, or eustachian tube augmentation or procedures.       We discussed the risks, benefits, alternatives, options of right myringotomy with tube placement including, but not limited to: Risk of bleeding, risk of infection, risk of retained tympanostomy tube, risk of tympanic membrane perforation, risk of recurrent otorrhea,  tympanostomy tube failure, potential need for additional procedures including tube removal/replacement.  We discussed the postoperative course and convalescence including using eardrops.  The patient voiced understanding.  Please see the procedure note for further details.    She will be seeing rhinology at [facility] in November.    The patient is medically cleared for consideration of a hearing aid evaluation.    Cabrera Fields MD  Department of Otolaryngology-Head and Neck Surgery  [facility]

## 2023-09-22 NOTE — LETTER
9/22/2023         RE: Chyna Moncada  [address]  [facility] [id number]        Dear Colleague,    Thank you for referring your patient, Chyna Moncada, to the [facility]. Please see a copy of my visit note below.    Chief Complaint   Patient presents with     RECHECK     Ear tube placement.     History of Present Illness  Chyna oMncada is a 72 year old female who presents today for follow-up.  I evaluated the patient on 8/10/2023 with right ear complaints.  She developed some ear symptoms and underwent irrigation of the ear.  She continued to have symptoms of ear plugging, fullness, echoing, and and sense that the ear is in a bucket.  She had obvious respiratory variation of her tympanic membrane and I diagnosed her with patulous eustachian tube on the right.  Discussed observation, trial of estrogen nasal spray, ear tube placement on the right, or referral for discussion of potential eustachian tube procedures to help with her patulous eustachian tube.  Upon initial evaluation, she elected for observation.  She contacted our office about pursuing an ear tube on the right.    The patient has a long history of decreased hearing in both ears worse in the left ear secondary to in utero [place] measles exposure.  She is worn hearing aids since her early 20s and has a known asymmetric hearing loss in the left ear.  She is not having any otalgia or otorrhea.  No bloody otorrhea.  Does intermittently hear breathing in her right ear.  Symptoms have been going on for about 1 month or so.  She does struggle with significant tinnitus worse in the right ear.  No prior history of ear surgery.  No significant vertigo.  The patient denies prior significant recreational, , or work-related noise exposure.      The patient underwent an outside audiogram performed 3/28/2023.  My review of the audiogram showed stable hearing from her prior audiogram in January 2022.  The patient has  mild sloping to moderate sloping to moderately severe sloping to severe sensorineural hearing loss in the right ear and moderate sloping to moderately severe sloping to severe sloping to profound sensorineural hearing loss in the left ear.  Pure-tone average was 47 dB on the right and 62 dB on the left.  Speech reception threshold was 45 dB on the right and 55 dB on the left.  The patient had 100% word recognition on the right and 60% word recognition on the left.      Past Medical History  Patient Active Problem List   Diagnosis     Allergic rhinitis     Anxiety state     Cerebral artery occlusion with cerebral infarction (H)     Degeneration of cervical intervertebral disc     Degeneration of lumbar or lumbosacral intervertebral disc     Insomnia     Postmenopausal atrophic vaginitis     Raynaud's syndrome     Osteopenia     Pulmonary nodules     TGA (transient global amnesia)     Recurrent cold sores     Peripheral vascular disease (H)     Atopic rhinitis     Osteoarthritis of hip     Bilateral sensorineural hearing loss     Tendinitis of shoulder     COPD (chronic obstructive pulmonary disease) (H)     SVT (supraventricular tachycardia) (H)     Age-related osteoporosis without current pathological fracture     Low platelet count (H)     Current Medications    Current Outpatient Medications:      alendronate (FOSAMAX) 70 MG tablet, Take 1 tablet (70 mg) by mouth every 7 days, Disp: 12 tablet, Rfl: 3     aspirin EC 81 MG tablet, Take 81 mg by mouth daily , Disp: , Rfl:      bisacodyl (DULCOLAX) 5 MG EC tablet, Take 2 tablets at 3 pm the day before your procedure. If your procedure is before 11 am, take 2 additional tablets at 11 pm. If your procedure is after 11 am, take 2 additional tablets at 6 am. For additional instructions refer to your colonoscopy prep instructions., Disp: 4 tablet, Rfl: 0     calcium carbonate 500 mg, elemental, 1250 (500 Ca) MG tablet chewable, Take 1,000 mg by mouth, Disp: , Rfl:       calcium-vitamin D (CALTRATE) 600-400 MG-UNIT per tablet, Take 1 tablet by mouth daily , Disp: , Rfl:      Cholecalciferol (VITAMIN D-3 PO), Take 1,000 Units by mouth daily, Disp: , Rfl:      cyclobenzaprine (FLEXERIL) 5 MG tablet, Take 1 tablet (5 mg) by mouth 2 times daily as needed for muscle spasms, Disp: 60 tablet, Rfl: 3     Inulin 2 g CHEW, , Disp: , Rfl:      mometasone (NASONEX) 50 MCG/ACT spray, Spray 2 sprays in nostril daily , Disp: , Rfl:      Multiple Vitamin (MULTI-VITAMINS) TABS, Take 1 tablet by mouth daily, Disp: , Rfl:      multivitamin (OCUVITE) TABS tablet, Take 1 tablet by mouth daily, Disp: , Rfl:      polyethylene glycol (GOLYTELY) 236 g suspension, The night before the exam at 6 pm drink an 8-ounce glass every 15 minutes until the jug is half empty. If you arrive before 11 AM: Drink the other half of the Golytely jug at 11 PM night before procedure. If you arrive after 11 AM: Drink the other half of the Golytely jug at 6 AM day of procedure. For additional instructions refer to your colonoscopy prep instructions., Disp: 4000 mL, Rfl: 0     pravastatin (PRAVACHOL) 40 MG tablet, TAKE ONE TABLET BY MOUTH EVERY NIGHT AT BEDTIME, Disp: 90 tablet, Rfl: 3     RESTASIS 0.05 % ophthalmic emulsion, , Disp: , Rfl:      valACYclovir (VALTREX) 1000 mg tablet, TAKE TWO TABLETS BY MOUTH TWICE A DAY, Disp: 4 tablet, Rfl: 11    Allergies  Allergies   Allergen Reactions     Tramadol Anaphylaxis     Benadryl [Diphenhydramine Hcl] Other (See Comments)     Made her goofy     Caffeine Other (See Comments)     Jittery       Codeine Nausea and Vomiting     Darvocet [Propoxyphene N-Apap]      Other reaction(s): GI Upset     Diphenhydramine Other (See Comments)     Lovastatin Other (See Comments) and Muscle Pain (Myalgia)     Other reaction(s): Myalgia  pain     Percocet [Oxycodone-Acetaminophen] Other (See Comments)     Other reaction(s): Dizziness  Sick or dizzy?     Vicodin [Hydrocodone-Acetaminophen] Other (See  "Comments) and Nausea     Sick or dizzy?       Social History  Social History     Socioeconomic History     Marital status:    Tobacco Use     Smoking status: Former     Types: Cigarettes     Quit date: 1/10/1983     Years since quittin.7     Smokeless tobacco: Never   Vaping Use     Vaping Use: Never used   Substance and Sexual Activity     Alcohol use: Not Currently     Alcohol/week: 1.0 standard drink of alcohol     Types: 1 Glasses of wine per week     Drug use: No     Sexual activity: Yes     Partners: Male   Other Topics Concern     Parent/sibling w/ CABG, MI or angioplasty before 65F 55M? No       Family History  Family History   Problem Relation Age of Onset     Arthritis Mother         was told was RA     Cancer Mother         GYN     Other - See Comments Mother         phlebitis for which hospitalized several times     Heart Disease Father      Cancer Brother         throat     Alcohol/Drug Maternal Grandfather      Heart Disease Paternal Grandfather         Heart attack     Diabetes Paternal Grandfather        Review of Systems  As per HPI and PMHx, otherwise 10 system review including the head and neck, constitutional, eyes, respiratory, GI, skin, neurologic, lymphatic, endocrine, and allergy systems is negative.    Physical Exam  /75 (BP Location: Right arm, Patient Position: Chair, Cuff Size: Adult Regular)   Pulse 68   Temp 97.5  F (36.4  C) (Tympanic)   Ht 1.657 m (5' 5.24\")   Wt 61.7 kg (136 lb)   SpO2 98%   BMI 22.47 kg/m    GENERAL: Patient is a pleasant, cooperative 72 year old female in no acute distress.  HEAD: Normocephalic, atraumatic.  Hair and scalp are normal.  EYES: Pupils are equal, round, reactive to light and accommodation.  Extraocular movements are intact.  The sclera nonicteric without injection.  The extraocular structures are normal.  EARS: Normal shape and symmetry.  No tenderness when palpating the mastoid or tragal areas bilaterally.  Otoscopic exam on " the right was a clear canal.  The right tympanic membrane was round, intact without evidence of effusion.  No retraction, granulation, drainage, or evidence of cholesteatoma.  With gentle inhalation exhalation the patient has obvious respiratory variation of the right tympanic membrane. Otoscopic exam on the left reveals. The left tympanic membrane was round, intact without evidence of effusion.  No retraction, granulation, drainage, or evidence of cholesteatoma.  No obvious respiratory variation.  NOSE: Nares are patent.  Nasal mucosa is pink and moist.  Negative anterior rhinoscopy.  NEUROLOGIC: Cranial nerves II through XII are grossly intact.  Voice is strong.  Patient is House-Brackmann I/VI bilaterally.  CARDIOVASCULAR: Extremities are warm and well-perfused.  No significant peripheral edema.  RESPIRATORY: Patient has nonlabored breathing without cough, wheeze, stridor.  PSYCHIATRIC: Patient is alert and oriented.  Mood and affect appear normal.  SKIN: Warm and dry.  No scalp, face, or neck lesions noted.     Assessment and Plan     ICD-10-CM    1. Patulous Eustachian tube, right  H69.01 Tympanotomy W/Tube      2. Sensorineural hearing loss, bilateral  H90.3 Tympanotomy W/Tube      3. Asymmetrical sensorineural hearing loss  H90.3 Tympanotomy W/Tube      4. Tinnitus, bilateral  H93.13 Tympanotomy W/Tube         It was my pleasure seeing Cyhna Santoyodolores today in clinic.  The patient has a history of significant sensorineural hearing loss and asymmetry secondary to interuterine exposure to [place] measles.  She has had a roughly 2 month history of ear plugging, fullness, echoing, and hearing her respiratory rate in her right ear.  She she has obvious respiratory variation of her tympanic membrane consistent with patulous eustachian tube on the right-hand side.      We discussed treatment options for patulous eustachian tube including observation, topical estrogen nasal spray, ear tube placement in office, or  eustachian tube augmentation or procedures.       We discussed the risks, benefits, alternatives, options of right myringotomy with tube placement including, but not limited to: Risk of bleeding, risk of infection, risk of retained tympanostomy tube, risk of tympanic membrane perforation, risk of recurrent otorrhea, tympanostomy tube failure, potential need for additional procedures including tube removal/replacement.  We discussed the postoperative course and convalescence including using eardrops.  The patient voiced understanding.  Please see the procedure note for further details.    She will be seeing rhinology at [facility] in November.    The patient is medically cleared for consideration of a hearing aid evaluation.    Cabrera Fields MD  Department of Otolaryngology-Head and Neck Surgery  [facility]       Again, thank you for allowing me to participate in the care of your patient.        Sincerely,        Cabrera Fields MD

## 2023-09-22 NOTE — PROCEDURES
PREOPERATIVE DIAGNOSES: Right patulous eustachian tube, bilateral sensorineural hearing loss, asymmetric left sensorineural hearing loss, history of [place] measles exposure.    POSTOPERATIVE DIAGNOSES: Same.     PROCEDURE PERFORMED:   Right myringotomy with tympanostomy tube placement     SURGEON: Cabrera Fields MD      ASSISTANTS: None.     BLOOD LOSS: Scant.     COMPLICATIONS: None.      SPECIMENS: None.     ANESTHESIA: Local.     GRAFTS, IMPLANTS, DRAINS: None.     INDICATIONS: Prevent complications, treat disease.    FINDINGS:   Right intact tympanic membrane without middle-ear effusion.    OPERATIVE TECHNIQUE: The patient was brought to the procedure room and identified by name clinic number.  They were placed supinely on the procedure chair.  The patient was prepped and draped in standard fashion.  After standard surgical pause, the microscope was brought to the field and used throughout the remainder of the case.  I examined the ear on the right.  Cerumen was cleaned with curette.  Phenol was placed in the posterior-inferior quadrant.  A radial myringotomy was made in the posterior-inferior quadrant.  A Dura-Vent ear tube was placed into the myringotomy.       This marked the end of the procedure.  The patient tolerated the procedure well.  There were no complications.  There was minimal blood loss.  All standard protocol and universal precautions were used throughout the procedure.    Cabrera Fields MD  Department of Otolaryngology-Head and Neck Surgery  [facility]

## 2023-09-29 ENCOUNTER — TELEPHONE (OUTPATIENT)
Dept: OTOLARYNGOLOGY | Facility: CLINIC | Age: 73
End: 2023-09-29
Payer: COMMERCIAL

## 2023-09-29 NOTE — TELEPHONE ENCOUNTER
[facility] Call Center    Phone Message    May a detailed message be left on voicemail: yes     Reason for Call: Other: Per pt she states that her ear has been plugged since the tube was placed. Pt can't hear anything. Pt can't watch tv or hear people talk. Pt wants to know if she should leave the tube in until her appt with Dr. Ontiveros on 10/24/23. Please call pt to discuss. Thank you     Action Taken: Message routed to:  [facility] ([facility] ENT    Travel Screening: Not Applicable

## 2023-09-29 NOTE — TELEPHONE ENCOUNTER
It would really be up to the patient.  Unfortunately is in the 50% of patients that get no benefit from ear tube placement with patulous eustachian tube.  If she would like the tube removed, we would have to find a time to remove it in the clinic and placed the patch on the eardrum.  If she would like this done before she meets with Dr. Ontiveros, we would have to find a time potentially on a Friday after my OR as I do not have any regularly scheduled clinic appointments for the remainder of my time here at [place].     IJL

## 2023-09-29 NOTE — TELEPHONE ENCOUNTER
Called and provided with pt option for removal or not.    She would like to remove and asks that his staff look for time do this as stated by Dr Fields. Does not need before [facility] mittal.    Stacia AGGARWAL   [facility]

## 2023-10-01 NOTE — PROGRESS NOTES
Chief Complaint   Patient presents with    RECHECK     Removing tube      History of Present Illness  Chyna Moncada is a 72 year old female who presents today for follow-up.  I initially evaluated the patient on 8/10/2023 with right ear complaints.  She developed some ear symptoms and underwent irrigation of the ear.  She continued to have symptoms of ear plugging, fullness, echoing, and and sense that the ear is in a bucket.  She had obvious respiratory variation of her tympanic membrane and I diagnosed her with patulous eustachian tube on the right.  We discussed observation, trial of estrogen nasal spray, ear tube placement on the right, or referral for discussion of potential eustachian tube procedures to help with her patulous eustachian tube.  Upon initial evaluation, she elected for observation.  She later contacted our office about pursuing an ear tube on the right. She was again seen on 9/22/2023 and we placed an ear tube on the right. She felt the right tube made her ear worse and contacted our office for ear tube removal. She returns today for follow up.      The patient has a long history of decreased hearing in both ears worse in the left ear secondary to in utero [place] measles exposure.  She is worn hearing aids since her early 20s and has a known asymmetric hearing loss in the left ear.  She is not having any otalgia or otorrhea.  No bloody otorrhea.  She does intermittently hear breathing in her right ear.  Symptoms have been going on for several months.  She does struggle with significant tinnitus worse in the right ear.  No prior history of ear surgery.  No significant vertigo.  The patient denies prior significant recreational, , or work-related noise exposure.      The patient underwent an outside audiogram performed 3/28/2023.  My review of the audiogram showed stable hearing from her prior audiogram in January 2022.  The patient has mild sloping to moderate sloping to moderately  severe sloping to severe sensorineural hearing loss in the right ear and moderate sloping to moderately severe sloping to severe sloping to profound sensorineural hearing loss in the left ear.  Pure-tone average was 47 dB on the right and 62 dB on the left.  Speech reception threshold was 45 dB on the right and 55 dB on the left.  The patient had 100% word recognition on the right and 60% word recognition on the left.      Past Medical History  Patient Active Problem List   Diagnosis    Allergic rhinitis    Anxiety state    Cerebral artery occlusion with cerebral infarction (H)    Degeneration of cervical intervertebral disc    Degeneration of lumbar or lumbosacral intervertebral disc    Insomnia    Postmenopausal atrophic vaginitis    Raynaud's syndrome    Osteopenia    Pulmonary nodules    TGA (transient global amnesia)    Recurrent cold sores    Peripheral vascular disease (H24)    Atopic rhinitis    Osteoarthritis of hip    Bilateral sensorineural hearing loss    Tendinitis of shoulder    COPD (chronic obstructive pulmonary disease) (H)    SVT (supraventricular tachycardia)    Age-related osteoporosis without current pathological fracture    Low platelet count (H24)     Current Medications    Current Outpatient Medications:     alendronate (FOSAMAX) 70 MG tablet, Take 1 tablet (70 mg) by mouth every 7 days, Disp: 12 tablet, Rfl: 3    aspirin EC 81 MG tablet, Take 81 mg by mouth daily , Disp: , Rfl:     bisacodyl (DULCOLAX) 5 MG EC tablet, Take 2 tablets at 3 pm the day before your procedure. If your procedure is before 11 am, take 2 additional tablets at 11 pm. If your procedure is after 11 am, take 2 additional tablets at 6 am. For additional instructions refer to your colonoscopy prep instructions., Disp: 4 tablet, Rfl: 0    calcium carbonate 500 mg, elemental, 1250 (500 Ca) MG tablet chewable, Take 1,000 mg by mouth, Disp: , Rfl:     calcium-vitamin D (CALTRATE) 600-400 MG-UNIT per tablet, Take 1 tablet by  mouth daily , Disp: , Rfl:     Cholecalciferol (VITAMIN D-3 PO), Take 1,000 Units by mouth daily, Disp: , Rfl:     cyclobenzaprine (FLEXERIL) 5 MG tablet, Take 1 tablet (5 mg) by mouth 2 times daily as needed for muscle spasms, Disp: 60 tablet, Rfl: 3    Inulin 2 g CHEW, , Disp: , Rfl:     mometasone (NASONEX) 50 MCG/ACT spray, Spray 2 sprays in nostril daily , Disp: , Rfl:     Multiple Vitamin (MULTI-VITAMINS) TABS, Take 1 tablet by mouth daily, Disp: , Rfl:     multivitamin (OCUVITE) TABS tablet, Take 1 tablet by mouth daily, Disp: , Rfl:     polyethylene glycol (GOLYTELY) 236 g suspension, The night before the exam at 6 pm drink an 8-ounce glass every 15 minutes until the jug is half empty. If you arrive before 11 AM: Drink the other half of the Golytely jug at 11 PM night before procedure. If you arrive after 11 AM: Drink the other half of the Golytely jug at 6 AM day of procedure. For additional instructions refer to your colonoscopy prep instructions., Disp: 4000 mL, Rfl: 0    pravastatin (PRAVACHOL) 40 MG tablet, TAKE ONE TABLET BY MOUTH EVERY NIGHT AT BEDTIME, Disp: 90 tablet, Rfl: 3    RESTASIS 0.05 % ophthalmic emulsion, , Disp: , Rfl:     valACYclovir (VALTREX) 1000 mg tablet, TAKE TWO TABLETS BY MOUTH TWICE A DAY, Disp: 4 tablet, Rfl: 11    Allergies  Allergies   Allergen Reactions    Tramadol Anaphylaxis    Benadryl [Diphenhydramine Hcl] Other (See Comments)     Made her goofy    Caffeine Other (See Comments)     Jittery      Codeine Nausea and Vomiting    Darvocet [Propoxyphene N-Apap]      Other reaction(s): GI Upset    Diphenhydramine Other (See Comments)    Lovastatin Other (See Comments) and Muscle Pain (Myalgia)     Other reaction(s): Myalgia  pain    Percocet [Oxycodone-Acetaminophen] Other (See Comments)     Other reaction(s): Dizziness  Sick or dizzy?    Vicodin [Hydrocodone-Acetaminophen] Other (See Comments) and Nausea     Sick or dizzy?       Social History  Social History     Socioeconomic  "History    Marital status:    Tobacco Use    Smoking status: Former     Types: Cigarettes     Quit date: 1/10/1983     Years since quittin.7    Smokeless tobacco: Never   Vaping Use    Vaping Use: Never used   Substance and Sexual Activity    Alcohol use: Not Currently     Alcohol/week: 1.0 standard drink of alcohol     Types: 1 Glasses of wine per week    Drug use: No    Sexual activity: Yes     Partners: Male   Other Topics Concern    Parent/sibling w/ CABG, MI or angioplasty before 65F 55M? No       Family History  Family History   Problem Relation Age of Onset    Arthritis Mother         was told was RA    Cancer Mother         GYN    Other - See Comments Mother         phlebitis for which hospitalized several times    Heart Disease Father     Cancer Brother         throat    Alcohol/Drug Maternal Grandfather     Heart Disease Paternal Grandfather         Heart attack    Diabetes Paternal Grandfather        Review of Systems  As per HPI and PMHx, otherwise 10 system review including the head and neck, constitutional, eyes, respiratory, GI, skin, neurologic, lymphatic, endocrine, and allergy systems is negative.    Physical Exam  /77 (BP Location: Right arm, Patient Position: Chair, Cuff Size: Adult Regular)   Pulse 69   Temp (!) 96.5  F (35.8  C) (Tympanic)   Ht 1.657 m (5' 5.24\")   Wt 61.7 kg (136 lb)   SpO2 100%   BMI 22.47 kg/m    GENERAL: Patient is a pleasant, cooperative 72 year old female in no acute distress.  HEAD: Normocephalic, atraumatic.  Hair and scalp are normal.  EYES: Pupils are equal, round, reactive to light and accommodation.  Extraocular movements are intact.  The sclera nonicteric without injection.  The extraocular structures are normal.  EARS: Normal shape and symmetry.  No tenderness when palpating the mastoid or tragal areas bilaterally.  Otoscopic exam on the right reveals a Dura-vent ear tube in the posterior-inferior quadrant. The middle ear is well aerated. " No granulation or drainage. Otoscopic exam on the left reveals a clear canal. The left tympanic membrane is intact without evidence of effusion. No retraction, granulation, or drainage.  NOSE: Nares are patent.  Nasal mucosa is pink and moist. Negative anterior rhinoscopy.  NEUROLOGIC: Cranial nerves II through XII are grossly intact.  Voice is strong.  Patient is House-Brackmann I/VI bilaterally.  CARDIOVASCULAR: Extremities are warm and well-perfused.  No significant peripheral edema.  RESPIRATORY: Patient has nonlabored breathing without cough, wheeze, stridor.  PSYCHIATRIC: Patient is alert and oriented.  Mood and affect appear normal.  SKIN: Warm and dry.  No scalp, face, or neck lesions noted.    Assessment and Plan     ICD-10-CM    1. Patulous Eustachian tube, right  H69.01       2. Sensorineural hearing loss, bilateral  H90.3       3. Asymmetrical sensorineural hearing loss  H90.3       4. Tinnitus, bilateral  H93.13       5. Retained myringotomy tube in right ear  Z96.22          It was my pleasure seeing Chyna Moncada today in clinic.  Unfortunately, the patient did not have any improvement after right ear tube placement and had worsening of symptoms.  We discussed ear tube removal and patch myringoplasty in office while she awaits a rhinology evaluation for patulous eustachian tube.    We discussed the risks, benefits, alternatives, options of right retained ventilation tube removal, right patch myringoplasty including, but not limited to: Risk of bleeding, risk of infection, risk of persistent tympanic membrane perforation, potential need for additional procedures.  We discussed the postoperative course and convalescence including dry ear precautions after surgery until the eardrum is healed.  The patient's family understands and are willing to proceed.  Please see procedure note for details.  The patient is scheduled to see Dr. Culp for evaluation of patulous eustachian tube at the beginning of  December.    I can see her prior to her rhinology appointment to make sure the eardrum is healed.  We can also update her audiogram at that time.  I encouraged her still to pursue hearing aids I think that this will ultimately be the treatment for her hearing loss.    Cabrera Fields MD  Department of Otolaryngology-Head and Neck Surgery  [facility]

## 2023-10-06 ENCOUNTER — OFFICE VISIT (OUTPATIENT)
Dept: OTOLARYNGOLOGY | Facility: CLINIC | Age: 73
End: 2023-10-06
Payer: COMMERCIAL

## 2023-10-06 VITALS
TEMPERATURE: 96.5 F | OXYGEN SATURATION: 100 % | DIASTOLIC BLOOD PRESSURE: 77 MMHG | HEART RATE: 69 BPM | WEIGHT: 136 LBS | SYSTOLIC BLOOD PRESSURE: 124 MMHG | BODY MASS INDEX: 22.66 KG/M2 | HEIGHT: 65 IN

## 2023-10-06 DIAGNOSIS — H93.13 TINNITUS, BILATERAL: ICD-10-CM

## 2023-10-06 DIAGNOSIS — Z96.22 RETAINED MYRINGOTOMY TUBE IN RIGHT EAR: ICD-10-CM

## 2023-10-06 DIAGNOSIS — H90.3 ASYMMETRICAL SENSORINEURAL HEARING LOSS: ICD-10-CM

## 2023-10-06 DIAGNOSIS — H69.01 PATULOUS EUSTACHIAN TUBE, RIGHT: Primary | ICD-10-CM

## 2023-10-06 DIAGNOSIS — H90.3 SENSORINEURAL HEARING LOSS, BILATERAL: ICD-10-CM

## 2023-10-06 PROCEDURE — 99214 OFFICE O/P EST MOD 30 MIN: CPT | Mod: 57 | Performed by: OTOLARYNGOLOGY

## 2023-10-06 PROCEDURE — 69620 MYRINGOPLASTY: CPT | Mod: RT | Performed by: OTOLARYNGOLOGY

## 2023-10-06 ASSESSMENT — PAIN SCALES - GENERAL: PAINLEVEL: NO PAIN (0)

## 2023-10-06 NOTE — TELEPHONE ENCOUNTER
FUTURE VISIT INFORMATION      FUTURE VISIT INFORMATION:  Date: 12/4/23  Time: 11:45am  [place] [facility] [place]  REFERRAL INFORMATION:  Referring provider:    Referring providers clinic:    Reason for visit/diagnosis  patulous eustachian tube     RECORDS REQUESTED FROM:       Clinic name Comments Records Status Imaging Status   [facility] ENT 8/10/23 note- Cabrera Fields MD      Audiogram: 8/10/23, 1/31/22 [facility]     [facility] 3/28/23 audiogram Scanned in [facility] MRA/ MR brain 3/30/2014  CT head 3/30/2014 [facility]

## 2023-10-06 NOTE — NURSING NOTE
"Initial /77 (BP Location: Right arm, Patient Position: Chair, Cuff Size: Adult Regular)   Pulse 69   Temp (!) 96.5  F (35.8  C) (Tympanic)   Ht 1.657 m (5' 5.24\")   Wt 61.7 kg (136 lb)   SpO2 100%   BMI 22.47 kg/m   Estimated body mass index is 22.47 kg/m  as calculated from the following:    Height as of this encounter: 1.657 m (5' 5.24\").    Weight as of this encounter: 61.7 kg (136 lb). .  Pedro Agrawal on 10/6/2023 at 3:26 PM    "

## 2023-10-06 NOTE — LETTER
10/6/2023         RE: Chyna Moncada  [address] [facility] [id number]        Dear Colleague,    Thank you for referring your patient, Chyna Moncada, to the [facility]. Please see a copy of my visit note below.    Chief Complaint   Patient presents with     RECHECK     Removing tube      History of Present Illness  Chyna Moncada is a 72 year old female who presents today for follow-up.  I initially evaluated the patient on 8/10/2023 with right ear complaints.  She developed some ear symptoms and underwent irrigation of the ear.  She continued to have symptoms of ear plugging, fullness, echoing, and and sense that the ear is in a bucket.  She had obvious respiratory variation of her tympanic membrane and I diagnosed her with patulous eustachian tube on the right.  We discussed observation, trial of estrogen nasal spray, ear tube placement on the right, or referral for discussion of potential eustachian tube procedures to help with her patulous eustachian tube.  Upon initial evaluation, she elected for observation.  She later contacted our office about pursuing an ear tube on the right. She was again seen on 9/22/2023 and we placed an ear tube on the right. She felt the right tube made her ear worse and contacted our office for ear tube removal. She returns today for follow up.      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  She is worn hearing aids since her early 20s and has a known asymmetric hearing loss in the left ear.  She is not having any otalgia or otorrhea.  No bloody otorrhea.  She does intermittently hear breathing in her right ear.  Symptoms have been going on for several months.  She does struggle with significant tinnitus worse in the right ear.  No prior history of ear surgery.  No significant vertigo.  The patient denies prior significant recreational, ,  or work-related noise exposure.      The patient underwent an outside audiogram performed 3/28/2023.  My review of the audiogram showed stable hearing from her prior audiogram in January 2022.  The patient has mild sloping to moderate sloping to moderately severe sloping to severe sensorineural hearing loss in the right ear and moderate sloping to moderately severe sloping to severe sloping to profound sensorineural hearing loss in the left ear.  Pure-tone average was 47 dB on the right and 62 dB on the left.  Speech reception threshold was 45 dB on the right and 55 dB on the left.  The patient had 100% word recognition on the right and 60% word recognition on the left.      Past Medical History  Patient Active Problem List   Diagnosis     Allergic rhinitis     Anxiety state     Cerebral artery occlusion with cerebral infarction (H)     Degeneration of cervical intervertebral disc     Degeneration of lumbar or lumbosacral intervertebral disc     Insomnia     Postmenopausal atrophic vaginitis     Raynaud's syndrome     Osteopenia     Pulmonary nodules     TGA (transient global amnesia)     Recurrent cold sores     Peripheral vascular disease (H24)     Atopic rhinitis     Osteoarthritis of hip     Bilateral sensorineural hearing loss     Tendinitis of shoulder     COPD (chronic obstructive pulmonary disease) (H)     SVT (supraventricular tachycardia)     Age-related osteoporosis without current pathological fracture     Low platelet count (H24)     Current Medications    Current Outpatient Medications:      alendronate (FOSAMAX) 70 MG tablet, Take 1 tablet (70 mg) by mouth every 7 days, Disp: 12 tablet, Rfl: 3     aspirin EC 81 MG tablet, Take 81 mg by mouth daily , Disp: , Rfl:      bisacodyl (DULCOLAX) 5 MG EC tablet, Take 2 tablets at 3 pm the day before your procedure. If your procedure is before 11 am, take 2 additional tablets at 11 pm. If your procedure is after 11 am, take 2 additional tablets at 6 am. For  additional instructions refer to your colonoscopy prep instructions., Disp: 4 tablet, Rfl: 0     calcium carbonate 500 mg, elemental, 1250 (500 Ca) MG tablet chewable, Take 1,000 mg by mouth, Disp: , Rfl:      calcium-vitamin D (CALTRATE) 600-400 MG-UNIT per tablet, Take 1 tablet by mouth daily , Disp: , Rfl:      Cholecalciferol (VITAMIN D-3 PO), Take 1,000 Units by mouth daily, Disp: , Rfl:      cyclobenzaprine (FLEXERIL) 5 MG tablet, Take 1 tablet (5 mg) by mouth 2 times daily as needed for muscle spasms, Disp: 60 tablet, Rfl: 3     Inulin 2 g CHEW, , Disp: , Rfl:      mometasone (NASONEX) 50 MCG/ACT spray, Spray 2 sprays in nostril daily , Disp: , Rfl:      Multiple Vitamin (MULTI-VITAMINS) TABS, Take 1 tablet by mouth daily, Disp: , Rfl:      multivitamin (OCUVITE) TABS tablet, Take 1 tablet by mouth daily, Disp: , Rfl:      polyethylene glycol (GOLYTELY) 236 g suspension, The night before the exam at 6 pm drink an 8-ounce glass every 15 minutes until the jug is half empty. If you arrive before 11 AM: Drink the other half of the Golytely jug at 11 PM night before procedure. If you arrive after 11 AM: Drink the other half of the Golytely jug at 6 AM day of procedure. For additional instructions refer to your colonoscopy prep instructions., Disp: 4000 mL, Rfl: 0     pravastatin (PRAVACHOL) 40 MG tablet, TAKE ONE TABLET BY MOUTH EVERY NIGHT AT BEDTIME, Disp: 90 tablet, Rfl: 3     RESTASIS 0.05 % ophthalmic emulsion, , Disp: , Rfl:      valACYclovir (VALTREX) 1000 mg tablet, TAKE TWO TABLETS BY MOUTH TWICE A DAY, Disp: 4 tablet, Rfl: 11    Allergies  Allergies   Allergen Reactions     Tramadol Anaphylaxis     Benadryl [Diphenhydramine Hcl] Other (See Comments)     Made her goofy     Caffeine Other (See Comments)     Jittery       Codeine Nausea and Vomiting     Darvocet [Propoxyphene N-Apap]      Other reaction(s): GI Upset     Diphenhydramine Other (See Comments)     Lovastatin Other (See Comments) and Muscle Pain  "(Myalgia)     Other reaction(s): Myalgia  pain     Percocet [Oxycodone-Acetaminophen] Other (See Comments)     Other reaction(s): Dizziness  Sick or dizzy?     Vicodin [Hydrocodone-Acetaminophen] Other (See Comments) and Nausea     Sick or dizzy?       Social History  Social History     Socioeconomic History     Marital status:    Tobacco Use     Smoking status: Former     Types: Cigarettes     Quit date: 1/10/1983     Years since quittin.7     Smokeless tobacco: Never   Vaping Use     Vaping Use: Never used   Substance and Sexual Activity     Alcohol use: Not Currently     Alcohol/week: 1.0 standard drink of alcohol     Types: 1 Glasses of wine per week     Drug use: No     Sexual activity: Yes     Partners: Male   Other Topics Concern     Parent/sibling w/ CABG, MI or angioplasty before 65F 55M? No       Family History  Family History   Problem Relation Age of Onset     Arthritis Mother         was told was RA     Cancer Mother         GYN     Other - See Comments Mother         phlebitis for which hospitalized several times     Heart Disease Father      Cancer Brother         throat     Alcohol/Drug Maternal Grandfather      Heart Disease Paternal Grandfather         Heart attack     Diabetes Paternal Grandfather        Review of Systems  As per HPI and PMHx, otherwise 10 system review including the head and neck, constitutional, eyes, respiratory, GI, skin, neurologic, lymphatic, endocrine, and allergy systems is negative.    Physical Exam  /77 (BP Location: Right arm, Patient Position: Chair, Cuff Size: Adult Regular)   Pulse 69   Temp (!) 96.5  F (35.8  C) (Tympanic)   Ht 1.657 m (5' 5.24\")   Wt 61.7 kg (136 lb)   SpO2 100%   BMI 22.47 kg/m    GENERAL: Patient is a pleasant, cooperative 72 year old female in no acute distress.  HEAD: Normocephalic, atraumatic.  Hair and scalp are normal.  EYES: Pupils are equal, round, reactive to light and accommodation.  Extraocular movements are " intact.  The sclera nonicteric without injection.  The extraocular structures are normal.  EARS: Normal shape and symmetry.  No tenderness when palpating the mastoid or tragal areas bilaterally.  Otoscopic exam on the right reveals a Dura-vent ear tube in the posterior-inferior quadrant. The middle ear is well aerated. No granulation or drainage. Otoscopic exam on the left reveals a clear canal. The left tympanic membrane is intact without evidence of effusion. No retraction, granulation, or drainage.  NOSE: Nares are patent.  Nasal mucosa is pink and moist. Negative anterior rhinoscopy.  NEUROLOGIC: Cranial nerves II through XII are grossly intact.  Voice is strong.  Patient is House-Brackmann I/VI bilaterally.  CARDIOVASCULAR: Extremities are warm and well-perfused.  No significant peripheral edema.  RESPIRATORY: Patient has nonlabored breathing without cough, wheeze, stridor.  PSYCHIATRIC: Patient is alert and oriented.  Mood and affect appear normal.  SKIN: Warm and dry.  No scalp, face, or neck lesions noted.    Assessment and Plan     ICD-10-CM    1. Patulous Eustachian tube, right  H69.01       2. Sensorineural hearing loss, bilateral  H90.3       3. Asymmetrical sensorineural hearing loss  H90.3       4. Tinnitus, bilateral  H93.13       5. Retained myringotomy tube in right ear  Z96.22          It was my pleasure seeing Chyna Santoyoskychacha today in clinic.  Unfortunately, the patient did not have any improvement after right ear tube placement and had worsening of symptoms.  We discussed ear tube removal and patch myringoplasty in office while she awaits a rhinology evaluation for patulous eustachian tube.    We discussed the risks, benefits, alternatives, options of right retained ventilation tube removal, right patch myringoplasty including, but not limited to: Risk of bleeding, risk of infection, risk of persistent tympanic membrane perforation, potential need for additional procedures.  We discussed the  postoperative course and convalescence including dry ear precautions after surgery until the eardrum is healed.  The patient's family understands and are willing to proceed.  Please see procedure note for details.  The patient is scheduled to see Dr. Culp for evaluation of patulous eustachian tube at the beginning of December.    I can see her prior to her rhinology appointment to make sure the eardrum is healed.  We can also update her audiogram at that time.  I encouraged her still to pursue hearing aids I think that this will ultimately be the treatment for her hearing loss.    Cabrera Fields MD  Department of Otolaryngology-Head and Neck Surgery  [facility]       Again, thank you for allowing me to participate in the care of your patient.        Sincerely,        Cabrera Fields MD

## 2023-10-06 NOTE — PROCEDURES
PREOPERATIVE DIAGNOSES: History of right patulous eustachian tube, retained right ventilation tube, ear fullness and decreased hearing.      POSTOPERATIVE DIAGNOSES: Same.     PROCEDURE PERFORMED:   Right ventilation tube removal  Right paper patch myringoplasty    SURGEON: Cabrera Fields MD      ASSISTANTS: None.     BLOOD LOSS: Scant.     COMPLICATIONS: None.      SPECIMENS: None.     ANESTHESIA: Local.     GRAFTS, IMPLANTS, DRAINS: None.     INDICATIONS: Prevent complications, treat disease.    FINDINGS:   Retained right Dura-Vent ventilation tube in the posterior-inferior quadrant.  Middle ear is well aerated without granulation or drainage.      OPERATIVE TECHNIQUE: The patient was brought to the procedure room and identified by name clinic number.  They were placed supinely on the procedure chair.  The patient was prepped and draped in standard fashion.  After standard surgical pause, the microscope was brought to the field and used throughout the remainder of the case.  I examined the ear on the right.  Cerumen was cleaned with curette.  A Dura-Vent ventilation tube was grasped and removed.  A 4% lidocaine cotton was placed on the eardrum and left for several minutes.  The cotton was removed and a straight pick was used to remove a 1 mm rim of drum around the periphery.          Next, sterile cigarette paper cut just larger than the perforation was placed over the perforation to assist with closure and healing.              This marked the end of the procedure.  The patient tolerated the procedure well.  There were no complications.  There was minimal blood loss.  All standard protocol and universal precautions were used throughout the procedure.    Cabrera Fields MD  Department of Otolaryngology-Head and Neck Surgery  [facility]

## 2023-10-10 ENCOUNTER — OFFICE VISIT (OUTPATIENT)
Dept: URGENT CARE | Facility: URGENT CARE | Age: 73
End: 2023-10-10
Payer: COMMERCIAL

## 2023-10-10 ENCOUNTER — TELEPHONE (OUTPATIENT)
Dept: OTOLARYNGOLOGY | Facility: CLINIC | Age: 73
End: 2023-10-10

## 2023-10-10 VITALS
DIASTOLIC BLOOD PRESSURE: 78 MMHG | TEMPERATURE: 97.2 F | RESPIRATION RATE: 16 BRPM | HEART RATE: 71 BPM | WEIGHT: 135 LBS | SYSTOLIC BLOOD PRESSURE: 127 MMHG | OXYGEN SATURATION: 100 % | BODY MASS INDEX: 22.3 KG/M2

## 2023-10-10 DIAGNOSIS — H72.91 PERFORATION OF RIGHT TYMPANIC MEMBRANE: Primary | ICD-10-CM

## 2023-10-10 PROCEDURE — 99213 OFFICE O/P EST LOW 20 MIN: CPT | Performed by: PHYSICIAN ASSISTANT

## 2023-10-10 RX ORDER — OFLOXACIN 3 MG/ML
1-2 SOLUTION/ DROPS OPHTHALMIC 4 TIMES DAILY
Qty: 3 ML | Refills: 0 | Status: SHIPPED | OUTPATIENT
Start: 2023-10-10 | End: 2023-10-10

## 2023-10-10 RX ORDER — OFLOXACIN 3 MG/ML
5 SOLUTION AURICULAR (OTIC) 2 TIMES DAILY
Qty: 4 ML | Refills: 0 | COMMUNITY
Start: 2023-10-10 | End: 2023-12-21

## 2023-10-10 ASSESSMENT — PAIN SCALES - GENERAL: PAINLEVEL: MILD PAIN (2)

## 2023-10-10 NOTE — PROGRESS NOTES
Chief Complaint   Patient presents with    RECHECK     History of Present Illness  Chyna Moncada is a 72 year old female who presents today for follow-up.  I initially evaluated the patient on 8/10/2023 with right ear complaints.  She developed some ear symptoms and underwent irrigation of the ear.  She continued to have symptoms of ear plugging, fullness, echoing, and and sense that the ear is in a bucket.  She had obvious respiratory variation of her tympanic membrane and I diagnosed her with patulous eustachian tube on the right.  We discussed observation, trial of estrogen nasal spray, ear tube placement on the right, or referral for discussion of potential eustachian tube procedures to help with her patulous eustachian tube.  Upon initial evaluation, she elected for observation.  She later contacted our office about pursuing an ear tube on the right. She was again seen on 9/22/2023 and we placed an ear tube on the right. She felt the right tube made her ear worse and contacted our office for ear tube removal. She was last seen for ear tube removal and patch myringoplasty on 10/6/2023. The patient presented to walk in clinic 10/10 with right ear discomfort and drainage. Shew was noted to have a perforation on the right. She was prescribed ofloxacin drops and advised to follow up with ENT. She returns today for follow up.      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  She is worn hearing aids since her early 20s and has a known asymmetric hearing loss in the left ear. She does intermittently hear breathing in her right ear.  Symptoms have been going on for several months.  She does struggle with significant tinnitus worse in the right ear.  No significant vertigo.  The patient denies prior significant recreational, , or work-related noise exposure.      The patient underwent an outside audiogram performed  3/28/2023.  My review of the audiogram showed stable hearing from her prior audiogram in January 2022.  The patient has mild sloping to moderate sloping to moderately severe sloping to severe sensorineural hearing loss in the right ear and moderate sloping to moderately severe sloping to severe sloping to profound sensorineural hearing loss in the left ear.  Pure-tone average was 47 dB on the right and 62 dB on the left.  Speech reception threshold was 45 dB on the right and 55 dB on the left.  The patient had 100% word recognition on the right and 60% word recognition on the left.      Past Medical History  Patient Active Problem List   Diagnosis    Allergic rhinitis    Anxiety state    Cerebral artery occlusion with cerebral infarction (H)    Degeneration of cervical intervertebral disc    Degeneration of lumbar or lumbosacral intervertebral disc    Insomnia    Postmenopausal atrophic vaginitis    Raynaud's syndrome    Osteopenia    Pulmonary nodules    TGA (transient global amnesia)    Recurrent cold sores    Peripheral vascular disease (H24)    Atopic rhinitis    Osteoarthritis of hip    Bilateral sensorineural hearing loss    Tendinitis of shoulder    COPD (chronic obstructive pulmonary disease) (H)    SVT (supraventricular tachycardia)    Age-related osteoporosis without current pathological fracture    Low platelet count (H24)     Current Medications    Current Outpatient Medications:     alendronate (FOSAMAX) 70 MG tablet, Take 1 tablet (70 mg) by mouth every 7 days, Disp: 12 tablet, Rfl: 3    aspirin EC 81 MG tablet, Take 81 mg by mouth daily , Disp: , Rfl:     calcium-vitamin D (CALTRATE) 600-400 MG-UNIT per tablet, Take 1 tablet by mouth daily , Disp: , Rfl:     cyclobenzaprine (FLEXERIL) 5 MG tablet, Take 1 tablet (5 mg) by mouth 2 times daily as needed for muscle spasms, Disp: 60 tablet, Rfl: 3    dexAMETHasone (DECADRON) 0.1 % ophthalmic solution, Place 3 drops into the right ear 2 times daily for 10  days, Disp: 5 mL, Rfl: 0    Inulin 2 g CHEW, , Disp: , Rfl:     mometasone (NASONEX) 50 MCG/ACT spray, Spray 2 sprays in nostril daily , Disp: , Rfl:     Multiple Vitamin (MULTI-VITAMINS) TABS, Take 1 tablet by mouth daily, Disp: , Rfl:     multivitamin (OCUVITE) TABS tablet, Take 1 tablet by mouth daily, Disp: , Rfl:     ofloxacin (FLOXIN) 0.3 % otic solution, Place 5 drops into the right ear 2 times daily, Disp: 4 mL, Rfl: 0    pravastatin (PRAVACHOL) 40 MG tablet, TAKE ONE TABLET BY MOUTH EVERY NIGHT AT BEDTIME, Disp: 90 tablet, Rfl: 3    RESTASIS 0.05 % ophthalmic emulsion, , Disp: , Rfl:     valACYclovir (VALTREX) 1000 mg tablet, TAKE TWO TABLETS BY MOUTH TWICE A DAY, Disp: 4 tablet, Rfl: 11    Cholecalciferol (VITAMIN D-3 PO), Take 1,000 Units by mouth daily (Patient not taking: Reported on 10/10/2023), Disp: , Rfl:     Allergies  Allergies   Allergen Reactions    Tramadol Anaphylaxis    Benadryl [Diphenhydramine Hcl] Other (See Comments)     Made her goofy    Caffeine Other (See Comments)     Jittery      Codeine Nausea and Vomiting    Darvocet [Propoxyphene N-Apap]      Other reaction(s): GI Upset    Diphenhydramine Other (See Comments)    Lovastatin Other (See Comments) and Muscle Pain (Myalgia)     Other reaction(s): Myalgia  pain    Percocet [Oxycodone-Acetaminophen] Other (See Comments)     Other reaction(s): Dizziness  Sick or dizzy?    Vicodin [Hydrocodone-Acetaminophen] Other (See Comments) and Nausea     Sick or dizzy?       Social History  Social History     Socioeconomic History    Marital status:    Tobacco Use    Smoking status: Former     Types: Cigarettes     Quit date: 1/10/1983     Years since quittin.7    Smokeless tobacco: Never   Vaping Use    Vaping Use: Never used   Substance and Sexual Activity    Alcohol use: Not Currently     Alcohol/week: 1.0 standard drink of alcohol     Types: 1 Glasses of wine per week    Drug use: No    Sexual activity: Yes     Partners: Male   Other  "Topics Concern    Parent/sibling w/ CABG, MI or angioplasty before 65F 55M? No       Family History  Family History   Problem Relation Age of Onset    Arthritis Mother         was told was RA    Cancer Mother         GYN    Other - See Comments Mother         phlebitis for which hospitalized several times    Heart Disease Father     Cancer Brother         throat    Alcohol/Drug Maternal Grandfather     Heart Disease Paternal Grandfather         Heart attack    Diabetes Paternal Grandfather        Review of Systems  As per HPI and PMHx, otherwise 10 system review including the head and neck, constitutional, eyes, respiratory, GI, skin, neurologic, lymphatic, endocrine, and allergy systems is negative.    Physical Exam  /65   Pulse 68   Ht 1.657 m (5' 5.25\")   Wt 61.2 kg (135 lb)   SpO2 97%   BMI 22.29 kg/m    GENERAL: Patient is a pleasant, cooperative 72 year old female in no acute distress.  HEAD: Normocephalic, atraumatic.  Hair and scalp are normal.  EYES: Pupils are equal, round, reactive to light and accommodation.  Extraocular movements are intact.  The sclera nonicteric without injection.  The extraocular structures are normal.  EARS: Normal shape and symmetry.  No tenderness when palpating the mastoid or tragal areas bilaterally.  The ears were examined the otic microscope for debridement on the right-hand side.  Otoscopic exam on the right reveals moist ceruminous debris and otorrhea in the canal impacted down to the tympanic membrane.  The cerumen and otorrhea were cleaned with a #5 suction.  The tympanic membrane is somewhat thickened and there is a perforation in the posterior-inferior quadrant.  The patch that had placed previously is no longer visualized.  The middle ear does have some mucoserous drainage that was suctioned with a #3 suction.  No granulation. Ciprodex drops were placed in the right ear.  Otoscopic exam on the left reveals a clear canal. The left tympanic membrane is intact " without evidence of effusion. No retraction, granulation, or drainage.  NOSE: Nares are patent.  Nasal mucosa is pink and moist. Negative anterior rhinoscopy.  NEUROLOGIC: Cranial nerves II through XII are grossly intact.  Voice is strong.  Patient is House-Brackmann I/VI bilaterally.  CARDIOVASCULAR: Extremities are warm and well-perfused.  No significant peripheral edema.  RESPIRATORY: Patient has nonlabored breathing without cough, wheeze, stridor.  PSYCHIATRIC: Patient is alert and oriented.  Mood and affect appear normal.  SKIN: Warm and dry.  No scalp, face, or neck lesions noted.    Assessment and Plan     ICD-10-CM    1. Patulous Eustachian tube, right  H69.01 Remove Cerumen     dexAMETHasone (DECADRON) 0.1 % ophthalmic solution      2. Sensorineural hearing loss, bilateral  H90.3 Remove Cerumen     dexAMETHasone (DECADRON) 0.1 % ophthalmic solution      3. Asymmetrical sensorineural hearing loss  H90.3 Remove Cerumen     dexAMETHasone (DECADRON) 0.1 % ophthalmic solution      4. Tinnitus, bilateral  H93.13 Remove Cerumen     dexAMETHasone (DECADRON) 0.1 % ophthalmic solution      5. Status post ear surgery  Z98.890 Remove Cerumen     dexAMETHasone (DECADRON) 0.1 % ophthalmic solution      6. Perforation of tympanic membrane, right  H72.91 Remove Cerumen     dexAMETHasone (DECADRON) 0.1 % ophthalmic solution      7. Non-recurrent acute suppurative otitis media of right ear without spontaneous rupture of tympanic membrane  H66.001 Remove Cerumen     dexAMETHasone (DECADRON) 0.1 % ophthalmic solution        It was my pleasure seeing Chynaantionette Moncada today in clinic.  Unfortunately, the patient has developed acute otitis media following placement of a paper patch last week.  The patch is not currently in place and the patient has active drainage.  She currently has ofloxacin drops from the urgent care.  I think we will add a steroid as well.  She can do 3 drops of ofloxacin and 3 drops of dexamethasone twice  daily for 10 days.  I will see her after completing treatment.  We could discuss replacing a patch at that point if the tympanic membrane does not heal spontaneously.      I will also see her prior to her rhinology appointment to make sure the eardrum is healed.  We can also update her audiogram at that time.  I encouraged her still to pursue hearing aids I think that this will ultimately be the treatment for her hearing loss.    Cabrera Fields MD  Department of Otolaryngology-Head and Neck Surgery  [facility]

## 2023-10-10 NOTE — TELEPHONE ENCOUNTER
Routed to Dr Fields:  Tube placed 10/6/23  Probably fell out yesterday  Patient at Urgent Care today    Patient is asking   Does tube/plug need to be replaced?  Please advise    Gianluca HI [facility]

## 2023-10-10 NOTE — TELEPHONE ENCOUNTER
The pt called called back. She went to the urgent care just now. The pt states the plug that Dr. Fields put in has come out. The pt is asking if it needs to be put back in, or just leave it. Please call the pt to discuss. Thanks.

## 2023-10-10 NOTE — TELEPHONE ENCOUNTER
[facility] Call Center    Phone Message    May a detailed message be left on voicemail: yes     Reason for Call: Other: Pt had a tube placed in her ear and this morning she heard swishing and felt something move and felt discharge so she used a kleenex and the discharge was a little discolored, she has a little pain too, she's wondering if it's infected or if she needs to be concerned, please call to discuss further, thanks     Action Taken: Other: ENT    Travel Screening: Not Applicable

## 2023-10-10 NOTE — TELEPHONE ENCOUNTER
Cabrera Fields MD  You10 minutes ago (12:42 PM)     I have removed her tube in the clinic last Friday and placed a patch on the eardrum.  Did the urgent care think the patch was dislodged?    If she wants me to take a look in the ear, you can add her to one of my hold spots tomorrow.  If it is actively infected, we might have to treat her with drops until the infection resolves and then consider replacing a patch or just letting it heal and seeing if it heals spontaneously.    IJL

## 2023-10-10 NOTE — TELEPHONE ENCOUNTER
Patient called  Was prescribed ear drops from  but would like provider to look and advise  Appointment scheduled    Prem DAWKINS RN  [facility]

## 2023-10-10 NOTE — PROGRESS NOTES
Assessment & Plan     Perforation of right tympanic membrane  Slight redness. Continue to monitor. Can start ofloxacin with worsening pain or drainage. Follow up with ENT.    - ofloxacin (FLOXIN) 0.3 % otic solution; Place 5 drops into the right ear 2 times daily                 Return in about 1 week (around 10/17/2023), or if symptoms worsen or fail to improve.    TWAN Harley [facility] [place]              Subjective   Chief Complaint   Patient presents with    Ear Problem     Rt ear problem, pt just recently put a tube in for a week then it didn't work so she went back on Friday and had it removed, ENT put in a plug. Pt said it feels kind of squishy now. She said her ear had water draining out of it this morning and is now having pain, noticed a little discoloration and states that there shouldn't be any moisture in it.       HPI     Ear problem   Onset of symptoms was 1 week(s) ago.  Course of illness is worsening.    Severity mild/mod  Current and Associated symptoms: had tube placed then removed, now has pain and drainage  Treatment measures tried include None tried.  Predisposing factors include None.                  Review of Systems         Objective    /78   Pulse 71   Temp 97.2  F (36.2  C) (Tympanic)   Resp 16   Wt 61.2 kg (135 lb)   SpO2 100%   BMI 22.30 kg/m    Body mass index is 22.3 kg/m .  Physical Exam  Constitutional:       General: She is not in acute distress.  HENT:      Right Ear: Tympanic membrane is perforated (slight redness).      Left Ear: Tympanic membrane and ear canal normal.   Neurological:      Mental Status: She is alert.

## 2023-10-11 ENCOUNTER — OFFICE VISIT (OUTPATIENT)
Dept: OTOLARYNGOLOGY | Facility: CLINIC | Age: 73
End: 2023-10-11
Payer: COMMERCIAL

## 2023-10-11 VITALS
WEIGHT: 135 LBS | BODY MASS INDEX: 22.49 KG/M2 | OXYGEN SATURATION: 97 % | HEART RATE: 68 BPM | HEIGHT: 65 IN | DIASTOLIC BLOOD PRESSURE: 65 MMHG | SYSTOLIC BLOOD PRESSURE: 123 MMHG

## 2023-10-11 DIAGNOSIS — H72.91 PERFORATION OF TYMPANIC MEMBRANE, RIGHT: ICD-10-CM

## 2023-10-11 DIAGNOSIS — H90.3 ASYMMETRICAL SENSORINEURAL HEARING LOSS: ICD-10-CM

## 2023-10-11 DIAGNOSIS — H66.001 NON-RECURRENT ACUTE SUPPURATIVE OTITIS MEDIA OF RIGHT EAR WITHOUT SPONTANEOUS RUPTURE OF TYMPANIC MEMBRANE: ICD-10-CM

## 2023-10-11 DIAGNOSIS — H93.13 TINNITUS, BILATERAL: ICD-10-CM

## 2023-10-11 DIAGNOSIS — Z98.890 STATUS POST EAR SURGERY: ICD-10-CM

## 2023-10-11 DIAGNOSIS — H90.3 SENSORINEURAL HEARING LOSS, BILATERAL: ICD-10-CM

## 2023-10-11 DIAGNOSIS — H69.01 PATULOUS EUSTACHIAN TUBE, RIGHT: Primary | ICD-10-CM

## 2023-10-11 PROCEDURE — 99024 POSTOP FOLLOW-UP VISIT: CPT | Mod: 25 | Performed by: OTOLARYNGOLOGY

## 2023-10-11 PROCEDURE — 69210 REMOVE IMPACTED EAR WAX UNI: CPT | Mod: 58 | Performed by: OTOLARYNGOLOGY

## 2023-10-11 RX ORDER — DEXAMETHASONE SODIUM PHOSPHATE 1 MG/ML
3 SOLUTION/ DROPS OPHTHALMIC 2 TIMES DAILY
Qty: 5 ML | Refills: 0 | Status: SHIPPED | OUTPATIENT
Start: 2023-10-11 | End: 2023-10-21

## 2023-10-11 ASSESSMENT — PAIN SCALES - GENERAL: PAINLEVEL: NO PAIN (1)

## 2023-10-11 NOTE — LETTER
10/11/2023         RE: Chyna Moncada  [address] [facility] [id number]        Dear Colleague,    Thank you for referring your patient, Chyna Moncada, to the [facility]. Please see a copy of my visit note below.    Chief Complaint   Patient presents with     RECHECK     History of Present Illness  Chyna Moncada is a 72 year old female who presents today for follow-up.  I initially evaluated the patient on 8/10/2023 with right ear complaints.  She developed some ear symptoms and underwent irrigation of the ear.  She continued to have symptoms of ear plugging, fullness, echoing, and and sense that the ear is in a bucket.  She had obvious respiratory variation of her tympanic membrane and I diagnosed her with patulous eustachian tube on the right.  We discussed observation, trial of estrogen nasal spray, ear tube placement on the right, or referral for discussion of potential eustachian tube procedures to help with her patulous eustachian tube.  Upon initial evaluation, she elected for observation.  She later contacted our office about pursuing an ear tube on the right. She was again seen on 9/22/2023 and we placed an ear tube on the right. She felt the right tube made her ear worse and contacted our office for ear tube removal. She was last seen for ear tube removal and patch myringoplasty on 10/6/2023. The patient presented to walk in clinic 10/10 with right ear discomfort and drainage. Shew was noted to have a perforation on the right. She was prescribed ofloxacin drops and advised to follow up with ENT. She returns today for follow up.      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  She is worn hearing aids since her early 20s and has a known asymmetric hearing loss in the left ear. She does intermittently hear breathing in her right ear.  Symptoms have been going on for several  months.  She does struggle with significant tinnitus worse in the right ear.  No significant vertigo.  The patient denies prior significant recreational, , or work-related noise exposure.      The patient underwent an outside audiogram performed 3/28/2023.  My review of the audiogram showed stable hearing from her prior audiogram in January 2022.  The patient has mild sloping to moderate sloping to moderately severe sloping to severe sensorineural hearing loss in the right ear and moderate sloping to moderately severe sloping to severe sloping to profound sensorineural hearing loss in the left ear.  Pure-tone average was 47 dB on the right and 62 dB on the left.  Speech reception threshold was 45 dB on the right and 55 dB on the left.  The patient had 100% word recognition on the right and 60% word recognition on the left.      Past Medical History  Patient Active Problem List   Diagnosis     Allergic rhinitis     Anxiety state     Cerebral artery occlusion with cerebral infarction (H)     Degeneration of cervical intervertebral disc     Degeneration of lumbar or lumbosacral intervertebral disc     Insomnia     Postmenopausal atrophic vaginitis     Raynaud's syndrome     Osteopenia     Pulmonary nodules     TGA (transient global amnesia)     Recurrent cold sores     Peripheral vascular disease (H24)     Atopic rhinitis     Osteoarthritis of hip     Bilateral sensorineural hearing loss     Tendinitis of shoulder     COPD (chronic obstructive pulmonary disease) (H)     SVT (supraventricular tachycardia)     Age-related osteoporosis without current pathological fracture     Low platelet count (H24)     Current Medications    Current Outpatient Medications:      alendronate (FOSAMAX) 70 MG tablet, Take 1 tablet (70 mg) by mouth every 7 days, Disp: 12 tablet, Rfl: 3     aspirin EC 81 MG tablet, Take 81 mg by mouth daily , Disp: , Rfl:      calcium-vitamin D (CALTRATE) 600-400 MG-UNIT per tablet, Take 1 tablet by  mouth daily , Disp: , Rfl:      cyclobenzaprine (FLEXERIL) 5 MG tablet, Take 1 tablet (5 mg) by mouth 2 times daily as needed for muscle spasms, Disp: 60 tablet, Rfl: 3     dexAMETHasone (DECADRON) 0.1 % ophthalmic solution, Place 3 drops into the right ear 2 times daily for 10 days, Disp: 5 mL, Rfl: 0     Inulin 2 g CHEW, , Disp: , Rfl:      mometasone (NASONEX) 50 MCG/ACT spray, Spray 2 sprays in nostril daily , Disp: , Rfl:      Multiple Vitamin (MULTI-VITAMINS) TABS, Take 1 tablet by mouth daily, Disp: , Rfl:      multivitamin (OCUVITE) TABS tablet, Take 1 tablet by mouth daily, Disp: , Rfl:      ofloxacin (FLOXIN) 0.3 % otic solution, Place 5 drops into the right ear 2 times daily, Disp: 4 mL, Rfl: 0     pravastatin (PRAVACHOL) 40 MG tablet, TAKE ONE TABLET BY MOUTH EVERY NIGHT AT BEDTIME, Disp: 90 tablet, Rfl: 3     RESTASIS 0.05 % ophthalmic emulsion, , Disp: , Rfl:      valACYclovir (VALTREX) 1000 mg tablet, TAKE TWO TABLETS BY MOUTH TWICE A DAY, Disp: 4 tablet, Rfl: 11     Cholecalciferol (VITAMIN D-3 PO), Take 1,000 Units by mouth daily (Patient not taking: Reported on 10/10/2023), Disp: , Rfl:     Allergies  Allergies   Allergen Reactions     Tramadol Anaphylaxis     Benadryl [Diphenhydramine Hcl] Other (See Comments)     Made her goofy     Caffeine Other (See Comments)     Jittery       Codeine Nausea and Vomiting     Darvocet [Propoxyphene N-Apap]      Other reaction(s): GI Upset     Diphenhydramine Other (See Comments)     Lovastatin Other (See Comments) and Muscle Pain (Myalgia)     Other reaction(s): Myalgia  pain     Percocet [Oxycodone-Acetaminophen] Other (See Comments)     Other reaction(s): Dizziness  Sick or dizzy?     Vicodin [Hydrocodone-Acetaminophen] Other (See Comments) and Nausea     Sick or dizzy?       Social History  Social History     Socioeconomic History     Marital status:    Tobacco Use     Smoking status: Former     Types: Cigarettes     Quit date: 1/10/1983     Years  "since quittin.7     Smokeless tobacco: Never   Vaping Use     Vaping Use: Never used   Substance and Sexual Activity     Alcohol use: Not Currently     Alcohol/week: 1.0 standard drink of alcohol     Types: 1 Glasses of wine per week     Drug use: No     Sexual activity: Yes     Partners: Male   Other Topics Concern     Parent/sibling w/ CABG, MI or angioplasty before 65F 55M? No       Family History  Family History   Problem Relation Age of Onset     Arthritis Mother         was told was RA     Cancer Mother         GYN     Other - See Comments Mother         phlebitis for which hospitalized several times     Heart Disease Father      Cancer Brother         throat     Alcohol/Drug Maternal Grandfather      Heart Disease Paternal Grandfather         Heart attack     Diabetes Paternal Grandfather        Review of Systems  As per HPI and PMHx, otherwise 10 system review including the head and neck, constitutional, eyes, respiratory, GI, skin, neurologic, lymphatic, endocrine, and allergy systems is negative.    Physical Exam  /65   Pulse 68   Ht 1.657 m (5' 5.25\")   Wt 61.2 kg (135 lb)   SpO2 97%   BMI 22.29 kg/m    GENERAL: Patient is a pleasant, cooperative 72 year old female in no acute distress.  HEAD: Normocephalic, atraumatic.  Hair and scalp are normal.  EYES: Pupils are equal, round, reactive to light and accommodation.  Extraocular movements are intact.  The sclera nonicteric without injection.  The extraocular structures are normal.  EARS: Normal shape and symmetry.  No tenderness when palpating the mastoid or tragal areas bilaterally.  The ears were examined the otic microscope for debridement on the right-hand side.  Otoscopic exam on the right reveals moist ceruminous debris and otorrhea in the canal impacted down to the tympanic membrane.  The cerumen and otorrhea were cleaned with a #5 suction.  The tympanic membrane is somewhat thickened and there is a perforation in the " posterior-inferior quadrant.  The patch that had placed previously is no longer visualized.  The middle ear does have some mucoserous drainage that was suctioned with a #3 suction.  No granulation. Ciprodex drops were placed in the right ear.  Otoscopic exam on the left reveals a clear canal. The left tympanic membrane is intact without evidence of effusion. No retraction, granulation, or drainage.  NOSE: Nares are patent.  Nasal mucosa is pink and moist. Negative anterior rhinoscopy.  NEUROLOGIC: Cranial nerves II through XII are grossly intact.  Voice is strong.  Patient is House-Brackmann I/VI bilaterally.  CARDIOVASCULAR: Extremities are warm and well-perfused.  No significant peripheral edema.  RESPIRATORY: Patient has nonlabored breathing without cough, wheeze, stridor.  PSYCHIATRIC: Patient is alert and oriented.  Mood and affect appear normal.  SKIN: Warm and dry.  No scalp, face, or neck lesions noted.    Assessment and Plan     ICD-10-CM    1. Patulous Eustachian tube, right  H69.01 Remove Cerumen     dexAMETHasone (DECADRON) 0.1 % ophthalmic solution      2. Sensorineural hearing loss, bilateral  H90.3 Remove Cerumen     dexAMETHasone (DECADRON) 0.1 % ophthalmic solution      3. Asymmetrical sensorineural hearing loss  H90.3 Remove Cerumen     dexAMETHasone (DECADRON) 0.1 % ophthalmic solution      4. Tinnitus, bilateral  H93.13 Remove Cerumen     dexAMETHasone (DECADRON) 0.1 % ophthalmic solution      5. Status post ear surgery  Z98.890 Remove Cerumen     dexAMETHasone (DECADRON) 0.1 % ophthalmic solution      6. Perforation of tympanic membrane, right  H72.91 Remove Cerumen     dexAMETHasone (DECADRON) 0.1 % ophthalmic solution      7. Non-recurrent acute suppurative otitis media of right ear without spontaneous rupture of tympanic membrane  H66.001 Remove Cerumen     dexAMETHasone (DECADRON) 0.1 % ophthalmic solution        It was my pleasure seeing Chyna Moncada today in clinic.  Unfortunately, the  patient has developed acute otitis media following placement of a paper patch last week.  The patch is not currently in place and the patient has active drainage.  She currently has ofloxacin drops from the urgent care.  I think we will add a steroid as well.  She can do 3 drops of ofloxacin and 3 drops of dexamethasone twice daily for 10 days.  I will see her after completing treatment.  We could discuss replacing a patch at that point if the tympanic membrane does not heal spontaneously.      I will also see her prior to her rhinology appointment to make sure the eardrum is healed.  We can also update her audiogram at that time.  I encouraged her still to pursue hearing aids I think that this will ultimately be the treatment for her hearing loss.    Cabrera Fields MD  Department of Otolaryngology-Head and Neck Surgery  [facility]       Again, thank you for allowing me to participate in the care of your patient.        Sincerely,        Cabrera Fields MD

## 2023-10-24 ENCOUNTER — PRE VISIT (OUTPATIENT)
Dept: OTOLARYNGOLOGY | Facility: CLINIC | Age: 73
End: 2023-10-24

## 2023-10-31 NOTE — PROGRESS NOTES
Chief Complaint   Patient presents with    RECHECK     History of Present Illness  Chyna Moncada is a 72 year old female who presents today for follow-up.  I initially evaluated the patient on 8/10/2023 with right ear complaints.  She developed some ear symptoms and underwent irrigation of the ear.  She continued to have symptoms of ear plugging, fullness, echoing, and and sense that the ear is in a bucket.  She had obvious respiratory variation of her tympanic membrane and I diagnosed her with patulous eustachian tube on the right.  We discussed observation, trial of estrogen nasal spray, ear tube placement on the right, or referral for discussion of potential eustachian tube procedures to help with her patulous eustachian tube.  Upon initial evaluation, she elected for observation.  She later contacted our office about pursuing an ear tube on the right. She was again seen on 9/22/2023 and we placed an ear tube on the right. She felt the right tube made her ear worse and contacted our office for ear tube removal. She was last seen for ear tube removal and patch myringoplasty on 10/6/2023. The patient presented to walk in clinic 10/10/2023 with right ear discomfort and drainage. Shew was noted to have a perforation on the right. She was prescribed ofloxacin drops.  I saw her for follow-up on 10/11/2023.  The patch had dissolved and the perforation appeared to be healing.  We decided for observation.  She returns today for follow up.      The patient has a long history of decreased hearing in both ears worse in the left ear secondary to in utero [place] citizen measles exposure.  She is worn hearing aids since her early 20s and has a known asymmetric hearing loss in the left ear. She does intermittently hear breathing in her right ear.  Symptoms have been going on for several months.  She does struggle with significant tinnitus worse in the right ear.  No significant vertigo.  The patient denies prior significant  recreational, , or work-related noise exposure.      The patient underwent an outside audiogram performed 3/28/2023.  My review of the audiogram showed stable hearing from her prior audiogram in January 2022.  The patient has mild sloping to moderate sloping to moderately severe sloping to severe sensorineural hearing loss in the right ear and moderate sloping to moderately severe sloping to severe sloping to profound sensorineural hearing loss in the left ear.  Pure-tone average was 47 dB on the right and 62 dB on the left.  Speech reception threshold was 45 dB on the right and 55 dB on the left.  The patient had 100% word recognition on the right and 60% word recognition on the left.      Past Medical History  Patient Active Problem List   Diagnosis    Allergic rhinitis    Anxiety state    Cerebral artery occlusion with cerebral infarction (H)    Degeneration of cervical intervertebral disc    Degeneration of lumbar or lumbosacral intervertebral disc    Insomnia    Postmenopausal atrophic vaginitis    Raynaud's syndrome    Osteopenia    Pulmonary nodules    TGA (transient global amnesia)    Recurrent cold sores    Peripheral vascular disease (H24)    Atopic rhinitis    Osteoarthritis of hip    Bilateral sensorineural hearing loss    Tendinitis of shoulder    COPD (chronic obstructive pulmonary disease) (H)    SVT (supraventricular tachycardia)    Age-related osteoporosis without current pathological fracture    Low platelet count (H24)     Current Medications    Current Outpatient Medications:     alendronate (FOSAMAX) 70 MG tablet, Take 1 tablet (70 mg) by mouth every 7 days, Disp: 12 tablet, Rfl: 3    aspirin EC 81 MG tablet, Take 81 mg by mouth daily , Disp: , Rfl:     calcium-vitamin D (CALTRATE) 600-400 MG-UNIT per tablet, Take 1 tablet by mouth daily , Disp: , Rfl:     cyclobenzaprine (FLEXERIL) 5 MG tablet, Take 1 tablet (5 mg) by mouth 2 times daily as needed for muscle spasms, Disp: 60 tablet,  Rfl: 3    Inulin 2 g CHEW, , Disp: , Rfl:     mometasone (NASONEX) 50 MCG/ACT spray, Spray 2 sprays in nostril daily , Disp: , Rfl:     Multiple Vitamin (MULTI-VITAMINS) TABS, Take 1 tablet by mouth daily, Disp: , Rfl:     multivitamin (OCUVITE) TABS tablet, Take 1 tablet by mouth daily, Disp: , Rfl:     pravastatin (PRAVACHOL) 40 MG tablet, TAKE ONE TABLET BY MOUTH EVERY NIGHT AT BEDTIME, Disp: 90 tablet, Rfl: 3    RESTASIS 0.05 % ophthalmic emulsion, , Disp: , Rfl:     valACYclovir (VALTREX) 1000 mg tablet, TAKE TWO TABLETS BY MOUTH TWICE A DAY, Disp: 4 tablet, Rfl: 11    Cholecalciferol (VITAMIN D-3 PO), Take 1,000 Units by mouth daily (Patient not taking: Reported on 10/10/2023), Disp: , Rfl:     ofloxacin (FLOXIN) 0.3 % otic solution, Place 5 drops into the right ear 2 times daily (Patient not taking: Reported on 11/3/2023), Disp: 4 mL, Rfl: 0    Allergies  Allergies   Allergen Reactions    Tramadol Anaphylaxis    Benadryl [Diphenhydramine Hcl] Other (See Comments)     Made her goofy    Caffeine Other (See Comments)     Jittery      Codeine Nausea and Vomiting    Darvocet [Propoxyphene N-Apap]      Other reaction(s): GI Upset    Diphenhydramine Other (See Comments)    Lovastatin Other (See Comments) and Muscle Pain (Myalgia)     Other reaction(s): Myalgia  pain    Percocet [Oxycodone-Acetaminophen] Other (See Comments)     Other reaction(s): Dizziness  Sick or dizzy?    Vicodin [Hydrocodone-Acetaminophen] Other (See Comments) and Nausea     Sick or dizzy?       Social History  Social History     Socioeconomic History    Marital status:    Tobacco Use    Smoking status: Former     Types: Cigarettes     Quit date: 1/10/1983     Years since quittin.7    Smokeless tobacco: Never   Vaping Use    Vaping Use: Never used   Substance and Sexual Activity    Alcohol use: Not Currently     Alcohol/week: 1.0 standard drink of alcohol     Types: 1 Glasses of wine per week    Drug use: No    Sexual activity: Yes  "    Partners: Male   Other Topics Concern    Parent/sibling w/ CABG, MI or angioplasty before 65F 55M? No       Family History  Family History   Problem Relation Age of Onset    Arthritis Mother         was told was RA    Cancer Mother         GYN    Other - See Comments Mother         phlebitis for which hospitalized several times    Heart Disease Father     Cancer Brother         throat    Alcohol/Drug Maternal Grandfather     Heart Disease Paternal Grandfather         Heart attack    Diabetes Paternal Grandfather        Review of Systems  As per HPI and PMHx, otherwise 10 system review including the head and neck, constitutional, eyes, respiratory, GI, skin, neurologic, lymphatic, endocrine, and allergy systems is negative.    Physical Exam  /62   Pulse 74   Ht 1.657 m (5' 5.25\")   Wt 61.2 kg (135 lb)   SpO2 99%   BMI 22.29 kg/m    GENERAL: Patient is a pleasant, cooperative 72 year old female in no acute distress.  HEAD: Normocephalic, atraumatic.  Hair and scalp are normal.  EYES: Pupils are equal, round, reactive to light and accommodation.  Extraocular movements are intact.  The sclera nonicteric without injection.  The extraocular structures are normal.  EARS: See below.  NOSE: Nares are patent.  Nasal mucosa is pink and moist. Negative anterior rhinoscopy.  NEUROLOGIC: Cranial nerves II through XII are grossly intact.  Voice is strong.  Patient is House-Brackmann I/VI bilaterally.  CARDIOVASCULAR: Extremities are warm and well-perfused.  No significant peripheral edema.  RESPIRATORY: Patient has nonlabored breathing without cough, wheeze, stridor.  PSYCHIATRIC: Patient is alert and oriented.  Mood and affect appear normal.  SKIN: Warm and dry.  No scalp, face, or neck lesions noted.    Physical Exam and Procedure    EARS: Normal shape and symmetry.  No tenderness when palpating the mastoid or tragal areas bilaterally.  The ears were then examined under the otic binocular microscope.  Otoscopic " exam on the right reveals a clear canal.  The right tympanic membrane was round, intact without evidence of effusion.  No retraction, granulation, drainage, or evidence of cholesteatoma.      Attention was turned to the left ear.  Otoscopic exam on the left reveals a clear canal the left tympanic membrane was round, intact without evidence of effusion.  No retraction, granulation, drainage, or evidence of cholesteatoma.      Assessment and Plan     ICD-10-CM    1. Patulous Eustachian tube, right  H69.01 Microscopy, Binocular      2. Sensorineural hearing loss, bilateral  H90.3 Microscopy, Binocular      3. Asymmetrical sensorineural hearing loss  H90.3 Microscopy, Binocular      4. Tinnitus, bilateral  H93.13 Microscopy, Binocular      5. Normal ear exam  Z01.10         It was my pleasure seeing Chyna Moncada today in clinic. Fortunately, her eardrum is healed and the ear looks normal.  We had a long discussion about her patulous symptoms versus her hearing loss symptoms.  I think I would first start refitting new hearing aids to see how much of her patulous symptoms bother her after getting a good new set of hearing aids.  She would need a new hearing test in our system if she would like to pursue hearing aids here.  The patient would like to cancel her rhinology appointment for now, which I think is reasonable.  We will try to set up a follow-up appointment for an audiogram followed by hearing aid consultation.    Cabrera Fields MD  Department of Otolaryngology-Head and Neck Surgery  [facility]

## 2023-11-03 ENCOUNTER — OFFICE VISIT (OUTPATIENT)
Dept: OTOLARYNGOLOGY | Facility: CLINIC | Age: 73
End: 2023-11-03
Payer: COMMERCIAL

## 2023-11-03 VITALS
BODY MASS INDEX: 22.49 KG/M2 | SYSTOLIC BLOOD PRESSURE: 113 MMHG | OXYGEN SATURATION: 99 % | DIASTOLIC BLOOD PRESSURE: 62 MMHG | WEIGHT: 135 LBS | HEIGHT: 65 IN | HEART RATE: 74 BPM

## 2023-11-03 DIAGNOSIS — H69.01 PATULOUS EUSTACHIAN TUBE, RIGHT: Primary | ICD-10-CM

## 2023-11-03 DIAGNOSIS — H90.3 ASYMMETRICAL SENSORINEURAL HEARING LOSS: ICD-10-CM

## 2023-11-03 DIAGNOSIS — H90.3 SENSORINEURAL HEARING LOSS, BILATERAL: ICD-10-CM

## 2023-11-03 DIAGNOSIS — H93.13 TINNITUS, BILATERAL: ICD-10-CM

## 2023-11-03 DIAGNOSIS — Z01.10 NORMAL EAR EXAM: ICD-10-CM

## 2023-11-03 PROCEDURE — 92504 EAR MICROSCOPY EXAMINATION: CPT | Performed by: OTOLARYNGOLOGY

## 2023-11-03 PROCEDURE — 99213 OFFICE O/P EST LOW 20 MIN: CPT | Mod: 24 | Performed by: OTOLARYNGOLOGY

## 2023-11-03 ASSESSMENT — PAIN SCALES - GENERAL: PAINLEVEL: NO PAIN (0)

## 2023-11-03 NOTE — LETTER
11/3/2023         RE: Chyna Moncada  [address] [facility] [address]        Dear Colleague,    Thank you for referring your patient, Chyna Moncada, to the [facility]. Please see a copy of my visit note below.    Chief Complaint   Patient presents with     RECHECK     History of Present Illness  Chyna Moncada is a 72 year old female who presents today for follow-up.  I initially evaluated the patient on 8/10/2023 with right ear complaints.  She developed some ear symptoms and underwent irrigation of the ear.  She continued to have symptoms of ear plugging, fullness, echoing, and and sense that the ear is in a bucket.  She had obvious respiratory variation of her tympanic membrane and I diagnosed her with patulous eustachian tube on the right.  We discussed observation, trial of estrogen nasal spray, ear tube placement on the right, or referral for discussion of potential eustachian tube procedures to help with her patulous eustachian tube.  Upon initial evaluation, she elected for observation.  She later contacted our office about pursuing an ear tube on the right. She was again seen on 9/22/2023 and we placed an ear tube on the right. She felt the right tube made her ear worse and contacted our office for ear tube removal. She was last seen for ear tube removal and patch myringoplasty on 10/6/2023. The patient presented to walk in clinic 10/10/2023 with right ear discomfort and drainage. Shew was noted to have a perforation on the right. She was prescribed ofloxacin drops.  I saw her for follow-up on 10/11/2023.  The patch had dissolved and the perforation appeared to be healing.  We decided for observation.  She returns today for follow up.      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  She is worn hearing aids since her early 20s and has a known asymmetric hearing loss in  the left ear. She does intermittently hear breathing in her right ear.  Symptoms have been going on for several months.  She does struggle with significant tinnitus worse in the right ear.  No significant vertigo.  The patient denies prior significant recreational, , or work-related noise exposure.      The patient underwent an outside audiogram performed 3/28/2023.  My review of the audiogram showed stable hearing from her prior audiogram in January 2022.  The patient has mild sloping to moderate sloping to moderately severe sloping to severe sensorineural hearing loss in the right ear and moderate sloping to moderately severe sloping to severe sloping to profound sensorineural hearing loss in the left ear.  Pure-tone average was 47 dB on the right and 62 dB on the left.  Speech reception threshold was 45 dB on the right and 55 dB on the left.  The patient had 100% word recognition on the right and 60% word recognition on the left.      Past Medical History  Patient Active Problem List   Diagnosis     Allergic rhinitis     Anxiety state     Cerebral artery occlusion with cerebral infarction (H)     Degeneration of cervical intervertebral disc     Degeneration of lumbar or lumbosacral intervertebral disc     Insomnia     Postmenopausal atrophic vaginitis     Raynaud's syndrome     Osteopenia     Pulmonary nodules     TGA (transient global amnesia)     Recurrent cold sores     Peripheral vascular disease (H24)     Atopic rhinitis     Osteoarthritis of hip     Bilateral sensorineural hearing loss     Tendinitis of shoulder     COPD (chronic obstructive pulmonary disease) (H)     SVT (supraventricular tachycardia)     Age-related osteoporosis without current pathological fracture     Low platelet count (H24)     Current Medications    Current Outpatient Medications:      alendronate (FOSAMAX) 70 MG tablet, Take 1 tablet (70 mg) by mouth every 7 days, Disp: 12 tablet, Rfl: 3     aspirin EC 81 MG tablet, Take 81  mg by mouth daily , Disp: , Rfl:      calcium-vitamin D (CALTRATE) 600-400 MG-UNIT per tablet, Take 1 tablet by mouth daily , Disp: , Rfl:      cyclobenzaprine (FLEXERIL) 5 MG tablet, Take 1 tablet (5 mg) by mouth 2 times daily as needed for muscle spasms, Disp: 60 tablet, Rfl: 3     Inulin 2 g CHEW, , Disp: , Rfl:      mometasone (NASONEX) 50 MCG/ACT spray, Spray 2 sprays in nostril daily , Disp: , Rfl:      Multiple Vitamin (MULTI-VITAMINS) TABS, Take 1 tablet by mouth daily, Disp: , Rfl:      multivitamin (OCUVITE) TABS tablet, Take 1 tablet by mouth daily, Disp: , Rfl:      pravastatin (PRAVACHOL) 40 MG tablet, TAKE ONE TABLET BY MOUTH EVERY NIGHT AT BEDTIME, Disp: 90 tablet, Rfl: 3     RESTASIS 0.05 % ophthalmic emulsion, , Disp: , Rfl:      valACYclovir (VALTREX) 1000 mg tablet, TAKE TWO TABLETS BY MOUTH TWICE A DAY, Disp: 4 tablet, Rfl: 11     Cholecalciferol (VITAMIN D-3 PO), Take 1,000 Units by mouth daily (Patient not taking: Reported on 10/10/2023), Disp: , Rfl:      ofloxacin (FLOXIN) 0.3 % otic solution, Place 5 drops into the right ear 2 times daily (Patient not taking: Reported on 11/3/2023), Disp: 4 mL, Rfl: 0    Allergies  Allergies   Allergen Reactions     Tramadol Anaphylaxis     Benadryl [Diphenhydramine Hcl] Other (See Comments)     Made her goofy     Caffeine Other (See Comments)     Jittery       Codeine Nausea and Vomiting     Darvocet [Propoxyphene N-Apap]      Other reaction(s): GI Upset     Diphenhydramine Other (See Comments)     Lovastatin Other (See Comments) and Muscle Pain (Myalgia)     Other reaction(s): Myalgia  pain     Percocet [Oxycodone-Acetaminophen] Other (See Comments)     Other reaction(s): Dizziness  Sick or dizzy?     Vicodin [Hydrocodone-Acetaminophen] Other (See Comments) and Nausea     Sick or dizzy?       Social History  Social History     Socioeconomic History     Marital status:    Tobacco Use     Smoking status: Former     Types: Cigarettes     Quit date:  "1/10/1983     Years since quittin.7     Smokeless tobacco: Never   Vaping Use     Vaping Use: Never used   Substance and Sexual Activity     Alcohol use: Not Currently     Alcohol/week: 1.0 standard drink of alcohol     Types: 1 Glasses of wine per week     Drug use: No     Sexual activity: Yes     Partners: Male   Other Topics Concern     Parent/sibling w/ CABG, MI or angioplasty before 65F 55M? No       Family History  Family History   Problem Relation Age of Onset     Arthritis Mother         was told was RA     Cancer Mother         GYN     Other - See Comments Mother         phlebitis for which hospitalized several times     Heart Disease Father      Cancer Brother         throat     Alcohol/Drug Maternal Grandfather      Heart Disease Paternal Grandfather         Heart attack     Diabetes Paternal Grandfather        Review of Systems  As per HPI and PMHx, otherwise 10 system review including the head and neck, constitutional, eyes, respiratory, GI, skin, neurologic, lymphatic, endocrine, and allergy systems is negative.    Physical Exam  /62   Pulse 74   Ht 1.657 m (5' 5.25\")   Wt 61.2 kg (135 lb)   SpO2 99%   BMI 22.29 kg/m    GENERAL: Patient is a pleasant, cooperative 72 year old female in no acute distress.  HEAD: Normocephalic, atraumatic.  Hair and scalp are normal.  EYES: Pupils are equal, round, reactive to light and accommodation.  Extraocular movements are intact.  The sclera nonicteric without injection.  The extraocular structures are normal.  EARS: See below.  NOSE: Nares are patent.  Nasal mucosa is pink and moist. Negative anterior rhinoscopy.  NEUROLOGIC: Cranial nerves II through XII are grossly intact.  Voice is strong.  Patient is House-Brackmann I/VI bilaterally.  CARDIOVASCULAR: Extremities are warm and well-perfused.  No significant peripheral edema.  RESPIRATORY: Patient has nonlabored breathing without cough, wheeze, stridor.  PSYCHIATRIC: Patient is alert and oriented.  " Mood and affect appear normal.  SKIN: Warm and dry.  No scalp, face, or neck lesions noted.    Physical Exam and Procedure    EARS: Normal shape and symmetry.  No tenderness when palpating the mastoid or tragal areas bilaterally.  The ears were then examined under the otic binocular microscope.  Otoscopic exam on the right reveals a clear canal.  The right tympanic membrane was round, intact without evidence of effusion.  No retraction, granulation, drainage, or evidence of cholesteatoma.      Attention was turned to the left ear.  Otoscopic exam on the left reveals a clear canal the left tympanic membrane was round, intact without evidence of effusion.  No retraction, granulation, drainage, or evidence of cholesteatoma.      Assessment and Plan     ICD-10-CM    1. Patulous Eustachian tube, right  H69.01 Microscopy, Binocular      2. Sensorineural hearing loss, bilateral  H90.3 Microscopy, Binocular      3. Asymmetrical sensorineural hearing loss  H90.3 Microscopy, Binocular      4. Tinnitus, bilateral  H93.13 Microscopy, Binocular      5. Normal ear exam  Z01.10         It was my pleasure seeing Chyna Moncada today in clinic. Fortunately, her eardrum is healed and the ear looks normal.  We had a long discussion about her patulous symptoms versus her hearing loss symptoms.  I think I would first start refitting new hearing aids to see how much of her patulous symptoms bother her after getting a good new set of hearing aids.  She would need a new hearing test in our system if she would like to pursue hearing aids here.  The patient would like to cancel her rhinology appointment for now, which I think is reasonable.  We will try to set up a follow-up appointment for an audiogram followed by hearing aid consultation.    Cabrera Fields MD  Department of Otolaryngology-Head and Neck Surgery  [facility]       Again, thank you for allowing me to participate in the care of your patient.        Sincerely,        Cabrera  GANGA Fields MD

## 2023-11-20 ENCOUNTER — PATIENT OUTREACH (OUTPATIENT)
Dept: CARE COORDINATION | Facility: CLINIC | Age: 73
End: 2023-11-20
Payer: COMMERCIAL

## 2023-12-04 ENCOUNTER — PRE VISIT (OUTPATIENT)
Dept: OTOLARYNGOLOGY | Facility: CLINIC | Age: 73
End: 2023-12-04

## 2023-12-21 ENCOUNTER — OFFICE VISIT (OUTPATIENT)
Dept: FAMILY MEDICINE | Facility: CLINIC | Age: 73
End: 2023-12-21
Payer: COMMERCIAL

## 2023-12-21 VITALS
SYSTOLIC BLOOD PRESSURE: 128 MMHG | RESPIRATION RATE: 16 BRPM | WEIGHT: 134 LBS | HEIGHT: 65 IN | OXYGEN SATURATION: 100 % | BODY MASS INDEX: 22.33 KG/M2 | DIASTOLIC BLOOD PRESSURE: 70 MMHG | TEMPERATURE: 97.1 F | HEART RATE: 69 BPM

## 2023-12-21 DIAGNOSIS — R42 DIZZINESS: ICD-10-CM

## 2023-12-21 DIAGNOSIS — M89.9 DISORDER OF BONE, UNSPECIFIED: ICD-10-CM

## 2023-12-21 DIAGNOSIS — I63.50 CEREBRAL ARTERY OCCLUSION WITH CEREBRAL INFARCTION (H): ICD-10-CM

## 2023-12-21 DIAGNOSIS — Z00.00 ENCOUNTER FOR MEDICARE ANNUAL WELLNESS EXAM: Primary | ICD-10-CM

## 2023-12-21 DIAGNOSIS — M81.0 AGE RELATED OSTEOPOROSIS, UNSPECIFIED PATHOLOGICAL FRACTURE PRESENCE: ICD-10-CM

## 2023-12-21 DIAGNOSIS — R26.81 UNSTEADY GAIT: ICD-10-CM

## 2023-12-21 DIAGNOSIS — R91.8 PULMONARY NODULES: ICD-10-CM

## 2023-12-21 DIAGNOSIS — Z23 NEED FOR TDAP VACCINATION: ICD-10-CM

## 2023-12-21 LAB
ALBUMIN SERPL BCG-MCNC: 4.4 G/DL (ref 3.5–5.2)
ALP SERPL-CCNC: 83 U/L (ref 40–150)
ALT SERPL W P-5'-P-CCNC: 18 U/L (ref 0–50)
ANION GAP SERPL CALCULATED.3IONS-SCNC: 11 MMOL/L (ref 7–15)
AST SERPL W P-5'-P-CCNC: 23 U/L (ref 0–45)
BILIRUB SERPL-MCNC: 0.6 MG/DL
BUN SERPL-MCNC: 15.3 MG/DL (ref 8–23)
CALCIUM SERPL-MCNC: 9.3 MG/DL (ref 8.8–10.2)
CHLORIDE SERPL-SCNC: 106 MMOL/L (ref 98–107)
CHOLEST SERPL-MCNC: 182 MG/DL
CREAT SERPL-MCNC: 0.66 MG/DL (ref 0.51–0.95)
DEPRECATED HCO3 PLAS-SCNC: 26 MMOL/L (ref 22–29)
EGFRCR SERPLBLD CKD-EPI 2021: >90 ML/MIN/1.73M2
FASTING STATUS PATIENT QL REPORTED: YES
GLUCOSE SERPL-MCNC: 101 MG/DL (ref 70–99)
HDLC SERPL-MCNC: 94 MG/DL
LDLC SERPL CALC-MCNC: 71 MG/DL
NONHDLC SERPL-MCNC: 88 MG/DL
POTASSIUM SERPL-SCNC: 4.2 MMOL/L (ref 3.4–5.3)
PROT SERPL-MCNC: 6.9 G/DL (ref 6.4–8.3)
SODIUM SERPL-SCNC: 143 MMOL/L (ref 135–145)
TRIGL SERPL-MCNC: 84 MG/DL
VIT D+METAB SERPL-MCNC: 72 NG/ML (ref 20–50)

## 2023-12-21 PROCEDURE — 36415 COLL VENOUS BLD VENIPUNCTURE: CPT | Performed by: NURSE PRACTITIONER

## 2023-12-21 PROCEDURE — 80053 COMPREHEN METABOLIC PANEL: CPT | Performed by: NURSE PRACTITIONER

## 2023-12-21 PROCEDURE — G0439 PPPS, SUBSEQ VISIT: HCPCS | Performed by: NURSE PRACTITIONER

## 2023-12-21 PROCEDURE — 80061 LIPID PANEL: CPT | Performed by: NURSE PRACTITIONER

## 2023-12-21 PROCEDURE — 99214 OFFICE O/P EST MOD 30 MIN: CPT | Mod: 24 | Performed by: NURSE PRACTITIONER

## 2023-12-21 PROCEDURE — 82306 VITAMIN D 25 HYDROXY: CPT | Performed by: NURSE PRACTITIONER

## 2023-12-21 RX ORDER — ALENDRONATE SODIUM 70 MG/1
70 TABLET ORAL
Qty: 12 TABLET | Refills: 3 | Status: SHIPPED | OUTPATIENT
Start: 2023-12-21

## 2023-12-21 RX ORDER — PRAVASTATIN SODIUM 40 MG
TABLET ORAL
Qty: 90 TABLET | Refills: 3 | Status: SHIPPED | OUTPATIENT
Start: 2023-12-21

## 2023-12-21 RX ORDER — RESPIRATORY SYNCYTIAL VIRUS VACCINE 120MCG/0.5
0.5 KIT INTRAMUSCULAR ONCE
Qty: 1 EACH | Refills: 0 | Status: CANCELLED | OUTPATIENT
Start: 2023-12-21 | End: 2023-12-21

## 2023-12-21 RX ORDER — PILOCARPINE HYDROCHLORIDE 20 MG/ML
SOLUTION/ DROPS OPHTHALMIC
COMMUNITY
Start: 2023-11-13

## 2023-12-21 ASSESSMENT — ANXIETY QUESTIONNAIRES
GAD7 TOTAL SCORE: 6
3. WORRYING TOO MUCH ABOUT DIFFERENT THINGS: SEVERAL DAYS
IF YOU CHECKED OFF ANY PROBLEMS ON THIS QUESTIONNAIRE, HOW DIFFICULT HAVE THESE PROBLEMS MADE IT FOR YOU TO DO YOUR WORK, TAKE CARE OF THINGS AT HOME, OR GET ALONG WITH OTHER PEOPLE: NOT DIFFICULT AT ALL
6. BECOMING EASILY ANNOYED OR IRRITABLE: SEVERAL DAYS
1. FEELING NERVOUS, ANXIOUS, OR ON EDGE: SEVERAL DAYS
7. FEELING AFRAID AS IF SOMETHING AWFUL MIGHT HAPPEN: SEVERAL DAYS
GAD7 TOTAL SCORE: 6
4. TROUBLE RELAXING: SEVERAL DAYS
2. NOT BEING ABLE TO STOP OR CONTROL WORRYING: SEVERAL DAYS
5. BEING SO RESTLESS THAT IT IS HARD TO SIT STILL: NOT AT ALL

## 2023-12-21 ASSESSMENT — PAIN SCALES - GENERAL: PAINLEVEL: NO PAIN (0)

## 2023-12-21 ASSESSMENT — ENCOUNTER SYMPTOMS
DIARRHEA: 0
PALPITATIONS: 0
HEMATURIA: 0
ARTHRALGIAS: 1
EYE PAIN: 0
DIZZINESS: 1
SHORTNESS OF BREATH: 0
DYSURIA: 0
CONSTIPATION: 0
CHILLS: 0
PARESTHESIAS: 0
SORE THROAT: 0
FREQUENCY: 0
BREAST MASS: 0
WEAKNESS: 1
FEVER: 0
NERVOUS/ANXIOUS: 1
JOINT SWELLING: 0
HEMATOCHEZIA: 0
NAUSEA: 0
MYALGIAS: 0
COUGH: 0
HEARTBURN: 1
ABDOMINAL PAIN: 0
HEADACHES: 0

## 2023-12-21 ASSESSMENT — ACTIVITIES OF DAILY LIVING (ADL): CURRENT_FUNCTION: NO ASSISTANCE NEEDED

## 2023-12-21 ASSESSMENT — PATIENT HEALTH QUESTIONNAIRE - PHQ9
SUM OF ALL RESPONSES TO PHQ QUESTIONS 1-9: 3
SUM OF ALL RESPONSES TO PHQ QUESTIONS 1-9: 3
10. IF YOU CHECKED OFF ANY PROBLEMS, HOW DIFFICULT HAVE THESE PROBLEMS MADE IT FOR YOU TO DO YOUR WORK, TAKE CARE OF THINGS AT HOME, OR GET ALONG WITH OTHER PEOPLE: NOT DIFFICULT AT ALL

## 2023-12-21 NOTE — PROGRESS NOTES
"SUBJECTIVE:   Chyna is a 73 year old, presenting for the following:  Physical        12/21/2023     6:47 AM   Additional Questions   Roomed by Genna PEÑA       Are you in the first 12 months of your Medicare coverage?  No    Healthy Habits:     In general, how would you rate your overall health?  Good    Frequency of exercise:  4-5 days/week    Duration of exercise:  15-30 minutes    Do you usually eat at least 4 servings of fruit and vegetables a day, include whole grains    & fiber and avoid regularly eating high fat or \"junk\" foods?  No    Taking medications regularly:  Yes    Medication side effects:  Not applicable    Ability to successfully perform activities of daily living:  No assistance needed    Home Safety:  No safety concerns identified    Hearing Impairment:  Difficulty following a conversation in a noisy restaurant or crowded room, need to ask people to speak up or repeat themselves, difficulty understanding soft or whispered speech and difficulty understanding speech on the telephone    In the past 6 months, have you been bothered by leaking of urine?  No    In general, how would you rate your overall mental or emotional health?  Good    Additional concerns today:  No      Today's PHQ-9 Score:       12/21/2023     6:47 AM   PHQ-9 SCORE   PHQ-9 Total Score MyChart 3 (Minimal depression)   PHQ-9 Total Score 3           Have you ever done Advance Care Planning? (For example, a Health Directive, POLST, or a discussion with a medical provider or your loved ones about your wishes): No, advance care planning information given to patient to review.  Patient declined advance care planning discussion at this time.       Fall risk  Fallen 2 or more times in the past year?: No  Any fall with injury in the past year?: No    Cognitive Screening   1) Repeat 3 items (Leader, Season, Table)    2) Clock draw: NORMAL  3) 3 item recall: Recalls 3 objects  Results: 3 items recalled: COGNITIVE IMPAIRMENT LESS " LIKELY    Mini-CogTM Copyright CASEY [employer]. Licensed by the author for use in [facility]; reprinted with permission (yesika@[facility]). All rights reserved.      Do you have sleep apnea, excessive snoring or daytime drowsiness? : no    Reviewed and updated as needed this visit by clinical staff                  Reviewed and updated as needed this visit by Provider                 Social History     Tobacco Use    Smoking status: Former     Types: Cigarettes     Quit date: 1/10/1983     Years since quittin.9    Smokeless tobacco: Never   Substance Use Topics    Alcohol use: Not Currently     Alcohol/week: 1.0 standard drink of alcohol     Types: 1 Glasses of wine per week             2023     6:51 AM   Alcohol Use   Prescreen: >3 drinks/day or >7 drinks/week? No     Do you have a current opioid prescription? No  Do you use any other controlled substances or medications that are not prescribed by a provider? None        Current providers sharing in care for this patient include:   Patient Care Team:  Nereida Jacobs APRN CNP as PCP - General (Family Medicine)  Nereida Jacobs APRN CNP as Assigned PCP  Tiffany Ernandez MD as Assigned Musculoskeletal Provider  Cabrera Fields MD as MD (Otolaryngology)  Alaina Ontiveros MD as MD (Otolaryngology)  Cabrera Fields MD as Assigned Surgical Provider    The following health maintenance items are reviewed in Epic and correct as of today:  Health Maintenance   Topic Date Due    DTAP/TDAP/TD IMMUNIZATION (6 - Td or Tdap) 10/22/2023    MEDICARE ANNUAL WELLNESS VISIT  2023    ANNUAL REVIEW OF HM ORDERS  2024    PHQ-9  2024    FALL RISK ASSESSMENT  2024    MAMMO SCREENING  2025    LIPID  2027    ADVANCE CARE PLANNING  2027    COLORECTAL CANCER SCREENING  2033    DEXA  2037    SPIROMETRY  Completed    HEPATITIS C SCREENING  Completed    COPD ACTION PLAN  Completed    DEPRESSION ACTION PLAN  Completed     INFLUENZA VACCINE  Completed    Pneumococcal Vaccine: 65+ Years  Completed    ZOSTER IMMUNIZATION  Completed    RSV VACCINE (Pregnancy & 60+)  Completed    COVID-19 Vaccine  Completed    IPV IMMUNIZATION  Aged Out    HPV IMMUNIZATION  Aged Out    MENINGITIS IMMUNIZATION  Aged Out    RSV MONOCLONAL ANTIBODY  Aged Out     Lab work is in process  Labs reviewed in EPIC  BP Readings from Last 3 Encounters:   23 128/70   23 113/62   10/11/23 123/65    Wt Readings from Last 3 Encounters:   23 60.8 kg (134 lb)   23 61.2 kg (135 lb)   10/11/23 61.2 kg (135 lb)                  Patient Active Problem List   Diagnosis    Allergic rhinitis    Anxiety state    Cerebral artery occlusion with cerebral infarction (H)    Degeneration of cervical intervertebral disc    Degeneration of lumbar or lumbosacral intervertebral disc    Insomnia    Postmenopausal atrophic vaginitis    Raynaud's syndrome    Osteopenia    Pulmonary nodules    TGA (transient global amnesia)    Recurrent cold sores    Peripheral vascular disease (H24)    Atopic rhinitis    Osteoarthritis of hip    Bilateral sensorineural hearing loss    Tendinitis of shoulder    COPD (chronic obstructive pulmonary disease) (H)    SVT (supraventricular tachycardia)    Age-related osteoporosis without current pathological fracture    Low platelet count (H24)     Past Surgical History:   Procedure Laterality Date    COLONOSCOPY N/A 2023    Procedure: Colonoscopy;  Surgeon: Isak Paulino MD;  Location: [facility]    HYSTERECTOMY, PAP NO LONGER INDICATED         Social History     Tobacco Use    Smoking status: Former     Types: Cigarettes     Quit date: 1/10/1983     Years since quittin.9    Smokeless tobacco: Never   Substance Use Topics    Alcohol use: Not Currently     Alcohol/week: 1.0 standard drink of alcohol     Types: 1 Glasses of wine per week     Family History   Problem Relation Age of Onset    Arthritis Mother         was told was  RA    Cancer Mother         GYN    Other - See Comments Mother         phlebitis for which hospitalized several times    Heart Disease Father     Cancer Brother         throat    Alcohol/Drug Maternal Grandfather     Heart Disease Paternal Grandfather         Heart attack    Diabetes Paternal Grandfather          Current Outpatient Medications   Medication Sig Dispense Refill    alendronate (FOSAMAX) 70 MG tablet Take 1 tablet (70 mg) by mouth every 7 days 12 tablet 3    aspirin EC 81 MG tablet Take 81 mg by mouth daily       calcium-vitamin D (CALTRATE) 600-400 MG-UNIT per tablet Take 1 tablet by mouth daily       cyclobenzaprine (FLEXERIL) 5 MG tablet Take 1 tablet (5 mg) by mouth 2 times daily as needed for muscle spasms 60 tablet 3    Inulin 2 g CHEW       mometasone (NASONEX) 50 MCG/ACT spray Spray 2 sprays in nostril daily       Multiple Vitamin (MULTI-VITAMINS) TABS Take 1 tablet by mouth daily      multivitamin (OCUVITE) TABS tablet Take 1 tablet by mouth daily      pilocarpine (PILOCAR) 2 % ophthalmic solution       pravastatin (PRAVACHOL) 40 MG tablet TAKE ONE TABLET BY MOUTH EVERY NIGHT AT BEDTIME 90 tablet 3    RESTASIS 0.05 % ophthalmic emulsion       Tdap, tetanus-diptheria-acell pertussis, (BOOSTRIX) 5-2.5-18.5 LF-MCG/0.5 ANSHU injection Inject 0.5 mLs into the muscle once for 1 dose 0.5 mL 0    valACYclovir (VALTREX) 1000 mg tablet TAKE TWO TABLETS BY MOUTH TWICE A DAY 4 tablet 11     Allergies   Allergen Reactions    Tramadol Anaphylaxis    Benadryl [Diphenhydramine Hcl] Other (See Comments)     Made her goofy    Caffeine Other (See Comments)     Jittery      Codeine Nausea and Vomiting    Darvocet [Propoxyphene N-Apap]      Other reaction(s): GI Upset    Diphenhydramine Other (See Comments)    Lovastatin Other (See Comments) and Muscle Pain (Myalgia)     Other reaction(s): Myalgia  pain    Percocet [Oxycodone-Acetaminophen] Other (See Comments)     Other reaction(s): Dizziness  Sick or dizzy?     Vicodin [Hydrocodone-Acetaminophen] Other (See Comments) and Nausea     Sick or dizzy?     Recent Labs   Lab Test 08/10/23  0751 07/10/23  0820 04/27/23  0815 01/16/23  0750 12/19/22  0732 12/16/21  0843 12/16/21  0843 04/22/21  0912 12/10/20  0846 07/24/20  1716   LDL  --   --   --   --  63  --  63  --  50  --    HDL  --   --   --   --  103  --  105  --  117  --    TRIG  --   --   --   --  63  --  89  --  85  --    ALT  --   --   --  19 18  --  23  --   --  25   CR 0.77  --  0.75 0.70 0.68   < > 0.72 0.69  --  0.73   GFRESTIMATED 82  --  84 >90 >90   < > 85 88  --  84   GFRESTBLACK  --   --   --   --   --   --   --  >90  --  >90   POTASSIUM 4.2  --  4.7 4.3 4.2   < > 3.8 4.0  --  3.7   TSH  --  2.69  --   --   --   --   --  1.64  --  2.32    < > = values in this interval not displayed.          Mammogram Screening: Mammogram Screening: Recommended mammography every 1-2 years with patient discussion and risk factor consideration        Review of Systems   Constitutional:  Negative for chills and fever.   HENT:  Positive for hearing loss. Negative for congestion, ear pain and sore throat.    Eyes:  Positive for visual disturbance. Negative for pain.   Respiratory:  Negative for cough and shortness of breath.    Cardiovascular:  Negative for chest pain, palpitations and peripheral edema.   Gastrointestinal:  Positive for heartburn. Negative for abdominal pain, constipation, diarrhea, hematochezia and nausea.   Breasts:  Negative for tenderness, breast mass and discharge.   Genitourinary:  Negative for dysuria, frequency, genital sores, hematuria, pelvic pain, urgency, vaginal bleeding and vaginal discharge.   Musculoskeletal:  Positive for arthralgias. Negative for joint swelling and myalgias.   Skin:  Negative for rash.   Neurological:  Positive for dizziness and weakness. Negative for headaches and paresthesias.   Psychiatric/Behavioral:  Negative for mood changes. The patient is nervous/anxious.        OBJECTIVE:  "  /70 (BP Location: Right arm, Cuff Size: Adult Regular)   Pulse 69   Temp 97.1  F (36.2  C) (Tympanic)   Resp 16   Ht 1.657 m (5' 5.25\")   Wt 60.8 kg (134 lb)   SpO2 100%   BMI 22.13 kg/m   Estimated body mass index is 22.29 kg/m  as calculated from the following:    Height as of 11/3/23: 1.657 m (5' 5.25\").    Weight as of 11/3/23: 61.2 kg (135 lb).  Physical Exam  GENERAL: healthy, alert and no distress  EYES: Eyes grossly normal to inspection, PERRL and conjunctivae and sclerae normal  HENT: ear canals and TM's normal, nose and mouth without ulcers or lesions  NECK: no adenopathy, no asymmetry, masses, or scars and thyroid normal to palpation  RESP: lungs clear to auscultation - no rales, rhonchi or wheezes  BREAST: normal without masses, tenderness or nipple discharge and no palpable axillary masses or adenopathy  CV: regular rate and rhythm, normal S1 S2, no S3 or S4, no murmur, click or rub, no peripheral edema and peripheral pulses strong  ABDOMEN: soft, nontender, no hepatosplenomegaly, no masses and bowel sounds normal  MS: no gross musculoskeletal defects noted, no edema  SKIN: no suspicious lesions or rashes  NEURO: Normal strength and tone, mentation intact and speech normal  PSYCH: mentation appears normal, affect normal/bright    Diagnostic Test Results:  Labs reviewed in Epic  No results found for any visits on 12/21/23.    ASSESSMENT / PLAN:   (Z00.00) Encounter for Medicare annual wellness exam  (primary encounter diagnosis)  Comment:   Plan:     (M81.0) Age related osteoporosis, unspecified pathological fracture presence  Comment: Stable  Plan: alendronate (FOSAMAX) 70 MG tablet,         OFFICE/OUTPT VISIT,EST,LEVL IV      (I63.50) Cerebral artery occlusion with cerebral infarction (H)  Comment: Stable   Plan: pravastatin (PRAVACHOL) 40 MG tablet, Lipid         panel reflex to direct LDL Fasting,         OFFICE/OUTPT VISIT,EST,LEVL IV      (R42) Dizziness  Comment: Patient has noticed " an increase in her dizziness is intermittent does not feel as if the room is spinning just feels dizzy with an unsteady gait.  Recommend obtaining labs and MRI we will have her work with physical therapy for gait training and follow-up with neurology  Plan: Vitamin D Deficiency, Comprehensive metabolic         panel (BMP + Alb, Alk Phos, ALT, AST, Total.         Bili, TP), MR Brain w/o Contrast, Adult         Neurology  Referral, OFFICE/OUTPT         VISIT,EST,LEVL IV      (R26.81) Unsteady gait  Comment: Recommend physical therapy  Plan: Physical Therapy Referral, OFFICE/OUTPT         VISIT,EST,LEVL IV      (M89.9) Disorder of bone, unspecified  Comment: will obtain labs   Plan: Vitamin D Deficiency, OFFICE/OUTPT         VISIT,EST,LEVL IV      (R91.8) Pulmonary nodules  Comment: Less than 6 mm pulmonary nodules noted on calcium CT scan greater than 1 year ago recommend follow-up CT scan in 1 year ordered today  Plan: CT Chest w/o Contrast, OFFICE/OUTPT         VISIT,EST,LEVL IV      (Z23) Need for Tdap vaccination  Comment: will obtain at pharmacy   Plan: Tdap, tetanus-diptheria-acell pertussis,         (BOOSTRIX) 5-2.5-18.5 LF-MCG/0.5 ANSHU injection      Patient has been advised of split billing requirements and indicates understanding: Yes      COUNSELING:  Reviewed preventive health counseling, as reflected in patient instructions       Regular exercise       Healthy diet/nutrition       Vision screening       Hearing screening       Dental care       Bladder control       Fall risk prevention       Aspirin prophylaxis        Folic Acid       Osteoporosis prevention/bone health       Colon cancer screening       (Laurie)menopause management       Advanced Planning         She reports that she quit smoking about 40 years ago. Her smoking use included cigarettes. She has never used smokeless tobacco.      Appropriate preventive services were discussed with this patient, including applicable screening as  appropriate for fall prevention, nutrition, physical activity, Tobacco-use cessation, weight loss and cognition.  Checklist reviewing preventive services available has been given to the patient.    Reviewed patients plan of care and provided an AVS. The Basic Care Plan (routine screening as documented in Health Maintenance) for Chyna meets the Care Plan requirement. This Care Plan has been established and reviewed with the Patient.          [facility]    Identified Health Risks:  I have reviewed Opioid Use Disorder and Substance Use Disorder risk factors and made any needed referrals. The patient was counseled and encouraged to consider modifying their diet and eating habits. She was provided with information on recommended healthy diet options.  The patient was provided with written information regarding signs of hearing loss.

## 2023-12-21 NOTE — PATIENT INSTRUCTIONS
Patient Education   Personalized Prevention Plan  You are due for the preventive services outlined below.  Your care team is available to assist you in scheduling these services.  If you have already completed any of these items, please share that information with your care team to update in your medical record.  Health Maintenance Due   Topic Date Due     Diptheria Tetanus Pertussis (DTAP/TDAP/TD) Vaccine (6 - Td or Tdap) 10/22/2023     Annual Wellness Visit  12/19/2023     Learning About Dietary Guidelines  What are the Dietary Guidelines for Americans?     Dietary Guidelines for Americans provide tips for eating well and staying healthy. This helps reduce the risk for long-term (chronic) diseases.  These guidelines recommend that you:  Eat and drink the right amount for you. The U.[place] government's food guide is called MyPlate. It can help you make your own well-balanced eating plan.  Try to balance your eating with your activity. This helps you stay at a healthy weight.  Drink alcohol in moderation, if at all.  Limit foods high in salt, saturated fat, trans fat, and added sugar.  These guidelines are from the U.S. Department of Agriculture and the U.S. Department of Health and Human Services. They are updated every 5 years.  What is MyPlate?  MyPlate is the U.[place] government's food guide. It can help you make your own well-balanced eating plan. A balanced eating plan means that you eat enough, but not too much, and that your food gives you the nutrients you need to stay healthy.  MyPlate focuses on eating plenty of whole grains, fruits, and vegetables, and on limiting fat and sugar. It is available online at www.ChooseMyPlate.gov.  How can you get started?  If you're trying to eat healthier, you can slowly change your eating habits over time. You don't have to make big changes all at once. Start by adding one or two healthy foods to your meals each day.  Grains  Choose whole-grain breads and cereals and whole-wheat  "pasta and whole-grain crackers.  Vegetables  Eat a variety of vegetables every day. They have lots of nutrients and are part of a heart-healthy diet.  Fruits  Eat a variety of fruits every day. Fruits contain lots of nutrients. Choose fresh fruit instead of fruit juice.  Protein foods  Choose fish and lean poultry more often. Eat red meat and fried meats less often. Dried beans, tofu, and nuts are also good sources of protein.  Dairy  Choose low-fat or fat-free products from this food group. If you have problems digesting milk, try eating cheese or yogurt instead.  Fats and oils  Limit fats and oils if you're trying to cut calories. Choose healthy fats when you cook. These include canola oil and olive oil.  Where can you learn more?  Go to https://www.my4oneone.net/patiented  Enter D676 in the search box to learn more about \"Learning About Dietary Guidelines.\"  Current as of: February 28, 2023               Content Version: 13.8    1667-0103 [employer].   Care instructions adapted under license by your healthcare professional. If you have questions about a medical condition or this instruction, always ask your healthcare professional. [employer] disclaims any warranty or liability for your use of this information.      Hearing Loss: Care Instructions  Overview     Hearing loss is a sudden or slow decrease in how well you hear. It can range from slight to profound. Permanent hearing loss can occur with aging. It also can happen when you are exposed long-term to loud noise. Examples include listening to loud music, riding motorcycles, or being around other loud machines.  Hearing loss can affect your work and home life. It can make you feel lonely or depressed. You may feel that you have lost your independence. But hearing aids and other devices can help you hear better and feel connected to others.  Follow-up care is a key part of your treatment and safety. Be sure to make and go to all " appointments, and call your doctor if you are having problems. It's also a good idea to know your test results and keep a list of the medicines you take.  How can you care for yourself at home?  Avoid loud noises whenever possible. This helps keep your hearing from getting worse.  Always wear hearing protection around loud noises.  Wear a hearing aid as directed.  A professional can help you pick a hearing aid that will work best for you.  You can also get hearing aids over the counter for mild to moderate hearing loss.  Have hearing tests as your doctor suggests. They can show whether your hearing has changed. Your hearing aid may need to be adjusted.  Use other devices as needed. These may include:  Telephone amplifiers and hearing aids that can connect to a television, stereo, radio, or microphone.  Devices that use lights or vibrations. These alert you to the doorbell, a ringing telephone, or a baby monitor.  Television closed-captioning. This shows the words at the bottom of the screen. Most new TVs can do this.  TTY (text telephone). This lets you type messages back and forth on the telephone instead of talking or listening. These devices are also called TDD. When messages are typed on the keyboard, they are sent over the phone line to a receiving TTY. The message is shown on a monitor.  Use text messaging, social media, and email if it is hard for you to communicate by telephone.  Try to learn a listening technique called speechreading. It is not lipreading. You pay attention to people's gestures, expressions, posture, and tone of voice. These clues can help you understand what a person is saying. Face the person you are talking to, and have them face you. Make sure the lighting is good. You need to see the other person's face clearly.  Think about counseling if you need help to adjust to your hearing loss.  When should you call for help?  Watch closely for changes in your health, and be sure to contact your  "doctor if:    You think your hearing is getting worse.     You have new symptoms, such as dizziness or nausea.   Where can you learn more?  Go to https://www.[employer].net/patiented  Enter R798 in the search box to learn more about \"Hearing Loss: Care Instructions.\"  Current as of: February 28, 2023               Content Version: 13.8    3150-2780 [employer].   Care instructions adapted under license by your healthcare professional. If you have questions about a medical condition or this instruction, always ask your healthcare professional. [employer] disclaims any warranty or liability for your use of this information.         "

## 2023-12-21 NOTE — LETTER
December 22, 2023      Chyna Moncada  [address] [facility] [id number]        Dear ,    We are writing to inform you of your test results.    Comprehensive panel was within normal limits.  Cholesterol numbers were within normal limits.     Resulted Orders   Lipid panel reflex to direct LDL Fasting   Result Value Ref Range    Cholesterol 182 <200 mg/dL    Triglycerides 84 <150 mg/dL    Direct Measure HDL 94 >=50 mg/dL    LDL Cholesterol Calculated 71 <=100 mg/dL    Non HDL Cholesterol 88 <130 mg/dL    Patient Fasting > 8hrs? Yes     Narrative    Cholesterol  Desirable:  <200 mg/dL    Triglycerides  Normal:  Less than 150 mg/dL  Borderline High:  150-199 mg/dL  High:  200-499 mg/dL  Very High:  Greater than or equal to 500 mg/dL    Direct Measure HDL  Female:  Greater than or equal to 50 mg/dL   Male:  Greater than or equal to 40 mg/dL    LDL Cholesterol  Desirable:  <100mg/dL  Above Desirable:  100-129 mg/dL   Borderline High:  130-159 mg/dL   High:  160-189 mg/dL   Very High:  >= 190 mg/dL    Non HDL Cholesterol  Desirable:  130 mg/dL  Above Desirable:  130-159 mg/dL  Borderline High:  160-189 mg/dL  High:  190-219 mg/dL  Very High:  Greater than or equal to 220 mg/dL   Comprehensive metabolic panel (BMP + Alb, Alk Phos, ALT, AST, Total. Bili, TP)   Result Value Ref Range    Sodium 143 135 - 145 mmol/L      Comment:      Reference intervals for this test were updated on 09/26/2023 to more accurately reflect our healthy population. There may be differences in the flagging of prior results with similar values performed with this method. Interpretation of those prior results can be made in the context of the updated reference intervals.     Potassium 4.2 3.4 - 5.3 mmol/L    Carbon Dioxide (CO2) 26 22 - 29 mmol/L    Anion Gap 11 7 - 15 mmol/L    Urea Nitrogen 15.3 8.0 - 23.0 mg/dL    Creatinine 0.66 0.51 - 0.95 mg/dL    GFR Estimate >90 >60 mL/min/1.73m2    Calcium 9.3 8.8 - 10.2 mg/dL     Chloride 106 98 - 107 mmol/L    Glucose 101 (H) 70 - 99 mg/dL    Alkaline Phosphatase 83 40 - 150 U/L      Comment:      Reference intervals for this test were updated on 11/14/2023 to more accurately reflect our healthy population. There may be differences in the flagging of prior results with similar values performed with this method. Interpretation of those prior results can be made in the context of the updated reference intervals.    AST 23 0 - 45 U/L      Comment:      Reference intervals for this test were updated on 6/12/2023 to more accurately reflect our healthy population. There may be differences in the flagging of prior results with similar values performed with this method. Interpretation of those prior results can be made in the context of the updated reference intervals.    ALT 18 0 - 50 U/L      Comment:      Reference intervals for this test were updated on 6/12/2023 to more accurately reflect our healthy population. There may be differences in the flagging of prior results with similar values performed with this method. Interpretation of those prior results can be made in the context of the updated reference intervals.      Protein Total 6.9 6.4 - 8.3 g/dL    Albumin 4.4 3.5 - 5.2 g/dL    Bilirubin Total 0.6 <=1.2 mg/dL       If you have any questions or concerns, please call the clinic at the number listed above.       Sincerely,      GEORGE Al CNP

## 2023-12-21 NOTE — LETTER
December 26, 2023      Chyna Moncada  [address] [facility] [address]        Dear ,    We are writing to inform you of your test results.    Comprehensive panel was within normal limits with normal renal functions.  Cholesterol numbers are all within normal limits continue with low-cholesterol diet and exercise.     Vitamin D remains mildly elevated I would still continue to hold vitamin D supplements we will recheck at your next visit.    Resulted Orders   Vitamin D Deficiency   Result Value Ref Range    Vitamin D, Total (25-Hydroxy) 72 (H) 20 - 50 ng/mL      Comment:      indicates supplementation, with increased risk of hypercalciuria    Narrative    Season, race, dietary intake, and treatment affect the concentration of 25-hydroxy-Vitamin D. Values may decrease during winter months and increase during summer months.    Vitamin D determination is routinely performed by an immunoassay specific for 25 hydroxyvitamin D3.  If an individual is on vitamin D2(ergocalciferol) supplementation, please specify 25 OH vitamin D2 and D3 level determination by LCMSMS test VITD23.     Lipid panel reflex to direct LDL Fasting   Result Value Ref Range    Cholesterol 182 <200 mg/dL    Triglycerides 84 <150 mg/dL    Direct Measure HDL 94 >=50 mg/dL    LDL Cholesterol Calculated 71 <=100 mg/dL    Non HDL Cholesterol 88 <130 mg/dL    Patient Fasting > 8hrs? Yes     Narrative    Cholesterol  Desirable:  <200 mg/dL    Triglycerides  Normal:  Less than 150 mg/dL  Borderline High:  150-199 mg/dL  High:  200-499 mg/dL  Very High:  Greater than or equal to 500 mg/dL    Direct Measure HDL  Female:  Greater than or equal to 50 mg/dL   Male:  Greater than or equal to 40 mg/dL    LDL Cholesterol  Desirable:  <100mg/dL  Above Desirable:  100-129 mg/dL   Borderline High:  130-159 mg/dL   High:  160-189 mg/dL   Very High:  >= 190 mg/dL    Non HDL Cholesterol  Desirable:  130 mg/dL  Above Desirable:  130-159  mg/dL  Borderline High:  160-189 mg/dL  High:  190-219 mg/dL  Very High:  Greater than or equal to 220 mg/dL   Comprehensive metabolic panel (BMP + Alb, Alk Phos, ALT, AST, Total. Bili, TP)   Result Value Ref Range    Sodium 143 135 - 145 mmol/L      Comment:      Reference intervals for this test were updated on 09/26/2023 to more accurately reflect our healthy population. There may be differences in the flagging of prior results with similar values performed with this method. Interpretation of those prior results can be made in the context of the updated reference intervals.     Potassium 4.2 3.4 - 5.3 mmol/L    Carbon Dioxide (CO2) 26 22 - 29 mmol/L    Anion Gap 11 7 - 15 mmol/L    Urea Nitrogen 15.3 8.0 - 23.0 mg/dL    Creatinine 0.66 0.51 - 0.95 mg/dL    GFR Estimate >90 >60 mL/min/1.73m2    Calcium 9.3 8.8 - 10.2 mg/dL    Chloride 106 98 - 107 mmol/L    Glucose 101 (H) 70 - 99 mg/dL    Alkaline Phosphatase 83 40 - 150 U/L      Comment:      Reference intervals for this test were updated on 11/14/2023 to more accurately reflect our healthy population. There may be differences in the flagging of prior results with similar values performed with this method. Interpretation of those prior results can be made in the context of the updated reference intervals.    AST 23 0 - 45 U/L      Comment:      Reference intervals for this test were updated on 6/12/2023 to more accurately reflect our healthy population. There may be differences in the flagging of prior results with similar values performed with this method. Interpretation of those prior results can be made in the context of the updated reference intervals.    ALT 18 0 - 50 U/L      Comment:      Reference intervals for this test were updated on 6/12/2023 to more accurately reflect our healthy population. There may be differences in the flagging of prior results with similar values performed with this method. Interpretation of those prior results can be made in  the context of the updated reference intervals.      Protein Total 6.9 6.4 - 8.3 g/dL    Albumin 4.4 3.5 - 5.2 g/dL    Bilirubin Total 0.6 <=1.2 mg/dL       If you have any questions or concerns, please call the clinic at the number listed above.       Sincerely,      GEORGE Al CNP

## 2023-12-29 ENCOUNTER — TELEPHONE (OUTPATIENT)
Dept: FAMILY MEDICINE | Facility: CLINIC | Age: 73
End: 2023-12-29
Payer: COMMERCIAL

## 2023-12-29 NOTE — TELEPHONE ENCOUNTER
Notify patient she should continue with her current calcium supplements and will check vitamin D level lab only appointment in 6 weeks.    Nereida Jacobs CNP

## 2024-01-06 ENCOUNTER — HOSPITAL ENCOUNTER (OUTPATIENT)
Dept: MRI IMAGING | Facility: CLINIC | Age: 74
Discharge: HOME | End: 2024-01-06
Attending: NURSE PRACTITIONER
Payer: COMMERCIAL

## 2024-01-06 ENCOUNTER — HOSPITAL ENCOUNTER (OUTPATIENT)
Dept: CT IMAGING | Facility: CLINIC | Age: 74
Discharge: HOME | End: 2024-01-06
Attending: NURSE PRACTITIONER
Payer: COMMERCIAL

## 2024-01-06 DIAGNOSIS — R91.8 PULMONARY NODULES: ICD-10-CM

## 2024-01-06 DIAGNOSIS — R42 DIZZINESS: ICD-10-CM

## 2024-01-06 PROCEDURE — 71250 CT THORAX DX C-: CPT

## 2024-01-06 PROCEDURE — 70551 MRI BRAIN STEM W/O DYE: CPT

## 2024-01-08 ENCOUNTER — TELEPHONE (OUTPATIENT)
Dept: OTOLARYNGOLOGY | Facility: CLINIC | Age: 74
End: 2024-01-08
Payer: COMMERCIAL

## 2024-01-08 NOTE — TELEPHONE ENCOUNTER
Let message to call [facility] if wants a copy of her last hearing evaluation and notes from Dr. Fields.    Day MELENDEZ, #[id number]

## 2024-01-08 NOTE — TELEPHONE ENCOUNTER
[facility] Call Center    Phone Message    May a detailed message be left on voicemail: yes     Reason for Call: Pt said she needs to go somewhere else to get her hearing aids as we are no longer in network, she used to see Dr. Fields and is wondering if someone from his care team can send a referral for her, please call her to discuss further, thanks    Action Taken: Other: ENT    Travel Screening: Not Applicable

## 2024-01-18 ENCOUNTER — THERAPY VISIT (OUTPATIENT)
Dept: PHYSICAL THERAPY | Facility: CLINIC | Age: 74
End: 2024-01-18
Attending: NURSE PRACTITIONER
Payer: COMMERCIAL

## 2024-01-18 DIAGNOSIS — R26.81 UNSTEADY GAIT: ICD-10-CM

## 2024-01-18 PROCEDURE — 97112 NEUROMUSCULAR REEDUCATION: CPT | Mod: GP | Performed by: PHYSICAL THERAPIST

## 2024-01-18 PROCEDURE — 97161 PT EVAL LOW COMPLEX 20 MIN: CPT | Mod: GP | Performed by: PHYSICAL THERAPIST

## 2024-01-18 NOTE — PROGRESS NOTES
PHYSICAL THERAPY EVALUATION  Type of Visit: Evaluation    See electronic medical record for Abuse and Falls Screening details.    Subjective      Presenting condition or subjective complaint: Balance problems - Pt relates she will suddenly get woozy when standing up. Pt relates her BP is good.  Pt relates it doesn't happen at night. Pt relates feels unsteady with stairs. Pt does have hearing loss and her R eustachian tube is stuck open. Also has tininitis  Date of onset: 23    Relevant medical history: Arthritis; Dizziness; Hearing problems; Osteoarthritis; Osteoporosis   Dates & types of surgery: on file    Prior diagnostic imaging/testing results: MRI     Prior therapy history for the same diagnosis, illness or injury: No        Living Environment  Social support: With a significant other or spouse   Type of home: House   Stairs to enter the home: Yes   Is there a railing: Yes   Ramp: No   Stairs inside the home: Yes   Is there a railing: Yes   Help at home: None  Equipment owned: Straight Cane; Four-point cane; Walker; Walker with wheels; Crutches     Employment: No    Hobbies/Interests: walking, reading    Patient goals for therapy: NA    Pain assessment: soreness noted but does not change balance     Objective    Balance Confidence: 72.5%    Blood pressure:   Supine:127/78 HR HR 67 to Standing:  /79 HR 80 - has symptoms today  Sittin/72 HR 80  to Standing:  /79 HR - no symptoms today        OBSERVATION:  Sensation: limited circulation on L foot but good feeling B   Head shaking:thrust: no symptoms or nystagmus noted     RANGE OF MOTION: LE ROM WNL  STRENGTH:       Lower Extremity:   Pain: - none + mild ++ moderate +++ severe  Strength Scale: 0-5/5 Left Right   Hip Flexion 5 5   Hip Extension     Hip Abduction     Hip Adduction     Knee Flexion 5 5   Knee Extension 5 5   Ankle Dorsiflexion  5 5   Ankle Plantarflexion  5 5         GAIT:   Level of Kimball: WNL  Assistive Device(s):  None  Gait Deviations: WFL      Stairs: difficulty going down stairs      SPECIAL TESTS   PRE POST   Functional Gait Assessment (FGA) TOTAL SCORE: (MAXIMUM SCORE 30): 24 24    10 Meter Walk Test (Comfortable)  WNL    10 Meter Walk Test (Fast)      6 Minute Walk Test (6MWT)            Drew Balance Scale (BBS)      5 Times Sit-to-Stand (5TSTS)      30 Sec Sit to Stand (reps/height) 12 times      Dynamic Gait Index (DGI)      Timed Up and Go (TUG) - sec Regular: 6.38  Manual:   Cognitive:   Regular:   Manual:   Cognitive:     Single Leg Stance Right (sec) 19    Single Leg Stance Left (sec) 8          Romberg  (sec) 18 secs eyse open, 2 secs eyse closed     Sharpened Romberg (sec)     Other:  Standing on foam eyes/open closed: WNL           Assessment & Plan   CLINICAL IMPRESSIONS   Medical Diagnosis: Unsteady gait    Treatment Diagnosis: decreased balance   Impression/Assessment: Patient is a 73 year old female with  complaints of impaired balance with standing up most consistent with orthostatic hypotension however pt did not have symptoms going from sitting to standing today.  The following significant findings have been identified: Impaired balance. These impairments interfere with their ability to perform household mobility and community mobility as compared to previous level of function. However pt is doing well thus set up on HEP to continue at home. Will hold chart open 4 weeks and pt is to return if symptoms do no improve.     Clinical Decision Making (Complexity):   Clinical Presentation: Stable/Uncomplicated  Clinical Presentation Rationale: based on medical and personal factors listed in PT evaluation  Clinical Decision Making (Complexity): Low complexity    PLAN OF CARE  Treatment Interventions:  Modalities: if needed  Interventions: Gait Training, Manual Therapy, Neuromuscular Re-education, Therapeutic Activity, Therapeutic Exercise, Self-Care/Home Management    Long Term Goals     PT Goal 1  Goal  Identifier: Bending over  Goal Description: Pt will be able to bend over to  object w/o LOB  Target Date: 02/01/24  PT Goal 2  Goal Identifier: standing up  Goal Description: Pt will be able stand up w/o LOB to return to PLOF  Target Date: 02/01/24  PT Goal 3  Goal Identifier: HEP  Goal Description: Pt will be compliant and competent in HEP to allow them to achieve maximal strength and reduce pain mobility  Target Date: 02/15/24      Frequency of Treatment: hold chart 4 weeks  Duration of Treatment: 4 weeks    Education Assessment:   Learner/Method: Patient    Risks and benefits of evaluation/treatment have been explained.   Patient/Family/caregiver agrees with Plan of Care.     Evaluation Time:     PT Eval, Low Complexity Minutes (78871): 30    Signing Clinician: Azul Ortega PT        [facility]                                                                                   OUTPATIENT PHYSICAL THERAPY      PLAN OF TREATMENT FOR OUTPATIENT REHABILITATION   Patient's Last Name, First Name, LUCINAJanelleLINDAJanelle  NatanaeldoloresJosephChyna  C YOB: 1950   Provider's Name   [facility]   Medical Record No.  [id number]     Onset Date: 01/01/23  Start of Care Date: 01/18/24     Medical Diagnosis:  Unsteady gait      PT Treatment Diagnosis:  decreased balance Plan of Treatment  Frequency/Duration: hold chart 4 weeks/ 4 weeks    Certification date from 01/18/24 to 02/15/24         See note for plan of treatment details and functional goals     Azul Ortega, PT                         I CERTIFY THE NEED FOR THESE SERVICES FURNISHED UNDER        THIS PLAN OF TREATMENT AND WHILE UNDER MY CARE     (Physician attestation of this document indicates review and certification of the therapy plan).              Referring Provider:  Nereida Jacobs    Initial Assessment  See Epic Evaluation- Start of Care Date: 01/18/24                    '

## 2024-02-01 ENCOUNTER — LAB (OUTPATIENT)
Dept: LAB | Facility: CLINIC | Age: 74
End: 2024-02-01
Payer: COMMERCIAL

## 2024-02-01 DIAGNOSIS — E55.9 VITAMIN D DEFICIENCY: ICD-10-CM

## 2024-02-01 LAB — VIT D+METAB SERPL-MCNC: 64 NG/ML (ref 20–50)

## 2024-02-01 PROCEDURE — 82306 VITAMIN D 25 HYDROXY: CPT

## 2024-02-01 PROCEDURE — 36415 COLL VENOUS BLD VENIPUNCTURE: CPT

## 2024-02-09 ENCOUNTER — HOSPITAL ENCOUNTER (OUTPATIENT)
Dept: MAMMOGRAPHY | Facility: CLINIC | Age: 74
Discharge: HOME | End: 2024-02-09
Attending: NURSE PRACTITIONER | Admitting: NURSE PRACTITIONER
Payer: COMMERCIAL

## 2024-02-09 DIAGNOSIS — Z12.31 VISIT FOR SCREENING MAMMOGRAM: ICD-10-CM

## 2024-02-09 PROCEDURE — 77063 BREAST TOMOSYNTHESIS BI: CPT

## 2024-02-20 ENCOUNTER — TELEPHONE (OUTPATIENT)
Dept: OTOLARYNGOLOGY | Facility: CLINIC | Age: 74
End: 2024-02-20
Payer: COMMERCIAL

## 2024-02-20 NOTE — TELEPHONE ENCOUNTER
[facility] Call Center    Phone Message    May a detailed message be left on voicemail: yes     Reason for Call: Other: Patient returning Mikaela's call. When writer informed patient of message below patient got extremely upset and would like to speak to Mikaela and the care team. Patient states that we are not in network and she already has an apt scheduled with a clinic that is in network for audiogram.    Please call patient back at [phone]. Thank you.    Action Taken: Message routed to:  [facility] ENT    Travel Screening: Not Applicable

## 2024-02-20 NOTE — TELEPHONE ENCOUNTER
This writer returned call to patient to further discuss hearing test that she is having done at the end of the month. Patient is scheduled to see Dr. Ontiveros in May and patient will need to have a hearing test performed here to ensure that we have everything that is needed prior to her appointment with Dr. Ontiveros. Patient was LVM and asked to call back to further discuss. This writer provided the call center number to call back.    LORIE QUEEN LPN on 2/20/2024 at 1:20 PM

## 2024-02-20 NOTE — TELEPHONE ENCOUNTER
[facility] Call Center    Phone Message    May a detailed message be left on voicemail: yes     Reason for Call: Per pt she is having a hearing test at the end of the month and did not want another one. Thank you    Action Taken: Message routed to:  [facility] ENT    Travel Screening: Not Applicable

## 2024-02-21 NOTE — TELEPHONE ENCOUNTER
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]  Asymmetrical sensorineural hearing loss [H90.3], ref'd by Cabrera Fields MD, rec's in [facility], pt stated she is having a hearing test at the end of the month, pt made appt, [facility] location     RECORDS REQUESTED FROM:         Clinic name Comments Records Status Imaging Status   [facility] ENT 8/10/23, 11/29/23 note- Cabrera Fields MD   8/10/23 notes- Tomer Mcnally AuD      Audiogram: 8/10/23, [facility]/31/22 [facility]     [facility] 3/28/23 audiogram Scanned in [facility] 1/6/24 MR brain  MRA/ MR brain 3/30/2014  CT head 3/30/2014 Epic Pacs                  pt stated she is having a hearing test at the end of the month per TE 2/20/24

## 2024-02-22 NOTE — TELEPHONE ENCOUNTER
Spoke with pt, her insurance informed her [facility] is not in network so she will be seeing [facility] in [place] on Wednesday for an audiogram and hearing aid consult. Writer suggested she reach out to her insurance to confirm Dr. Ontiveros is in network for her. If not, look for suggestions from them as to who is in network. Writer will also have Dr. Ontiveros review her chart to see if this is an appropriate referral to her or if she should see someone else within ENT. Pt was very appreciative of the phone conversation and understands we will be in contact next week.

## 2024-02-27 ENCOUNTER — TELEPHONE (OUTPATIENT)
Dept: OTOLARYNGOLOGY | Facility: CLINIC | Age: 74
End: 2024-02-27
Payer: COMMERCIAL

## 2024-02-27 NOTE — TELEPHONE ENCOUNTER
[facility] Call Center    Phone Message    May a detailed message be left on voicemail: yes     Reason for Call: Other: Pt states appt is for Eustacian tube and not Sinus.  Please contact pt as her appt was cancelled stating she need a Rhinologist.  [facility] [place] Thanks      Action Taken: Other: ENT    Travel Screening: Not Applicable

## 2024-02-28 NOTE — TELEPHONE ENCOUNTER
"FUTURE VISIT INFORMATION      FUTURE VISIT INFORMATION:  Date: 3/11/24  Time: 1PM  Location: [facility]  REFERRAL INFORMATION:  Referring provider:  Cabrera Fields MD  Referring providers clinic:  [place] [facility]  Reason for visit/diagnosis  Patulous Eustacian tube - Referred by Cabrera Fields MD in [place] [facility]. ok per Fanny     RECORDS REQUESTED FROM:       Clinic name Comments Records Status Imaging Status   [place] [facility]   8/10/23, 11/29/23- [facility] Cabrera Fields MD [facility] imaging  1/6/24 MR brain  MRA/ MR brain 3/30/2014  CT head 3/30/2014 Epic  PACS            \"Please notify/message CSS if patient completed outside imaging prior to scheduled appointment and/or any outside records that might have been missed at pre visit -Thank you\"  "

## 2024-02-28 NOTE — TELEPHONE ENCOUNTER
[facility] Call Center    Phone Message    May a detailed message be left on voicemail: yes     Reason for Call: Other: Pt calling back to check status. Writer unable to schedule with Dr Culp, per protocol. Please call to schedule appropriately. Thanks.     Action Taken: Other: ENT    Travel Screening: Not Applicable

## 2024-03-11 ENCOUNTER — PRE VISIT (OUTPATIENT)
Dept: OTOLARYNGOLOGY | Facility: CLINIC | Age: 74
End: 2024-03-11

## 2024-03-11 ENCOUNTER — OFFICE VISIT (OUTPATIENT)
Dept: OTOLARYNGOLOGY | Facility: CLINIC | Age: 74
End: 2024-03-11
Payer: COMMERCIAL

## 2024-03-11 VITALS
BODY MASS INDEX: 22.59 KG/M2 | WEIGHT: 135.6 LBS | HEIGHT: 65 IN | DIASTOLIC BLOOD PRESSURE: 79 MMHG | OXYGEN SATURATION: 99 % | HEART RATE: 80 BPM | SYSTOLIC BLOOD PRESSURE: 126 MMHG

## 2024-03-11 DIAGNOSIS — H69.01 PATULOUS EUSTACHIAN TUBE, RIGHT: ICD-10-CM

## 2024-03-11 DIAGNOSIS — H90.3 ASYMMETRICAL SENSORINEURAL HEARING LOSS: ICD-10-CM

## 2024-03-11 DIAGNOSIS — H90.3 SENSORINEURAL HEARING LOSS, BILATERAL: ICD-10-CM

## 2024-03-11 PROCEDURE — 99212 OFFICE O/P EST SF 10 MIN: CPT | Mod: 25 | Performed by: OTOLARYNGOLOGY

## 2024-03-11 PROCEDURE — 92511 NASOPHARYNGOSCOPY: CPT | Performed by: OTOLARYNGOLOGY

## 2024-03-11 RX ORDER — AMOXICILLIN 500 MG/1
CAPSULE ORAL
COMMUNITY
Start: 2024-03-04

## 2024-03-11 ASSESSMENT — PAIN SCALES - GENERAL: PAINLEVEL: NO PAIN (0)

## 2024-03-11 NOTE — PATIENT INSTRUCTIONS
You were seen in the ENT Clinic today by Dr. Culp. If you have any questions or concerns after your appointment, please contact us (see below)       2.   The following recommendations have been made based upon your appointment today:   -Dr. Katz's nurse will reach out to you once she has talked to Dr. Sloan and they will develop a plan.      3.   Plan is to return to the ENT clinic as needed.           How to Contact Us:  Send a [employer] message to your provider. Our team will respond to you via [employer]. Occasionally, we will need to call you to get further information.  For urgent matters (Monday-Friday), call the ENT Clinic: [phone] and speak with a call center team member - they will route your call appropriately.   If you'd like to speak directly with a nurse, please find our contact information below. We do our best to check voicemail frequently throughout the day, and will work to call you back within 1-2 days. For urgent matters, please use the general clinic phone numbers listed above.        Mikaela RIOJAS LPN  Direct: [phone]         [facility]  Department of Otolaryngology         Dr. Culp's note:    Possible right patulous eustachian tube dysfunction in setting of bilateral sensorineural hearing loss left greater than right. With bulging tympanic membrane, and somewhat inconsistent symptoms I would like her case to be reviewed by Dr. Ontiveros before I would consider a bone wax lorene. I am wondering if a trial of blue tac on the tympanic membrane would reduce the distortion and alleviate symptoms.        Upcoming appointment with Dr. Ontiveros:  Audiogram (hearing test) March 19, 2024 at 9:00 am  Appointment with Dr. Ontiveros March 19, 2024 at 10:00 am    If you have any additional questions or concerns you can contact Fanny (Dr. Katz's nurse) at [phone]

## 2024-03-11 NOTE — NURSING NOTE
"Chief Complaint   Patient presents with    Consult   Blood pressure 126/79, pulse 80, height 1.657 m (5' 5.25\"), weight 61.5 kg (135 lb 9.6 oz), SpO2 99%, not currently breastfeeding. Duane Murphy, EMT    "

## 2024-03-11 NOTE — PROGRESS NOTES
Otolaryngology Adult Consultation    Patient: Chyna Moncada   : 1950     Patient presents with:  Consult: No chief complaint on file.       Referring Provider: Referred Self   Consulting Physician:  Sandra Culp MD       Assessment/Plan: Possible R patulous ETD in setting of bilateral SNHL L>R. With bulging TM, and somewhat inconsistent symptoms I would like her case to be reviewed by Dr. Ontiveros before I would consider a bone wax lorene. I am wondering if a trial of blue tac on the TM would reduce the distortion and alleviate symptoms.      HPI: Chyna Moncada is a 73 year old female seen today in the Otolaryngology Clinic in consultation from Dr. Cabrera Fields for possible patulous right eustachian tube dysfunction. The patient c/o right ear fullness and was found to have movement of her TM with respiration and symptoms c/w patulous ETD. A trial of myringotomy with PET placement was not successful in alleviating symptoms, actually made it worse. The TM has healed. She has a longstanding h/o hearing loss L>R felt to be due to her mother  having '[place] measles' when she was pregnant. She wears bilateral hearing aids. She feels the need to 'pop' her ear often. She feels like her speech is distorted. Symptoms do not get better when laying down.     alendronate (FOSAMAX) 70 MG tablet, Take 1 tablet (70 mg) by mouth every 7 days  aspirin EC 81 MG tablet, Take 81 mg by mouth daily   calcium-vitamin D (CALTRATE) 600-400 MG-UNIT per tablet, Take 1 tablet by mouth daily   cyclobenzaprine (FLEXERIL) 5 MG tablet, Take 1 tablet (5 mg) by mouth 2 times daily as needed for muscle spasms  Inulin 2 g CHEW,   mometasone (NASONEX) 50 MCG/ACT spray, Spray 2 sprays in nostril daily   Multiple Vitamin (MULTI-VITAMINS) TABS, Take 1 tablet by mouth daily  multivitamin (OCUVITE) TABS tablet, Take 1 tablet by mouth daily  pilocarpine (PILOCAR) 2 % ophthalmic solution,   pravastatin (PRAVACHOL) 40 MG tablet, TAKE ONE TABLET BY  MOUTH EVERY NIGHT AT BEDTIME  RESTASIS 0.05 % ophthalmic emulsion,   valACYclovir (VALTREX) 1000 mg tablet, TAKE TWO TABLETS BY MOUTH TWICE A DAY    No current facility-administered medications on file prior to visit.         Allergies   Allergen Reactions    Tramadol Anaphylaxis    Benadryl [Diphenhydramine Hcl] Other (See Comments)     Made her goofy    Caffeine Other (See Comments)     Jittery      Codeine Nausea and Vomiting    Darvocet [Propoxyphene N-Apap]      Other reaction(s): GI Upset    Diphenhydramine Other (See Comments)    Lovastatin Other (See Comments) and Muscle Pain (Myalgia)     Other reaction(s): Myalgia  pain    Percocet [Oxycodone-Acetaminophen] Other (See Comments)     Other reaction(s): Dizziness  Sick or dizzy?    Vicodin [Hydrocodone-Acetaminophen] Other (See Comments) and Nausea     Sick or dizzy?       Past Medical History:   Diagnosis Date    Cerebral embolism with cerebral infarction (H)     Symptomatic menopausal or female climacteric states 2007       Social History     Occupational History    Not on file   Tobacco Use    Smoking status: Former     Types: Cigarettes     Quit date: 1/10/1983     Years since quittin.1    Smokeless tobacco: Never   Vaping Use    Vaping Use: Never used   Substance and Sexual Activity    Alcohol use: Not Currently     Alcohol/week: 1.0 standard drink of alcohol     Types: 1 Glasses of wine per week    Drug use: No    Sexual activity: Yes     Partners: Male        Review of Systems       No data to display                 14 point ROS neg other than the symptoms noted above.    Physical Exam:    General Assessment  The patient is alert, oriented and in no acute distress.   Head/Face/Scalp  Grossly normal.   Ears  Normal canals, R TM with dimeric area, bulging. I could not reproduce respiratory variation of the TM. Left canal with some cerumen, TM normal.  Nose  External nose is straight, skin is normal. Intranasal exam (anterior rhinoscopy)  reveals deviation of septum to R.         Procedure:  Endoscopy indicated for exam of NP  Topical anesthetic/decongestant spray applied.  Rigid scope used for visualization.  Findings: Could not pass on R. No masses in NP, ET orifice normal in appearance bilaterally.        18 minutes spent by me on the date of the encounter doing chart review, history and exam, documentation and further activities per the note. This time is in addition to separately billable procedure.

## 2024-03-11 NOTE — LETTER
3/11/2024       RE: Chyna Moncada  [address] [facility] [address]     Dear Colleague,    Thank you for referring your patient, Chyna Moncada, to the [facility] [place] [facility]. Please see a copy of my visit note below.    Otolaryngology Adult Consultation    Patient: Chyna Moncada   : 1950     Patient presents with:  Consult: No chief complaint on file.       Referring Provider: Referred Self   Consulting Physician:  Sandra Culp MD       Assessment/Plan: Possible R patulous ETD in setting of bilateral SNHL L>R. With bulging TM, and somewhat inconsistent symptoms I would like her case to be reviewed by Dr. Ontiveros before I would consider a bone wax lorene. I am wondering if a trial of blue tac on the TM would reduce the distortion and alleviate symptoms.      HPI: Chyna Moncada is a 73 year old female seen today in the Otolaryngology Clinic in consultation from Dr. Cabrera Fields for possible patulous right eustachian tube dysfunction. The patient c/o right ear fullness and was found to have movement of her TM with respiration and symptoms c/w patulous ETD. A trial of myringotomy with PET placement was not successful in alleviating symptoms, actually made it worse. The TM has healed. She has a longstanding h/o hearing loss L>R felt to be due to her mother  having '[place] measles' when she was pregnant. She wears bilateral hearing aids. She feels the need to 'pop' her ear often. She feels like her speech is distorted. Symptoms do not get better when laying down.     alendronate (FOSAMAX) 70 MG tablet, Take 1 tablet (70 mg) by mouth every 7 days  aspirin EC 81 MG tablet, Take 81 mg by mouth daily   calcium-vitamin D (CALTRATE) 600-400 MG-UNIT per tablet, Take 1 tablet by mouth daily   cyclobenzaprine (FLEXERIL) 5 MG tablet, Take 1 tablet (5 mg) by mouth 2 times daily as needed for muscle  spasms  Inulin 2 g CHEW,   mometasone (NASONEX) 50 MCG/ACT spray, Spray 2 sprays in nostril daily   Multiple Vitamin (MULTI-VITAMINS) TABS, Take 1 tablet by mouth daily  multivitamin (OCUVITE) TABS tablet, Take 1 tablet by mouth daily  pilocarpine (PILOCAR) 2 % ophthalmic solution,   pravastatin (PRAVACHOL) 40 MG tablet, TAKE ONE TABLET BY MOUTH EVERY NIGHT AT BEDTIME  RESTASIS 0.05 % ophthalmic emulsion,   valACYclovir (VALTREX) 1000 mg tablet, TAKE TWO TABLETS BY MOUTH TWICE A DAY    No current facility-administered medications on file prior to visit.         Allergies   Allergen Reactions    Tramadol Anaphylaxis    Benadryl [Diphenhydramine Hcl] Other (See Comments)     Made her goofy    Caffeine Other (See Comments)     Jittery      Codeine Nausea and Vomiting    Darvocet [Propoxyphene N-Apap]      Other reaction(s): GI Upset    Diphenhydramine Other (See Comments)    Lovastatin Other (See Comments) and Muscle Pain (Myalgia)     Other reaction(s): Myalgia  pain    Percocet [Oxycodone-Acetaminophen] Other (See Comments)     Other reaction(s): Dizziness  Sick or dizzy?    Vicodin [Hydrocodone-Acetaminophen] Other (See Comments) and Nausea     Sick or dizzy?       Past Medical History:   Diagnosis Date    Cerebral embolism with cerebral infarction (H)     Symptomatic menopausal or female climacteric states 2007       Social History     Occupational History    Not on file   Tobacco Use    Smoking status: Former     Types: Cigarettes     Quit date: 1/10/1983     Years since quittin.1    Smokeless tobacco: Never   Vaping Use    Vaping Use: Never used   Substance and Sexual Activity    Alcohol use: Not Currently     Alcohol/week: 1.0 standard drink of alcohol     Types: 1 Glasses of wine per week    Drug use: No    Sexual activity: Yes     Partners: Male        Review of Systems       No data to display                 14 point ROS neg other than the symptoms noted above.    Physical Exam:    General  Assessment  The patient is alert, oriented and in no acute distress.   Head/Face/Scalp  Grossly normal.   Ears  Normal canals, R TM with dimeric area, bulging. I could not reproduce respiratory variation of the TM. Left canal with some cerumen, TM normal.  Nose  External nose is straight, skin is normal. Intranasal exam (anterior rhinoscopy) reveals deviation of septum to R.       Procedure:  Endoscopy indicated for exam of NP  Topical anesthetic/decongestant spray applied.  Rigid scope used for visualization.  Findings: Could not pass on R. No masses in NP, ET orifice normal in appearance bilaterally.      18 minutes spent by me on the date of the encounter doing chart review, history and exam, documentation and further activities per the note. This time is in addition to separately billable procedure.      Again, thank you for allowing me to participate in the care of your patient.      Sincerely,    Sandra Culp MD

## 2024-03-13 ENCOUNTER — TELEPHONE (OUTPATIENT)
Dept: OTOLARYNGOLOGY | Facility: CLINIC | Age: 74
End: 2024-03-13
Payer: COMMERCIAL

## 2024-03-13 DIAGNOSIS — H69.01 PATULOUS EUSTACHIAN TUBE, RIGHT: Primary | ICD-10-CM

## 2024-03-13 DIAGNOSIS — H90.3 SENSORINEURAL HEARING LOSS, BILATERAL: ICD-10-CM

## 2024-03-13 NOTE — TELEPHONE ENCOUNTER
Per patient request, copy of provider note sent regarding conversation with Dr. Culp on Monday. This writer also included a copy of patient's itinerary for upcoming appointment and hearing test on March 29, 2024.    LORIE QUEEN LPN on 3/13/2024 at 4:17 PM

## 2024-03-13 NOTE — TELEPHONE ENCOUNTER
FUTURE VISIT INFORMATION      FUTURE VISIT INFORMATION:  Date: 3/19/24  Time: 10 AM  Location: [facility]  REFERRAL INFORMATION:  Referring provider:  Cabrera Fields MD   Referring providers clinic:  [facility]   Reason for visit/diagnosis:  Sensorineural hearing loss, bilateral [H90.3]  Patulous Eustachian tube, right [H69.01]  Asymmetrical sensorineural hearing loss [H90.3], ref'd by Cabrera Fields MD, rec's in [facility], pt stated she is having a hearing test at the end of the month, pt made appt, [facility] location     RECORDS REQUESTED FROM      Clinic name Comments Records Status Imaging Status   [facility]- Sandra Culp MD  [facility]  8/10/23, 11/3/23 note- Cabrera Fields MD      Audiogram: 8/10/23, 1/31/22 [facility]     [facility]  3/28/23 audiogram  Scanned in [facility]  1/6/24 MR brain     MRA/ MR brain 3/30/2014  CT head 3/30/2014 Epic  PACS

## 2024-03-13 NOTE — TELEPHONE ENCOUNTER
If the Audio and Dr. Ontiveros appts on 3/19 do not work for the patient please reschedule to next available.

## 2024-03-14 NOTE — TELEPHONE ENCOUNTER
This writer called patient and followed up on Dr. Culp's notes as well as the upcoming appointment with Dr. Ontiveros. Confirmed with patient that we have mailed her the AVS containing Dr. Culp's note as well as her itinerary for her upcoming appointment with Dr. Ontiveros. Patient is in agreement with appointment date and time. Patient was advised to call this writer back, or Dr. Katz's nurse Fanny for any questions before the appointment. Patient was given both this writer's as well as Fanny's business card with our direct numbers for questions. Patient appreciative of call. Fanny updated that patient confirmed appointments next week.    LORIE QUEEN LPN on 3/14/2024 at 11:25 AM

## 2024-03-19 ENCOUNTER — OFFICE VISIT (OUTPATIENT)
Dept: OTOLARYNGOLOGY | Facility: CLINIC | Age: 74
End: 2024-03-19
Payer: COMMERCIAL

## 2024-03-19 ENCOUNTER — PRE VISIT (OUTPATIENT)
Dept: OTOLARYNGOLOGY | Facility: CLINIC | Age: 74
End: 2024-03-19

## 2024-03-19 ENCOUNTER — OFFICE VISIT (OUTPATIENT)
Dept: AUDIOLOGY | Facility: CLINIC | Age: 74
End: 2024-03-19
Payer: COMMERCIAL

## 2024-03-19 VITALS
TEMPERATURE: 97.3 F | DIASTOLIC BLOOD PRESSURE: 68 MMHG | BODY MASS INDEX: 22.49 KG/M2 | HEART RATE: 76 BPM | WEIGHT: 135 LBS | HEIGHT: 65 IN | OXYGEN SATURATION: 100 % | SYSTOLIC BLOOD PRESSURE: 118 MMHG

## 2024-03-19 DIAGNOSIS — H93.8X1 EAR FULLNESS, RIGHT: ICD-10-CM

## 2024-03-19 DIAGNOSIS — H93.13 TINNITUS, BILATERAL: ICD-10-CM

## 2024-03-19 DIAGNOSIS — H90.3 ASYMMETRICAL SENSORINEURAL HEARING LOSS: Primary | ICD-10-CM

## 2024-03-19 DIAGNOSIS — H90.A21 SENSORINEURAL HEARING LOSS (SNHL) OF RIGHT EAR WITH RESTRICTED HEARING OF LEFT EAR: Primary | ICD-10-CM

## 2024-03-19 DIAGNOSIS — H90.3 SENSORINEURAL HEARING LOSS, BILATERAL: ICD-10-CM

## 2024-03-19 PROCEDURE — 92567 TYMPANOMETRY: CPT | Performed by: AUDIOLOGIST

## 2024-03-19 PROCEDURE — 92557 COMPREHENSIVE HEARING TEST: CPT | Performed by: AUDIOLOGIST

## 2024-03-19 PROCEDURE — 92504 EAR MICROSCOPY EXAMINATION: CPT | Performed by: OTOLARYNGOLOGY

## 2024-03-19 PROCEDURE — 99214 OFFICE O/P EST MOD 30 MIN: CPT | Mod: 25 | Performed by: OTOLARYNGOLOGY

## 2024-03-19 ASSESSMENT — PAIN SCALES - GENERAL: PAINLEVEL: NO PAIN (0)

## 2024-03-19 NOTE — PROGRESS NOTES
AUDIOLOGY REPORT    SUMMARY: Audiology visit completed. See audiogram for results.      RECOMMENDATIONS: Follow-up with ENT.    Hugh Baeza, CCC-A  Clinical Audiologist  [place] #[id number]

## 2024-03-19 NOTE — PROGRESS NOTES
Neurotology Clinic      Name: Chyna Moncada  MRN: [id number]  Age: 73 year old  : 1950  Referring provider: Referred Self  2024     Chief Complaint:   Consultation    History of Present Illness:   Chyna Moncada is a 73 year old female with a history of possible patulous eustachian tube dysfuction who presents for consultation. Consultation was requested by Dr. Culp. She was initially seen by Dr. Fields on 08/10/23 for right ear problems. She has a long history of decreased hearing in both ears with worsening in the left ear, due to what she reports to be a in utero [place] measles exposure. She has had a constellation of otologic symptoms including tinnitus and ear fullness. Dr. Fields observed motion of the tympanic membrane with respiration and on 23 she underwent right PE tube insertion, but eventually had the tube removed, as she felt it made her hearing worse. Tube removal and patch myringoplasty was performed on 10/6/2023. She was seen shortly after by Dr. Fields again, where she continued to have right otorrhea and discomfort. She had been using Floxin eardrops to help with the symptoms.  Along with discomfort and right otorrhea, she continued to have ear plugging, fullness, and echoing. She was referred to the  for bone wax lorene placement for patulous tube.  She met Dr. Culp and respiratory excursion with respiration was not observed.  Dr Culp requested a trial of blue tack to determine if weighting the TM to reduce excursion would alleviate her symptoms.    Today, she reports that she has right ear pressure and fullness. There is no relationship between the pressure or fullness and position changes (present supine and standing).  No relationship to time of day or activity level. No improvement when supine or when head is between her legs.  When bigger vehicles travel down in front of her house, she states experiences high-pitched tinnitus and constant loud-noise sensitivity  in her right ear. At first, she had a ringings sound, but now it has upgraded to a screech. She feels her symptoms began following a right ear irrigation one year ago. She has a hard time with word reception from others in the left ear, especially with masks and crowded environments. She notices her balance has been off, and she had a brain MRI, that ruled out abnormalities. Originally, her parents bought her a right hearing aid to help with her hearing, and then she obtained a pair through her 's insurance at [facility]. When she took her father to his hearing aid appointment at [facility], she also obtained another set of hearing aids from [facility]. They are not giving her the benefit she needs.       Review of Systems:   Pertinent items are noted in HPI or as in patient entered ROS below, remainder of complete ROS is negative.       3/19/2024    10:04 AM    ENT ROS   Constitutional Problems with sleep   Psychology Frequently feeling depressed or sad    Frequently feeling anxious   Ears, Nose, Throat Ringing/noise in ears        Active Medications:     Current Outpatient Medications:     alendronate (FOSAMAX) 70 MG tablet, Take 1 tablet (70 mg) by mouth every 7 days, Disp: 12 tablet, Rfl: 3    amoxicillin (AMOXIL) 500 MG capsule, , Disp: , Rfl:     aspirin EC 81 MG tablet, Take 81 mg by mouth daily , Disp: , Rfl:     calcium-vitamin D (CALTRATE) 600-400 MG-UNIT per tablet, Take 1 tablet by mouth daily , Disp: , Rfl:     cyclobenzaprine (FLEXERIL) 5 MG tablet, Take 1 tablet (5 mg) by mouth 2 times daily as needed for muscle spasms, Disp: 60 tablet, Rfl: 3    Inulin 2 g CHEW, , Disp: , Rfl:     mometasone (NASONEX) 50 MCG/ACT spray, Spray 2 sprays in nostril daily , Disp: , Rfl:     Multiple Vitamin (MULTI-VITAMINS) TABS, Take 1 tablet by mouth daily, Disp: , Rfl:     multivitamin (OCUVITE) TABS tablet, Take 1 tablet by mouth daily, Disp: , Rfl:     pilocarpine (PILOCAR) 2 % ophthalmic solution, , Disp: , Rfl:      pravastatin (PRAVACHOL) 40 MG tablet, TAKE ONE TABLET BY MOUTH EVERY NIGHT AT BEDTIME, Disp: 90 tablet, Rfl: 3    RESTASIS 0.05 % ophthalmic emulsion, , Disp: , Rfl:     valACYclovir (VALTREX) 1000 mg tablet, TAKE TWO TABLETS BY MOUTH TWICE A DAY, Disp: 4 tablet, Rfl: 11      Allergies:   Tramadol, Benadryl [diphenhydramine hcl], Caffeine, Codeine, Darvocet [propoxyphene n-apap], Diphenhydramine, Lovastatin, Percocet [oxycodone-acetaminophen], and Vicodin [hydrocodone-acetaminophen]      Past Medical History:  Past Medical History:   Diagnosis Date    Cerebral embolism with cerebral infarction (H)     Symptomatic menopausal or female climacteric states 04/12/2007     Patient Active Problem List   Diagnosis    Allergic rhinitis    Anxiety state    Cerebral artery occlusion with cerebral infarction (H)    Degeneration of cervical intervertebral disc    Degeneration of lumbar or lumbosacral intervertebral disc    Insomnia    Postmenopausal atrophic vaginitis    Raynaud's syndrome    Osteopenia    Pulmonary nodules    TGA (transient global amnesia)    Recurrent cold sores    Peripheral vascular disease (H24)    Atopic rhinitis    Osteoarthritis of hip    Bilateral sensorineural hearing loss    Tendinitis of shoulder    COPD (chronic obstructive pulmonary disease) (H)    SVT (supraventricular tachycardia)    Age-related osteoporosis without current pathological fracture    Low platelet count (H24)    Unsteady gait        Past Surgical History:  Past Surgical History:   Procedure Laterality Date    COLONOSCOPY N/A 8/17/2023    Procedure: Colonoscopy;  Surgeon: Isak Paulino MD;  Location: [facility]    HYSTERECTOMY, PAP NO LONGER INDICATED         Family History:   Family History   Problem Relation Age of Onset    Arthritis Mother         was told was RA    Cancer Mother         GYN    Other - See Comments Mother         phlebitis for which hospitalized several times    Heart Disease Father     Cancer Brother      "    throat    Alcohol/Drug Maternal Grandfather     Heart Disease Paternal Grandfather         Heart attack    Diabetes Paternal Grandfather          Social History:   Social History     Tobacco Use    Smoking status: Former     Types: Cigarettes     Quit date: 1/10/1983     Years since quittin.2    Smokeless tobacco: Never   Vaping Use    Vaping Use: Never used   Substance Use Topics    Alcohol use: Not Currently     Alcohol/week: 1.0 standard drink of alcohol     Types: 1 Glasses of wine per week    Drug use: No        Physical Exam:   /68   Pulse 76   Temp 97.3  F (36.3  C)   Ht 1.651 m (5' 5\")   Wt 61.2 kg (135 lb)   SpO2 100%   BMI 22.47 kg/m         Constitutional:  The patient was accompanied, well-groomed, and in no acute distress.     Skin: Normal:  warm and pink without rash   Neurologic: Alert and oriented x 3.  CN's III-XII within normal limits.  Voice normal.    Psychiatric: The patient's affect was calm, cooperative, and appropriate.     Communication:  Normal; communicates verbally, normal voice quality.   Respiratory: Breathing comfortably without stridor or exertion of accessory muscles.    Head/Face:  No lesions or scars. No sinus tenderness.     Eyes: Pupils were equal and reactive.  Extraocular movement intact.           Otologic microscope exam:  Right ear: The canal is narrow. There is an atelectatic segment of the tympanic membrane posteriorly, consistent with prolonged insufflation and the prior PE tube placement.  When she breathes in and out, her tympanic membrane does not move. This was examined in the upright position with forced inspiration confirming no motion.    A [place] of blue poster tack was pressed into a disc and placed to covering 70% of the tympanic membrane. She reported her hearing diminished with this and the fullness improved some. After walking around for a certain amount of time, at her request, I suctioned the material completely off the tympanic " membrane as she did not like the effect or the reduced hearing.  This is not alleviate her most bothersome symptoms.    Left ear: There was a cerumen impaction removed today. The tympanic membrane appears healthy.     Audiogram: today, independently reviewed  AUDIOGRAM:   Mild sloping to severe SNHL (masking dilemma at 4 kHz)  in the right ea  Mild sloping to profound mixed hearing loss (masking dilemma at 4 kHz and vibrotactile response at 250 Hz) in the left ear.   Note: left bone conduction at 4 kHz was repeatable.   Hearing worsened 15 dB at 500 Hz in the right ear. The left ear is stable re: 1/31/22.   Word rec: 84% right (prev. 92%, non-significant change),   Left: 60% (prev. 72%, non-significant change).        She underwent an audiogram at [facility] 03/28/23 and 08/30/23. This demonstrated:   RIGHT: normal eardrum mobility    LEFT:   normal eardrum mobility  Tympanometry was not performed at [facility].     Right: Speech reception threshold is 45 dB with 100% word recognition. Tympanogram A type   Left: Speech reception threshold is 55 dB with 60% word recognition. Tympanogram A type     Audiogram was independently reviewed    Imaging:  MR Brain 01/06/24  IMPRESSION:  1.  No acute intracranial process.  2.  Generalized brain atrophy and presumed microvascular ischemic changes as detailed above.    ** this is not an IAC protocol MRI; cannot rule out retrocochlear lesion with this scan.    MRI Neck 03/30/14  IMPRESSION: Negative MR angiogram of the neck vessels. No stenosis is seen at either carotid bifurcation. No arterial dissection is   identified.     CT Head 03/30/14  IMPRESSION: Normal CT scan of the head without contrast.     Outside records from [facility] and [facility] were independently reviewed.       Assessment and Plan:  Asymmetric sensorineural hearing loss     - Recommend IAC protocol MRI with gadolinium, order placed    Right tinnitus  Right ear fullness  No clear diagnosis of  patulous eustachian tube    The blue tack did not have a positive impact.  I would not recommend additional intervention for the eustachian tube. The fullness may be more of a manifestation of the progression of sensorineural hearing loss in her better hearing ear.  She is most bothered by tinnitus.    My recommendation is for her to be fitted with up to date binaural amplification with the guidance of an audiologist. She is medically cleared for hearing aids. She agrees with this plan.         Scribe Disclosure:   I, Kaela Magana, am serving as a scribe; to document services personally performed by Alaina Ontiveros MD -based on data collection and the provider's statements to me.     Provider Disclosure:  I agree with above History, Review of Systems, Physical exam and Plan.  I have reviewed the content of the documentation and have edited it as needed. I have personally performed the services documented here and the documentation accurately represents those services and the decisions I have made.      Electronically signed by:    Alaina Ontiveros MD  Otology & Neurotology  [facility]

## 2024-03-19 NOTE — LETTER
3/19/2024       RE: Chyna Moncada  [address] [facility] [address]     Dear Colleague,    Thank you for referring your patient, Chyna Moncada, to the [facility] EAR NOSE AND [facility]ROAT [facility] at [facility]. Please see a copy of my visit note below.      Neurotology Clinic      Name: Chyna Moncada  MRN: [id number]  Age: 73 year old  : 1950  Referring provider: Referred Self  2024     Chief Complaint:   Consultation    History of Present Illness:   Chyna Moncada is a 73 year old female with a history of possible patulous eustachian tube dysfuction who presents for consultation. Consultation was requested by Dr. Culp. She was initially seen by Dr. Fields on 08/10/23 for right ear problems. She has a long history of decreased hearing in both ears with worsening in the left ear, due to what she reports to be a in utero [place] measles exposure. She has had a constellation of otologic symptoms including tinnitus and ear fullness. Dr. Fields observed motion of the tympanic membrane with respiration and on 23 she underwent right PE tube insertion, but eventually had the tube removed, as she felt it made her hearing worse. Tube removal and patch myringoplasty was performed on 10/6/2023. She was seen shortly after by Dr. Fields again, where she continued to have right otorrhea and discomfort. She had been using Floxin eardrops to help with the symptoms.  Along with discomfort and right otorrhea, she continued to have ear plugging, fullness, and echoing. She was referred to the  for bone wax lorene placement for patulous tube.  She met Dr. Culp and respiratory excursion with respiration was not observed.  Dr Culp requested a trial of blue tack to determine if weighting the TM to reduce excursion would alleviate her symptoms.    Today, she reports that she has right ear pressure and fullness. There is no relationship  between the pressure or fullness and position changes (present supine and standing).  No relationship to time of day or activity level. No improvement when supine or when head is between her legs.  When bigger vehicles travel down in front of her house, she states experiences high-pitched tinnitus and constant loud-noise sensitivity in her right ear. At first, she had a ringings sound, but now it has upgraded to a screech. She feels her symptoms began following a right ear irrigation one year ago. She has a hard time with word reception from others in the left ear, especially with masks and crowded environments. She notices her balance has been off, and she had a brain MRI, that ruled out abnormalities. Originally, her parents bought her a right hearing aid to help with her hearing, and then she obtained a pair through her 's insurance at [facility]. When she took her father to his hearing aid appointment at [facility], she also obtained another set of hearing aids from [facility]. They are not giving her the benefit she needs.       Review of Systems:   Pertinent items are noted in HPI or as in patient entered ROS below, remainder of complete ROS is negative.       3/19/2024    10:04 AM    ENT ROS   Constitutional Problems with sleep   Psychology Frequently feeling depressed or sad    Frequently feeling anxious   Ears, Nose, Throat Ringing/noise in ears        Active Medications:     Current Outpatient Medications:     alendronate (FOSAMAX) 70 MG tablet, Take 1 tablet (70 mg) by mouth every 7 days, Disp: 12 tablet, Rfl: 3    amoxicillin (AMOXIL) 500 MG capsule, , Disp: , Rfl:     aspirin EC 81 MG tablet, Take 81 mg by mouth daily , Disp: , Rfl:     calcium-vitamin D (CALTRATE) 600-400 MG-UNIT per tablet, Take 1 tablet by mouth daily , Disp: , Rfl:     cyclobenzaprine (FLEXERIL) 5 MG tablet, Take 1 tablet (5 mg) by mouth 2 times daily as needed for muscle spasms, Disp: 60 tablet, Rfl: 3    Inulin 2 g CHEW, , Disp:  , Rfl:     mometasone (NASONEX) 50 MCG/ACT spray, Spray 2 sprays in nostril daily , Disp: , Rfl:     Multiple Vitamin (MULTI-VITAMINS) TABS, Take 1 tablet by mouth daily, Disp: , Rfl:     multivitamin (OCUVITE) TABS tablet, Take 1 tablet by mouth daily, Disp: , Rfl:     pilocarpine (PILOCAR) 2 % ophthalmic solution, , Disp: , Rfl:     pravastatin (PRAVACHOL) 40 MG tablet, TAKE ONE TABLET BY MOUTH EVERY NIGHT AT BEDTIME, Disp: 90 tablet, Rfl: 3    RESTASIS 0.05 % ophthalmic emulsion, , Disp: , Rfl:     valACYclovir (VALTREX) 1000 mg tablet, TAKE TWO TABLETS BY MOUTH TWICE A DAY, Disp: 4 tablet, Rfl: 11      Allergies:   Tramadol, Benadryl [diphenhydramine hcl], Caffeine, Codeine, Darvocet [propoxyphene n-apap], Diphenhydramine, Lovastatin, Percocet [oxycodone-acetaminophen], and Vicodin [hydrocodone-acetaminophen]      Past Medical History:  Past Medical History:   Diagnosis Date    Cerebral embolism with cerebral infarction (H)     Symptomatic menopausal or female climacteric states 04/12/2007     Patient Active Problem List   Diagnosis    Allergic rhinitis    Anxiety state    Cerebral artery occlusion with cerebral infarction (H)    Degeneration of cervical intervertebral disc    Degeneration of lumbar or lumbosacral intervertebral disc    Insomnia    Postmenopausal atrophic vaginitis    Raynaud's syndrome    Osteopenia    Pulmonary nodules    TGA (transient global amnesia)    Recurrent cold sores    Peripheral vascular disease (H24)    Atopic rhinitis    Osteoarthritis of hip    Bilateral sensorineural hearing loss    Tendinitis of shoulder    COPD (chronic obstructive pulmonary disease) (H)    SVT (supraventricular tachycardia)    Age-related osteoporosis without current pathological fracture    Low platelet count (H24)    Unsteady gait        Past Surgical History:  Past Surgical History:   Procedure Laterality Date    COLONOSCOPY N/A 8/17/2023    Procedure: Colonoscopy;  Surgeon: Isak Paulino MD;   "Location: [facility]    HYSTERECTOMY, PAP NO LONGER INDICATED         Family History:   Family History   Problem Relation Age of Onset    Arthritis Mother         was told was RA    Cancer Mother         GYN    Other - See Comments Mother         phlebitis for which hospitalized several times    Heart Disease Father     Cancer Brother         throat    Alcohol/Drug Maternal Grandfather     Heart Disease Paternal Grandfather         Heart attack    Diabetes Paternal Grandfather          Social History:   Social History     Tobacco Use    Smoking status: Former     Types: Cigarettes     Quit date: 1/10/1983     Years since quittin.2    Smokeless tobacco: Never   Vaping Use    Vaping Use: Never used   Substance Use Topics    Alcohol use: Not Currently     Alcohol/week: 1.0 standard drink of alcohol     Types: 1 Glasses of wine per week    Drug use: No        Physical Exam:   /68   Pulse 76   Temp 97.3  F (36.3  C)   Ht 1.651 m (5' 5\")   Wt 61.2 kg (135 lb)   SpO2 100%   BMI 22.47 kg/m         Constitutional:  The patient was accompanied, well-groomed, and in no acute distress.     Skin: Normal:  warm and pink without rash   Neurologic: Alert and oriented x 3.  CN's III-XII within normal limits.  Voice normal.    Psychiatric: The patient's affect was calm, cooperative, and appropriate.     Communication:  Normal; communicates verbally, normal voice quality.   Respiratory: Breathing comfortably without stridor or exertion of accessory muscles.    Head/Face:  No lesions or scars. No sinus tenderness.     Eyes: Pupils were equal and reactive.  Extraocular movement intact.           Otologic microscope exam:  Right ear: The canal is narrow. There is an atelectatic segment of the tympanic membrane posteriorly, consistent with prolonged insufflation and the prior PE tube placement.  When she breathes in and out, her tympanic membrane does not move. This was examined in the upright position with forced inspiration " confirming no motion.    A [place] of blue poster tack was pressed into a disc and placed to covering 70% of the tympanic membrane. She reported her hearing diminished with this and the fullness improved some. After walking around for a certain amount of time, at her request, I suctioned the material completely off the tympanic membrane as she did not like the effect or the reduced hearing.  This is not alleviate her most bothersome symptoms.    Left ear: There was a cerumen impaction removed today. The tympanic membrane appears healthy.     Audiogram: today, independently reviewed  AUDIOGRAM:   Mild sloping to severe SNHL (masking dilemma at 4 kHz)  in the right ea  Mild sloping to profound mixed hearing loss (masking dilemma at 4 kHz and vibrotactile response at 250 Hz) in the left ear.   Note: left bone conduction at 4 kHz was repeatable.   Hearing worsened 15 dB at 500 Hz in the right ear. The left ear is stable re: 1/31/22.   Word rec: 84% right (prev. 92%, non-significant change),   Left: 60% (prev. 72%, non-significant change).        She underwent an audiogram at [facility] and [facility] 03/28/23 and 08/30/23. This demonstrated:   RIGHT: normal eardrum mobility    LEFT:   normal eardrum mobility  Tympanometry was not performed at [facility].     Right: Speech reception threshold is 45 dB with 100% word recognition. Tympanogram A type   Left: Speech reception threshold is 55 dB with 60% word recognition. Tympanogram A type     Audiogram was independently reviewed    Imaging:  MR Brain 01/06/24  IMPRESSION:  1.  No acute intracranial process.  2.  Generalized brain atrophy and presumed microvascular ischemic changes as detailed above.    ** this is not an [facility] protocol MRI; cannot rule out retrocochlear lesion with this scan.    MRI Neck 03/30/14  IMPRESSION: Negative MR angiogram of the neck vessels. No stenosis is seen at either carotid bifurcation. No arterial dissection is   identified.     CT Head  03/30/14  IMPRESSION: Normal CT scan of the head without contrast.     Outside records from [facility] and [facility] were independently reviewed.       Assessment and Plan:  Asymmetric sensorineural hearing loss     - Recommend IAC protocol MRI with gadolinium, order placed    Right tinnitus  Right ear fullness  No clear diagnosis of patulous eustachian tube    The blue tack did not have a positive impact.  I would not recommend additional intervention for the eustachian tube. The fullness may be more of a manifestation of the progression of sensorineural hearing loss in her better hearing ear.  She is most bothered by tinnitus.    My recommendation is for her to be fitted with up to date binaural amplification with the guidance of an audiologist. She is medically cleared for hearing aids. She agrees with this plan.         Scribe Disclosure:   I, Kaela Magana, am serving as a scribe; to document services personally performed by Alaina Ontiveros MD -based on data collection and the provider's statements to me.     Provider Disclosure:  I agree with above History, Review of Systems, Physical exam and Plan.  I have reviewed the content of the documentation and have edited it as needed. I have personally performed the services documented here and the documentation accurately represents those services and the decisions I have made.      Electronically signed by:    Alaina Ontiveros MD  Otology & Neurotology  [facility]        Again, thank you for allowing me to participate in the care of your patient.      Sincerely,    Alaina Ontiveros MD

## 2024-03-19 NOTE — NURSING NOTE
"Chief Complaint   Patient presents with    Consult     Sensorineural hearing loss      Blood pressure 118/68, pulse 76, temperature 97.3  F (36.3  C), height 1.651 m (5' 5\"), weight 61.2 kg (135 lb), SpO2 100%, not currently breastfeeding.  Bryce Myers LPN    "

## 2024-03-19 NOTE — PATIENT INSTRUCTIONS
You were seen in the ENT Clinic today by Dr. Ontiveros. If you have any questions or concerns after your appointment, please contact us (see below)      2.   Please obtain an MRI.   3.   A referral has been placed for new hearing aids.         How to Contact Us:  Send a [employer] message to your provider. Our team will respond to you via [employer]. Occasionally, we will need to call you to get further information.  For urgent matters (Monday-Friday), call the ENT Clinic: [phone] and speak with a call center team member - they will route your call appropriately.   If you'd like to speak directly with a nurse, please find our contact information below. We do our best to check voicemail frequently throughout the day, and will work to call you back within 1-2 days. For urgent matters, please use the general clinic phone numbers listed above.      Fanny LEW RN  ENT RN Care Coordinator  Direct: [phone]    Iman MARSHALL LPN  Direct: [phone]

## 2024-03-19 NOTE — PROGRESS NOTES
01/18/24 0500   Appointment Info   Signing clinician's name / credentials Azul Ortega, PT, DPT   Visits Used 1   Medical Diagnosis Unsteady gait   PT Tx Diagnosis decreased balance   Quick Adds Certification   Progress Note/Certification   Start of Care Date 01/18/24   Onset of illness/injury or Date of Surgery 01/01/23   Therapy Frequency hold chart 4 weeks   Predicted Duration 4 weeks   Certification date from 01/18/24   Certification date to 02/15/24   Progress Note Due Date 02/15/24   GOALS   PT Goals 2;3;4   PT Goal 1   Goal Identifier Bending over   Goal Description Pt will be able to bend over to  object w/o LOB   Target Date 02/01/24   PT Goal 2   Goal Identifier standing up   Goal Description Pt will be able stand up w/o LOB to return to PLOF   Target Date 02/01/24   PT Goal 3   Goal Identifier HEP   Goal Description Pt will be compliant and competent in HEP to allow them to achieve maximal strength and reduce pain mobility   Target Date 02/15/24   Subjective Report   Subjective Report see eval   Treatment Interventions (PT)   Interventions Therapeutic Procedure/Exercise;Gait Training;Neuromuscular Re-education   Neuromuscular Re-education   Neuromuscular re-ed of mvmt, balance, coord, kinesthetic sense, posture, proprioception minutes (47601) 15   Neuromuscular Re-education Neuro Re-ed 2;Neuro Re-ed 5;Neuro Re-ed 4;Neuro Re-ed 3;Neuro Re-ed 6;Neuro Re-ed 7   Neuro Re-ed 1 Standing heel toe - 30 secs   Neuro Re-ed 1 - Details eyes open, closed, head turns   Skilled Intervention improve mobility and balance   Patient Response/Progress tolerates well in session   Neuro Re-ed 2 standing heel taps x 10   Neuro Re-ed 3 SL balance   Neuro Re-ed 3 - Details eyes open/closed   Neuro Re-ed 4 seated ankle kicks/LAQ prior to standing up   Eval/Assessments   PT Eval, Low Complexity Minutes (92606) 30   Education   Learner/Method Patient   Plan   Home program [id number]   Plan for next session plan to trial  HEP at home, will hold chart 4 weeks   Total Session Time   Timed Code Treatment Minutes 15   Total Treatment Time (sum of timed and untimed services) 45         DISCHARGE  Reason for Discharge: Patient has failed to schedule further appointments.    Equipment Issued: NA    Discharge Plan: Patient to continue home program.    Referring Provider:  Nereida Jacobs

## 2024-03-30 ENCOUNTER — HOSPITAL ENCOUNTER (OUTPATIENT)
Dept: MRI IMAGING | Facility: CLINIC | Age: 74
Discharge: HOME | End: 2024-03-30
Attending: OTOLARYNGOLOGY | Admitting: OTOLARYNGOLOGY
Payer: COMMERCIAL

## 2024-03-30 DIAGNOSIS — H93.8X1 EAR FULLNESS, RIGHT: ICD-10-CM

## 2024-03-30 DIAGNOSIS — H90.3 SENSORINEURAL HEARING LOSS, BILATERAL: ICD-10-CM

## 2024-03-30 PROCEDURE — A9585 GADOBUTROL INJECTION: HCPCS | Performed by: OTOLARYNGOLOGY

## 2024-03-30 PROCEDURE — 255N000002 HC RX 255 OP 636: Performed by: OTOLARYNGOLOGY

## 2024-03-30 PROCEDURE — 70543 MRI ORBT/FAC/NCK W/O &W/DYE: CPT

## 2024-03-30 RX ORDER — GADOBUTROL 604.72 MG/ML
6 INJECTION INTRAVENOUS ONCE
Status: COMPLETED | OUTPATIENT
Start: 2024-03-30 | End: 2024-03-30

## 2024-03-30 RX ADMIN — GADOBUTROL 6 ML: 604.72 INJECTION INTRAVENOUS at 10:03

## 2024-04-11 ENCOUNTER — TELEPHONE (OUTPATIENT)
Dept: OTOLARYNGOLOGY | Facility: CLINIC | Age: 74
End: 2024-04-11
Payer: COMMERCIAL

## 2024-04-12 ENCOUNTER — OFFICE VISIT (OUTPATIENT)
Dept: AUDIOLOGY | Facility: CLINIC | Age: 74
End: 2024-04-12
Attending: OTOLARYNGOLOGY
Payer: COMMERCIAL

## 2024-04-12 DIAGNOSIS — H90.3 SENSORINEURAL HEARING LOSS, BILATERAL: ICD-10-CM

## 2024-04-12 DIAGNOSIS — H93.8X1 EAR FULLNESS, RIGHT: ICD-10-CM

## 2024-04-12 PROCEDURE — V5299 HEARING SERVICE: HCPCS | Performed by: AUDIOLOGIST

## 2024-04-12 NOTE — PROGRESS NOTES
AUDIOLOGY REPORT: Hearing Aid Consultation     SUBJECTIVE: Chyna Moncada is a 73 year old female was seen in the Audiology Clinic at [facility] on 4/12/24 to discuss concerns with hearing and functional communication difficulties. Chyna has been seen previously on 3/19/2024, and results revealed a mixed hearing loss of the left ear and sensorineural hearing loss of the right ear.  The patient was medically evaluated and determined to be cleared for binaural hearing aids by Dr. Ontiveros. Chyna notes difficulty with communication in a variety of listening situations.    Patient reports they have insurance benefits for hearing aids. It was determined that benefits are through [employer]. Patient would like to explore this benefit. I explained the differences between care models from [facility] and [facility] and that if they felt uncomfortable or pressured with the [facility] they were always welcome to return to [facility].       Tomer Gillis [facility]  Licensed Audiologist #[id number]  4/12/2024

## 2024-04-25 ENCOUNTER — TELEPHONE (OUTPATIENT)
Dept: OTOLARYNGOLOGY | Facility: CLINIC | Age: 74
End: 2024-04-25
Payer: COMMERCIAL

## 2024-05-03 ENCOUNTER — PRE VISIT (OUTPATIENT)
Dept: OTOLARYNGOLOGY | Facility: CLINIC | Age: 74
End: 2024-05-03

## 2024-05-22 ENCOUNTER — ALLIED HEALTH/NURSE VISIT (OUTPATIENT)
Dept: FAMILY MEDICINE | Facility: CLINIC | Age: 74
End: 2024-05-22
Payer: COMMERCIAL

## 2024-05-22 DIAGNOSIS — B00.1 RECURRENT COLD SORES: Primary | ICD-10-CM

## 2024-05-22 DIAGNOSIS — B00.1 RECURRENT COLD SORES: ICD-10-CM

## 2024-05-22 DIAGNOSIS — Z76.0 ENCOUNTER FOR MEDICATION REFILL: ICD-10-CM

## 2024-05-22 PROCEDURE — 99207 PR NO CHARGE NURSE ONLY: CPT

## 2024-05-22 RX ORDER — VALACYCLOVIR HYDROCHLORIDE 1 G/1
TABLET, FILM COATED ORAL
Qty: 4 TABLET | Refills: 11 | Status: SHIPPED | OUTPATIENT
Start: 2024-05-22

## 2024-05-22 NOTE — PROGRESS NOTES
Asking for refill for Valtrex  No symptoms currently  Will send refill to Nereida Jacobs for review  Iesha Henriquez RN

## 2024-05-22 NOTE — TELEPHONE ENCOUNTER
Walk in to clinic asking for refill for Valtrex.  Last refill 12/19/22  No symptoms currently  Will send refill to Nereida Jacobs for review.  Patient aware Nereida Jacobs out of office today  Iesha Henriquez RN

## 2024-09-04 NOTE — TELEPHONE ENCOUNTER
OK per Edilia Davison PA-C to order physical therapy. Patient notified and I told her if not better, should be seen.  Iesha Henriquez RN     Detail Level: Simple Application Tool (Optional): Liquid Nitrogen Sprayer Number Of Freeze-Thaw Cycles: 2 freeze-thaw cycles Show Aperture Variable?: Yes Post-Care Instructions: I reviewed with the patient in detail post-care instructions. Patient is to wear sunprotection, and avoid picking at any of the treated lesions. Pt may apply Vaseline to crusted or scabbing areas. Consent: The patient's consent was obtained including but not limited to risks of crusting, scabbing, blistering, scarring, darker or lighter pigmentary change, recurrence, incomplete removal and infection. Render Note In Bullet Format When Appropriate: No

## 2024-11-27 DIAGNOSIS — M81.0 AGE RELATED OSTEOPOROSIS, UNSPECIFIED PATHOLOGICAL FRACTURE PRESENCE: ICD-10-CM

## 2024-11-27 RX ORDER — ALENDRONATE SODIUM 70 MG/1
TABLET ORAL
Qty: 12 TABLET | Refills: 0 | Status: SHIPPED | OUTPATIENT
Start: 2024-11-27

## 2024-11-27 NOTE — TELEPHONE ENCOUNTER
Patient had a dexa scan completed in 2022.  Sharita refill given x 1, patient has upcoming appointment 1/3/25 with Nereida Jacobs NP.   Prescription approved per [facility] Refill Protocol.  Julie Behrendt RN

## 2024-12-30 ENCOUNTER — NURSE TRIAGE (OUTPATIENT)
Dept: FAMILY MEDICINE | Facility: CLINIC | Age: 74
End: 2024-12-30
Payer: COMMERCIAL

## 2024-12-30 ENCOUNTER — HOSPITAL ENCOUNTER (EMERGENCY)
Facility: CLINIC | Age: 74
Discharge: HOME | End: 2024-12-30
Attending: EMERGENCY MEDICINE | Admitting: EMERGENCY MEDICINE
Payer: COMMERCIAL

## 2024-12-30 VITALS
HEART RATE: 76 BPM | HEIGHT: 65 IN | DIASTOLIC BLOOD PRESSURE: 69 MMHG | SYSTOLIC BLOOD PRESSURE: 112 MMHG | RESPIRATION RATE: 20 BRPM | TEMPERATURE: 98.9 F | OXYGEN SATURATION: 99 % | BODY MASS INDEX: 22.47 KG/M2

## 2024-12-30 DIAGNOSIS — M54.50 BILATERAL LOW BACK PAIN WITHOUT SCIATICA, UNSPECIFIED CHRONICITY: ICD-10-CM

## 2024-12-30 LAB
ATRIAL RATE - MUSE: 65 BPM
DIASTOLIC BLOOD PRESSURE - MUSE: NORMAL MMHG
INTERPRETATION ECG - MUSE: NORMAL
P AXIS - MUSE: 75 DEGREES
PR INTERVAL - MUSE: 150 MS
QRS DURATION - MUSE: 82 MS
QT - MUSE: 404 MS
QTC - MUSE: 420 MS
R AXIS - MUSE: 60 DEGREES
SYSTOLIC BLOOD PRESSURE - MUSE: NORMAL MMHG
T AXIS - MUSE: 44 DEGREES
VENTRICULAR RATE- MUSE: 65 BPM

## 2024-12-30 PROCEDURE — 250N000011 HC RX IP 250 OP 636: Performed by: EMERGENCY MEDICINE

## 2024-12-30 PROCEDURE — 96372 THER/PROPH/DIAG INJ SC/IM: CPT | Performed by: EMERGENCY MEDICINE

## 2024-12-30 PROCEDURE — 93005 ELECTROCARDIOGRAM TRACING: CPT | Performed by: EMERGENCY MEDICINE

## 2024-12-30 PROCEDURE — 99284 EMERGENCY DEPT VISIT MOD MDM: CPT | Performed by: EMERGENCY MEDICINE

## 2024-12-30 PROCEDURE — 93010 ELECTROCARDIOGRAM REPORT: CPT | Performed by: EMERGENCY MEDICINE

## 2024-12-30 PROCEDURE — 99283 EMERGENCY DEPT VISIT LOW MDM: CPT | Performed by: EMERGENCY MEDICINE

## 2024-12-30 RX ORDER — KETOROLAC TROMETHAMINE 30 MG/ML
30 INJECTION, SOLUTION INTRAMUSCULAR; INTRAVENOUS ONCE
Status: COMPLETED | OUTPATIENT
Start: 2024-12-30 | End: 2024-12-30

## 2024-12-30 RX ORDER — HYDROMORPHONE HYDROCHLORIDE 1 MG/ML
0.5 INJECTION, SOLUTION INTRAMUSCULAR; INTRAVENOUS; SUBCUTANEOUS ONCE
Status: COMPLETED | OUTPATIENT
Start: 2024-12-30 | End: 2024-12-30

## 2024-12-30 RX ORDER — ONDANSETRON 4 MG/1
4 TABLET, ORALLY DISINTEGRATING ORAL ONCE
Status: COMPLETED | OUTPATIENT
Start: 2024-12-30 | End: 2024-12-30

## 2024-12-30 RX ADMIN — ONDANSETRON 4 MG: 4 TABLET, ORALLY DISINTEGRATING ORAL at 21:45

## 2024-12-30 RX ADMIN — KETOROLAC TROMETHAMINE 30 MG: 30 INJECTION, SOLUTION INTRAMUSCULAR at 20:09

## 2024-12-30 RX ADMIN — HYDROMORPHONE HYDROCHLORIDE 0.5 MG: 1 INJECTION, SOLUTION INTRAMUSCULAR; INTRAVENOUS; SUBCUTANEOUS at 20:09

## 2024-12-30 RX ADMIN — ONDANSETRON 4 MG: 4 TABLET, ORALLY DISINTEGRATING ORAL at 20:09

## 2024-12-30 ASSESSMENT — ACTIVITIES OF DAILY LIVING (ADL)
ADLS_ACUITY_SCORE: 41

## 2024-12-30 ASSESSMENT — COLUMBIA-SUICIDE SEVERITY RATING SCALE - C-SSRS
6. HAVE YOU EVER DONE ANYTHING, STARTED TO DO ANYTHING, OR PREPARED TO DO ANYTHING TO END YOUR LIFE?: NO
1. IN THE PAST MONTH, HAVE YOU WISHED YOU WERE DEAD OR WISHED YOU COULD GO TO SLEEP AND NOT WAKE UP?: NO
2. HAVE YOU ACTUALLY HAD ANY THOUGHTS OF KILLING YOURSELF IN THE PAST MONTH?: NO

## 2024-12-30 NOTE — TELEPHONE ENCOUNTER
"Central scheduling transferring pt to RN.    \"4 yrs ago, I feel and hurt my back. It was deep tissue injury, but farhat morrell, I hurt my back again and now I am having these horrible spasms where they won't stop. I have tried cold and hot and if I use the heating pad, it helps. The injury is moving across my whole back and into my neck and my shoulders..\"     Denies chest pain, sob.    \"The other morning I got light headed and cool and clammy but not now..\"     \"I have arthritis strength tylenol and I haven't been taking it because it isn't doing anything and I can't take Ibuprofen..\"      Pt will go to ER now. Jessica Roman RN      Reason for Disposition   SEVERE back pain (e.g., excruciating, unable to do any normal activities) and not improved after pain medicine and CARE ADVICE    Additional Information   Negative: Passed out (e.g., fainted, lost consciousness, blacked out and was not responding)   Negative: Shock suspected (e.g., cold/pale/clammy skin, too weak to stand, low BP, rapid pulse)   Negative: Sounds like a life-threatening emergency to the triager   Negative: Major injury to the back (e.g., MVA, fall > 10 feet or 3 meters, penetrating injury, etc.)   Negative: Pain in the upper back over the ribs (rib cage) that radiates (travels) into the chest   Negative: Pain in the upper back over the ribs (rib cage) and worsened by coughing (or clearly increases with breathing)   Negative: Back pain during pregnancy   Negative: SEVERE back pain of sudden onset and age > 60 years   Negative: SEVERE abdominal pain (e.g., excruciating)   Negative: Abdominal pain and age > 60 years   Negative: Unable to urinate (or only a few drops) and bladder feels very full   Negative: Loss of bladder or bowel control (urine or bowel incontinence; wetting self, leaking stool) of new-onset   Negative: Numbness (loss of sensation) in groin or rectal area   Negative: Pain radiates into groin, scrotum   Negative: Blood in urine " "(red, pink, or tea-colored)   Negative: Vomiting and pain over lower ribs of back (i.e., flank - kidney area)   Negative: Weakness of a leg or foot (e.g., unable to bear weight, dragging foot)   Negative: Patient sounds very sick or weak to the triager   Negative: Fever > 100.4 F (38.0 C) and flank pain   Negative: Pain or burning with passing urine (urination)    Answer Assessment - Initial Assessment Questions  1. ONSET: \"When did the pain begin?\" (e.g., minutes, hours, days)      Behzad morrell  2. LOCATION: \"Where does it hurt?\" (upper, mid or lower back)      Started on left, now whole lower back  3. SEVERITY: \"How bad is the pain?\"  (e.g., Scale 1-10; mild, moderate, or severe)  Rates pain: \"All the way to the top\"        4. PATTERN: \"Is the pain constant?\" (e.g., yes, no; constant, intermittent)       \"Constant\"  5. RADIATION: \"Does the pain shoot into your legs or somewhere else?\"      \"Into my neck and shoulders\"  6. CAUSE:  \"What do you think is causing the back pain?\"       Prior injury  7. BACK OVERUSE:  \"Any recent lifting of heavy objects, strenuous work or exercise?\"     Yes, pushing/pulling on windows  8. MEDICINES: \"What have you taken so far for the pain?\" (e.g., nothing, acetaminophen, NSAIDS)    \"I can't take nsaids and tylenol doesn't work\"        9. NEUROLOGIC SYMPTOMS: \"Do you have any weakness, numbness, or problems with bowel/bladder control?\"        10. OTHER SYMPTOMS: \"Do you have any other symptoms?\" (e.g., fever, abdomen pain, burning with urination, blood in urine)        denies  11. PREGNANCY: \"Is there any chance you are pregnant?\" \"When was your last menstrual period?\"       Denies    Protocols used: Back Pain-A-[place]    "

## 2024-12-30 NOTE — ED TRIAGE NOTES
Pt reports 12/14 she was reaching up to close the window and started having back pain. Reports she has been taking tylenol without relief.      Triage Assessment (Adult)       Row Name 12/30/24 1712          Triage Assessment    Airway WDL WDL        Respiratory WDL    Respiratory WDL WDL

## 2024-12-31 NOTE — ED PROVIDER NOTES
History     Chief Complaint   Patient presents with    Back Pain     HPI  Chyna Moncada is a 74 year old female who presents to the emergency department secondary to back pain.  Patient started having back pain on Behzad Maite when she reached out to make close a window and was pulling on it to light and up in order to lock it.  An hour later she had increased low back pain with muscle spasms.  She has had this previously.  There is no pain rating down the legs.  There is no urinary or fecal incontinence.  She has tried Tylenol without relief.  She has multiple medication allergies which she had some Flexeril at home 5 mg tablets that she has been taking and that has helped as well.    Allergies:  Allergies   Allergen Reactions    Tramadol Anaphylaxis    Benadryl [Diphenhydramine Hcl] Other (See Comments)     Made her goofy    Caffeine Other (See Comments)     Jittery      Codeine Nausea and Vomiting    Darvocet [Propoxyphene N-Apap]      Other reaction(s): GI Upset    Diphenhydramine Other (See Comments)    Lovastatin Other (See Comments) and Muscle Pain (Myalgia)     Other reaction(s): Myalgia  pain    Percocet [Oxycodone-Acetaminophen] Other (See Comments)     Other reaction(s): Dizziness  Sick or dizzy?    Vicodin [Hydrocodone-Acetaminophen] Other (See Comments) and Nausea     Sick or dizzy?       Problem List:    Patient Active Problem List    Diagnosis Date Noted    Unsteady gait 01/18/2024     Priority: Medium    Low platelet count (H) 01/16/2023     Priority: Medium    SVT (supraventricular tachycardia) (H) 03/11/2022     Priority: Medium    Age-related osteoporosis without current pathological fracture 03/11/2022     Priority: Medium    COPD (chronic obstructive pulmonary disease) (H) 12/16/2021     Priority: Medium    Recurrent cold sores 04/30/2014     Priority: Medium    Osteopenia 03/31/2014     Priority: Medium     Dexa scan of bones shows moderate bone loss but not outright osteoporosis.  I do  "not have previous study for comparison, so I think it is up to her what she wants to do regarding her actonel - resume med or repeat dexa in 3 years - 2018.        TGA (transient global amnesia) 03/31/2014     Priority: Medium    Osteoarthritis of hip 02/06/2013     Priority: Medium    Bilateral sensorineural hearing loss 09/30/2012     Priority: Medium     Overview:   Candidate for hearing aids      Tendinitis of shoulder 02/01/2012     Priority: Medium    Postmenopausal atrophic vaginitis 01/20/2010     Priority: Medium    Insomnia 06/19/2009     Priority: Medium    Allergic rhinitis 04/12/2007     Priority: Medium    Anxiety state 04/12/2007     Priority: Medium     Overview:   Infrequent Ativan use.        Cerebral artery occlusion with cerebral infarction (H) 04/12/2007     Priority: Medium     On her [facility] summary page, it says she has history of CVA in the left occipital region, noted incidentally on MRI. The problem entry is dated 4/2007. She recalls being told \"you must have had a stroke at some point\" but never recalls any incident. She does not remember why she was having an MRI of her brain in 2007.      Degeneration of cervical intervertebral disc 04/12/2007     Priority: Medium    Degeneration of lumbar or lumbosacral intervertebral disc 04/12/2007     Priority: Medium    Raynaud's syndrome 04/12/2007     Priority: Medium    Peripheral vascular disease (H) 04/12/2007     Priority: Medium    Atopic rhinitis 04/12/2007     Priority: Medium    Pulmonary nodules 03/31/2014     Priority: Low        Past Medical History:    Past Medical History:   Diagnosis Date    Cerebral embolism with cerebral infarction (H)     Symptomatic menopausal or female climacteric states 04/12/2007       Past Surgical History:    Past Surgical History:   Procedure Laterality Date    COLONOSCOPY N/A 8/17/2023    Procedure: Colonoscopy;  Surgeon: Isak Paulino MD;  Location: [facility]    HYSTERECTOMY, PAP NO LONGER INDICATED " "        Family History:    Family History   Problem Relation Age of Onset    Arthritis Mother         was told was RA    Cancer Mother         GYN    Other - See Comments Mother         phlebitis for which hospitalized several times    Heart Disease Father     Cancer Brother         throat    Alcohol/Drug Maternal Grandfather     Heart Disease Paternal Grandfather         Heart attack    Diabetes Paternal Grandfather        Social History:  Marital Status:   [2]  Social History     Tobacco Use    Smoking status: Former     Current packs/day: 0.00     Types: Cigarettes     Quit date: 1/10/1983     Years since quittin.0    Smokeless tobacco: Never   Vaping Use    Vaping status: Never Used   Substance Use Topics    Alcohol use: Not Currently     Alcohol/week: 1.0 standard drink of alcohol     Types: 1 Glasses of wine per week    Drug use: No        Medications:    cyclobenzaprine (FLEXERIL) 5 MG tablet  alendronate (FOSAMAX) 70 MG tablet  amoxicillin (AMOXIL) 500 MG capsule  aspirin EC 81 MG tablet  calcium-vitamin D (CALTRATE) 600-400 MG-UNIT per tablet  Inulin 2 g CHEW  mometasone (NASONEX) 50 MCG/ACT spray  Multiple Vitamin (MULTI-VITAMINS) TABS  multivitamin (OCUVITE) TABS tablet  pilocarpine (PILOCAR) 2 % ophthalmic solution  pravastatin (PRAVACHOL) 40 MG tablet  RESTASIS 0.05 % ophthalmic emulsion  valACYclovir (VALTREX) 1000 mg tablet          Review of Systems   All other systems reviewed and are negative.      Physical Exam   BP: 114/62  Pulse: 97  Temp: 98.9  F (37.2  C)  Resp: 20  Height: 165.1 cm (5' 5\")  SpO2: 99 %      Physical Exam  Vitals and nursing note reviewed.   Constitutional:       General: She is not in acute distress.     Appearance: Normal appearance. She is well-developed.   HENT:      Head: Normocephalic and atraumatic.   Eyes:      General: No scleral icterus.     Conjunctiva/sclera: Conjunctivae normal.   Cardiovascular:      Rate and Rhythm: Normal rate.   Pulmonary:      " Effort: Pulmonary effort is normal. No respiratory distress.   Abdominal:      General: Abdomen is flat.   Musculoskeletal:      Cervical back: Normal range of motion and neck supple.      Comments: Decreased range of motion with straight leg raise secondary to increased back pain.  No radicular symptoms down the legs with straight leg raise but there is increased discomfort at approximately 20 degrees on the left and 40 degrees on the right.   Skin:     General: Skin is warm and dry.      Findings: No rash.   Neurological:      General: No focal deficit present.      Mental Status: She is alert and oriented to person, place, and time.      Sensory: No sensory deficit.         ED Course        Procedures             Results for orders placed or performed during the hospital encounter of 12/30/24 (from the past 24 hours)   EKG 12-lead, tracing only   Result Value Ref Range    Systolic Blood Pressure  mmHg    Diastolic Blood Pressure  mmHg    Ventricular Rate 65 BPM    Atrial Rate 65 BPM    OR Interval 150 ms    QRS Duration 82 ms     ms    QTc 420 ms    P Axis 75 degrees    R AXIS 60 degrees    T Axis 44 degrees    Interpretation ECG       Sinus rhythm  Normal ECG  When compared with ECG of 05-Nov-2007 13:22,  No significant change was found  Confirmed by SEE ED PROVIDER NOTE FOR, ECG INTERPRETATION ([id number]),  Kimberly Arroyo ([id number]) on 12/30/2024 9:46:11 PM         Medications   HYDROmorphone (PF) (DILAUDID) injection 0.5 mg (0.5 mg Intramuscular $Given 12/30/24 2009)   ondansetron (ZOFRAN ODT) ODT tab 4 mg (4 mg Oral $Given 12/30/24 2009)   ketorolac (TORADOL) injection 30 mg (30 mg Intramuscular $Given 12/30/24 2009)   ondansetron (ZOFRAN ODT) ODT tab 4 mg (4 mg Oral $Given 12/30/24 0218)       Assessments & Plan (with Medical Decision Making)  74-year-old female who presented to the emergency department secondary to low back pain.  There is no red flags on history or exam.  Most likely this is  muscular.  She can take her Flexeril that she has at home.  After long discussion of options we decided to proceed with 1/2 mg IM Dilaudid, Zofran ODT to prevent any nausea vomiting from the narcotic, IM Toradol.  Patient was discharged home in improved condition.  Return to ER precautions and follow-up precautions discussed.  Patient already has an appointment with her primary care provider on Friday.  She is going to ask for referral to PT.  She was given expected course, gentle stretching exercises and long-term core strengthening exercises.  All questions answered prior to discharge.     I have reviewed the nursing notes.    I have reviewed the findings, diagnosis, plan and need for follow up with the patient.  Addendum: while Waiting for the patient's  to pick her up patient developed some abdominal discomfort lower chest discomfort.  She was given Zofran and some water and an EKG was performed.  She quickly felt better and everything went away.  EKG was normal.         EKG Interpretation:      Interpreted by Buddy Tinsley MD  Time reviewed:[id number]   Symptoms at time of EKG: chest discomfort   Rhythm: normal sinus   Rate: normal  Axis: NORMAL  Ectopy: none  Conduction: normal  ST Segments/ T Waves: No ST-T wave changes  Q Waves: none  Comparison to prior: Unchanged    Clinical Impression: normal EKG          New Prescriptions    No medications on file       Final diagnoses:   Bilateral low back pain without sciatica, unspecified chronicity       12/30/2024   [facility] EMERGENCY DEPT       Buddy Tinsley MD  12/30/24 2006       Buddy Tinsley MD  12/30/24 0809

## 2024-12-31 NOTE — DISCHARGE INSTRUCTIONS
Use the muscle relaxer you already have as needed.  The pain pills that I would have prescribed you seem to have adverse reactions to so I held off on prescribing any of those.  Follow-up with your doctor as planned.  Follow-up with massage therapy and physical therapy as discussed.  It was a pleasure to meet you.  Hopefully this gets better quickly.

## 2025-01-03 ENCOUNTER — ANCILLARY PROCEDURE (OUTPATIENT)
Dept: GENERAL RADIOLOGY | Facility: CLINIC | Age: 75
End: 2025-01-03
Attending: NURSE PRACTITIONER
Payer: COMMERCIAL

## 2025-01-03 ENCOUNTER — OFFICE VISIT (OUTPATIENT)
Dept: FAMILY MEDICINE | Facility: CLINIC | Age: 75
End: 2025-01-03
Payer: COMMERCIAL

## 2025-01-03 VITALS
TEMPERATURE: 98 F | HEIGHT: 65 IN | RESPIRATION RATE: 16 BRPM | OXYGEN SATURATION: 98 % | SYSTOLIC BLOOD PRESSURE: 136 MMHG | WEIGHT: 133.6 LBS | DIASTOLIC BLOOD PRESSURE: 82 MMHG | HEART RATE: 94 BPM | BODY MASS INDEX: 22.26 KG/M2

## 2025-01-03 DIAGNOSIS — F32.1 CURRENT MODERATE EPISODE OF MAJOR DEPRESSIVE DISORDER WITHOUT PRIOR EPISODE (H): ICD-10-CM

## 2025-01-03 DIAGNOSIS — M81.0 AGE RELATED OSTEOPOROSIS, UNSPECIFIED PATHOLOGICAL FRACTURE PRESENCE: ICD-10-CM

## 2025-01-03 DIAGNOSIS — Z00.00 ENCOUNTER FOR MEDICARE ANNUAL WELLNESS EXAM: Primary | ICD-10-CM

## 2025-01-03 DIAGNOSIS — M62.830 BACK MUSCLE SPASM: ICD-10-CM

## 2025-01-03 DIAGNOSIS — M51.361 DEGENERATION OF INTERVERTEBRAL DISC OF LUMBAR REGION WITH LOWER EXTREMITY PAIN: ICD-10-CM

## 2025-01-03 DIAGNOSIS — S46.919A STRAIN OF SHOULDER, UNSPECIFIED LATERALITY, INITIAL ENCOUNTER: ICD-10-CM

## 2025-01-03 DIAGNOSIS — E55.9 VITAMIN D DEFICIENCY: ICD-10-CM

## 2025-01-03 DIAGNOSIS — E08.59 DIABETES DUE TO UNDERLYING CONDITION W OTH CIRCULATORY COMP (H): ICD-10-CM

## 2025-01-03 DIAGNOSIS — M54.16 LUMBAR RADICULOPATHY: ICD-10-CM

## 2025-01-03 DIAGNOSIS — I63.50 CEREBRAL ARTERY OCCLUSION WITH CEREBRAL INFARCTION (H): ICD-10-CM

## 2025-01-03 DIAGNOSIS — R11.0 NAUSEA: ICD-10-CM

## 2025-01-03 LAB
ALBUMIN SERPL BCG-MCNC: 4.2 G/DL (ref 3.5–5.2)
ALP SERPL-CCNC: 116 U/L (ref 40–150)
ALT SERPL W P-5'-P-CCNC: 27 U/L (ref 0–50)
ANION GAP SERPL CALCULATED.3IONS-SCNC: 14 MMOL/L (ref 7–15)
AST SERPL W P-5'-P-CCNC: 26 U/L (ref 0–45)
BILIRUB SERPL-MCNC: 0.5 MG/DL
BUN SERPL-MCNC: 20.9 MG/DL (ref 8–23)
CALCIUM SERPL-MCNC: 9.6 MG/DL (ref 8.8–10.4)
CHLORIDE SERPL-SCNC: 103 MMOL/L (ref 98–107)
CHOLEST SERPL-MCNC: 173 MG/DL
CREAT SERPL-MCNC: 0.77 MG/DL (ref 0.51–0.95)
EGFRCR SERPLBLD CKD-EPI 2021: 80 ML/MIN/1.73M2
ERYTHROCYTE [DISTWIDTH] IN BLOOD BY AUTOMATED COUNT: 12.5 % (ref 10–15)
FASTING STATUS PATIENT QL REPORTED: ABNORMAL
FASTING STATUS PATIENT QL REPORTED: NORMAL
GLUCOSE SERPL-MCNC: 117 MG/DL (ref 70–99)
HCO3 SERPL-SCNC: 25 MMOL/L (ref 22–29)
HCT VFR BLD AUTO: 42.1 % (ref 35–47)
HDLC SERPL-MCNC: 100 MG/DL
HGB BLD-MCNC: 13.9 G/DL (ref 11.7–15.7)
LDLC SERPL CALC-MCNC: 57 MG/DL
MCH RBC QN AUTO: 28.7 PG (ref 26.5–33)
MCHC RBC AUTO-ENTMCNC: 33 G/DL (ref 31.5–36.5)
MCV RBC AUTO: 87 FL (ref 78–100)
NONHDLC SERPL-MCNC: 73 MG/DL
PLATELET # BLD AUTO: 176 10E3/UL (ref 150–450)
POTASSIUM SERPL-SCNC: 4.9 MMOL/L (ref 3.4–5.3)
PROT SERPL-MCNC: 7.2 G/DL (ref 6.4–8.3)
RBC # BLD AUTO: 4.85 10E6/UL (ref 3.8–5.2)
SODIUM SERPL-SCNC: 142 MMOL/L (ref 135–145)
TRIGL SERPL-MCNC: 82 MG/DL
VIT D+METAB SERPL-MCNC: 64 NG/ML (ref 20–50)
WBC # BLD AUTO: 6.8 10E3/UL (ref 4–11)

## 2025-01-03 PROCEDURE — 72100 X-RAY EXAM L-S SPINE 2/3 VWS: CPT | Mod: TC | Performed by: RADIOLOGY

## 2025-01-03 PROCEDURE — G0439 PPPS, SUBSEQ VISIT: HCPCS | Performed by: NURSE PRACTITIONER

## 2025-01-03 PROCEDURE — 36415 COLL VENOUS BLD VENIPUNCTURE: CPT | Performed by: NURSE PRACTITIONER

## 2025-01-03 PROCEDURE — 80061 LIPID PANEL: CPT | Performed by: NURSE PRACTITIONER

## 2025-01-03 PROCEDURE — 85027 COMPLETE CBC AUTOMATED: CPT | Performed by: NURSE PRACTITIONER

## 2025-01-03 PROCEDURE — 73030 X-RAY EXAM OF SHOULDER: CPT | Mod: TC | Performed by: RADIOLOGY

## 2025-01-03 PROCEDURE — 80053 COMPREHEN METABOLIC PANEL: CPT | Performed by: NURSE PRACTITIONER

## 2025-01-03 PROCEDURE — 82306 VITAMIN D 25 HYDROXY: CPT | Performed by: NURSE PRACTITIONER

## 2025-01-03 PROCEDURE — 99214 OFFICE O/P EST MOD 30 MIN: CPT | Mod: 25 | Performed by: NURSE PRACTITIONER

## 2025-01-03 RX ORDER — CYCLOBENZAPRINE HCL 5 MG
5 TABLET ORAL 2 TIMES DAILY PRN
Qty: 60 TABLET | Refills: 3 | Status: SHIPPED | OUTPATIENT
Start: 2025-01-03

## 2025-01-03 RX ORDER — PRAVASTATIN SODIUM 40 MG
TABLET ORAL
Qty: 90 TABLET | Refills: 3 | Status: SHIPPED | OUTPATIENT
Start: 2025-01-03

## 2025-01-03 RX ORDER — MELOXICAM 7.5 MG/1
7.5 TABLET ORAL DAILY
Qty: 30 TABLET | Refills: 0 | Status: SHIPPED | OUTPATIENT
Start: 2025-01-03

## 2025-01-03 RX ORDER — ONDANSETRON 4 MG/1
4 TABLET, ORALLY DISINTEGRATING ORAL EVERY 8 HOURS PRN
Qty: 20 TABLET | Refills: 0 | Status: SHIPPED | OUTPATIENT
Start: 2025-01-03

## 2025-01-03 RX ORDER — ALENDRONATE SODIUM 70 MG/1
70 TABLET ORAL
Qty: 12 TABLET | Refills: 3 | Status: SHIPPED | OUTPATIENT
Start: 2025-01-03

## 2025-01-03 RX ORDER — CALCIUM CARBONATE 500 MG/1
2 TABLET, CHEWABLE ORAL DAILY
COMMUNITY

## 2025-01-03 SDOH — HEALTH STABILITY: PHYSICAL HEALTH: ON AVERAGE, HOW MANY MINUTES DO YOU ENGAGE IN EXERCISE AT THIS LEVEL?: PATIENT DECLINED

## 2025-01-03 SDOH — HEALTH STABILITY: PHYSICAL HEALTH
ON AVERAGE, HOW MANY DAYS PER WEEK DO YOU ENGAGE IN MODERATE TO STRENUOUS EXERCISE (LIKE A BRISK WALK)?: PATIENT DECLINED

## 2025-01-03 ASSESSMENT — PATIENT HEALTH QUESTIONNAIRE - PHQ9
SUM OF ALL RESPONSES TO PHQ QUESTIONS 1-9: 4
SUM OF ALL RESPONSES TO PHQ QUESTIONS 1-9: 4
10. IF YOU CHECKED OFF ANY PROBLEMS, HOW DIFFICULT HAVE THESE PROBLEMS MADE IT FOR YOU TO DO YOUR WORK, TAKE CARE OF THINGS AT HOME, OR GET ALONG WITH OTHER PEOPLE: NOT DIFFICULT AT ALL

## 2025-01-03 ASSESSMENT — PAIN SCALES - GENERAL: PAINLEVEL_OUTOF10: EXTREME PAIN (8)

## 2025-01-03 ASSESSMENT — SOCIAL DETERMINANTS OF HEALTH (SDOH): HOW OFTEN DO YOU GET TOGETHER WITH FRIENDS OR RELATIVES?: PATIENT DECLINED

## 2025-01-03 NOTE — PATIENT INSTRUCTIONS
Patient Education   Preventive Care Advice   This is general advice given by our system to help you stay healthy. However, your care team may have specific advice just for you. Please talk to your care team about your preventive care needs.  Nutrition  Eat 5 or more servings of fruits and vegetables each day.  Try wheat bread, brown rice and whole grain pasta (instead of white bread, rice, and pasta).  Get enough calcium and vitamin D. Check the label on foods and aim for 100% of the RDA (recommended daily allowance).  Lifestyle  Exercise at least 150 minutes each week  (30 minutes a day, 5 days a week).  Do muscle strengthening activities 2 days a week. These help control your weight and prevent disease.  No smoking.  Wear sunscreen to prevent skin cancer.  Have a dental exam and cleaning every 6 months.  Yearly exams  See your health care team every year to talk about:  Any changes in your health.  Any medicines your care team has prescribed.  Preventive care, family planning, and ways to prevent chronic diseases.  Shots (vaccines)   HPV shots (up to age 26), if you've never had them before.  Hepatitis B shots (up to age 59), if you've never had them before.  COVID-19 shot: Get this shot when it's due.  Flu shot: Get a flu shot every year.  Tetanus shot: Get a tetanus shot every 10 years.  Pneumococcal, hepatitis A, and RSV shots: Ask your care team if you need these based on your risk.  Shingles shot (for age 50 and up)  General health tests  Diabetes screening:  Starting at age 35, Get screened for diabetes at least every 3 years.  If you are younger than age 35, ask your care team if you should be screened for diabetes.  Cholesterol test: At age 39, start having a cholesterol test every 5 years, or more often if advised.  Bone density scan (DEXA): At age 50, ask your care team if you should have this scan for osteoporosis (brittle bones).  Hepatitis C: Get tested at least once in your life.  STIs (sexually  transmitted infections)  Before age 24: Ask your care team if you should be screened for STIs.  After age 24: Get screened for STIs if you're at risk. You are at risk for STIs (including HIV) if:  You are sexually active with more than one person.  You don't use condoms every time.  You or a partner was diagnosed with a sexually transmitted infection.  If you are at risk for HIV, ask about PrEP medicine to prevent HIV.  Get tested for HIV at least once in your life, whether you are at risk for HIV or not.  Cancer screening tests  Cervical cancer screening: If you have a cervix, begin getting regular cervical cancer screening tests starting at age 21.  Breast cancer scan (mammogram): If you've ever had breasts, begin having regular mammograms starting at age 40. This is a scan to check for breast cancer.  Colon cancer screening: It is important to start screening for colon cancer at age 45.  Have a colonoscopy test every 10 years (or more often if you're at risk) Or, ask your provider about stool tests like a FIT test every year or Cologuard test every 3 years.  To learn more about your testing options, visit:   .  For help making a decision, visit:   https://bit.ly/pk60081.  Prostate cancer screening test: If you have a prostate, ask your care team if a prostate cancer screening test (PSA) at age 55 is right for you.  Lung cancer screening: If you are a current or former smoker ages 50 to 80, ask your care team if ongoing lung cancer screenings are right for you.  For informational purposes only. Not to replace the advice of your health care provider. Copyright   2023 [facility]. All rights reserved. Clinically reviewed by the [facility] Transitions Program. [employer] 916930 - REV 01/24.  Preventing Falls: Care Instructions  Injuries and health problems such as trouble walking or poor eyesight can increase your risk of falling. So can some medicines. But there are things you can do to help  "prevent falls. You can exercise to get stronger. You can also arrange your home to make it safer.    Talk to your doctor about the medicines you take. Ask if any of them increase the risk of falls and whether they can be changed or stopped.   Try to exercise regularly. It can help improve your strength and balance. This can help lower your risk of falling.         Practice fall safety and prevention.   Wear low-heeled shoes that fit well and give your feet good support. Talk to your doctor if you have foot problems that make this hard.  Carry a cellphone or wear a medical alert device that you can use to call for help.  Use stepladders instead of chairs to reach high objects. Don't climb if you're at risk for falls. Ask for help, if needed.  Wear the correct eyeglasses, if you need them.        Make your home safer.   Remove rugs, cords, clutter, and furniture from walkways.  Keep your house well lit. Use night-lights in hallways and bathrooms.  Install and use sturdy handrails on stairways.  Wear nonskid footwear, even inside. Don't walk barefoot or in socks without shoes.        Be safe outside.   Use handrails, curb cuts, and ramps whenever possible.  Keep your hands free by using a shoulder bag or backpack.  Try to walk in well-lit areas. Watch out for uneven ground, changes in pavement, and debris.  Be careful in the winter. Walk on the grass or gravel when sidewalks are slippery. Use de-icer on steps and walkways. Add non-slip devices to shoes.    Put grab bars and nonskid mats in your shower or tub and near the toilet. Try to use a shower chair or bath bench when bathing.   Get into a tub or shower by putting in your weaker leg first. Get out with your strong side first. Have a phone or medical alert device in the bathroom with you.   Where can you learn more?  Go to https://www.[employer]Revwise.net/patiented  Enter G117 in the search box to learn more about \"Preventing Falls: Care Instructions.\"  Current as of: " July 31, 2024  Content Version: 14.3    2024 [employer].   Care instructions adapted under license by your healthcare professional. If you have questions about a medical condition or this instruction, always ask your healthcare professional. [employer] disclaims any warranty or liability for your use of this information.    Hearing Loss: Care Instructions  Overview     Hearing loss is a sudden or slow decrease in how well you hear. It can range from slight to profound. Permanent hearing loss can occur with aging. It also can happen when you are exposed long-term to loud noise. Examples include listening to loud music, riding motorcycles, or being around other loud machines.  Hearing loss can affect your work and home life. It can make you feel lonely or depressed. You may feel that you have lost your independence. But hearing aids and other devices can help you hear better and feel connected to others.  Follow-up care is a key part of your treatment and safety. Be sure to make and go to all appointments, and call your doctor if you are having problems. It's also a good idea to know your test results and keep a list of the medicines you take.  How can you care for yourself at home?  Avoid loud noises whenever possible. This helps keep your hearing from getting worse.  Always wear hearing protection around loud noises.  Wear a hearing aid as directed.  A professional can help you pick a hearing aid that will work best for you.  You can also get hearing aids over the counter for mild to moderate hearing loss.  Have hearing tests as your doctor suggests. They can show whether your hearing has changed. Your hearing aid may need to be adjusted.  Use other devices as needed. These may include:  Telephone amplifiers and hearing aids that can connect to a television, stereo, radio, or microphone.  Devices that use lights or vibrations. These alert you to the doorbell, a ringing telephone, or a baby  "monitor.  Television closed-captioning. This shows the words at the bottom of the screen. Most new TVs can do this.  TTY (text telephone). This lets you type messages back and forth on the telephone instead of talking or listening. These devices are also called TDD. When messages are typed on the keyboard, they are sent over the phone line to a receiving TTY. The message is shown on a monitor.  Use text messaging, social media, and email if it is hard for you to communicate by telephone.  Try to learn a listening technique called speechreading. It is not lipreading. You pay attention to people's gestures, expressions, posture, and tone of voice. These clues can help you understand what a person is saying. Face the person you are talking to, and have them face you. Make sure the lighting is good. You need to see the other person's face clearly.  Think about counseling if you need help to adjust to your hearing loss.  When should you call for help?  Watch closely for changes in your health, and be sure to contact your doctor if:    You think your hearing is getting worse.     You have new symptoms, such as dizziness or nausea.   Where can you learn more?  Go to https://www.[employer].net/patiented  Enter R798 in the search box to learn more about \"Hearing Loss: Care Instructions.\"  Current as of: September 27, 2023  Content Version: 14.3    2024 [employer].   Care instructions adapted under license by your healthcare professional. If you have questions about a medical condition or this instruction, always ask your healthcare professional. [employer] disclaims any warranty or liability for your use of this information.       "

## 2025-01-03 NOTE — NURSING NOTE
"Initial BP (!) 146/82   Pulse 94   Temp 98  F (36.7  C) (Tympanic)   Resp 16   Ht 1.651 m (5' 5\")   Wt 60.6 kg (133 lb 9.6 oz)   SpO2 98%   BMI 22.23 kg/m   Estimated body mass index is 22.23 kg/m  as calculated from the following:    Height as of this encounter: 1.651 m (5' 5\").    Weight as of this encounter: 60.6 kg (133 lb 9.6 oz). .    "

## 2025-01-03 NOTE — PROGRESS NOTES
Preventive Care Visit  [facility]  GEORGE Al CNP, Family Medicine  Pavel 3, 2025      Assessment & Plan   Problem List Items Addressed This Visit          Nervous and Auditory    Cerebral artery occlusion with cerebral infarction (H)    Relevant Medications    pravastatin (PRAVACHOL) 40 MG tablet       Endocrine    Diabetes due to underlying condition w oth circulatory comp (H)    Relevant Medications    alendronate (FOSAMAX) 70 MG tablet       Behavioral    Current moderate episode of major depressive disorder without prior episode (H)     Other Visit Diagnoses       Encounter for Medicare annual wellness exam    -  Primary    Relevant Orders    REVIEW OF HEALTH MAINTENANCE PROTOCOL ORDERS (Completed)    Lipid panel reflex to direct LDL Non-fasting (Completed)    Comprehensive metabolic panel (BMP + Alb, Alk Phos, ALT, AST, Total. Bili, TP) (Completed)    CBC with platelets (Completed)    Lumbar radiculopathy        Relevant Medications    cyclobenzaprine (FLEXERIL) 5 MG tablet    meloxicam (MOBIC) 7.5 MG tablet    Other Relevant Orders    XR Lumbar Spine 2/3 Views (Completed)    Physical Therapy  Referral    Strain of shoulder, unspecified laterality, initial encounter        Relevant Medications    cyclobenzaprine (FLEXERIL) 5 MG tablet    meloxicam (MOBIC) 7.5 MG tablet    Other Relevant Orders    XR Shoulder Bilateral G/E 2 Views (Completed)    Physical Therapy  Referral    Physical Therapy  Referral    Vitamin D deficiency        Relevant Orders    Vitamin D Deficiency (Completed)    Vitamin D Deficiency    Age related osteoporosis, unspecified pathological fracture presence        Relevant Medications    alendronate (FOSAMAX) 70 MG tablet    cyclobenzaprine (FLEXERIL) 5 MG tablet    meloxicam (MOBIC) 7.5 MG tablet    Back muscle spasm        Relevant Medications    cyclobenzaprine (FLEXERIL) 5 MG tablet    meloxicam (MOBIC) 7.5 MG tablet     Nausea        Relevant Medications    ondansetron (ZOFRAN ODT) 4 MG ODT tab    Degeneration of intervertebral disc of lumbar region with lower extremity pain        Relevant Medications    alendronate (FOSAMAX) 70 MG tablet    cyclobenzaprine (FLEXERIL) 5 MG tablet    meloxicam (MOBIC) 7.5 MG tablet    Other Relevant Orders    Spine  Referral             Patient has been advised of split billing requirements and indicates understanding: Yes       Counseling  Appropriate preventive services were addressed with this patient via screening, questionnaire, or discussion as appropriate for fall prevention, nutrition, physical activity, Tobacco-use cessation, social engagement, weight loss and cognition.  Checklist reviewing preventive services available has been given to the patient.  Reviewed patient's diet, addressing concerns and/or questions.   The patient was provided with written information regarding signs of hearing loss.     See Patient Instructions      Deneen Clemente is a 74 year old, presenting for the following:  Wellness Visit        1/3/2025     6:54 AM   Additional Questions   Roomed by Bailey LEW CMA           HPI          Health Care Directive  Patient does not have a Health Care Directive: Discussed advance care planning with patient; however, patient declined at this time.      1/3/2025   General Health   How would you rate your overall physical health? (!) FAIR   Feel stress (tense, anxious, or unable to sleep) Only a little   (!) STRESS CONCERN      1/3/2025   Nutrition   Diet: Regular (no restrictions)         1/3/2025   Exercise   Days per week of moderate/strenous exercise Patient declined   Average minutes spent exercising at this level Patient declined         1/3/2025   Social Factors   Frequency of gathering with friends or relatives Patient declined   Worry food won't last until get money to buy more No   Food not last or not have enough money for food? No   Do you have housing?  (Housing is defined as stable permanent housing and does not include staying ouside in a car, in a tent, in an abandoned building, in an overnight shelter, or couch-surfing.) Yes   Are you worried about losing your housing? No   Lack of transportation? No   Unable to get utilities (heat,electricity)? No         1/3/2025   Fall Risk   Fallen 2 or more times in the past year? No   Trouble with walking or balance? Yes           1/3/2025   Activities of Daily Living- Home Safety   Needs help with the following daily activites None of the above   Safety concerns in the home None of the above         1/3/2025   Dental   Dentist two times every year? Yes         1/3/2025   Hearing Screening   Hearing concerns? (!) I NEED TO ASK PEOPLE TO SPEAK UP OR REPEAT THEMSELVES.    (!) IT'S HARD TO FOLLOW A CONVERSATION IN A NOISY RESTAURANT OR CROWDED ROOM.    (!) TROUBLE UNDERSTANDING SOFT OR WHISPERED SPEECH.    (!) TROUBLE UNDERSTANDING SPEECH ON THE TELEPHONE       Multiple values from one day are sorted in reverse-chronological order         1/3/2025   Driving Risk Screening   Patient/family members have concerns about driving No         1/3/2025   General Alertness/Fatigue Screening   Have you been more tired than usual lately? (!) DECLINE         1/3/2025   Urinary Incontinence Screening   Bothered by leaking urine in past 6 months No         1/3/2025   TB Screening   Were you born outside of the [place]? No       Today's PHQ-9 Score:       1/3/2025     6:49 AM   PHQ-9 SCORE   PHQ-9 Total Score MyChart 4 (Minimal depression)   PHQ-9 Total Score 4        Patient-reported         1/3/2025   Substance Use   Alcohol more than 3/day or more than 7/wk Not Applicable   Do you have a current opioid prescription? No   How severe/bad is pain from 1 to 10? 8/10   Do you use any other substances recreationally? No     Social History     Tobacco Use    Smoking status: Former     Current packs/day: 0.00     Types: Cigarettes     Quit date:  1/10/1983     Years since quittin.0    Smokeless tobacco: Never   Vaping Use    Vaping status: Never Used   Substance Use Topics    Alcohol use: Not Currently     Alcohol/week: 1.0 standard drink of alcohol     Types: 1 Glasses of wine per week    Drug use: No           2024   LAST FHS-7 RESULTS   1st degree relative breast or ovarian cancer Unknown   Any relative bilateral breast cancer No   Any male have breast cancer No   Any ONE woman have BOTH breast AND ovarian cancer No   Any woman with breast cancer before 50yrs No   2 or more relatives with breast AND/OR ovarian cancer No   2 or more relatives with breast AND/OR bowel cancer No        Mammogram Screening - After age 74- determine frequency with patient based on health status, life expectancy and patient goals    ASCVD Risk   The ASCVD Risk score (Betty CHAUHAN, et al., 2019) failed to calculate for the following reasons:    Risk score cannot be calculated because patient has a medical history suggesting prior/existing ASCVD    Fracture Risk Assessment Tool  Link to Frax Calculator  Use the information below to complete the Frax calculator  : 1950  Sex: female  Weight (kg): 60.6 kg (actual weight)  Height (cm): 165.1 cm  Previous Fragility Fracture:  No  History of parent with fractured hip:  No  Current Smoking:  No  Patient has been on glucocorticoids for more than 3 months (5mg/day or more): No  Rheumatoid Arthritis on Problem List:  No  Secondary Osteoporosis on Problem List:  No  Consumes 3 or more units of alcohol per day: No  Femoral Neck BMD (g/cm2)            Reviewed and updated as needed this visit by Provider                    Labs reviewed in EPIC  BP Readings from Last 3 Encounters:   25 (!) 146/82   24 112/69   24 118/68    Wt Readings from Last 3 Encounters:   25 60.6 kg (133 lb 9.6 oz)   24 61.2 kg (135 lb)   24 61.5 kg (135 lb 9.6 oz)                  Patient Active Problem List    Diagnosis    Allergic rhinitis    Anxiety state    Cerebral artery occlusion with cerebral infarction (H)    Degeneration of cervical intervertebral disc    Degeneration of lumbar or lumbosacral intervertebral disc    Insomnia    Postmenopausal atrophic vaginitis    Raynaud's syndrome    Osteopenia    Pulmonary nodules    TGA (transient global amnesia)    Recurrent cold sores    Peripheral vascular disease (H)    Atopic rhinitis    Osteoarthritis of hip    Bilateral sensorineural hearing loss    Tendinitis of shoulder    COPD (chronic obstructive pulmonary disease) (H)    SVT (supraventricular tachycardia) (H)    Age-related osteoporosis without current pathological fracture    Low platelet count (H)    Unsteady gait     Past Surgical History:   Procedure Laterality Date    COLONOSCOPY N/A 2023    Procedure: Colonoscopy;  Surgeon: Isak Paulino MD;  Location: [facility]    HYSTERECTOMY, PAP NO LONGER INDICATED         Social History     Tobacco Use    Smoking status: Former     Current packs/day: 0.00     Types: Cigarettes     Quit date: 1/10/1983     Years since quittin.0    Smokeless tobacco: Never   Substance Use Topics    Alcohol use: Not Currently     Alcohol/week: 1.0 standard drink of alcohol     Types: 1 Glasses of wine per week     Family History   Problem Relation Age of Onset    Arthritis Mother         was told was RA    Cancer Mother         GYN    Other - See Comments Mother         phlebitis for which hospitalized several times    Heart Disease Father     Cancer Brother         throat    Alcohol/Drug Maternal Grandfather     Heart Disease Paternal Grandfather         Heart attack    Diabetes Paternal Grandfather          Current Outpatient Medications   Medication Sig Dispense Refill    alendronate (FOSAMAX) 70 MG tablet Take 1 tablet (70 mg) by mouth every 7 days. 12 tablet 3    amoxicillin (AMOXIL) 500 MG capsule       aspirin EC 81 MG tablet Take 81 mg by mouth daily        calcium-vitamin D (CALTRATE) 600-400 MG-UNIT per tablet Take 1 tablet by mouth daily       cyclobenzaprine (FLEXERIL) 5 MG tablet Take 1 tablet (5 mg) by mouth 2 times daily as needed for muscle spasms 60 tablet 3    Inulin 2 g CHEW       mometasone (NASONEX) 50 MCG/ACT spray Spray 2 sprays in nostril daily       Multiple Vitamin (MULTI-VITAMINS) TABS Take 1 tablet by mouth daily      multivitamin (OCUVITE) TABS tablet Take 1 tablet by mouth daily      pilocarpine (PILOCAR) 2 % ophthalmic solution       pravastatin (PRAVACHOL) 40 MG tablet TAKE ONE TABLET BY MOUTH EVERY NIGHT AT BEDTIME 90 tablet 3    RESTASIS 0.05 % ophthalmic emulsion       valACYclovir (VALTREX) 1000 mg tablet TAKE TWO TABLETS BY MOUTH TWICE A DAY 4 tablet 11     Allergies   Allergen Reactions    Tramadol Anaphylaxis    Benadryl [Diphenhydramine Hcl] Other (See Comments)     Made her goofy    Caffeine Other (See Comments)     Jittery      Codeine Nausea and Vomiting    Darvocet [Propoxyphene N-Apap]      Other reaction(s): GI Upset    Diphenhydramine Other (See Comments)    Lovastatin Other (See Comments) and Muscle Pain (Myalgia)     Other reaction(s): Myalgia  pain    Percocet [Oxycodone-Acetaminophen] Other (See Comments)     Other reaction(s): Dizziness  Sick or dizzy?    Vicodin [Hydrocodone-Acetaminophen] Other (See Comments) and Nausea     Sick or dizzy?     Recent Labs   Lab Test 12/21/23  0742 08/10/23  0751 07/10/23  0820 04/27/23  0815 01/16/23  0750 12/19/22  0732 12/16/21  0843 12/16/21  0843 04/22/21  0912 12/10/20  0846 07/24/20  1716   LDL 71  --   --   --   --  63  --  63  --    < >  --    HDL 94  --   --   --   --  103  --  105  --    < >  --    TRIG 84  --   --   --   --  63  --  89  --    < >  --    ALT 18  --   --   --  19 18   < > 23  --   --  25   CR 0.66 0.77  --    < > 0.70 0.68   < > 0.72 0.69  --  0.73   GFRESTIMATED >90 82  --    < > >90 >90   < > 85 88  --  84   GFRESTBLACK  --   --   --   --   --   --   --   --  >90   "--  >90   POTASSIUM 4.2 4.2  --    < > 4.3 4.2   < > 3.8 4.0  --  3.7   TSH  --   --  2.69  --   --   --   --   --  1.64  --  2.32    < > = values in this interval not displayed.      Current providers sharing in care for this patient include:  Patient Care Team:  Nereida Jacobs APRN CNP as PCP - General (Family Medicine)  Nereida Jacobs APRN CNP as Assigned PCP  Cabrera Fields MD as MD (Otolaryngology)  Alaina Ontiveros MD as MD (Otolaryngology)  Judson Roberts MD as MD (Neurology)  Alaina Ontiveros MD as MD (Otolaryngology)  Alaina Ontiveros MD as Assigned Surgical Provider    The following health maintenance items are reviewed in Epic and correct as of today:  Health Maintenance   Topic Date Due    ANNUAL REVIEW OF HM ORDERS  04/27/2024    LIPID  12/21/2024    MEDICARE ANNUAL WELLNESS VISIT  12/21/2024    PHQ-9  07/03/2025    FALL RISK ASSESSMENT  01/03/2026    MAMMO SCREENING  02/09/2026    GLUCOSE  12/21/2026    ADVANCE CARE PLANNING  12/21/2028    COLORECTAL CANCER SCREENING  08/17/2033    DTAP/TDAP/TD IMMUNIZATION (7 - Td or Tdap) 12/21/2033    DEXA  03/08/2037    SPIROMETRY  Completed    HEPATITIS C SCREENING  Completed    COPD ACTION PLAN  Completed    DEPRESSION ACTION PLAN  Completed    INFLUENZA VACCINE  Completed    Pneumococcal Vaccine: 50+ Years  Completed    ZOSTER IMMUNIZATION  Completed    RSV VACCINE  Completed    COVID-19 Vaccine  Completed    HPV IMMUNIZATION  Aged Out    MENINGITIS IMMUNIZATION  Aged Out    RSV MONOCLONAL ANTIBODY  Aged Out         Review of Systems  Constitutional, HEENT, cardiovascular, pulmonary, gi and gu systems are negative, except as otherwise noted.     Objective    Exam  BP (!) 146/82   Pulse 94   Temp 98  F (36.7  C) (Tympanic)   Resp 16   Ht 1.651 m (5' 5\")   Wt 60.6 kg (133 lb 9.6 oz)   SpO2 98%   BMI 22.23 kg/m     Estimated body mass index is 22.47 kg/m  as calculated from the following:    Height as of 12/30/24: 1.651 m (5' " "5\").    Weight as of 3/19/24: 61.2 kg (135 lb).    Physical Exam  GENERAL: alert and no distress  EYES: Eyes grossly normal to inspection, PERRL and conjunctivae and sclerae normal  HENT: ear canals and TM's normal, nose and mouth without ulcers or lesions  NECK: no adenopathy, no asymmetry, masses, or scars  RESP: lungs clear to auscultation - no rales, rhonchi or wheezes  CV: regular rate and rhythm, normal S1 S2, no S3 or S4, no murmur, click or rub, no peripheral edema  MS: no gross musculoskeletal defects noted, no edema  SKIN: no suspicious lesions or rashes  NEURO: Normal strength and tone, mentation intact and speech normal  PSYCH: mentation appears normal, affect normal/bright    Palpation:  Non-tender SC joint, clavicle, AC joint, acromion, subacromial space, proximal bicep tendon and upper trapezius muscle   Range of Motion        Active:all normal        Passive: all normal  Strength: rotator cuff strength full  Special tests: Negative Neer's test, Negative Chadwick, Negative Cross-Arm adduction, Negative Anterior Apprehension, Negative Posterior Apprehension, Negative Sulcus Sign, Negative Nolasco's    Palpation:  Non-tender SC joint, clavicle, AC joint, acromion, subacromial space, proximal bicep tendon and upper trapezius muscle   Range of Motion        Active:all normal        Passive: all normal  Strength: rotator cuff strength full  Special tests: Negative Neer's test, Negative Chadwick, Negative Cross-Arm adduction, Negative Anterior Apprehension, Negative Posterior Apprehension, Negative Sulcus Sign, Negative Nolasco's    Tender:  lumbar spinous processes, lumbar facet joints, left para lumbar muscles, right para lumbar muscles  Non-tender:  thoracic spinous processes  Range of Motion:  left lateral thoracic bending   decreased, right lateral thoracic bending  decreased, left thoracic rotation  decreased, right thoracic rotation  decreased, lumbar flexion  decreased, lumbar extension  decreased, " painful, left lateral lumbar bending  decreased, painful, right lateral lumbar bending  decreased, painful, left lateral lumbar rotation  decreased, painful, right lateral lumbar rotation  decreased  Strength:  able to heel walk  Special tests:  negative straight leg raises    Hip Exam: Hip ROM full           No data to display                       Signed Electronically by: GEORGE Al CNP    Answers submitted by the patient for this visit:  Patient Health Questionnaire (Submitted on 1/3/2025)  If you checked off any problems, how difficult have these problems made it for you to do your work, take care of things at home, or get along with other people?: Not difficult at all  PHQ9 TOTAL SCORE: 4

## 2025-01-06 ENCOUNTER — TELEPHONE (OUTPATIENT)
Dept: FAMILY MEDICINE | Facility: CLINIC | Age: 75
End: 2025-01-06
Payer: COMMERCIAL

## 2025-01-06 PROBLEM — F32.1 CURRENT MODERATE EPISODE OF MAJOR DEPRESSIVE DISORDER WITHOUT PRIOR EPISODE (H): Status: ACTIVE | Noted: 2025-01-06

## 2025-01-06 PROBLEM — E08.59 DIABETES DUE TO UNDERLYING CONDITION W OTH CIRCULATORY COMP (H): Status: ACTIVE | Noted: 2025-01-06

## 2025-01-06 NOTE — TELEPHONE ENCOUNTER
Test Results    Pt called and requested copy of Vitamin D and Xray from 1/3/25 results be mailed to the address on file. Printed and placed in outgoing mail.    Rupali Montilla on 1/6/2025 at 10:17 AM

## 2025-01-07 ENCOUNTER — PATIENT OUTREACH (OUTPATIENT)
Dept: CARE COORDINATION | Facility: CLINIC | Age: 75
End: 2025-01-07
Payer: COMMERCIAL

## 2025-01-09 ENCOUNTER — THERAPY VISIT (OUTPATIENT)
Dept: PHYSICAL THERAPY | Facility: CLINIC | Age: 75
End: 2025-01-09
Attending: NURSE PRACTITIONER
Payer: COMMERCIAL

## 2025-01-09 DIAGNOSIS — S46.919A STRAIN OF SHOULDER, UNSPECIFIED LATERALITY, INITIAL ENCOUNTER: ICD-10-CM

## 2025-01-09 DIAGNOSIS — M54.16 LUMBAR RADICULOPATHY: ICD-10-CM

## 2025-01-09 PROCEDURE — 97161 PT EVAL LOW COMPLEX 20 MIN: CPT | Mod: GP | Performed by: PHYSICAL THERAPIST

## 2025-01-09 PROCEDURE — 97110 THERAPEUTIC EXERCISES: CPT | Mod: GP | Performed by: PHYSICAL THERAPIST

## 2025-01-09 NOTE — PROGRESS NOTES
PHYSICAL THERAPY EVALUATION  Type of Visit: Evaluation       Fall Risk Screen:  Fall screen completed by: PT  Have you fallen 2 or more times in the past year?: (Patient-Rptd) No  Have you fallen and had an injury in the past year?: (Patient-Rptd) No  Is patient a fall risk?: No    Subjective         Presenting condition or subjective complaint: back pain and shoulder pain    Pt presents with lower back pain after trying to unlock windows on [place]. Pain is located along whole lower back L>R. Short term pain in L buttock. Denies numbness/tingling in BLE. Difficulty with dressing, cleaning, prolonged standing/walking and driving. Signed up for a massage for her lower back as this helped previously. Would like core exercises. Relieving factors are heat.     Her bilateral shoulder are bothersome as well. Pain is located along bilateral upper trap and GHJ. Difficulty sleeping on R side, reaching overhead and daily activities. Would like ROM exercises.         Date of onset: 12/24/25    Relevant medical history: Arthritis; Hearing problems; Menopause; Osteoarthritis; Stroke   Dates & types of surgery: on file with you    Prior diagnostic imaging/testing results: X-ray     Prior therapy history for the same diagnosis, illness or injury: Yes      Prior Level of Function  Independent     Living Environment  Social support: With a significant other or spouse   Type of home: House   Stairs to enter the home: Yes 14 Is there a railing: Yes     Ramp: No   Stairs inside the home: Yes 14 Is there a railing: Yes     Help at home: None  Equipment owned: Straight Cane; Four-point cane; Walker; Crutches     Employment:      Hobbies/Interests:      Patient goals for therapy: everything    Pain assessment: Pain present     Objective   LUMBAR SPINE EVALUATION  PAIN: Pain Level at Rest: 4/10  Pain Level with Use: 8/10  Pain is Exacerbated By: cleaning/ daily use     INTEGUMENTARY (edema, incisions): WFL    POSTURE: Sitting  Posture: Rounded shoulders, Forward head, Thoracic kyphosis increased    GAIT:     Gait Deviations:  bent over posture     BALANCE/PROPRIOCEPTION:     ROM:   (Degrees) Left  Right    Lumbar Side Bend 10% restricted 10% restricted   Lumbar Flexion Hands to mid shin, pulling on L side   Lumbar Extension Full, painfree   Pain:   End feel:         STRENGTH/ MYOTOMES:    Left Right   TA 10 sec 10 sec    T12-L3 (Hip Flexion) 5 5   L2-4 (Quads)  5 5   Knee Flexion 5 5   L4 (Ankle DF) 5- 5   L5 (Great Toe Ext) 5 5   S1 (Toe Raise)     Hip abduction 4 4   Hip Extension 4 4          DTR S:     CORD SIGNS:     DERMATOMES: WFL    FLEXIBILITY: Decreased hamstrings L, Decreased hamstrings R    LUMBAR/HIP Special Tests:   Positive (+) Negative (-) Left Right   PRIYA - -   FADIR/Labrum/HOLLAND - -   Femoral Nerve     Piriformis     SLR - -   Slump + +      PELVIS/SI SPECIAL TESTS/SCREEN:        Left Right Additional Notes   ASLR - -    Gaenslen's Test      Pelvic Compression      Pelvic Gapping      Sacral Thrust      Thigh Thrust        FUNCTIONAL TESTS:     PALPATION: hypertonic R>L thoracic paraspinals      SPINAL SEGMENTAL CONCLUSIONS: normal lumbar mobs, hypomobile thoracic mobs    SHOULDER EVALUATION  PAIN: Pain Level at Rest: 0/10  Pain Level with Use: 5/10  Pain is Exacerbated By: sleeping, reaching  POSTURE: rounded shoulders      ROM:   (Degrees) Left AROM Left PROM Right AROM  Right PROM   Shoulder Flexion full full full Full, end range stiffness   Shoulder Abduction full full full full   Shoulder External Rotation full full full full   Shoulder Internal Rotation T7 full T11 Limited, pain          Shoulder Flexion ER       Shoulder Flexion IR       Elbow Extension       Elbow Flexion       Pain:   End feel:     STRENGTH:   Pain: - none + mild ++ moderate +++ severe  Strength Scale: 0-5/5 Left Right   Shoulder Flexion 5 5-   Shoulder Abduction 5 4+, pain   Shoulder Adduction     Shoulder External Rotation 5 4+, pain    Shoulder Internal Rotation 5 5   Shoulder Horizontal Adduction     Elbow Flexion 5 5-   Elbow Extension 5 5-   Periscapular Strength:     Mid Trap     Lower Trap     Rhomboid     Serratus Anterior           JOINT MOBILITY: hypomoble post GHJ             Assessment & Plan   CLINICAL IMPRESSIONS  Medical Diagnosis: Lumbar radiculopathy (M54.16)    Strain of shoulder, unspecified laterality, initial encounter (S46.003P)    Treatment Diagnosis: Thoracolumbar pain, B shoulder pain   Impression/Assessment: Patient is a 74 year old female with lower back and B shoulder pain complaints.  The following significant findings have been identified: Pain, Decreased ROM/flexibility, Decreased joint mobility, Decreased strength, Impaired balance, Decreased proprioception, Impaired gait, Impaired muscle performance, Decreased activity tolerance, Impaired posture, and Instability. These impairments interfere with their ability to perform self care tasks, recreational activities, household chores, driving , household mobility, and community mobility as compared to previous level of function.     Clinical Decision Making (Complexity):  Clinical Presentation: Stable/Uncomplicated  Clinical Presentation Rationale: based on medical and personal factors listed in PT evaluation  Clinical Decision Making (Complexity): Low complexity    PLAN OF CARE  Treatment Interventions:  Modalities: Cryotherapy, Dry Needling, E-stim, Hot Pack, Mechanical Traction, Ultrasound  Interventions: Gait Training, Manual Therapy, Neuromuscular Re-education, Therapeutic Activity, Therapeutic Exercise, Self-Care/Home Management    Long Term Goals     PT Goal 1  Goal Identifier: LTG  Goal Description: Pt will demonstrate <2/10 pain in lower back in order to perform daily cleaning chores with ease  Target Date: 03/20/25  PT Goal 2  Goal Identifier: STG  Goal Description: Pt will demonstrate full lumbar flexion in order to perform lower body dressing with  ease  Target Date: 02/13/25  PT Goal 3  Goal Identifier: LTG  Goal Description: Pt will demonstrate 5/5 B RC strength in order to lift and carry objects without pain  Target Date: 03/20/25  PT Goal 4  Goal Identifier: LTG  Goal Description: Pt will demonstrate independence in HEP in order toself manage symptoms  Target Date: 03/20/25      Frequency of Treatment: 1x week  Duration of Treatment: 10 weeks    Recommended Referrals to Other Professionals:   Education Assessment:   Learner/Method: Patient;Listening;Demonstration;Pictures/Video;Reading    Risks and benefits of evaluation/treatment have been explained.   Patient/Family/caregiver agrees with Plan of Care.     Evaluation Time:     PT Eval, Low Complexity Minutes (23841): 25       Signing Clinician: Martha Murray, PT        [facility]                                                                                   OUTPATIENT PHYSICAL THERAPY      PLAN OF TREATMENT FOR OUTPATIENT REHABILITATION   Patient's Last Name, First Name, LUCINAJanelleLINDAJanelle  aNtanaelskyChyna burnham  LISA YOB: 1950   Provider's Name   [facility]   Medical Record No.  [id number]     Onset Date: 12/24/25  Start of Care Date: 01/09/25     Medical Diagnosis:  Lumbar radiculopathy (M54.16)    Strain of shoulder, unspecified laterality, initial encounter (S46.919A)      PT Treatment Diagnosis:  Thoracolumbar pain, B shoulder pain Plan of Treatment  Frequency/Duration: 1x week/ 10 weeks    Certification date from 01/09/25 to 03/20/25         See note for plan of treatment details and functional goals     Martha Murray, PT                         I CERTIFY THE NEED FOR THESE SERVICES FURNISHED UNDER        THIS PLAN OF TREATMENT AND WHILE UNDER MY CARE     (Physician attestation of this document indicates review and certification of the therapy plan).              Referring Provider:  Nereida Jacobs    Initial Assessment  See Epic  Evaluation- Start of Care Date: 01/09/25

## 2025-01-16 ENCOUNTER — THERAPY VISIT (OUTPATIENT)
Dept: PHYSICAL THERAPY | Facility: CLINIC | Age: 75
End: 2025-01-16
Attending: NURSE PRACTITIONER
Payer: COMMERCIAL

## 2025-01-16 DIAGNOSIS — S46.919A STRAIN OF SHOULDER, UNSPECIFIED LATERALITY, INITIAL ENCOUNTER: ICD-10-CM

## 2025-01-16 DIAGNOSIS — M54.16 LUMBAR RADICULOPATHY: Primary | ICD-10-CM

## 2025-01-16 PROCEDURE — 97110 THERAPEUTIC EXERCISES: CPT | Mod: GP | Performed by: PHYSICAL THERAPIST

## 2025-01-23 ENCOUNTER — TELEPHONE (OUTPATIENT)
Dept: FAMILY MEDICINE | Facility: CLINIC | Age: 75
End: 2025-01-23

## 2025-01-23 ENCOUNTER — THERAPY VISIT (OUTPATIENT)
Dept: PHYSICAL THERAPY | Facility: CLINIC | Age: 75
End: 2025-01-23
Attending: NURSE PRACTITIONER
Payer: COMMERCIAL

## 2025-01-23 DIAGNOSIS — M54.16 LUMBAR RADICULOPATHY: Primary | ICD-10-CM

## 2025-01-23 DIAGNOSIS — S46.919A STRAIN OF SHOULDER, UNSPECIFIED LATERALITY, INITIAL ENCOUNTER: ICD-10-CM

## 2025-01-23 PROCEDURE — 97140 MANUAL THERAPY 1/> REGIONS: CPT | Mod: GP | Performed by: PHYSICAL THERAPIST

## 2025-01-23 PROCEDURE — 97110 THERAPEUTIC EXERCISES: CPT | Mod: GP | Performed by: PHYSICAL THERAPIST

## 2025-01-23 NOTE — TELEPHONE ENCOUNTER
Order/Referral Request    Who is requesting: Patient    Orders being requested: MRI    Reason service is needed/diagnosis: was just seen for PT and was recommended to get one before seeing the spine specialist.     When are orders needed by: asap    Has this been discussed with Provider: No    Does patient have a preference on a Group/Provider/Facility? [facility]    Does patient have an appointment scheduled?: No    Where to send orders: Place orders within [facility] and let patient know so they can schedule    Okay to leave a detailed message?: Yes at Cell number on file:    Telephone Information:   Mobile [phone]

## 2025-01-23 NOTE — TELEPHONE ENCOUNTER
Notified patient    FYI- Looks like the order for the MRI was not signed , please sign      Keerthi Barnett/ Patient

## 2025-01-23 NOTE — TELEPHONE ENCOUNTER
Notify patient I placed an order for MRI of the back patient to call to schedule    Please contact [phone] to schedule your diagnostic procedure     Nereida Jacobs CNP

## 2025-01-23 NOTE — TELEPHONE ENCOUNTER
Notify patient I have ordered that MRI she should call to schedule.    Please contact [phone] to schedule your diagnostic procedure

## 2025-01-26 DIAGNOSIS — M54.16 LUMBAR RADICULOPATHY: ICD-10-CM

## 2025-01-26 DIAGNOSIS — S46.919A STRAIN OF SHOULDER, UNSPECIFIED LATERALITY, INITIAL ENCOUNTER: ICD-10-CM

## 2025-01-27 RX ORDER — MELOXICAM 7.5 MG/1
7.5 TABLET ORAL DAILY
Qty: 30 TABLET | Refills: 0 | Status: SHIPPED | OUTPATIENT
Start: 2025-01-27

## 2025-01-27 NOTE — TELEPHONE ENCOUNTER
DIAGNOSIS: Degeneration of intervertebral disc of lumbar region with lower extremity pain   APPOINTMENT DATE: 2/10/25   NOTES STATUS DETAILS   OFFICE NOTE from referring provider Internal 1/3/25 Nereida Jacobs APRN CNP @[facility]     OFFICE NOTE from other specialist Internal 8/29/23, 7/11/23 , 11/14/22(Transferred Recs) @[facility]    12/21/20 Tomer Balbuena PA-C @[facility]    11/6/19 Edilia Davison PA-C @[facility]     DISCHARGE REPORT from the ER Internal 12/30/24 Buddy Tinsley MD @[facility] ED    12/18/20 Dipak Barker MD @ [facility]    12/21/18 Vamshi Lucero MD @[facility]     EMG (for Spine) Internal [facility]  9/21/21 EMG     IMPLANT RECORD/STICKER Internal    LABS     MRI Internal [facility]  2/1/25 MR Lumbar Spine  9/22/21 MR Lumbar Spine  11/8/19 MR Lumbar Spine     XRAYS (IMAGES & REPORTS) Internal [facility]  1/3/25 XR Lumbar Spine  5/12/22 XR Pelvis & Hip  12/18/20 XR Lumbar Spine

## 2025-01-28 DIAGNOSIS — M51.369 DDD (DEGENERATIVE DISC DISEASE), LUMBAR: Primary | ICD-10-CM

## 2025-01-30 ENCOUNTER — THERAPY VISIT (OUTPATIENT)
Dept: PHYSICAL THERAPY | Facility: CLINIC | Age: 75
End: 2025-01-30
Attending: NURSE PRACTITIONER
Payer: COMMERCIAL

## 2025-01-30 DIAGNOSIS — M54.16 LUMBAR RADICULOPATHY: Primary | ICD-10-CM

## 2025-01-30 DIAGNOSIS — S46.919A STRAIN OF SHOULDER, UNSPECIFIED LATERALITY, INITIAL ENCOUNTER: ICD-10-CM

## 2025-01-30 PROCEDURE — 97110 THERAPEUTIC EXERCISES: CPT | Mod: GP | Performed by: PHYSICAL THERAPIST

## 2025-01-30 PROCEDURE — 97140 MANUAL THERAPY 1/> REGIONS: CPT | Mod: GP | Performed by: PHYSICAL THERAPIST

## 2025-02-01 ENCOUNTER — HOSPITAL ENCOUNTER (OUTPATIENT)
Dept: MRI IMAGING | Facility: CLINIC | Age: 75
Discharge: HOME | End: 2025-02-01
Attending: NURSE PRACTITIONER | Admitting: NURSE PRACTITIONER
Payer: COMMERCIAL

## 2025-02-01 DIAGNOSIS — M54.16 LUMBAR RADICULOPATHY: ICD-10-CM

## 2025-02-01 PROCEDURE — 72148 MRI LUMBAR SPINE W/O DYE: CPT

## 2025-02-06 ENCOUNTER — THERAPY VISIT (OUTPATIENT)
Dept: PHYSICAL THERAPY | Facility: CLINIC | Age: 75
End: 2025-02-06
Attending: NURSE PRACTITIONER
Payer: COMMERCIAL

## 2025-02-06 DIAGNOSIS — M54.16 LUMBAR RADICULOPATHY: Primary | ICD-10-CM

## 2025-02-06 DIAGNOSIS — S46.919A STRAIN OF SHOULDER, UNSPECIFIED LATERALITY, INITIAL ENCOUNTER: ICD-10-CM

## 2025-02-06 PROCEDURE — 97110 THERAPEUTIC EXERCISES: CPT | Mod: GP | Performed by: PHYSICAL THERAPIST

## 2025-02-10 ENCOUNTER — ANCILLARY PROCEDURE (OUTPATIENT)
Dept: GENERAL RADIOLOGY | Facility: CLINIC | Age: 75
End: 2025-02-10
Attending: ORTHOPAEDIC SURGERY
Payer: COMMERCIAL

## 2025-02-10 ENCOUNTER — PRE VISIT (OUTPATIENT)
Dept: ORTHOPEDICS | Facility: CLINIC | Age: 75
End: 2025-02-10

## 2025-02-10 ENCOUNTER — OFFICE VISIT (OUTPATIENT)
Dept: ORTHOPEDICS | Facility: CLINIC | Age: 75
End: 2025-02-10
Attending: NURSE PRACTITIONER
Payer: COMMERCIAL

## 2025-02-10 VITALS — WEIGHT: 135 LBS | HEIGHT: 65 IN | BODY MASS INDEX: 22.49 KG/M2

## 2025-02-10 DIAGNOSIS — M51.361 DEGENERATION OF INTERVERTEBRAL DISC OF LUMBAR REGION WITH LOWER EXTREMITY PAIN: ICD-10-CM

## 2025-02-10 PROCEDURE — 72100 X-RAY EXAM L-S SPINE 2/3 VWS: CPT | Mod: TC | Performed by: RADIOLOGY

## 2025-02-10 NOTE — NURSING NOTE
"Reason For Visit:   Chief Complaint   Patient presents with    Consult     Degeneration of intervertebral disc of lumbar region with lower extremity pain        Primary MD: Nereida Jacobs  Ref. MD: Denise Jacobs    ?  No  Occupation Retired.    Date of injury: No  Type of injury: Chronic, did have a fall 4 years ago, trying to open windows recently aggravated it.     Date of surgery: No  Type of surgery: No    Smoker: No  Request smoking cessation information: No    Ht 1.658 m (5' 5.28\")   Wt 61.2 kg (135 lb)   BMI 22.28 kg/m      Pain Assessment  Patient Currently in Pain: Denies    Oswestry (YANET) Questionnaire        2/10/2025     1:04 PM   OSWESTRY DISABILITY INDEX   Count 9    Sum 20    Oswestry Score (%) 44.44 %        Proxy-reported            Neck Disability Index (NDI) Questionnaire        1/9/2025     7:51 AM   Neck Disability Index (NDI)   Neck Disability Index: Count 10   NDI: Total Score = SUM (points for all 10 findings) 18   Neck Disability in Percent = (Total Score) / 50 * 100 36 (%)              Visual Analog Pain Scale  Back Pain Scale 0-10: 0 (with activity pain up to 7 plus)  Right leg pain: 0  Left leg pain: 0  Neck Pain Scale 0-10: 0  Right arm pain: 0  Left arm pain: 0    Promis 10 Assessment         No data to display                         Majo Ni, CONCEPCIONN   "

## 2025-02-12 ENCOUNTER — HOSPITAL ENCOUNTER (OUTPATIENT)
Dept: MAMMOGRAPHY | Facility: CLINIC | Age: 75
Discharge: HOME | End: 2025-02-12
Attending: NURSE PRACTITIONER
Payer: COMMERCIAL

## 2025-02-12 DIAGNOSIS — Z12.31 VISIT FOR SCREENING MAMMOGRAM: ICD-10-CM

## 2025-02-12 PROCEDURE — 77063 BREAST TOMOSYNTHESIS BI: CPT

## 2025-02-12 PROCEDURE — 77067 SCR MAMMO BI INCL CAD: CPT

## 2025-02-13 ENCOUNTER — THERAPY VISIT (OUTPATIENT)
Dept: PHYSICAL THERAPY | Facility: CLINIC | Age: 75
End: 2025-02-13
Attending: NURSE PRACTITIONER
Payer: COMMERCIAL

## 2025-02-13 DIAGNOSIS — M54.16 LUMBAR RADICULOPATHY: Primary | ICD-10-CM

## 2025-02-13 DIAGNOSIS — S46.919A STRAIN OF SHOULDER, UNSPECIFIED LATERALITY, INITIAL ENCOUNTER: ICD-10-CM

## 2025-02-13 PROCEDURE — 97110 THERAPEUTIC EXERCISES: CPT | Mod: GP | Performed by: PHYSICAL THERAPIST

## 2025-02-20 ENCOUNTER — THERAPY VISIT (OUTPATIENT)
Dept: PHYSICAL THERAPY | Facility: CLINIC | Age: 75
End: 2025-02-20
Attending: NURSE PRACTITIONER
Payer: COMMERCIAL

## 2025-02-20 DIAGNOSIS — S46.919A STRAIN OF SHOULDER, UNSPECIFIED LATERALITY, INITIAL ENCOUNTER: ICD-10-CM

## 2025-02-20 DIAGNOSIS — M54.16 LUMBAR RADICULOPATHY: Primary | ICD-10-CM

## 2025-02-20 PROCEDURE — 97110 THERAPEUTIC EXERCISES: CPT | Mod: GP | Performed by: PHYSICAL THERAPIST

## 2025-02-24 ENCOUNTER — OFFICE VISIT (OUTPATIENT)
Dept: PODIATRY | Facility: CLINIC | Age: 75
End: 2025-02-24
Payer: COMMERCIAL

## 2025-02-24 DIAGNOSIS — L85.1 PLANTAR POROKERATOSIS, ACQUIRED: ICD-10-CM

## 2025-02-24 DIAGNOSIS — M20.42 ACQUIRED HAMMER TOE DEFORMITY OF LESSER TOE OF LEFT FOOT: Primary | ICD-10-CM

## 2025-02-24 PROCEDURE — 99213 OFFICE O/P EST LOW 20 MIN: CPT | Performed by: PODIATRIST

## 2025-02-24 NOTE — NURSING NOTE
"Chief Complaint   Patient presents with    RECHECK     BL feet- callus       Initial There were no vitals taken for this visit. Estimated body mass index is 22.28 kg/m  as calculated from the following:    Height as of 2/10/25: 1.658 m (5' 5.28\").    Weight as of 2/10/25: 61.2 kg (135 lb).  Medications and allergies reviewed.      Jodi KIRBY MA    "

## 2025-02-24 NOTE — PROGRESS NOTES
Chyna returns to the office for reevaluation of the left foot.  The patient relates following the instructions given at the last visit with noted overall less pain and more improvement in function of the left foot.   The patient relates no other problems.         Lower Extremity Physical Exam:      Neurovascular status remains unchanged.  Muscular exam is within normal limits to major muscle groups.  Integument is intact.      Noted contracted hammertoe deformity on the left foot.  Noted hyperkeratotic skin lesion on the plantar aspect of the left foot.  No surrounding erythema edema or subdermal hemorrhage noted.       Assessment:     ICD-10-CM    1. Acquired hammer toe deformity of lesser toe of left foot  M20.42       2. Plantar porokeratosis, acquired  L85.1           Plan:    I have explained to Chyna about the progress of the conditions.  At this time, the hyperkeratotic skin lesion was sharply debrided with #15 blade down to healthy epidermis.  No bleeding noted.    Chyna verbalized agreement with and understanding of the rational for the diagnosis and treatment plan.  All questions were answered to best of my ability and the patient's satisfaction. The patient was advised to contact the clinic with any questions that may arise after the clinic visit.      Disclaimer: This note consists of symbols derived from keyboarding, dictation and/or voice recognition software. As a result, there may be errors in the script that have gone undetected. Please consider this when interpreting information found in this chart.       GANGA Pace D.P.M., FMOE.F.AJanelleS.

## 2025-02-24 NOTE — LETTER
2/24/2025      Chyna Moncada  [address] [facility] [address]      Dear Colleague,    Thank you for referring your patient, Chyna Moncada, to the [facility] [place]. Please see a copy of my visit note below.    Chyna returns to the office for reevaluation of the left foot.  The patient relates following the instructions given at the last visit with noted overall less pain and more improvement in function of the left foot.   The patient relates no other problems.         Lower Extremity Physical Exam:      Neurovascular status remains unchanged.  Muscular exam is within normal limits to major muscle groups.  Integument is intact.      Noted contracted hammertoe deformity on the left foot.  Noted hyperkeratotic skin lesion on the plantar aspect of the left foot.  No surrounding erythema edema or subdermal hemorrhage noted.       Assessment:     ICD-10-CM    1. Acquired hammer toe deformity of lesser toe of left foot  M20.42       2. Plantar porokeratosis, acquired  L85.1           Plan:    I have explained to Chyna about the progress of the conditions.  At this time, the hyperkeratotic skin lesion was sharply debrided with #15 blade down to healthy epidermis.  No bleeding noted.    Chyna verbalized agreement with and understanding of the rational for the diagnosis and treatment plan.  All questions were answered to best of my ability and the patient's satisfaction. The patient was advised to contact the clinic with any questions that may arise after the clinic visit.      Disclaimer: This note consists of symbols derived from keyboarding, dictation and/or voice recognition software. As a result, there may be errors in the script that have gone undetected. Please consider this when interpreting information found in this chart.       GANGA Pace D.P.M., F.COLE.C.F.A.S.      Again, thank you for allowing me to participate in the care of your patient.         Sincerely,        Nato Pace DPM    Electronically signed

## 2025-03-04 ENCOUNTER — TELEPHONE (OUTPATIENT)
Dept: FAMILY MEDICINE | Facility: CLINIC | Age: 75
End: 2025-03-04
Payer: COMMERCIAL

## 2025-03-04 DIAGNOSIS — Z78.0 MENOPAUSE: Primary | ICD-10-CM

## 2025-03-04 NOTE — TELEPHONE ENCOUNTER
Last annual wellness exam 01/03/25. Last Dexa Scan 03/08/22.  Order ended for provider consideration.  Please route to care team to notify patient.  Emily HERRERA RN

## 2025-03-04 NOTE — TELEPHONE ENCOUNTER
Order/Referral Request    Who is requesting: patient     Orders being requested: Dexa     Reason service is needed/diagnosis: patient is due     When are orders needed by: Soon     Has this been discussed with Provider: No    Does patient have a preference on a Group/Provider/Facility? [place]     Does patient have an appointment scheduled?: No    Where to send orders: Place orders within Epic    Okay to leave a detailed message?: Yes at Cell number on file:    Telephone Information:   Mobile [phone]

## 2025-04-07 ENCOUNTER — LAB (OUTPATIENT)
Dept: LAB | Facility: CLINIC | Age: 75
End: 2025-04-07
Payer: COMMERCIAL

## 2025-04-07 DIAGNOSIS — E55.9 VITAMIN D DEFICIENCY: ICD-10-CM

## 2025-04-07 LAB — VIT D+METAB SERPL-MCNC: 60 NG/ML (ref 20–50)

## 2025-04-07 PROCEDURE — 82306 VITAMIN D 25 HYDROXY: CPT

## 2025-04-07 PROCEDURE — 36415 COLL VENOUS BLD VENIPUNCTURE: CPT

## 2025-05-06 ENCOUNTER — OFFICE VISIT (OUTPATIENT)
Dept: FAMILY MEDICINE | Facility: CLINIC | Age: 75
End: 2025-05-06
Payer: COMMERCIAL

## 2025-05-06 VITALS
TEMPERATURE: 97.9 F | BODY MASS INDEX: 22.18 KG/M2 | WEIGHT: 138 LBS | RESPIRATION RATE: 20 BRPM | OXYGEN SATURATION: 98 % | DIASTOLIC BLOOD PRESSURE: 60 MMHG | HEART RATE: 96 BPM | HEIGHT: 66 IN | SYSTOLIC BLOOD PRESSURE: 106 MMHG

## 2025-05-06 DIAGNOSIS — H61.21 IMPACTED CERUMEN OF RIGHT EAR: ICD-10-CM

## 2025-05-06 DIAGNOSIS — K58.0 IRRITABLE BOWEL SYNDROME WITH DIARRHEA: Primary | ICD-10-CM

## 2025-05-06 DIAGNOSIS — J44.9 CHRONIC OBSTRUCTIVE PULMONARY DISEASE, UNSPECIFIED COPD TYPE (H): ICD-10-CM

## 2025-05-06 PROCEDURE — 1126F AMNT PAIN NOTED NONE PRSNT: CPT | Performed by: NURSE PRACTITIONER

## 2025-05-06 PROCEDURE — 99214 OFFICE O/P EST MOD 30 MIN: CPT | Performed by: NURSE PRACTITIONER

## 2025-05-06 PROCEDURE — 3074F SYST BP LT 130 MM HG: CPT | Performed by: NURSE PRACTITIONER

## 2025-05-06 PROCEDURE — 3078F DIAST BP <80 MM HG: CPT | Performed by: NURSE PRACTITIONER

## 2025-05-06 ASSESSMENT — PAIN SCALES - GENERAL: PAINLEVEL_OUTOF10: NO PAIN (0)

## 2025-05-06 NOTE — PROGRESS NOTES
"  Assessment & Plan     Chronic obstructive pulmonary disease, unspecified COPD type (H)  Stable     Irritable bowel syndrome with diarrhea  Patient is having bouts of diarrhea has developed symptoms more of irritable bowel syndrome last colonoscopy was within normal limits right colon recommend following up with gastroenterology referral has been made reviewed fiber intake carbohydrate intake until patient can be seen.    - Adult GI  Referral - Consult Only    Impacted cerumen of right ear  Patient has impacted cerumen to right ear will need to be irrigated out patient does not want it irrigated out in primary care would like to see ENT due to past experiences with ear irrigation and ear problems post irrigation.  I did review doing Debrox 5 days prior to ENT visit.  - Adult ENT  Referral            Deneen Clemente is a 74 year old, presenting for the following health issues:  Bowel Problems and Cerumen Impaction      5/6/2025     7:45 AM   Additional Questions   Roomed by Liss     History of Present Illness       Reason for visit:  Ear wax and gut issues  Symptom onset:  3-7 days ago  Symptoms include:  Excessive gas stomach issues  Symptom intensity:  Moderate  Symptom progression:  Staying the same  Had these symptoms before:  No  What makes it worse:  No  What makes it better:  No   She is taking medications regularly.                Review of Systems  Constitutional, HEENT, cardiovascular, pulmonary, gi and gu systems are negative, except as otherwise noted.      Objective    /60 (BP Location: Right arm)   Pulse 96   Temp 97.9  F (36.6  C) (Tympanic)   Resp 20   Ht 1.676 m (5' 6\")   Wt 62.6 kg (138 lb)   LMP 05/06/2001   SpO2 98%   BMI 22.27 kg/m    Body mass index is 22.27 kg/m .  Physical Exam   GENERAL: alert and no distress  EYES: Eyes grossly normal to inspection, PERRL and conjunctivae and sclerae normal  HENT: ear canals and TM's normal, nose and mouth without ulcers " or lesions  NECK: no adenopathy, no asymmetry, masses, or scars  RESP: lungs clear to auscultation - no rales, rhonchi or wheezes  CV: regular rate and rhythm, normal S1 S2, no S3 or S4, no murmur, click or rub, no peripheral edema  ABDOMEN: soft, nontender, no hepatosplenomegaly, no masses and bowel sounds normal  MS: no gross musculoskeletal defects noted, no edema  SKIN: no suspicious lesions or rashes  NEURO: Normal strength and tone, mentation intact and speech normal  PSYCH: mentation appears normal, affect normal/bright    No results found for any visits on 05/06/25.        Signed Electronically by: GEORGE Al CNP

## 2025-05-07 ENCOUNTER — PATIENT OUTREACH (OUTPATIENT)
Dept: CARE COORDINATION | Facility: CLINIC | Age: 75
End: 2025-05-07
Payer: COMMERCIAL

## 2025-05-08 ENCOUNTER — HOSPITAL ENCOUNTER (OUTPATIENT)
Dept: BONE DENSITY | Facility: CLINIC | Age: 75
Discharge: HOME | End: 2025-05-08
Attending: NURSE PRACTITIONER
Payer: COMMERCIAL

## 2025-05-08 DIAGNOSIS — Z78.0 MENOPAUSE: ICD-10-CM

## 2025-05-08 PROCEDURE — 77080 DXA BONE DENSITY AXIAL: CPT

## 2025-05-15 ENCOUNTER — RESULTS FOLLOW-UP (OUTPATIENT)
Dept: FAMILY MEDICINE | Facility: CLINIC | Age: 75
End: 2025-05-15

## 2025-05-15 DIAGNOSIS — M81.0 AGE RELATED OSTEOPOROSIS, UNSPECIFIED PATHOLOGICAL FRACTURE PRESENCE: Primary | ICD-10-CM

## 2025-05-19 ENCOUNTER — PATIENT OUTREACH (OUTPATIENT)
Dept: CARE COORDINATION | Facility: CLINIC | Age: 75
End: 2025-05-19
Payer: COMMERCIAL

## 2025-05-19 ENCOUNTER — TELEPHONE (OUTPATIENT)
Dept: FAMILY MEDICINE | Facility: CLINIC | Age: 75
End: 2025-05-19
Payer: COMMERCIAL

## 2025-05-19 NOTE — TELEPHONE ENCOUNTER
Please Dexa was abnormal, would you advise anything different knowing she took Tums before the exam?      There is been some increase meant of the bone loss density.  I recommend we continue with the Fosamax and I will also have you see endocrinology for further management of your osteoporosis.  I have placed a referral they will contact you to schedule.  No if they are scheduling very far out I would recommend getting an appointment and we will continue the Fosamax until you can be seen by endocrinology.     Nereida Jacobs CNP

## 2025-06-06 ENCOUNTER — ANCILLARY PROCEDURE (OUTPATIENT)
Dept: GENERAL RADIOLOGY | Facility: CLINIC | Age: 75
End: 2025-06-06
Attending: NURSE PRACTITIONER
Payer: COMMERCIAL

## 2025-06-06 ENCOUNTER — OFFICE VISIT (OUTPATIENT)
Dept: FAMILY MEDICINE | Facility: CLINIC | Age: 75
End: 2025-06-06
Payer: COMMERCIAL

## 2025-06-06 VITALS
OXYGEN SATURATION: 99 % | HEART RATE: 61 BPM | WEIGHT: 137 LBS | SYSTOLIC BLOOD PRESSURE: 139 MMHG | RESPIRATION RATE: 20 BRPM | BODY MASS INDEX: 22.82 KG/M2 | DIASTOLIC BLOOD PRESSURE: 78 MMHG | HEIGHT: 65 IN | TEMPERATURE: 97.3 F

## 2025-06-06 DIAGNOSIS — M25.561 CHRONIC PAIN OF RIGHT KNEE: ICD-10-CM

## 2025-06-06 DIAGNOSIS — M54.12 CERVICAL RADICULOPATHY: Primary | ICD-10-CM

## 2025-06-06 DIAGNOSIS — G89.29 CHRONIC PAIN OF RIGHT KNEE: ICD-10-CM

## 2025-06-06 DIAGNOSIS — M54.12 CERVICAL RADICULOPATHY: ICD-10-CM

## 2025-06-06 DIAGNOSIS — M17.11 PRIMARY OSTEOARTHRITIS OF RIGHT KNEE: ICD-10-CM

## 2025-06-06 PROCEDURE — 99214 OFFICE O/P EST MOD 30 MIN: CPT | Performed by: NURSE PRACTITIONER

## 2025-06-06 PROCEDURE — 3078F DIAST BP <80 MM HG: CPT | Performed by: NURSE PRACTITIONER

## 2025-06-06 PROCEDURE — 3075F SYST BP GE 130 - 139MM HG: CPT | Performed by: NURSE PRACTITIONER

## 2025-06-06 PROCEDURE — 72040 X-RAY EXAM NECK SPINE 2-3 VW: CPT | Mod: TC | Performed by: RADIOLOGY

## 2025-06-06 PROCEDURE — 73560 X-RAY EXAM OF KNEE 1 OR 2: CPT | Mod: TC | Performed by: RADIOLOGY

## 2025-06-06 PROCEDURE — 1125F AMNT PAIN NOTED PAIN PRSNT: CPT | Performed by: NURSE PRACTITIONER

## 2025-06-06 ASSESSMENT — PAIN SCALES - GENERAL: PAINLEVEL_OUTOF10: SEVERE PAIN (7)

## 2025-06-06 ASSESSMENT — PATIENT HEALTH QUESTIONNAIRE - PHQ9: SUM OF ALL RESPONSES TO PHQ QUESTIONS 1-9: 0

## 2025-06-06 NOTE — PROGRESS NOTES
"  Assessment & Plan     Cervical radiculopathy  Recommend physical therapy if not improving will do further imaging.  - XR Cervical Spine 2/3 Views; Future  - Physical Therapy  Referral; Future    Chronic pain of right knee  - XR Knee Standing AP Bilat and Lateral Right; Future    Primary osteoarthritis of right knee  Increased osteoarthritis recommend following up with orthopedics.  - Orthopedic  Referral; Future            Follow-up       Deneen Clemente is a 74 year old, presenting for the following health issues:  Knee Pain and Neck Pain      6/6/2025     8:26 AM   Additional Questions   Roomed by Liss     Knee Pain    History of Present Illness       Reason for visit:  Rt knee and neck pain  Symptom onset:  1-2 weeks ago  Symptoms include:  Rt knee painful  - neck pain/loss of motion - when tilts head back is worse  Symptom intensity:  Moderate  Symptom progression:  Worsening  Had these symptoms before:  Yes  Has tried/received treatment for these symptoms:  No  What makes it worse:  Movement  What makes it better:  None   She is taking medications regularly.        Review of Systems  Constitutional, HEENT, cardiovascular, pulmonary, gi and gu systems are negative, except as otherwise noted.      Objective    /78 (BP Location: Right arm)   Pulse 61   Temp 97.3  F (36.3  C) (Tympanic)   Resp 20   Ht 1.651 m (5' 5\")   Wt 62.1 kg (137 lb)   LMP 05/06/2001   SpO2 99%   BMI 22.80 kg/m    Body mass index is 22.8 kg/m .  Physical Exam   GENERAL: alert and no distress  EYES: Eyes grossly normal to inspection, PERRL and conjunctivae and sclerae normal  HENT: ear canals and TM's normal, nose and mouth without ulcers or lesions  NECK: no adenopathy, no asymmetry, masses, or scars  RESP: lungs clear to auscultation - no rales, rhonchi or wheezes  CV: regular rate and rhythm, normal S1 S2, no S3 or S4, no murmur, click or rub, no peripheral edema  ABDOMEN: soft, nontender, no " hepatosplenomegaly, no masses and bowel sounds normal  MS: no gross musculoskeletal defects noted, no edema  SKIN: no suspicious lesions or rashes  NEURO: Normal strength and tone, mentation intact and speech normal  PSYCH: mentation appears normal, affect normal/bright      Inspection: normal cervical lordosis  Tender:  left paracervical muscles, right paracervical muscles  Non-tender:  occipital nerves, spinous processes, scapula, medial border of scapula  Range of Motion:  Full ROM of cervical spine  Strength: Full strength of all neck muscles  Special tests:  Reproduction of radicular pattern      Inspection:       AP/lateral alignment normal      Non-tender: patellar facets, MCL, LCL, lateral joint line, medial joint line, IT band, posterior knee   Active Range of Motion: all normal  Strength: quad  5/5, Hamstrings  5/5, Gastroc  5/5, Tibialis anterior  5/5, Permeals  5/5 and core strength  5/5 hip abductors and other core muscles   Special tests: normal Valgus stress test, normal Varus, negative Lachman's test, negative pivot shift, negative posterior drawer, no posterolateral corner signs, negative Madison's, no apprehension with lateral stress of the patella.      X-ray reviewed and interpreted by myself in office no acute findings will await final radiology report        Results for orders placed or performed in visit on 06/06/25   XR Knee Standing AP Bilat and Lateral Right     Status: None    Narrative    EXAM: XR KNEE STANDING AP BILAT AND LATERAL RIGHT  LOCATION: [facility]  DATE: 6/6/2025    INDICATION: Chronic pain of right knee.  COMPARISON: 01/17/2023.      Impression    IMPRESSION:   RIGHT KNEE: Normal joint spaces and alignment. No fracture or joint effusion. Chondrocalcinosis.    LEFT KNEE: Frontal view shows normal medial and lateral compartment joint spaces. Chondrocalcinosis.       Signed Electronically by: GEORGE Al CNP

## 2025-06-07 ENCOUNTER — RESULTS FOLLOW-UP (OUTPATIENT)
Dept: FAMILY MEDICINE | Facility: CLINIC | Age: 75
End: 2025-06-07

## 2025-06-07 DIAGNOSIS — M54.12 CERVICAL RADICULOPATHY: Primary | ICD-10-CM

## 2025-06-07 DIAGNOSIS — M50.30 DDD (DEGENERATIVE DISC DISEASE), CERVICAL: ICD-10-CM

## 2025-06-09 ENCOUNTER — THERAPY VISIT (OUTPATIENT)
Dept: PHYSICAL THERAPY | Facility: CLINIC | Age: 75
End: 2025-06-09
Attending: NURSE PRACTITIONER
Payer: COMMERCIAL

## 2025-06-09 ENCOUNTER — PATIENT OUTREACH (OUTPATIENT)
Dept: CARE COORDINATION | Facility: CLINIC | Age: 75
End: 2025-06-09
Payer: COMMERCIAL

## 2025-06-09 DIAGNOSIS — M54.12 CERVICAL RADICULOPATHY: ICD-10-CM

## 2025-06-09 PROCEDURE — 97161 PT EVAL LOW COMPLEX 20 MIN: CPT | Mod: GP | Performed by: PHYSICAL THERAPIST

## 2025-06-09 PROCEDURE — 97140 MANUAL THERAPY 1/> REGIONS: CPT | Mod: GP | Performed by: PHYSICAL THERAPIST

## 2025-06-09 PROCEDURE — 97110 THERAPEUTIC EXERCISES: CPT | Mod: GP | Performed by: PHYSICAL THERAPIST

## 2025-06-09 ASSESSMENT — ACTIVITIES OF DAILY LIVING (ADL)
REMOVING_SOMETHING_FROM_YOUR_BACK_POCKET: 3
AT_ITS_WORST?: 7
WASHING_YOUR_HAIR?: 5
WHEN_LYING_ON_THE_INVOLVED_SIDE: 5
TOUCHING_THE_BACK_OF_YOUR_NECK: 7
PLEASE_INDICATE_YOR_PRIMARY_REASON_FOR_REFERRAL_TO_THERAPY:: SHOULDER
WASHING_YOUR_BACK: 7
REACHING_FOR_SOMETHING_ON_A_HIGH_SHELF: 9
PUTTING_ON_A_SHIRT_THAT_BUTTONS_DOWN_THE_FRONT: 0
PUTTING_ON_YOUR_PANTS: 4
PUTTING_ON_AN_UNDERSHIRT_OR_A_PULLOVER_SWEATER: 7
PLACING_AN_OBJECT_ON_A_HIGH_SHELF: 7
PUSHING_WITH_THE_INVOLVED_ARM: 7

## 2025-06-09 NOTE — PROGRESS NOTES
PHYSICAL THERAPY EVALUATION  Type of Visit: Evaluation       Fall Risk Screen:  Have you fallen 2 or more times in the past year?: No  Have you fallen and had an injury in the past year?: No    Subjective         Presenting condition or subjective complaint: neck and right shoulder pain  Date of onset: 05/09/25    Relevant medical history: Arthritis; Hearing problems   Dates & types of surgery: too many and should be in chart    Prior diagnostic imaging/testing results: MRI; X-ray     Prior therapy history for the same diagnosis, illness or injury: Yes not sure    Prior Level of Function  Transfers: Independent  Ambulation: Independent  ADL: Independent  IADL:     Living Environment  Social support: With a significant other or spouse   Type of home: House   Stairs to enter the home: Yes 8 Is there a railing: Yes     Ramp: No   Stairs inside the home: Yes 14 Is there a railing: Yes     Help at home: None  Equipment owned: Straight Cane; Four-point cane; Walker with wheels; Crutches     Employment: No    Hobbies/Interests: walking, reading and yard work    Patient goals for therapy: sleep, dust shelves and read books    Pain assessment:    Reading and dusting is painful  the neck pain to the upper back and to the shoulders.  R > L shoulder pain.  Pulling dog leash hurts.   The neck has been sore for a month or so.  No pain past elbow.  Not a lot of sx through the day.    Past Medical History:   Diagnosis Date    Cerebral embolism with cerebral infarction (H)     Symptomatic menopausal or female climacteric states 04/12/2007     Past Surgical History:   Procedure Laterality Date    COLONOSCOPY N/A 8/17/2023    Procedure: Colonoscopy;  Surgeon: Isak Paulino MD;  Location: [facility]    HYSTERECTOMY, PAP NO LONGER INDICATED         Objective   CERVICAL SPINE EVALUATION  PAIN: Pain Level with Use: 7/10  INTEGUMENTARY (edema, incisions):   POSTURE:  sleeping is sore, rounded sitting posture  GAIT:   Weightbearing  Status:   Assistive Device(s):   Gait Deviations:   BALANCE/PROPRIOCEPTION:   WEIGHTBEARING ALIGNMENT:   ROM: ext 40 ERP, R rot 45 ERP, L 55 tight, flex full ERP    MYOTOMES:  = bilat C4-T1  DTR S:   CORD SIGNS:   DERMATOMES: normal C4-T1  NEURAL TENSION:   FLEXIBILITY:  tight UT, levator, R>L  SPECIAL TESTS:   PALPATION:  R>L UT, levator, post scalene  SPINAL SEGMENTAL CONCLUSIONS:  FRS RT2 LT4 ERS RC4. Stiff mid C in ext and R ,4th ribs      Assessment & Plan   CLINICAL IMPRESSIONS  Medical Diagnosis: cervical radiculopathy    Treatment Diagnosis: neck pain   Impression/Assessment: Patient is a 74 year old female with acute on chronic neck complaints.  The following significant findings have been identified: Pain, Decreased ROM/flexibility, Decreased joint mobility, Decreased proprioception, Impaired muscle performance, Decreased activity tolerance, and Impaired posture. These impairments interfere with their ability to perform self care tasks, recreational activities, household chores, and driving  as compared to previous level of function.     Clinical Decision Making (Complexity):  Clinical Presentation: Evolving/Changing  Clinical Presentation Rationale: based on medical and personal factors listed in PT evaluation  Clinical Decision Making (Complexity): Moderate complexity    PLAN OF CARE  Treatment Interventions:  Interventions: Manual Therapy, Neuromuscular Re-education, Therapeutic Activity, Therapeutic Exercise, Self-Care/Home Management    Long Term Goals     PT Goal 1  Goal Identifier: 1  Goal Description: pt will be able to turn neck bilat  Rationale: to maximize safety and independence with performance of ADLs and functional tasks  Target Date: 07/01/25  PT Goal 2  Goal Identifier: 2  Goal Description: pt will be able to look up to ceiling to dust without pain  Rationale: to maximize safety and independence with performance of ADLs and functional tasks  Target Date: 07/15/25  PT Goal 3  Goal  Identifier: 3  Goal Description: pt will be able to drive without neck pain 1 hour  Rationale: to maximize safety and independence with performance of ADLs and functional tasks  Target Date: 08/05/25      Frequency of Treatment: 1x/wk  Duration of Treatment: 8wk    Recommended Referrals to Other Professionals:   Education Assessment:   Learner/Method: Patient  Education Comments: posture ed    Risks and benefits of evaluation/treatment have been explained.   Patient/Family/caregiver agrees with Plan of Care.     Evaluation Time:     PT Eval, Low Complexity Minutes (50845): 20       Signing Clinician: ZAIN Gomez [facility]                                                                                   OUTPATIENT PHYSICAL THERAPY      PLAN OF TREATMENT FOR OUTPATIENT REHABILITATION   Patient's Last Name, First Name, Chyna Porter YOB: 1950   Provider's Name   [facility]   Medical Record No.  [id number]     Onset Date: 05/09/25  Start of Care Date: 06/09/25     Medical Diagnosis:  cervical radiculopathy      PT Treatment Diagnosis:  neck pain Plan of Treatment  Frequency/Duration: 1x/wk/ 8wk    Certification date from 06/09/25 to 08/04/25         See note for plan of treatment details and functional goals     Noel Rojas, PT                         I CERTIFY THE NEED FOR THESE SERVICES FURNISHED UNDER        THIS PLAN OF TREATMENT AND WHILE UNDER MY CARE     (Physician attestation of this document indicates review and certification of the therapy plan).              Referring Provider:  Nereida Jacobs    Initial Assessment  See Epic Evaluation- Start of Care Date: 06/09/25

## 2025-06-11 ENCOUNTER — PATIENT OUTREACH (OUTPATIENT)
Dept: CARE COORDINATION | Facility: CLINIC | Age: 75
End: 2025-06-11
Payer: COMMERCIAL

## 2025-06-23 NOTE — PROGRESS NOTES
ASSESSMENT & PLAN    Chyna was seen today for pain.    Diagnoses and all orders for this visit:    Primary osteoarthritis of right knee  -     Orthopedic  Referral  -     XR Knee Right 1/2 Views; Future      This issue is acute and Unchanged.    # Right knee pain: Chyna Moncada  was seen today for right knee pain. Symptoms had been going on for 2-3 weeks. On examination there are positive findings of joint line and peripatellar tenderness. Imaging findings showed R>L knee chondrocalcinosis, minimal joint space narrowing.     Likely cause of patient's condition due to osteoarthritis flare. Other possible conditions contributing to symptoms include localized contusion from prolonged kneeling.  Counseled patient on nature of condition and treatment options.    - Activity: activity as tolerated and home exercises given  - Ice, heat, massage as needed  - Medications:      - diclofenac (voltaren) gel three times daily for 1-2 weeks, then as need.      - tylenol 1000mg three times daily as needed    Follow-up: In 4-6 weeks if symptoms do not improve, sooner if worsening  Can consider cortisone injection    Savage Garcia MD  [facility] [place]    -----  Chief Complaint   Patient presents with    Right Knee - Pain       SUBJECTIVE  Chyna Moncada is a/an 74 year old female who is seen in consultation at the request of  Nereida Jacobs C.N.P. for evaluation of right knee.     The patient is seen by themselves.    Onset: 2-3 week(s) ago. Patient reports that pain began after laying 200+ bricks for a patio, kneeling.   Location of Pain: right anterior knee, patellofemoral  Worsened by: walking, prolonged sitting, kneeling, stairs, knee extension  Better with: Tylenol  Treatments tried: knee sleeve, neoprene brace, Tylenol  Associated symptoms: no distal numbness or tingling; denies swelling or warmth    Orthopedic/Surgical history: NO  Social History/Occupation: retired; enjoys  gardening, yard work, walking her dog      REVIEW OF SYSTEMS:  Review of Systems    OBJECTIVE:  LMP 05/06/2001    General: healthy, alert and in no distress  Skin: no suspicious lesions or rash.  CV: distal perfusion intact   Resp: normal respiratory effort without conversational dyspnea   Psych: normal mood and affect  Gait: NORMAL  Neuro: Normal light sensory exam of right lower extremity    RIGHT KNEE  Inspection:    Normal alignment; no edema, erythema, or ecchymosis present  Palpation:    Tender about the medial> lateral joint line, patella and patellar facets, lateral gastroc origin. Remainder of bony and ligamentous landmarks are nontender.    No effusion is present    Patellofemoral crepitus is Absent  Range of Motion:     00 extension to 1200 flexion  Strength:    Quadriceps 5/5 and hamstrings 5/5    Extensor mechanism intact  Special Tests:    Negative: MCL/valgus stress (0 & 30 deg), LCL/varus stress (0 & 30 deg), Lachman's, anterior drawer, posterior drawer       RADIOLOGY:  Final results and radiologist's interpretation, available in the [facility] health record.  Images were reviewed with the patient in the office today.  My personal interpretation of the performed imaging:   -  R>L knee chondrocalcinosis, minimal joint space narrowing.     EXAM: XR KNEE STANDING AP BILAT AND LATERAL RIGHT  LOCATION: [facility]  DATE: 6/6/2025     INDICATION: Chronic pain of right knee.  COMPARISON: 01/17/2023.                                                                      IMPRESSION:   RIGHT KNEE: Normal joint spaces and alignment. No fracture or joint effusion. Chondrocalcinosis.     LEFT KNEE: Frontal view shows normal medial and lateral compartment joint spaces. Chondrocalcinosis.

## 2025-06-24 ENCOUNTER — ANCILLARY PROCEDURE (OUTPATIENT)
Dept: GENERAL RADIOLOGY | Facility: CLINIC | Age: 75
End: 2025-06-24
Attending: STUDENT IN AN ORGANIZED HEALTH CARE EDUCATION/TRAINING PROGRAM
Payer: COMMERCIAL

## 2025-06-24 ENCOUNTER — OFFICE VISIT (OUTPATIENT)
Dept: ORTHOPEDICS | Facility: CLINIC | Age: 75
End: 2025-06-24
Attending: NURSE PRACTITIONER
Payer: COMMERCIAL

## 2025-06-24 DIAGNOSIS — M17.11 PRIMARY OSTEOARTHRITIS OF RIGHT KNEE: ICD-10-CM

## 2025-06-24 PROCEDURE — 73560 X-RAY EXAM OF KNEE 1 OR 2: CPT | Mod: TC | Performed by: RADIOLOGY

## 2025-06-24 NOTE — PATIENT INSTRUCTIONS
# Right knee pain:   Likely cause of patient's condition due to osteoarthritis flare. Other possible conditions contributing to symptoms include localized contusion from prolonged kneeling.  Counseled patient on nature of condition and treatment options.    - Activity: activity as tolerated and home exercises given  - Ice, heat, massage as needed  - Medications:      - diclofenac (voltaren) gel three times daily for 1-2 weeks, then as need.      - tylenol 1000mg three times daily as needed    Follow-up: In 4-6 weeks if symptoms do not improve, sooner if worsening  Can consider cortisone injection    Please call [phone] and ask for my team if you have any questions or concerns.

## 2025-06-24 NOTE — LETTER
6/24/2025      Chyna Moncada  [address] [place] [address]      Dear Colleague,    Thank you for referring your patient, Chyna Moncada, to the [facility]. Please see a copy of my visit note below.    ASSESSMENT & PLAN    Chyna was seen today for pain.    Diagnoses and all orders for this visit:    Primary osteoarthritis of right knee  -     Orthopedic  Referral  -     XR Knee Right 1/2 Views; Future      This issue is acute and Unchanged.    # Right knee pain: Chyna Moncada  was seen today for right knee pain. Symptoms had been going on for 2-3 weeks. On examination there are positive findings of joint line and peripatellar tenderness. Imaging findings showed R>L knee chondrocalcinosis, minimal joint space narrowing.     Likely cause of patient's condition due to osteoarthritis flare. Other possible conditions contributing to symptoms include localized contusion from prolonged kneeling.  Counseled patient on nature of condition and treatment options.    - Activity: activity as tolerated and home exercises given  - Ice, heat, massage as needed  - Medications:      - diclofenac (voltaren) gel three times daily for 1-2 weeks, then as need.      - tylenol 1000mg three times daily as needed    Follow-up: In 4-6 weeks if symptoms do not improve, sooner if worsening  Can consider cortisone injection    Savage Garcia MD  [facility]    -----  Chief Complaint   Patient presents with     Right Knee - Pain       SUBJECTIVE  Chyna Moncada is a/an 74 year old female who is seen in consultation at the request of  Nereida Jacobs C.N.P. for evaluation of right knee.     The patient is seen by themselves.    Onset: 2-3 week(s) ago. Patient reports that pain began after laying 200+ bricks for a patio, kneeling.   Location of Pain: right anterior knee, patellofemoral  Worsened by: walking, prolonged sitting,  kneeling, stairs, knee extension  Better with: Tylenol  Treatments tried: knee sleeve, neoprene brace, Tylenol  Associated symptoms: no distal numbness or tingling; denies swelling or warmth    Orthopedic/Surgical history: NO  Social History/Occupation: retired; enjoys gardening, yard work, walking her dog      REVIEW OF SYSTEMS:  Review of Systems    OBJECTIVE:  LMP 05/06/2001    General: healthy, alert and in no distress  Skin: no suspicious lesions or rash.  CV: distal perfusion intact   Resp: normal respiratory effort without conversational dyspnea   Psych: normal mood and affect  Gait: NORMAL  Neuro: Normal light sensory exam of right lower extremity    RIGHT KNEE  Inspection:    Normal alignment; no edema, erythema, or ecchymosis present  Palpation:    Tender about the medial> lateral joint line, patella and patellar facets, lateral gastroc origin. Remainder of bony and ligamentous landmarks are nontender.    No effusion is present    Patellofemoral crepitus is Absent  Range of Motion:     00 extension to 1200 flexion  Strength:    Quadriceps 5/5 and hamstrings 5/5    Extensor mechanism intact  Special Tests:    Negative: MCL/valgus stress (0 & 30 deg), LCL/varus stress (0 & 30 deg), Lachman's, anterior drawer, posterior drawer       RADIOLOGY:  Final results and radiologist's interpretation, available in the [facility] health record.  Images were reviewed with the patient in the office today.  My personal interpretation of the performed imaging:   -  R>L knee chondrocalcinosis, minimal joint space narrowing.     EXAM: XR KNEE STANDING AP BILAT AND LATERAL RIGHT  LOCATION: [facility]  DATE: 6/6/2025     INDICATION: Chronic pain of right knee.  COMPARISON: 01/17/2023.                                                                      IMPRESSION:   RIGHT KNEE: Normal joint spaces and alignment. No fracture or joint effusion. Chondrocalcinosis.     LEFT KNEE: Frontal view shows normal  medial and lateral compartment joint spaces. Chondrocalcinosis.      Again, thank you for allowing me to participate in the care of your patient.        Sincerely,        Savage Garcia MD    Electronically signed

## 2025-06-25 ENCOUNTER — HOSPITAL ENCOUNTER (OUTPATIENT)
Dept: MRI IMAGING | Facility: CLINIC | Age: 75
Discharge: HOME | End: 2025-06-25
Attending: NURSE PRACTITIONER
Payer: COMMERCIAL

## 2025-06-25 DIAGNOSIS — M50.30 DDD (DEGENERATIVE DISC DISEASE), CERVICAL: ICD-10-CM

## 2025-06-25 DIAGNOSIS — M54.12 CERVICAL RADICULOPATHY: ICD-10-CM

## 2025-06-25 PROCEDURE — 72141 MRI NECK SPINE W/O DYE: CPT

## 2025-07-01 ENCOUNTER — RESULTS FOLLOW-UP (OUTPATIENT)
Dept: FAMILY MEDICINE | Facility: CLINIC | Age: 75
End: 2025-07-01
Payer: COMMERCIAL

## 2025-07-01 DIAGNOSIS — M50.30 DDD (DEGENERATIVE DISC DISEASE), CERVICAL: ICD-10-CM

## 2025-07-01 DIAGNOSIS — M54.12 CERVICAL RADICULOPATHY: Primary | ICD-10-CM

## 2025-07-02 ENCOUNTER — THERAPY VISIT (OUTPATIENT)
Dept: PHYSICAL THERAPY | Facility: CLINIC | Age: 75
End: 2025-07-02
Attending: NURSE PRACTITIONER
Payer: COMMERCIAL

## 2025-07-02 DIAGNOSIS — M54.12 CERVICAL RADICULOPATHY: Primary | ICD-10-CM

## 2025-07-02 PROCEDURE — 97140 MANUAL THERAPY 1/> REGIONS: CPT | Mod: GP | Performed by: PHYSICAL THERAPIST

## 2025-07-02 PROCEDURE — 97012 MECHANICAL TRACTION THERAPY: CPT | Mod: GP | Performed by: PHYSICAL THERAPIST

## 2025-07-11 ENCOUNTER — TELEPHONE (OUTPATIENT)
Dept: FAMILY MEDICINE | Facility: CLINIC | Age: 75
End: 2025-07-11
Payer: COMMERCIAL

## 2025-07-16 ENCOUNTER — THERAPY VISIT (OUTPATIENT)
Dept: PHYSICAL THERAPY | Facility: CLINIC | Age: 75
End: 2025-07-16
Attending: NURSE PRACTITIONER
Payer: COMMERCIAL

## 2025-07-16 DIAGNOSIS — S46.919A STRAIN OF SHOULDER, UNSPECIFIED LATERALITY, INITIAL ENCOUNTER: ICD-10-CM

## 2025-07-16 DIAGNOSIS — M54.12 CERVICAL RADICULOPATHY: Primary | ICD-10-CM

## 2025-07-16 PROCEDURE — 97110 THERAPEUTIC EXERCISES: CPT | Mod: GP | Performed by: PHYSICAL THERAPIST

## 2025-07-23 ENCOUNTER — MYC MEDICAL ADVICE (OUTPATIENT)
Dept: ORTHOPEDICS | Facility: CLINIC | Age: 75
End: 2025-07-23
Payer: COMMERCIAL

## 2025-07-23 DIAGNOSIS — M54.12 CERVICAL RADICULOPATHY: Primary | ICD-10-CM

## 2025-07-28 ENCOUNTER — ANCILLARY PROCEDURE (OUTPATIENT)
Dept: GENERAL RADIOLOGY | Facility: CLINIC | Age: 75
End: 2025-07-28
Attending: ORTHOPAEDIC SURGERY
Payer: COMMERCIAL

## 2025-07-28 ENCOUNTER — OFFICE VISIT (OUTPATIENT)
Dept: ORTHOPEDICS | Facility: CLINIC | Age: 75
End: 2025-07-28
Payer: COMMERCIAL

## 2025-07-28 VITALS — BODY MASS INDEX: 22.82 KG/M2 | HEIGHT: 65 IN | WEIGHT: 137 LBS

## 2025-07-28 DIAGNOSIS — M54.12 CERVICAL RADICULOPATHY: ICD-10-CM

## 2025-07-28 DIAGNOSIS — M54.12 CERVICAL RADICULOPATHY: Primary | ICD-10-CM

## 2025-07-28 DIAGNOSIS — M47.812 FACET ARTHROPATHY, CERVICAL: ICD-10-CM

## 2025-07-28 PROCEDURE — 99213 OFFICE O/P EST LOW 20 MIN: CPT | Performed by: ORTHOPAEDIC SURGERY

## 2025-07-28 PROCEDURE — 72040 X-RAY EXAM NECK SPINE 2-3 VW: CPT | Mod: TC | Performed by: STUDENT IN AN ORGANIZED HEALTH CARE EDUCATION/TRAINING PROGRAM

## 2025-07-28 NOTE — LETTER
7/28/2025      Chyna Moncada  [address] [facility] [address]      Dear Colleague,    Thank you for referring your patient, Chyna Moncada, to the [facility] [place]. Please see a copy of my visit note below.    Spine Surgery Consultation    REFERRING PHYSICIAN: Nereida Jacobs   PRIMARY CARE PHYSICIAN: Nereida Jacobs    Bone [facility] Medication.   DEXA showed osteopenia.   Fosamax  Stopped Vitamin D since it is high           Chief Complaint:   Consult (upper back pain, neck pain / Nereida Jacobs NP @ [facility] )    History of Present Illness:     Chyna Moncada is a 74 year old female who presents today for evaluation of neck pain.     She describes neck pain without radiating symptoms down her arms.This has been ongoing for some time now atraumatically. She has had balance changes for 5 years that has gotten worse recently, >1 year ago. She has also been dropping things frequently. She can feel when she is holding something and drops things 3-4 times. The dexterity changes are newer that the balance. She points that it radiates to the periscapular region R>L. She has done PT. She describes the pain worsens with extension and flexion. PT exercises has improved her ROM  and ability to turn her head. She denies bowel/bladder changes. She describes her pain is a 2 and if she does house work and yard work the pain is exacerbated to 8-9/10. With any activities she is really sore    Past treatments tried:  - Physical therapy: currently in PT  - Injections: denies any cervical injection.   - Medications: PRN arthritis tylenol. Use to use a muscle relaxer. Flexeril makes her dizziy.     Oswestry (YANET) Questionnaire        2/10/2025     1:04 PM   OSWESTRY DISABILITY INDEX   Count 9    Sum 20    Oswestry Score (%) 44.44 %        Proxy-reported         7/28/2025    12:58 PM   Neck Disability Index (  Sumeet H. and Laura C. 1991. All rights reserved.; used with permission)    SECTION 1 - PAIN INTENSITY 1   SECTION 2 - PERSONAL CARE 2   SECTION 3 - LIFTING 0   SECTION 4 - READING 2   SECTION 5 - HEADACHES 2   SECTION 6 - CONCENTRATION 0   SECTION 7 - WORK 1   SECTION 8 - DRIVING 1   SECTION 9 - SLEEPING 2   SECTION 10 - RECREATION 2   Count 10    Sum 13    Raw Score: /50 13    Neck Disability Index Score: (%) 26 %        Patient-reported     Visual Analog Scale (VAS) Questionnaire        2025     1:04 PM   VISUAL ANALOG PAIN SCALE   Back Pain Scale 0-10 0   Right leg pain 0   Left leg pain 0   Neck Pain Scale 0-10 2   Right arm pain 0   Left arm pain 0               Past Medical History:     Past Medical History:   Diagnosis Date     Cerebral embolism with cerebral infarction (H)      Symptomatic menopausal or female climacteric states 2007            Past Surgical History:     Past Surgical History:   Procedure Laterality Date     COLONOSCOPY N/A 2023    Procedure: Colonoscopy;  Surgeon: Isak Paulino MD;  Location: [facility] [facility]     HYSTERECTOMY, PAP NO LONGER INDICATED              Social History:     Social History     Tobacco Use     Smoking status: Former     Current packs/day: 0.00     Types: Cigarettes     Quit date: 1/10/1983     Years since quittin.5     Smokeless tobacco: Never   Substance Use Topics     Alcohol use: Not Currently     Alcohol/week: 1.0 standard drink of alcohol     Types: 1 Glasses of wine per week            Family History:     Family History   Problem Relation Age of Onset     Arthritis Mother         was told was RA     Cancer Mother         GYN     Other - See Comments Mother         phlebitis for which hospitalized several times     Heart Disease Father      Cancer Brother         throat     Alcohol/Drug Maternal Grandfather      Heart Disease Paternal Grandfather         Heart attack     Diabetes Paternal Grandfather             Allergies:     Allergies   Allergen Reactions     Tramadol Anaphylaxis     Benadryl [Diphenhydramine  Hcl] Other (See Comments)     Made her goofy     Caffeine Other (See Comments)     Jittery       Codeine Nausea and Vomiting     Darvocet [Propoxyphene N-Apap]      Other reaction(s): GI Upset     Diphenhydramine Other (See Comments)     Lovastatin Other (See Comments) and Muscle Pain (Myalgia)     Other reaction(s): Myalgia  pain     Percocet [Oxycodone-Acetaminophen] Other (See Comments)     Other reaction(s): Dizziness  Sick or dizzy?     Vicodin [Hydrocodone-Acetaminophen] Other (See Comments) and Nausea     Sick or dizzy?            Medications:     Current Outpatient Medications   Medication Sig Dispense Refill     alendronate (FOSAMAX) 70 MG tablet Take 1 tablet (70 mg) by mouth every 7 days. 12 tablet 3     artificial tears OINT ophthalmic ointment at bedtime.       aspirin EC 81 MG tablet Take 81 mg by mouth daily        calcium carbonate (TUMS) 500 MG chewable tablet Take 2 chew tab by mouth daily.       cyclobenzaprine (FLEXERIL) 5 MG tablet Take 1 tablet (5 mg) by mouth 2 times daily as needed for muscle spasms. 60 tablet 3     Inulin 2 g CHEW        mometasone (NASONEX) 50 MCG/ACT spray Spray 2 sprays in nostril daily        Multiple Vitamin (MULTI-VITAMINS) TABS Take 1 tablet by mouth daily       multivitamin (OCUVITE) TABS tablet Take 1 tablet by mouth daily       pravastatin (PRAVACHOL) 40 MG tablet TAKE ONE TABLET BY MOUTH EVERY NIGHT AT BEDTIME 90 tablet 3     valACYclovir (VALTREX) 1000 mg tablet TAKE TWO TABLETS BY MOUTH TWICE A DAY 4 tablet 11     meloxicam (MOBIC) 7.5 MG tablet TAKE 1 TABLET (7.5 MG) BY MOUTH DAILY. (Patient not taking: Reported on 7/28/2025) 30 tablet 0     ondansetron (ZOFRAN ODT) 4 MG ODT tab Take 1 tablet (4 mg) by mouth every 8 hours as needed for nausea. (Patient not taking: Reported on 7/28/2025) 20 tablet 0     No current facility-administered medications for this visit.             Review of Systems:     A 10 point ROS was performed and reviewed.  See HPI and Epic  "intake form for pertinent positive.          Physical Exam:   Vitals: Ht 1.65 m (5' 4.96\")   Wt 62.1 kg (137 lb)   LMP 05/06/2001   BMI 22.83 kg/m    Constitutional: Patient is healthy, well-nourished and appears stated age.  Respiratory: Patient is breathing normally and in no respiratory distress.  Skin: No suspicious rashes or lesions  Gait: ataxic tandem gait.   Neurologic - Sensation intact to light touch bilaterally. Romberg +. Deep tendon reflexes 3+ bicep and tricep. Maloney positive.   Musculoskeletal: Strength: 5/5 bilateral deltoid bicep tricep and   Spine: overall good sagittal balance  Cervical spine:  Normal lordosis  Midline tenderness at baseline, periscapular tenderness (rhomboids).   Pain with extension  Lhermitte's Test: negative  Spurling's Test: +         Imaging:   I independently reviewed and interpreted the following imaging at this clinic visit which were also reviewed with patient    MRI Cervical   C4-7 spondylosis, C4-7 central canal stenosis with cord shape change, foraminal stenosis noted throughout this levels. C4-5 anteriorlisthesis improved when flat on table. Facet arthropathy noted C4-7    Xrays Cervical 2 views.   C4-7 spondylosis, degenerative disc disease C5-6  C4-5 spondylolisthesis, C5-6 retrolisthesis on dynamic films     Assessment:     74 year old female with     Cervical myoradiculopathy      She has cervicalgia and myelopathy due to advanced cervical spondylosis. C4-5 she has anterolisthesis and C5-6 retrolisthesis that noted changed with flexion/extension. She has re onset of her cervical radicular pain with extension and flexion. MRI shows cevere canal stenosis C5-7, foraminal stenosis bilateral C5-7, mild-moderate C4-5 canal stenosis. She has cord shape change as well. It is believed that her pain radiating to periscapular could be a C6 pattern. We discussed conservative management vs surgical and risk involved with both approaches. We discussed persuing facet " injections and she would like to do that later. Plan to continue working with therapy. We will plan to follow up on her myelopathy in 6 months.      Plan:     RTC in 6 months  Continue PT  May pursue cervical medial branch blocks prior to follow up for neck pain. Patient to call to pursue.    Plan formulated with Dr. Maria who saw patient and examined the patient and reviewed imaging. We used the patient's imaging and models to explain their pathophysiology and treatment options. All the patient's questions were answered to their full satisfaction.     Thank you for allowing me to be a part of this patient's care.    Bishop CUATE Miller PA-C   Orthopedic Spine Surgery    ATTESTATION:  Chyna Moncada is a 74 year old female with cervical degenerative disc disease, spondylolisthesis, early degenerative cervical myelopathy.  At this point I recommended referral to physical therapy for balance and proprioception training, chin tucks and postural exercises.  If neck pain from facet arthropathy persists we will consider cervical facet joint injections.  Reviewed natural history of cervical myelopathy which is stepwise progression.  If symptoms worsen will discuss surgery to decompress the spinal cord. This would be three level ACDF.     I reviewed the patient's history, physical examination and imaging studies with the Physician Assistant.  In addition I individually examined the patient and developed the treatment plan. I personally performed the substantive portion of the clinical encounter today including developing the treatment plan as well as counseling the patient regarding treatment options.  I agree with the assessment and plan as documented.     Time spent on this clinical encounter by me, independent of the PA, was 20 minutes. This included chart review, history and physical examination, patient counseling, care coordination and documentation.     Jovanny Maria MD  Orthopedic Spine  Surgeon  , Department of Orthopedic Surgery        Again, thank you for allowing me to participate in the care of your patient.        Sincerely,        Jovanny Maria MD    Electronically signed

## 2025-07-28 NOTE — NURSING NOTE
"Reason For Visit:   Chief Complaint   Patient presents with    Consult     upper back pain, neck pain / Nereida Jacobs NP @ [facility]        Primary MD: Nereida Jacobs  Ref. MD: Nereida Jacobs    ?  No  Occupation Retired.    Date of injury: No  Type of injury: No.    Date of surgery: No  Type of surgery: No.    Smoker: No  Request smoking cessation information: No    Ht 1.65 m (5' 4.96\")   Wt 62.1 kg (137 lb)   LMP 05/06/2001   BMI 22.83 kg/m      Pain Assessment  Patient Currently in Pain: Yes (2/10)  Primary Pain Location: Neck    Oswestry (YANET) Questionnaire        2/10/2025     1:04 PM   OSWESTRY DISABILITY INDEX   Count 9    Sum 20    Oswestry Score (%) 44.44 %        Proxy-reported            Neck Disability Index (NDI) Questionnaire        7/28/2025    12:58 PM   Neck Disability Index (NDI)   Neck Disability Index: Count 10   NDI: Total Score = SUM (points for all 10 findings) 13   Neck Disability in Percent = (Total Score) / 50 * 100 26 (%)              Visual Analog Pain Scale  Back Pain Scale 0-10: 0  Right leg pain: 0  Left leg pain: 0  Neck Pain Scale 0-10: 2  Right arm pain: 0  Left arm pain: 0    Promis 10 Assessment         No data to display                         Majo Ni LPN   "

## 2025-07-28 NOTE — PROGRESS NOTES
Spine Surgery Consultation    REFERRING PHYSICIAN: Nereida Jacobs   PRIMARY CARE PHYSICIAN: Nereida Jacobs    Bone Health Medication.   DEXA showed osteopenia.   Fosamax  Stopped Vitamin D since it is high           Chief Complaint:   Consult (upper back pain, neck pain / Nereida Jacobs NP @ [facility] )    History of Present Illness:     Chyna Moncada is a 74 year old female who presents today for evaluation of neck pain.     She describes neck pain without radiating symptoms down her arms.This has been ongoing for some time now atraumatically. She has had balance changes for 5 years that has gotten worse recently, >1 year ago. She has also been dropping things frequently. She can feel when she is holding something and drops things 3-4 times. The dexterity changes are newer that the balance. She points that it radiates to the periscapular region R>L. She has done PT. She describes the pain worsens with extension and flexion. PT exercises has improved her ROM  and ability to turn her head. She denies bowel/bladder changes. She describes her pain is a 2 and if she does house work and yard work the pain is exacerbated to 8-9/10. With any activities she is really sore    Past treatments tried:  - Physical therapy: currently in PT  - Injections: denies any cervical injection.   - Medications: PRN arthritis tylenol. Use to use a muscle relaxer. Flexeril makes her dizziy.     Oswestry (YANET) Questionnaire        2/10/2025     1:04 PM   OSWESTRY DISABILITY INDEX   Count 9    Sum 20    Oswestry Score (%) 44.44 %        Proxy-reported         7/28/2025    12:58 PM   Neck Disability Index (  Sumeet H. and Laura GONZALEZ. 1991. All rights reserved.; used with permission)   SECTION 1 - PAIN INTENSITY 1   SECTION 2 - PERSONAL CARE 2   SECTION 3 - LIFTING 0   SECTION 4 - READING 2   SECTION 5 - HEADACHES 2   SECTION 6 - CONCENTRATION 0   SECTION 7 - WORK 1   SECTION 8 - DRIVING 1   SECTION 9 - SLEEPING 2   SECTION 10 - RECREATION  2   Count 10    Sum 13    Raw Score: /50 13    Neck Disability Index Score: (%) 26 %        Patient-reported     Visual Analog Scale (VAS) Questionnaire        2025     1:04 PM   VISUAL ANALOG PAIN SCALE   Back Pain Scale 0-10 0   Right leg pain 0   Left leg pain 0   Neck Pain Scale 0-10 2   Right arm pain 0   Left arm pain 0               Past Medical History:     Past Medical History:   Diagnosis Date    Cerebral embolism with cerebral infarction (H)     Symptomatic menopausal or female climacteric states 2007            Past Surgical History:     Past Surgical History:   Procedure Laterality Date    COLONOSCOPY N/A 2023    Procedure: Colonoscopy;  Surgeon: Isak Paulino MD;  Location: [facility] [facility]    HYSTERECTOMY, PAP NO LONGER INDICATED              Social History:     Social History     Tobacco Use    Smoking status: Former     Current packs/day: 0.00     Types: Cigarettes     Quit date: 1/10/1983     Years since quittin.5    Smokeless tobacco: Never   Substance Use Topics    Alcohol use: Not Currently     Alcohol/week: 1.0 standard drink of alcohol     Types: 1 Glasses of wine per week            Family History:     Family History   Problem Relation Age of Onset    Arthritis Mother         was told was RA    Cancer Mother         GYN    Other - See Comments Mother         phlebitis for which hospitalized several times    Heart Disease Father     Cancer Brother         throat    Alcohol/Drug Maternal Grandfather     Heart Disease Paternal Grandfather         Heart attack    Diabetes Paternal Grandfather             Allergies:     Allergies   Allergen Reactions    Tramadol Anaphylaxis    Benadryl [Diphenhydramine Hcl] Other (See Comments)     Made her goofy    Caffeine Other (See Comments)     Jittery      Codeine Nausea and Vomiting    Darvocet [Propoxyphene N-Apap]      Other reaction(s): GI Upset    Diphenhydramine Other (See Comments)    Lovastatin Other (See Comments) and Muscle Pain  "(Myalgia)     Other reaction(s): Myalgia  pain    Percocet [Oxycodone-Acetaminophen] Other (See Comments)     Other reaction(s): Dizziness  Sick or dizzy?    Vicodin [Hydrocodone-Acetaminophen] Other (See Comments) and Nausea     Sick or dizzy?            Medications:     Current Outpatient Medications   Medication Sig Dispense Refill    alendronate (FOSAMAX) 70 MG tablet Take 1 tablet (70 mg) by mouth every 7 days. 12 tablet 3    artificial tears OINT ophthalmic ointment at bedtime.      aspirin EC 81 MG tablet Take 81 mg by mouth daily       calcium carbonate (TUMS) 500 MG chewable tablet Take 2 chew tab by mouth daily.      cyclobenzaprine (FLEXERIL) 5 MG tablet Take 1 tablet (5 mg) by mouth 2 times daily as needed for muscle spasms. 60 tablet 3    Inulin 2 g CHEW       mometasone (NASONEX) 50 MCG/ACT spray Spray 2 sprays in nostril daily       Multiple Vitamin (MULTI-VITAMINS) TABS Take 1 tablet by mouth daily      multivitamin (OCUVITE) TABS tablet Take 1 tablet by mouth daily      pravastatin (PRAVACHOL) 40 MG tablet TAKE ONE TABLET BY MOUTH EVERY NIGHT AT BEDTIME 90 tablet 3    valACYclovir (VALTREX) 1000 mg tablet TAKE TWO TABLETS BY MOUTH TWICE A DAY 4 tablet 11    meloxicam (MOBIC) 7.5 MG tablet TAKE 1 TABLET (7.5 MG) BY MOUTH DAILY. (Patient not taking: Reported on 7/28/2025) 30 tablet 0    ondansetron (ZOFRAN ODT) 4 MG ODT tab Take 1 tablet (4 mg) by mouth every 8 hours as needed for nausea. (Patient not taking: Reported on 7/28/2025) 20 tablet 0     No current facility-administered medications for this visit.             Review of Systems:     A 10 point ROS was performed and reviewed.  See HPI and Epic intake form for pertinent positive.          Physical Exam:   Vitals: Ht 1.65 m (5' 4.96\")   Wt 62.1 kg (137 lb)   LMP 05/06/2001   BMI 22.83 kg/m    Constitutional: Patient is healthy, well-nourished and appears stated age.  Respiratory: Patient is breathing normally and in no respiratory " distress.  Skin: No suspicious rashes or lesions  Gait: ataxic tandem gait.   Neurologic - Sensation intact to light touch bilaterally. Romberg +. Deep tendon reflexes 3+ bicep and tricep. Maloney positive.   Musculoskeletal: Strength: 5/5 bilateral deltoid bicep tricep and   Spine: overall good sagittal balance  Cervical spine:  Normal lordosis  Midline tenderness at baseline, periscapular tenderness (rhomboids).   Pain with extension  Lhermitte's Test: negative  Spurling's Test: +         Imaging:   I independently reviewed and interpreted the following imaging at this clinic visit which were also reviewed with patient    MRI Cervical   C4-7 spondylosis, C4-7 central canal stenosis with cord shape change, foraminal stenosis noted throughout this levels. C4-5 anteriorlisthesis improved when flat on table. Facet arthropathy noted C4-7    Xrays Cervical 2 views.   C4-7 spondylosis, degenerative disc disease C5-6  C4-5 spondylolisthesis, C5-6 retrolisthesis on dynamic films     Assessment:     74 year old female with     Cervical myoradiculopathy      She has cervicalgia and myelopathy due to advanced cervical spondylosis. C4-5 she has anterolisthesis and C5-6 retrolisthesis that noted changed with flexion/extension. She has re onset of her cervical radicular pain with extension and flexion. MRI shows cevere canal stenosis C5-7, foraminal stenosis bilateral C5-7, mild-moderate C4-5 canal stenosis. She has cord shape change as well. It is believed that her pain radiating to periscapular could be a C6 pattern. We discussed conservative management vs surgical and risk involved with both approaches. We discussed persuing facet injections and she would like to do that later. Plan to continue working with therapy. We will plan to follow up on her myelopathy in 6 months.      Plan:     RTC in 6 months  Continue PT  May pursue cervical medial branch blocks prior to follow up for neck pain. Patient to call to  pursue.    Plan formulated with Dr. Maria who saw patient and examined the patient and reviewed imaging. We used the patient's imaging and models to explain their pathophysiology and treatment options. All the patient's questions were answered to their full satisfaction.     Thank you for allowing me to be a part of this patient's care.    Bishop CUATE Miller PA-C   Orthopedic Spine Surgery    ATTESTATION:  Chyna Moncada is a 74 year old female with cervical degenerative disc disease, spondylolisthesis, early degenerative cervical myelopathy.  At this point I recommended referral to physical therapy for balance and proprioception training, chin tucks and postural exercises.  If neck pain from facet arthropathy persists we will consider cervical facet joint injections.  Reviewed natural history of cervical myelopathy which is stepwise progression.  If symptoms worsen will discuss surgery to decompress the spinal cord. This would be three level ACDF.     I reviewed the patient's history, physical examination and imaging studies with the Physician Assistant.  In addition I individually examined the patient and developed the treatment plan. I personally performed the substantive portion of the clinical encounter today including developing the treatment plan as well as counseling the patient regarding treatment options.  I agree with the assessment and plan as documented.     Time spent on this clinical encounter by me, independent of the PA, was 20 minutes. This included chart review, history and physical examination, patient counseling, care coordination and documentation.     Jovanny Maria MD  Orthopedic Spine Surgeon  , Department of Orthopedic Surgery

## 2025-07-30 ENCOUNTER — THERAPY VISIT (OUTPATIENT)
Dept: PHYSICAL THERAPY | Facility: CLINIC | Age: 75
End: 2025-07-30
Attending: NURSE PRACTITIONER
Payer: COMMERCIAL

## 2025-07-30 DIAGNOSIS — M47.812 FACET ARTHROPATHY, CERVICAL: ICD-10-CM

## 2025-07-30 DIAGNOSIS — R26.81 UNSTEADY GAIT: ICD-10-CM

## 2025-07-30 DIAGNOSIS — M54.12 CERVICAL RADICULOPATHY: Primary | ICD-10-CM

## 2025-07-30 PROCEDURE — 97112 NEUROMUSCULAR REEDUCATION: CPT | Mod: GP | Performed by: PHYSICAL THERAPIST

## 2025-07-30 PROCEDURE — 97140 MANUAL THERAPY 1/> REGIONS: CPT | Mod: GP | Performed by: PHYSICAL THERAPIST

## 2025-08-07 ENCOUNTER — OFFICE VISIT (OUTPATIENT)
Dept: FAMILY MEDICINE | Facility: CLINIC | Age: 75
End: 2025-08-07
Payer: COMMERCIAL

## 2025-08-07 VITALS
OXYGEN SATURATION: 100 % | RESPIRATION RATE: 20 BRPM | BODY MASS INDEX: 22.66 KG/M2 | SYSTOLIC BLOOD PRESSURE: 102 MMHG | WEIGHT: 136 LBS | HEART RATE: 84 BPM | DIASTOLIC BLOOD PRESSURE: 62 MMHG | TEMPERATURE: 97.4 F | HEIGHT: 65 IN

## 2025-08-07 DIAGNOSIS — R53.83 OTHER FATIGUE: ICD-10-CM

## 2025-08-07 DIAGNOSIS — E55.9 VITAMIN D DEFICIENCY, UNSPECIFIED: ICD-10-CM

## 2025-08-07 DIAGNOSIS — R79.9 ABNORMAL FINDING OF BLOOD CHEMISTRY, UNSPECIFIED: ICD-10-CM

## 2025-08-07 DIAGNOSIS — M79.672 LEFT FOOT PAIN: Primary | ICD-10-CM

## 2025-08-07 LAB
ALBUMIN SERPL BCG-MCNC: 4.2 G/DL (ref 3.5–5.2)
ALP SERPL-CCNC: 94 U/L (ref 40–150)
ALT SERPL W P-5'-P-CCNC: 27 U/L (ref 0–50)
ANION GAP SERPL CALCULATED.3IONS-SCNC: 9 MMOL/L (ref 7–15)
AST SERPL W P-5'-P-CCNC: 31 U/L (ref 0–45)
BILIRUB SERPL-MCNC: 0.7 MG/DL
BUN SERPL-MCNC: 17.1 MG/DL (ref 8–23)
CALCIUM SERPL-MCNC: 9.3 MG/DL (ref 8.8–10.4)
CHLORIDE SERPL-SCNC: 104 MMOL/L (ref 98–107)
CREAT SERPL-MCNC: 0.73 MG/DL (ref 0.51–0.95)
EGFRCR SERPLBLD CKD-EPI 2021: 86 ML/MIN/1.73M2
ERYTHROCYTE [DISTWIDTH] IN BLOOD BY AUTOMATED COUNT: 12.8 % (ref 10–15)
ERYTHROCYTE [SEDIMENTATION RATE] IN BLOOD BY WESTERGREN METHOD: 8 MM/HR (ref 0–30)
GLUCOSE SERPL-MCNC: 103 MG/DL (ref 70–99)
HCO3 SERPL-SCNC: 29 MMOL/L (ref 22–29)
HCT VFR BLD AUTO: 40.7 % (ref 35–47)
HGB BLD-MCNC: 13.3 G/DL (ref 11.7–15.7)
MAGNESIUM SERPL-MCNC: 2 MG/DL (ref 1.7–2.3)
MCH RBC QN AUTO: 28.5 PG (ref 26.5–33)
MCHC RBC AUTO-ENTMCNC: 32.7 G/DL (ref 31.5–36.5)
MCV RBC AUTO: 87 FL (ref 78–100)
PLATELET # BLD AUTO: 144 10E3/UL (ref 150–450)
POTASSIUM SERPL-SCNC: 4.9 MMOL/L (ref 3.4–5.3)
PROT SERPL-MCNC: 6.8 G/DL (ref 6.4–8.3)
RBC # BLD AUTO: 4.67 10E6/UL (ref 3.8–5.2)
SODIUM SERPL-SCNC: 142 MMOL/L (ref 135–145)
TSH SERPL DL<=0.005 MIU/L-ACNC: 1.07 UIU/ML (ref 0.3–4.2)
VIT D+METAB SERPL-MCNC: 61 NG/ML (ref 20–50)
WBC # BLD AUTO: 5.9 10E3/UL (ref 4–11)

## 2025-08-07 PROCEDURE — 36415 COLL VENOUS BLD VENIPUNCTURE: CPT | Performed by: NURSE PRACTITIONER

## 2025-08-07 PROCEDURE — 3074F SYST BP LT 130 MM HG: CPT | Performed by: NURSE PRACTITIONER

## 2025-08-07 PROCEDURE — 85652 RBC SED RATE AUTOMATED: CPT | Performed by: NURSE PRACTITIONER

## 2025-08-07 PROCEDURE — 85027 COMPLETE CBC AUTOMATED: CPT | Performed by: NURSE PRACTITIONER

## 2025-08-07 PROCEDURE — 3044F HG A1C LEVEL LT 7.0%: CPT | Performed by: NURSE PRACTITIONER

## 2025-08-07 PROCEDURE — 84443 ASSAY THYROID STIM HORMONE: CPT | Performed by: NURSE PRACTITIONER

## 2025-08-07 PROCEDURE — G2211 COMPLEX E/M VISIT ADD ON: HCPCS | Performed by: NURSE PRACTITIONER

## 2025-08-07 PROCEDURE — 99214 OFFICE O/P EST MOD 30 MIN: CPT | Performed by: NURSE PRACTITIONER

## 2025-08-07 PROCEDURE — 80053 COMPREHEN METABOLIC PANEL: CPT | Performed by: NURSE PRACTITIONER

## 2025-08-07 PROCEDURE — 83735 ASSAY OF MAGNESIUM: CPT | Performed by: NURSE PRACTITIONER

## 2025-08-07 PROCEDURE — 1125F AMNT PAIN NOTED PAIN PRSNT: CPT | Performed by: NURSE PRACTITIONER

## 2025-08-07 PROCEDURE — 3078F DIAST BP <80 MM HG: CPT | Performed by: NURSE PRACTITIONER

## 2025-08-07 PROCEDURE — 83036 HEMOGLOBIN GLYCOSYLATED A1C: CPT | Performed by: NURSE PRACTITIONER

## 2025-08-07 PROCEDURE — 86618 LYME DISEASE ANTIBODY: CPT | Performed by: NURSE PRACTITIONER

## 2025-08-07 PROCEDURE — 82306 VITAMIN D 25 HYDROXY: CPT | Performed by: NURSE PRACTITIONER

## 2025-08-07 ASSESSMENT — PAIN SCALES - GENERAL: PAINLEVEL_OUTOF10: SEVERE PAIN (7)

## 2025-08-08 LAB
EST. AVERAGE GLUCOSE BLD GHB EST-MCNC: 114 MG/DL
HBA1C MFR BLD: 5.6 % (ref 0–5.6)

## 2025-08-11 ENCOUNTER — THERAPY VISIT (OUTPATIENT)
Dept: PHYSICAL THERAPY | Facility: CLINIC | Age: 75
End: 2025-08-11
Attending: NURSE PRACTITIONER
Payer: COMMERCIAL

## 2025-08-11 DIAGNOSIS — M54.12 CERVICAL RADICULOPATHY: Primary | ICD-10-CM

## 2025-08-11 LAB — B BURGDOR IGG+IGM SER QL: 0.22

## 2025-08-11 PROCEDURE — 97112 NEUROMUSCULAR REEDUCATION: CPT | Mod: GP | Performed by: PHYSICAL THERAPIST

## 2025-08-11 PROCEDURE — 97110 THERAPEUTIC EXERCISES: CPT | Mod: GP | Performed by: PHYSICAL THERAPIST

## 2025-08-13 ENCOUNTER — OFFICE VISIT (OUTPATIENT)
Dept: ORTHOPEDICS | Facility: CLINIC | Age: 75
End: 2025-08-13
Payer: COMMERCIAL

## 2025-08-13 DIAGNOSIS — M17.11 PRIMARY OSTEOARTHRITIS OF RIGHT KNEE: Primary | ICD-10-CM

## 2025-08-13 PROCEDURE — 20610 DRAIN/INJ JOINT/BURSA W/O US: CPT | Mod: RT | Performed by: STUDENT IN AN ORGANIZED HEALTH CARE EDUCATION/TRAINING PROGRAM

## 2025-08-13 PROCEDURE — 99214 OFFICE O/P EST MOD 30 MIN: CPT | Mod: 25 | Performed by: STUDENT IN AN ORGANIZED HEALTH CARE EDUCATION/TRAINING PROGRAM

## 2025-08-13 RX ORDER — BETAMETHASONE SODIUM PHOSPHATE AND BETAMETHASONE ACETATE 3; 3 MG/ML; MG/ML
6 INJECTION, SUSPENSION INTRA-ARTICULAR; INTRALESIONAL; INTRAMUSCULAR; SOFT TISSUE
Status: COMPLETED | OUTPATIENT
Start: 2025-08-13 | End: 2025-08-13

## 2025-08-13 RX ORDER — ROPIVACAINE HYDROCHLORIDE 5 MG/ML
4 INJECTION, SOLUTION EPIDURAL; INFILTRATION; PERINEURAL
Status: COMPLETED | OUTPATIENT
Start: 2025-08-13 | End: 2025-08-13

## 2025-08-13 RX ADMIN — BETAMETHASONE SODIUM PHOSPHATE AND BETAMETHASONE ACETATE 6 MG: 3; 3 INJECTION, SUSPENSION INTRA-ARTICULAR; INTRALESIONAL; INTRAMUSCULAR; SOFT TISSUE at 08:42

## 2025-08-13 RX ADMIN — ROPIVACAINE HYDROCHLORIDE 4 ML: 5 INJECTION, SOLUTION EPIDURAL; INFILTRATION; PERINEURAL at 08:42

## (undated) RX ORDER — GLYCOPYRROLATE 0.2 MG/ML
INJECTION, SOLUTION INTRAMUSCULAR; INTRAVENOUS
Status: DISPENSED
Start: 2023-08-17